# Patient Record
Sex: FEMALE | Race: OTHER | NOT HISPANIC OR LATINO
[De-identification: names, ages, dates, MRNs, and addresses within clinical notes are randomized per-mention and may not be internally consistent; named-entity substitution may affect disease eponyms.]

---

## 2018-05-15 ENCOUNTER — CHART COPY (OUTPATIENT)
Age: 68
End: 2018-05-15

## 2018-05-15 DIAGNOSIS — R29.898 OTHER SYMPTOMS AND SIGNS INVOLVING THE MUSCULOSKELETAL SYSTEM: ICD-10-CM

## 2018-05-20 ENCOUNTER — APPOINTMENT (OUTPATIENT)
Dept: MRI IMAGING | Facility: CLINIC | Age: 68
End: 2018-05-20
Payer: MEDICARE

## 2018-05-20 ENCOUNTER — OUTPATIENT (OUTPATIENT)
Dept: OUTPATIENT SERVICES | Facility: HOSPITAL | Age: 68
LOS: 1 days | End: 2018-05-20
Payer: MEDICARE

## 2018-05-20 DIAGNOSIS — R29.898 OTHER SYMPTOMS AND SIGNS INVOLVING THE MUSCULOSKELETAL SYSTEM: ICD-10-CM

## 2018-05-20 PROCEDURE — 72148 MRI LUMBAR SPINE W/O DYE: CPT

## 2018-05-20 PROCEDURE — 72148 MRI LUMBAR SPINE W/O DYE: CPT | Mod: 26

## 2018-05-21 ENCOUNTER — APPOINTMENT (OUTPATIENT)
Dept: SPINE | Facility: CLINIC | Age: 68
End: 2018-05-21
Payer: MEDICARE

## 2018-05-21 VITALS
HEART RATE: 59 BPM | DIASTOLIC BLOOD PRESSURE: 76 MMHG | BODY MASS INDEX: 33.49 KG/M2 | HEIGHT: 62 IN | WEIGHT: 182 LBS | SYSTOLIC BLOOD PRESSURE: 135 MMHG

## 2018-05-21 DIAGNOSIS — R26.89 OTHER ABNORMALITIES OF GAIT AND MOBILITY: ICD-10-CM

## 2018-05-21 DIAGNOSIS — R29.2 ABNORMAL REFLEX: ICD-10-CM

## 2018-05-21 PROCEDURE — 99213 OFFICE O/P EST LOW 20 MIN: CPT

## 2018-05-21 RX ORDER — FOLIC ACID 1 MG/1
1 TABLET ORAL
Qty: 30 | Refills: 0 | Status: ACTIVE | COMMUNITY
Start: 2017-10-06

## 2018-05-21 RX ORDER — METOPROLOL TARTRATE 75 MG/1
TABLET, FILM COATED ORAL
Refills: 0 | Status: ACTIVE | COMMUNITY

## 2018-05-21 RX ORDER — GABAPENTIN 900 MG/1
TABLET, FILM COATED ORAL
Refills: 0 | Status: ACTIVE | COMMUNITY

## 2018-05-21 RX ORDER — PAROXETINE HYDROCHLORIDE 40 MG/1
40 TABLET, FILM COATED ORAL
Refills: 0 | Status: ACTIVE | COMMUNITY

## 2018-05-21 RX ORDER — SIMVASTATIN 80 MG/1
TABLET, FILM COATED ORAL
Refills: 0 | Status: ACTIVE | COMMUNITY

## 2018-05-27 ENCOUNTER — APPOINTMENT (OUTPATIENT)
Dept: MRI IMAGING | Facility: CLINIC | Age: 68
End: 2018-05-27
Payer: MEDICARE

## 2018-05-27 ENCOUNTER — OUTPATIENT (OUTPATIENT)
Dept: OUTPATIENT SERVICES | Facility: HOSPITAL | Age: 68
LOS: 1 days | End: 2018-05-27
Payer: MEDICARE

## 2018-05-27 DIAGNOSIS — M54.9 DORSALGIA, UNSPECIFIED: ICD-10-CM

## 2018-05-27 PROCEDURE — 72141 MRI NECK SPINE W/O DYE: CPT

## 2018-05-27 PROCEDURE — 70551 MRI BRAIN STEM W/O DYE: CPT

## 2018-05-27 PROCEDURE — 70551 MRI BRAIN STEM W/O DYE: CPT | Mod: 26

## 2018-05-27 PROCEDURE — 72141 MRI NECK SPINE W/O DYE: CPT | Mod: 26

## 2018-06-01 ENCOUNTER — APPOINTMENT (OUTPATIENT)
Dept: SPINE | Facility: CLINIC | Age: 68
End: 2018-06-01
Payer: MEDICARE

## 2018-06-01 VITALS
HEIGHT: 62 IN | SYSTOLIC BLOOD PRESSURE: 125 MMHG | HEART RATE: 57 BPM | DIASTOLIC BLOOD PRESSURE: 84 MMHG | WEIGHT: 182 LBS | BODY MASS INDEX: 33.49 KG/M2

## 2018-06-01 PROCEDURE — 99213 OFFICE O/P EST LOW 20 MIN: CPT

## 2018-06-01 RX ORDER — AMLODIPINE BESYLATE 5 MG/1
TABLET ORAL
Refills: 0 | Status: ACTIVE | COMMUNITY

## 2018-06-01 RX ORDER — BENAZEPRIL HYDROCHLORIDE 5 MG/1
TABLET ORAL
Refills: 0 | Status: ACTIVE | COMMUNITY

## 2018-06-02 ENCOUNTER — OUTPATIENT (OUTPATIENT)
Dept: OUTPATIENT SERVICES | Facility: HOSPITAL | Age: 68
LOS: 1 days | End: 2018-06-02
Payer: MEDICARE

## 2018-06-02 ENCOUNTER — APPOINTMENT (OUTPATIENT)
Dept: CT IMAGING | Facility: CLINIC | Age: 68
End: 2018-06-02
Payer: MEDICARE

## 2018-06-02 DIAGNOSIS — G95.9 DISEASE OF SPINAL CORD, UNSPECIFIED: ICD-10-CM

## 2018-06-02 PROCEDURE — 72125 CT NECK SPINE W/O DYE: CPT | Mod: 26

## 2018-06-02 PROCEDURE — 72125 CT NECK SPINE W/O DYE: CPT

## 2018-07-01 ENCOUNTER — OUTPATIENT (OUTPATIENT)
Dept: OUTPATIENT SERVICES | Facility: HOSPITAL | Age: 68
LOS: 1 days | End: 2018-07-01
Payer: MEDICARE

## 2018-07-01 PROCEDURE — G9001: CPT

## 2018-07-10 ENCOUNTER — OUTPATIENT (OUTPATIENT)
Dept: OUTPATIENT SERVICES | Facility: HOSPITAL | Age: 68
LOS: 1 days | End: 2018-07-10
Payer: MEDICARE

## 2018-07-10 VITALS
HEART RATE: 57 BPM | DIASTOLIC BLOOD PRESSURE: 58 MMHG | TEMPERATURE: 98 F | HEIGHT: 62 IN | OXYGEN SATURATION: 98 % | SYSTOLIC BLOOD PRESSURE: 112 MMHG | WEIGHT: 190.04 LBS | RESPIRATION RATE: 16 BRPM

## 2018-07-10 DIAGNOSIS — N39.0 URINARY TRACT INFECTION, SITE NOT SPECIFIED: Chronic | ICD-10-CM

## 2018-07-10 DIAGNOSIS — Z29.9 ENCOUNTER FOR PROPHYLACTIC MEASURES, UNSPECIFIED: ICD-10-CM

## 2018-07-10 DIAGNOSIS — E78.00 PURE HYPERCHOLESTEROLEMIA, UNSPECIFIED: ICD-10-CM

## 2018-07-10 DIAGNOSIS — Z98.890 OTHER SPECIFIED POSTPROCEDURAL STATES: Chronic | ICD-10-CM

## 2018-07-10 DIAGNOSIS — Z01.818 ENCOUNTER FOR OTHER PREPROCEDURAL EXAMINATION: ICD-10-CM

## 2018-07-10 DIAGNOSIS — M95.9 ACQUIRED DEFORMITY OF MUSCULOSKELETAL SYSTEM, UNSPECIFIED: ICD-10-CM

## 2018-07-10 DIAGNOSIS — M48.02 SPINAL STENOSIS, CERVICAL REGION: ICD-10-CM

## 2018-07-10 DIAGNOSIS — Z86.79 PERSONAL HISTORY OF OTHER DISEASES OF THE CIRCULATORY SYSTEM: ICD-10-CM

## 2018-07-10 LAB
ALBUMIN SERPL ELPH-MCNC: 4.4 G/DL — SIGNIFICANT CHANGE UP (ref 3.3–5)
ALP SERPL-CCNC: 73 U/L — SIGNIFICANT CHANGE UP (ref 40–120)
ALT FLD-CCNC: 16 U/L — SIGNIFICANT CHANGE UP (ref 10–45)
ANION GAP SERPL CALC-SCNC: 17 MMOL/L — SIGNIFICANT CHANGE UP (ref 5–17)
AST SERPL-CCNC: 18 U/L — SIGNIFICANT CHANGE UP (ref 10–40)
BILIRUB SERPL-MCNC: 0.4 MG/DL — SIGNIFICANT CHANGE UP (ref 0.2–1.2)
BLD GP AB SCN SERPL QL: NEGATIVE — SIGNIFICANT CHANGE UP
BUN SERPL-MCNC: 26 MG/DL — HIGH (ref 7–23)
CALCIUM SERPL-MCNC: 9.8 MG/DL — SIGNIFICANT CHANGE UP (ref 8.4–10.5)
CHLORIDE SERPL-SCNC: 99 MMOL/L — SIGNIFICANT CHANGE UP (ref 96–108)
CO2 SERPL-SCNC: 21 MMOL/L — LOW (ref 22–31)
CREAT SERPL-MCNC: 1.65 MG/DL — HIGH (ref 0.5–1.3)
GLUCOSE SERPL-MCNC: 115 MG/DL — HIGH (ref 70–99)
HBA1C BLD-MCNC: 6.2 % — HIGH (ref 4–5.6)
HCT VFR BLD CALC: 45.3 % — HIGH (ref 34.5–45)
HGB BLD-MCNC: 14.7 G/DL — SIGNIFICANT CHANGE UP (ref 11.5–15.5)
MCHC RBC-ENTMCNC: 30.1 PG — SIGNIFICANT CHANGE UP (ref 27–34)
MCHC RBC-ENTMCNC: 32.5 GM/DL — SIGNIFICANT CHANGE UP (ref 32–36)
MCV RBC AUTO: 92.8 FL — SIGNIFICANT CHANGE UP (ref 80–100)
MRSA PCR RESULT.: SIGNIFICANT CHANGE UP
PLATELET # BLD AUTO: 314 K/UL — SIGNIFICANT CHANGE UP (ref 150–400)
POTASSIUM SERPL-MCNC: 4.6 MMOL/L — SIGNIFICANT CHANGE UP (ref 3.5–5.3)
POTASSIUM SERPL-SCNC: 4.6 MMOL/L — SIGNIFICANT CHANGE UP (ref 3.5–5.3)
PROT SERPL-MCNC: 8.5 G/DL — HIGH (ref 6–8.3)
RBC # BLD: 4.88 M/UL — SIGNIFICANT CHANGE UP (ref 3.8–5.2)
RBC # FLD: 13 % — SIGNIFICANT CHANGE UP (ref 10.3–14.5)
RH IG SCN BLD-IMP: POSITIVE — SIGNIFICANT CHANGE UP
S AUREUS DNA NOSE QL NAA+PROBE: DETECTED
SODIUM SERPL-SCNC: 137 MMOL/L — SIGNIFICANT CHANGE UP (ref 135–145)
WBC # BLD: 10.58 K/UL — HIGH (ref 3.8–10.5)
WBC # FLD AUTO: 10.58 K/UL — HIGH (ref 3.8–10.5)

## 2018-07-10 PROCEDURE — 87641 MR-STAPH DNA AMP PROBE: CPT

## 2018-07-10 PROCEDURE — 80053 COMPREHEN METABOLIC PANEL: CPT

## 2018-07-10 PROCEDURE — 86850 RBC ANTIBODY SCREEN: CPT

## 2018-07-10 PROCEDURE — 86900 BLOOD TYPING SEROLOGIC ABO: CPT

## 2018-07-10 PROCEDURE — 85027 COMPLETE CBC AUTOMATED: CPT

## 2018-07-10 PROCEDURE — G0463: CPT

## 2018-07-10 PROCEDURE — 86901 BLOOD TYPING SEROLOGIC RH(D): CPT

## 2018-07-10 PROCEDURE — 83036 HEMOGLOBIN GLYCOSYLATED A1C: CPT

## 2018-07-10 PROCEDURE — 87640 STAPH A DNA AMP PROBE: CPT

## 2018-07-10 RX ORDER — SODIUM CHLORIDE 9 MG/ML
3 INJECTION INTRAMUSCULAR; INTRAVENOUS; SUBCUTANEOUS EVERY 8 HOURS
Qty: 0 | Refills: 0 | Status: DISCONTINUED | OUTPATIENT
Start: 2018-07-20 | End: 2018-07-20

## 2018-07-10 RX ORDER — LIDOCAINE HCL 20 MG/ML
0.2 VIAL (ML) INJECTION ONCE
Qty: 0 | Refills: 0 | Status: DISCONTINUED | OUTPATIENT
Start: 2018-07-20 | End: 2018-07-20

## 2018-07-10 RX ORDER — CEFAZOLIN SODIUM 1 G
2000 VIAL (EA) INJECTION ONCE
Qty: 0 | Refills: 0 | Status: DISCONTINUED | OUTPATIENT
Start: 2018-07-20 | End: 2018-07-21

## 2018-07-10 NOTE — H&P PST ADULT - PSH
Fracture  ORIF Left wrist  1/17/13  Bleeding ulcer  stomach surgery for bleeding ulcer  H/O colonoscopy Bleeding ulcer  stomach surgery for bleeding ulcer  Chronic UTI  Pt reports presently on 2nd dose of antibiotics 7/5/18 10 days, Dr Herman aware, pt going for m/eval 7/13/18.  Fracture  ORIF Left wrist  1/17/13  H/O colonoscopy    History of lumbar surgery  2013 Bleeding ulcer  stomach surgery for bleeding ulcer  Chronic UTI  Pt reports presently on 2nd dose of antibiotics 7/5/18 10 days, Dr Herman aware, pt going for m/eval 7/13/18.  Fracture  ORIF Left wrist  1/17/13  H/O cardiac catheterization  2013, no intervention needed, treated medically  H/O colonoscopy    History of lumbar surgery  2013

## 2018-07-10 NOTE — H&P PST ADULT - PMH
Anxiety    Bleeding ulcer  stomach ulcer 4 years ago  Hypercholesterolemia    H/O: HTN (hypertension)    Lumbar stenosis Acute hepatitis C virus infection without hepatic coma  treated 2015- Resolved  Anxiety    Balance disorder    Bleeding ulcer  stomach ulcer 2011  H/O: HTN (hypertension)    Hypercholesterolemia    Lumbar stenosis    Spinal stenosis in cervical region Acute hepatitis C virus infection without hepatic coma  treated 2015- Resolved  Anxiety    Balance disorder    Bleeding ulcer  stomach ulcer 2011  Coronary artery disease involving native coronary artery of native heart without angina pectoris    H/O: HTN (hypertension)    Hypercholesterolemia    Lumbar stenosis    Spinal stenosis in cervical region

## 2018-07-10 NOTE — H&P PST ADULT - PROBLEM SELECTOR PLAN 1
C3-7 anterior cervical discectomy and fusion   Check labs, MRSA , MSSA  Call for M/eval (Chronic UTI), ekg C3-7 anterior cervical discectomy and fusion   Check labs, MRSA , MSSA  Call for M/eval (Chronic UTI), ekg and Card eval

## 2018-07-10 NOTE — H&P PST ADULT - ASSESSMENT
CAPRINI SCORE [CLOT]    AGE RELATED RISK FACTORS                                                       MOBILITY RELATED FACTORS  [ ] Age 41-60 years                                            (1 Point)                  [ ] Bed rest                                                        (1 Point)  [ ] Age: 61-74 years                                           (2 Points)                 [ ] Plaster cast                                                   (2 Points)  [ ] Age= 75 years                                              (3 Points)                 [ ] Bed bound for more than 72 hours                 (2 Points)    DISEASE RELATED RISK FACTORS                                               GENDER SPECIFIC FACTORS  [ ] Edema in the lower extremities                       (1 Point)                  [ ] Pregnancy                                                     (1 Point)  [ ] Varicose veins                                               (1 Point)                  [ ] Post-partum < 6 weeks                                   (1 Point)             [ ] BMI > 25 Kg/m2                                            (1 Point)                  [ ] Hormonal therapy  or oral contraception          (1 Point)                 [ ] Sepsis (in the previous month)                        (1 Point)                  [ ] History of pregnancy complications                 (1 point)  [ ] Pneumonia or serious lung disease                                               [ ] Unexplained or recurrent                     (1 Point)           (in the previous month)                               (1 Point)  [ ] Abnormal pulmonary function test                     (1 Point)                 SURGERY RELATED RISK FACTORS  [ ] Acute myocardial infarction                              (1 Point)                 [ ]  Section                                             (1 Point)  [ ] Congestive heart failure (in the previous month)  (1 Point)               [ ] Minor surgery                                                  (1 Point)   [ ] Inflammatory bowel disease                             (1 Point)                 [ ] Arthroscopic surgery                                        (2 Points)  [ ] Central venous access                                      (2 Points)                [ ] General surgery lasting more than 45 minutes   (2 Points)       [ ] Stroke (in the previous month)                          (5 Points)               [ ] Elective arthroplasty                                         (5 Points)                                                                                                                                               HEMATOLOGY RELATED FACTORS                                                 TRAUMA RELATED RISK FACTORS  [ ] Prior episodes of VTE                                     (3 Points)                 [ ] Fracture of the hip, pelvis, or leg                       (5 Points)  [ ] Positive family history for VTE                         (3 Points)                 [ ] Acute spinal cord injury (in the previous month)  (5 Points)  [ ] Prothrombin 99499 A                                     (3 Points)                 [ ] Paralysis  (less than 1 month)                             (5 Points)  [ ] Factor V Leiden                                             (3 Points)                  [ ] Multiple Trauma within 1 month                        (5 Points)  [ ] Lupus anticoagulants                                     (3 Points)                                                           [ ] Anticardiolipin antibodies                               (3 Points)                                                       [ ] High homocysteine in the blood                      (3 Points)                                             [ ] Other congenital or acquired thrombophilia      (3 Points)                                                [ ] Heparin induced thrombocytopenia                  (3 Points)                                          Total Score [    5      ]

## 2018-07-10 NOTE — H&P PST ADULT - ACTIVITY
Able to walk block with walker , able to climb stairs, moderate house work Able to walk block with walker slow, able to climb stairs, moderate house work

## 2018-07-10 NOTE — H&P PST ADULT - HISTORY OF PRESENT ILLNESS
68 y old female PMH  HTN, HLD, Chronic back pain, difficulty walking uses walker. S/P lumbar surgery 2013. S/P MRI and Cat scan. Presents for C3-7 anterior cervical discectomy and fusion 7/17/18. Pt reports she has a UTI 2nd round of antibiotics started 7/4/18 for 10 days, Dr Herman is aware. Pt is goine for M/eval 7/13/18. 68 y old female PMH  HTN, HLD, Chronic back pain, difficulty walking uses walker. S/P lumbar surgery 2013. S/P MRI Spine. Presents for C3-7 anterior cervical discectomy and fusion 7/17/18. Pt reports she has a UTI 2nd round of antibiotics started 7/4/18 for 10 days, Dr Herman is aware. Pt is goine for M/eval 7/13/18.

## 2018-07-10 NOTE — H&P PST ADULT - NSANTHOSAYNRD_GEN_A_CORE
No. LEIA screening performed.  STOP BANG Legend: 0-2 = LOW Risk; 3-4 = INTERMEDIATE Risk; 5-8 = HIGH Risk

## 2018-07-13 ENCOUNTER — OTHER (OUTPATIENT)
Age: 68
End: 2018-07-13

## 2018-07-13 DIAGNOSIS — Z22.321 CARRIER OR SUSPECTED CARRIER OF METHICILLIN SUSCEPTIBLE STAPHYLOCOCCUS AUREUS: ICD-10-CM

## 2018-07-19 ENCOUNTER — TRANSCRIPTION ENCOUNTER (OUTPATIENT)
Age: 68
End: 2018-07-19

## 2018-07-20 ENCOUNTER — INPATIENT (INPATIENT)
Facility: HOSPITAL | Age: 68
LOS: 2 days | Discharge: HOME | DRG: 472 | End: 2018-07-23
Attending: NEUROLOGICAL SURGERY | Admitting: NEUROLOGICAL SURGERY
Payer: MEDICARE

## 2018-07-20 ENCOUNTER — APPOINTMENT (OUTPATIENT)
Dept: SPINE | Facility: HOSPITAL | Age: 68
End: 2018-07-20

## 2018-07-20 VITALS
OXYGEN SATURATION: 97 % | RESPIRATION RATE: 18 BRPM | WEIGHT: 190.04 LBS | DIASTOLIC BLOOD PRESSURE: 70 MMHG | TEMPERATURE: 98 F | HEIGHT: 62 IN | SYSTOLIC BLOOD PRESSURE: 104 MMHG | HEART RATE: 60 BPM

## 2018-07-20 DIAGNOSIS — M95.9 ACQUIRED DEFORMITY OF MUSCULOSKELETAL SYSTEM, UNSPECIFIED: ICD-10-CM

## 2018-07-20 DIAGNOSIS — Z98.890 OTHER SPECIFIED POSTPROCEDURAL STATES: Chronic | ICD-10-CM

## 2018-07-20 DIAGNOSIS — N39.0 URINARY TRACT INFECTION, SITE NOT SPECIFIED: Chronic | ICD-10-CM

## 2018-07-20 LAB
ANION GAP SERPL CALC-SCNC: 17 MMOL/L — SIGNIFICANT CHANGE UP (ref 5–17)
APPEARANCE UR: CLEAR — SIGNIFICANT CHANGE UP
BACTERIA # UR AUTO: ABNORMAL /HPF
BASOPHILS # BLD AUTO: 0 K/UL — SIGNIFICANT CHANGE UP (ref 0–0.2)
BASOPHILS NFR BLD AUTO: 0.4 % — SIGNIFICANT CHANGE UP (ref 0–2)
BILIRUB UR-MCNC: NEGATIVE — SIGNIFICANT CHANGE UP
BUN SERPL-MCNC: 26 MG/DL — HIGH (ref 7–23)
CALCIUM SERPL-MCNC: 9.3 MG/DL — SIGNIFICANT CHANGE UP (ref 8.4–10.5)
CHLORIDE SERPL-SCNC: 99 MMOL/L — SIGNIFICANT CHANGE UP (ref 96–108)
CO2 SERPL-SCNC: 21 MMOL/L — LOW (ref 22–31)
COLOR SPEC: SIGNIFICANT CHANGE UP
CREAT SERPL-MCNC: 1.12 MG/DL — SIGNIFICANT CHANGE UP (ref 0.5–1.3)
DIFF PNL FLD: ABNORMAL
EOSINOPHIL # BLD AUTO: 0.1 K/UL — SIGNIFICANT CHANGE UP (ref 0–0.5)
EOSINOPHIL NFR BLD AUTO: 0.8 % — SIGNIFICANT CHANGE UP (ref 0–6)
EPI CELLS # UR: SIGNIFICANT CHANGE UP /HPF
GLUCOSE BLDC GLUCOMTR-MCNC: 127 MG/DL — HIGH (ref 70–99)
GLUCOSE SERPL-MCNC: 192 MG/DL — HIGH (ref 70–99)
GLUCOSE UR QL: NEGATIVE — SIGNIFICANT CHANGE UP
HCT VFR BLD CALC: 44.8 % — SIGNIFICANT CHANGE UP (ref 34.5–45)
HGB BLD-MCNC: 14.6 G/DL — SIGNIFICANT CHANGE UP (ref 11.5–15.5)
KETONES UR-MCNC: NEGATIVE — SIGNIFICANT CHANGE UP
LEUKOCYTE ESTERASE UR-ACNC: ABNORMAL
LYMPHOCYTES # BLD AUTO: 1.4 K/UL — SIGNIFICANT CHANGE UP (ref 1–3.3)
LYMPHOCYTES # BLD AUTO: 13.6 % — SIGNIFICANT CHANGE UP (ref 13–44)
MCHC RBC-ENTMCNC: 30 PG — SIGNIFICANT CHANGE UP (ref 27–34)
MCHC RBC-ENTMCNC: 32.6 GM/DL — SIGNIFICANT CHANGE UP (ref 32–36)
MCV RBC AUTO: 91.8 FL — SIGNIFICANT CHANGE UP (ref 80–100)
MONOCYTES # BLD AUTO: 0.1 K/UL — SIGNIFICANT CHANGE UP (ref 0–0.9)
MONOCYTES NFR BLD AUTO: 1.2 % — LOW (ref 2–14)
NEUTROPHILS # BLD AUTO: 8.8 K/UL — HIGH (ref 1.8–7.4)
NEUTROPHILS NFR BLD AUTO: 84 % — HIGH (ref 43–77)
NITRITE UR-MCNC: NEGATIVE — SIGNIFICANT CHANGE UP
PH UR: 6.5 — SIGNIFICANT CHANGE UP (ref 5–8)
PLATELET # BLD AUTO: 306 K/UL — SIGNIFICANT CHANGE UP (ref 150–400)
POTASSIUM SERPL-MCNC: 4.4 MMOL/L — SIGNIFICANT CHANGE UP (ref 3.5–5.3)
POTASSIUM SERPL-SCNC: 4.4 MMOL/L — SIGNIFICANT CHANGE UP (ref 3.5–5.3)
PROT UR-MCNC: SIGNIFICANT CHANGE UP
RBC # BLD: 4.88 M/UL — SIGNIFICANT CHANGE UP (ref 3.8–5.2)
RBC # FLD: 11.6 % — SIGNIFICANT CHANGE UP (ref 10.3–14.5)
RBC CASTS # UR COMP ASSIST: SIGNIFICANT CHANGE UP /HPF (ref 0–2)
SODIUM SERPL-SCNC: 137 MMOL/L — SIGNIFICANT CHANGE UP (ref 135–145)
SP GR SPEC: 1.01 — SIGNIFICANT CHANGE UP (ref 1.01–1.02)
UROBILINOGEN FLD QL: NEGATIVE — SIGNIFICANT CHANGE UP
WBC # BLD: 10.5 K/UL — SIGNIFICANT CHANGE UP (ref 3.8–10.5)
WBC # FLD AUTO: 10.5 K/UL — SIGNIFICANT CHANGE UP (ref 3.8–10.5)
WBC UR QL: ABNORMAL /HPF (ref 0–5)

## 2018-07-20 PROCEDURE — 22853 INSJ BIOMECHANICAL DEVICE: CPT | Mod: 59,GC

## 2018-07-20 PROCEDURE — 22551 ARTHRD ANT NTRBDY CERVICAL: CPT | Mod: GC

## 2018-07-20 PROCEDURE — 99291 CRITICAL CARE FIRST HOUR: CPT

## 2018-07-20 PROCEDURE — 22552 ARTHRD ANT NTRBD CERVICAL EA: CPT | Mod: GC

## 2018-07-20 RX ORDER — DEXAMETHASONE 0.5 MG/5ML
4 ELIXIR ORAL EVERY 6 HOURS
Qty: 0 | Refills: 0 | Status: COMPLETED | OUTPATIENT
Start: 2018-07-20 | End: 2018-07-21

## 2018-07-20 RX ORDER — DEXTROSE MONOHYDRATE, SODIUM CHLORIDE, AND POTASSIUM CHLORIDE 50; .745; 4.5 G/1000ML; G/1000ML; G/1000ML
1000 INJECTION, SOLUTION INTRAVENOUS
Qty: 0 | Refills: 0 | Status: DISCONTINUED | OUTPATIENT
Start: 2018-07-20 | End: 2018-07-21

## 2018-07-20 RX ORDER — CEFAZOLIN SODIUM 1 G
2000 VIAL (EA) INJECTION EVERY 8 HOURS
Qty: 0 | Refills: 0 | Status: COMPLETED | OUTPATIENT
Start: 2018-07-20 | End: 2018-07-21

## 2018-07-20 RX ORDER — ACETAMINOPHEN 500 MG
650 TABLET ORAL EVERY 6 HOURS
Qty: 0 | Refills: 0 | Status: DISCONTINUED | OUTPATIENT
Start: 2018-07-20 | End: 2018-07-23

## 2018-07-20 RX ORDER — ACETAMINOPHEN 500 MG
1000 TABLET ORAL ONCE
Qty: 0 | Refills: 0 | Status: COMPLETED | OUTPATIENT
Start: 2018-07-20 | End: 2018-07-20

## 2018-07-20 RX ORDER — HYDROMORPHONE HYDROCHLORIDE 2 MG/ML
0.4 INJECTION INTRAMUSCULAR; INTRAVENOUS; SUBCUTANEOUS
Qty: 0 | Refills: 0 | Status: DISCONTINUED | OUTPATIENT
Start: 2018-07-20 | End: 2018-07-21

## 2018-07-20 RX ORDER — ONDANSETRON 8 MG/1
4 TABLET, FILM COATED ORAL ONCE
Qty: 0 | Refills: 0 | Status: DISCONTINUED | OUTPATIENT
Start: 2018-07-20 | End: 2018-07-21

## 2018-07-20 RX ORDER — METOPROLOL TARTRATE 50 MG
100 TABLET ORAL
Qty: 0 | Refills: 0 | Status: DISCONTINUED | OUTPATIENT
Start: 2018-07-20 | End: 2018-07-23

## 2018-07-20 RX ORDER — AMLODIPINE BESYLATE 2.5 MG/1
10 TABLET ORAL DAILY
Qty: 0 | Refills: 0 | Status: DISCONTINUED | OUTPATIENT
Start: 2018-07-20 | End: 2018-07-23

## 2018-07-20 RX ORDER — LISINOPRIL 2.5 MG/1
10 TABLET ORAL DAILY
Qty: 0 | Refills: 0 | Status: DISCONTINUED | OUTPATIENT
Start: 2018-07-20 | End: 2018-07-23

## 2018-07-20 RX ORDER — FOLIC ACID 0.8 MG
1 TABLET ORAL DAILY
Qty: 0 | Refills: 0 | Status: DISCONTINUED | OUTPATIENT
Start: 2018-07-20 | End: 2018-07-23

## 2018-07-20 RX ORDER — OXYCODONE HYDROCHLORIDE 5 MG/1
5 TABLET ORAL EVERY 6 HOURS
Qty: 0 | Refills: 0 | Status: DISCONTINUED | OUTPATIENT
Start: 2018-07-20 | End: 2018-07-23

## 2018-07-20 RX ORDER — SIMVASTATIN 20 MG/1
40 TABLET, FILM COATED ORAL AT BEDTIME
Qty: 0 | Refills: 0 | Status: DISCONTINUED | OUTPATIENT
Start: 2018-07-20 | End: 2018-07-23

## 2018-07-20 RX ADMIN — HYDROMORPHONE HYDROCHLORIDE 0.4 MILLIGRAM(S): 2 INJECTION INTRAMUSCULAR; INTRAVENOUS; SUBCUTANEOUS at 12:50

## 2018-07-20 RX ADMIN — Medication 100 MILLIGRAM(S): at 17:03

## 2018-07-20 RX ADMIN — Medication 1 MILLIGRAM(S): at 15:50

## 2018-07-20 RX ADMIN — HYDROMORPHONE HYDROCHLORIDE 0.4 MILLIGRAM(S): 2 INJECTION INTRAMUSCULAR; INTRAVENOUS; SUBCUTANEOUS at 12:20

## 2018-07-20 RX ADMIN — Medication 4 MILLIGRAM(S): at 18:02

## 2018-07-20 RX ADMIN — Medication 40 MILLIGRAM(S): at 15:19

## 2018-07-20 RX ADMIN — Medication 1000 MILLIGRAM(S): at 17:30

## 2018-07-20 RX ADMIN — OXYCODONE HYDROCHLORIDE 5 MILLIGRAM(S): 5 TABLET ORAL at 15:09

## 2018-07-20 RX ADMIN — DEXTROSE MONOHYDRATE, SODIUM CHLORIDE, AND POTASSIUM CHLORIDE 75 MILLILITER(S): 50; .745; 4.5 INJECTION, SOLUTION INTRAVENOUS at 11:30

## 2018-07-20 RX ADMIN — Medication 100 MILLIGRAM(S): at 18:02

## 2018-07-20 RX ADMIN — SIMVASTATIN 40 MILLIGRAM(S): 20 TABLET, FILM COATED ORAL at 22:25

## 2018-07-20 RX ADMIN — SODIUM CHLORIDE 3 MILLILITER(S): 9 INJECTION INTRAMUSCULAR; INTRAVENOUS; SUBCUTANEOUS at 06:55

## 2018-07-20 RX ADMIN — HYDROMORPHONE HYDROCHLORIDE 0.4 MILLIGRAM(S): 2 INJECTION INTRAMUSCULAR; INTRAVENOUS; SUBCUTANEOUS at 11:40

## 2018-07-20 RX ADMIN — HYDROMORPHONE HYDROCHLORIDE 0.4 MILLIGRAM(S): 2 INJECTION INTRAMUSCULAR; INTRAVENOUS; SUBCUTANEOUS at 12:09

## 2018-07-20 RX ADMIN — HYDROMORPHONE HYDROCHLORIDE 0.4 MILLIGRAM(S): 2 INJECTION INTRAMUSCULAR; INTRAVENOUS; SUBCUTANEOUS at 11:28

## 2018-07-20 RX ADMIN — Medication 4 MILLIGRAM(S): at 12:53

## 2018-07-20 RX ADMIN — HYDROMORPHONE HYDROCHLORIDE 0.4 MILLIGRAM(S): 2 INJECTION INTRAMUSCULAR; INTRAVENOUS; SUBCUTANEOUS at 12:40

## 2018-07-20 RX ADMIN — Medication 400 MILLIGRAM(S): at 17:09

## 2018-07-20 RX ADMIN — OXYCODONE HYDROCHLORIDE 5 MILLIGRAM(S): 5 TABLET ORAL at 14:39

## 2018-07-20 RX ADMIN — HYDROMORPHONE HYDROCHLORIDE 0.4 MILLIGRAM(S): 2 INJECTION INTRAMUSCULAR; INTRAVENOUS; SUBCUTANEOUS at 12:00

## 2018-07-20 RX ADMIN — HYDROMORPHONE HYDROCHLORIDE 0.4 MILLIGRAM(S): 2 INJECTION INTRAMUSCULAR; INTRAVENOUS; SUBCUTANEOUS at 11:48

## 2018-07-20 NOTE — PHYSICAL THERAPY INITIAL EVALUATION ADULT - TRANSFER TRAINING, PT EVAL
GOAL: Pt will transfer sit to/from stand independently with least restrictive device as appropriate in 2 weeks

## 2018-07-20 NOTE — PROGRESS NOTE ADULT - ASSESSMENT
ASSESSMENT: C3-7 ACDF, POD0.  PMH  HTN, HLD, Chronic back pain, difficulty walking uses walker. S/P lumbar surgery 2013.    NEURO:  Surgical drains per NSGY, Pain control  Possible transfer to floor in AM  Activity: [x] OOB as tolerated [] Bedrest [] PT [] OT [] PMNR    PULM:  incentive spirometry    CV:  -150mmHg, d/c a-line in AM if hemodynamically stable    RENAL:  Dickey d/c  IVF until good PO intake    GI:  Diet: Dysphagia screen and then advance diet as tolerated  GI prophylaxis [x] not indicated [] PPI [] other:  Bowel regimen [x] colace []x senna [] other:    ENDO:   Goal euglycemia (-180)    HEME/ONC:  VTE prophylaxis: [x] SCDs [] chemoprophylaxis [x] hold chemoprophylaxis due to: fresh postop [] high risk of DVT/PE on admission due to:    ID:  Marie-op antibiotics    SOCIAL/FAMILY:  [x] awaiting [] updated at bedside [] family meeting    CODE STATUS:  [x] Full Code [] DNR [] DNI [] Palliative/Comfort Care    DISPOSITION:  [x] ICU [] Stroke Unit [] Floor [] EMU [] RCU [] PCU      Time seen: 1700  Time spent: 35 critical care minutes ASSESSMENT: C3-7 ACDF, POD0.  PMH  HTN, HLD, Chronic back pain, difficulty walking uses walker. S/P lumbar surgery 2013.    NEURO:  Surgical drains per NSGY, Pain control  Possible transfer to floor in AM  Activity: [x] OOB as tolerated [] Bedrest [] PT [] OT [] PMNR    PULM:  incentive spirometry    CV:  -150mmHg, d/c a-line in AM if hemodynamically stable    RENAL:  Dickey d/c  IVF until good PO intake    GI:  Diet: Dysphagia screen and then advance diet as tolerated  GI prophylaxis [x] not indicated [] PPI [] other:  Bowel regimen [x] colace []x senna [] other:    ENDO:   Goal euglycemia (-180)    HEME/ONC:  VTE prophylaxis: [x] SCDs [] chemoprophylaxis [x] hold chemoprophylaxis due to: fresh postop [] high risk of DVT/PE on admission due to:    ID:  Marie-op antibiotics    SOCIAL/FAMILY:  [x] awaiting [] updated at bedside [] family meeting    CODE STATUS:  [x] Full Code [] DNR [] DNI [] Palliative/Comfort Care    DISPOSITION:  [x] ICU [] Stroke Unit [] Floor [] EMU [] RCU [] PCU      Time seen: 1700  Time spent: 35 critical care minutes for risk for post operative hemorrhage, cervical myelopathy, cord compression, airway compromise

## 2018-07-20 NOTE — PHYSICAL THERAPY INITIAL EVALUATION ADULT - GAIT TRAINING, PT EVAL
GOAL: Pt will ambulate 200 feet independently with least restrictive device as appropriate in 2 weeks

## 2018-07-20 NOTE — PHYSICAL THERAPY INITIAL EVALUATION ADULT - GENERAL OBSERVATIONS, REHAB EVAL
Pt beni 35 min eval well. Pt agreed to get into chair. Pt rec'd in bed, peripheral IV lines, +chi, premedicated, and NAD.

## 2018-07-20 NOTE — PATIENT PROFILE ADULT. - PSH
Bleeding ulcer  stomach surgery for bleeding ulcer  Chronic UTI  Pt reports presently on 2nd dose of antibiotics 7/5/18 10 days, Dr Herman aware, pt going for m/eval 7/13/18.  Fracture  ORIF Left wrist  1/17/13  H/O cardiac catheterization  2013, no intervention needed, treated medically  H/O colonoscopy    History of lumbar surgery  2013

## 2018-07-20 NOTE — PATIENT PROFILE ADULT. - PMH
Acute hepatitis C virus infection without hepatic coma  treated 2015- Resolved  Anxiety    Balance disorder    Bleeding ulcer  stomach ulcer 2011  Coronary artery disease involving native coronary artery of native heart without angina pectoris    H/O: HTN (hypertension)    Hypercholesterolemia    Lumbar stenosis    Spinal stenosis in cervical region

## 2018-07-20 NOTE — PHYSICAL THERAPY INITIAL EVALUATION ADULT - PLANNED THERAPY INTERVENTIONS, PT EVAL
bed mobility training/transfer training/GOAL: Pt will negotiate 9 steps with unilateral handrail in a step-to pattern independently in 2 weeks/gait training

## 2018-07-20 NOTE — PHYSICAL THERAPY INITIAL EVALUATION ADULT - PERTINENT HX OF CURRENT PROBLEM, REHAB EVAL
69 y/o female with h/o chronic back pain, s/p lumbar sugery 2013. Pt now presents s/p C3-C7 anterior cervical discectomy and fusion.

## 2018-07-20 NOTE — PHYSICAL THERAPY INITIAL EVALUATION ADULT - ADDITIONAL COMMENTS
Pt states she lives in an apartment with her spouse with about 5 steps to enter building (+handrail), and a flight of steps to apartment (+handrail). Pt states prior to admission being independent with all functional mobility and ADLs. Pt states she was ambulatory using RW. Pt states spouse would assist her at times on stairs.

## 2018-07-21 LAB
ANION GAP SERPL CALC-SCNC: 17 MMOL/L — SIGNIFICANT CHANGE UP (ref 5–17)
BASOPHILS # BLD AUTO: 0 K/UL — SIGNIFICANT CHANGE UP (ref 0–0.2)
BASOPHILS NFR BLD AUTO: 0.2 % — SIGNIFICANT CHANGE UP (ref 0–2)
BUN SERPL-MCNC: 18 MG/DL — SIGNIFICANT CHANGE UP (ref 7–23)
CALCIUM SERPL-MCNC: 9.5 MG/DL — SIGNIFICANT CHANGE UP (ref 8.4–10.5)
CHLORIDE SERPL-SCNC: 95 MMOL/L — LOW (ref 96–108)
CO2 SERPL-SCNC: 21 MMOL/L — LOW (ref 22–31)
CREAT SERPL-MCNC: 0.97 MG/DL — SIGNIFICANT CHANGE UP (ref 0.5–1.3)
CULTURE RESULTS: NO GROWTH — SIGNIFICANT CHANGE UP
EOSINOPHIL # BLD AUTO: 0 K/UL — SIGNIFICANT CHANGE UP (ref 0–0.5)
EOSINOPHIL NFR BLD AUTO: 0.2 % — SIGNIFICANT CHANGE UP (ref 0–6)
GLUCOSE BLDC GLUCOMTR-MCNC: 166 MG/DL — HIGH (ref 70–99)
GLUCOSE BLDC GLUCOMTR-MCNC: 180 MG/DL — HIGH (ref 70–99)
GLUCOSE SERPL-MCNC: 189 MG/DL — HIGH (ref 70–99)
HCT VFR BLD CALC: 46.9 % — HIGH (ref 34.5–45)
HGB BLD-MCNC: 15.2 G/DL — SIGNIFICANT CHANGE UP (ref 11.5–15.5)
LYMPHOCYTES # BLD AUTO: 1.4 K/UL — SIGNIFICANT CHANGE UP (ref 1–3.3)
LYMPHOCYTES # BLD AUTO: 11.9 % — LOW (ref 13–44)
MCHC RBC-ENTMCNC: 30.1 PG — SIGNIFICANT CHANGE UP (ref 27–34)
MCHC RBC-ENTMCNC: 32.4 GM/DL — SIGNIFICANT CHANGE UP (ref 32–36)
MCV RBC AUTO: 92.8 FL — SIGNIFICANT CHANGE UP (ref 80–100)
MONOCYTES # BLD AUTO: 0.1 K/UL — SIGNIFICANT CHANGE UP (ref 0–0.9)
MONOCYTES NFR BLD AUTO: 1.2 % — LOW (ref 2–14)
NEUTROPHILS # BLD AUTO: 10.5 K/UL — HIGH (ref 1.8–7.4)
NEUTROPHILS NFR BLD AUTO: 86.5 % — HIGH (ref 43–77)
PLATELET # BLD AUTO: 369 K/UL — SIGNIFICANT CHANGE UP (ref 150–400)
POTASSIUM SERPL-MCNC: 4.2 MMOL/L — SIGNIFICANT CHANGE UP (ref 3.5–5.3)
POTASSIUM SERPL-SCNC: 4.2 MMOL/L — SIGNIFICANT CHANGE UP (ref 3.5–5.3)
RBC # BLD: 5.05 M/UL — SIGNIFICANT CHANGE UP (ref 3.8–5.2)
RBC # FLD: 11.7 % — SIGNIFICANT CHANGE UP (ref 10.3–14.5)
SODIUM SERPL-SCNC: 133 MMOL/L — LOW (ref 135–145)
SPECIMEN SOURCE: SIGNIFICANT CHANGE UP
WBC # BLD: 12.2 K/UL — HIGH (ref 3.8–10.5)
WBC # FLD AUTO: 12.2 K/UL — HIGH (ref 3.8–10.5)

## 2018-07-21 PROCEDURE — 99233 SBSQ HOSP IP/OBS HIGH 50: CPT

## 2018-07-21 RX ORDER — DEXAMETHASONE 0.5 MG/5ML
4 ELIXIR ORAL EVERY 6 HOURS
Qty: 0 | Refills: 0 | Status: DISCONTINUED | OUTPATIENT
Start: 2018-07-21 | End: 2018-07-21

## 2018-07-21 RX ORDER — ACETAMINOPHEN 500 MG
1000 TABLET ORAL ONCE
Qty: 0 | Refills: 0 | Status: COMPLETED | OUTPATIENT
Start: 2018-07-21 | End: 2018-07-21

## 2018-07-21 RX ORDER — DOCUSATE SODIUM 100 MG
100 CAPSULE ORAL THREE TIMES A DAY
Qty: 0 | Refills: 0 | Status: DISCONTINUED | OUTPATIENT
Start: 2018-07-21 | End: 2018-07-23

## 2018-07-21 RX ORDER — INSULIN LISPRO 100/ML
VIAL (ML) SUBCUTANEOUS
Qty: 0 | Refills: 0 | Status: COMPLETED | OUTPATIENT
Start: 2018-07-21 | End: 2018-07-22

## 2018-07-21 RX ORDER — LABETALOL HCL 100 MG
10 TABLET ORAL ONCE
Qty: 0 | Refills: 0 | Status: COMPLETED | OUTPATIENT
Start: 2018-07-21 | End: 2018-07-21

## 2018-07-21 RX ORDER — SODIUM CHLORIDE 9 MG/ML
1 INJECTION INTRAMUSCULAR; INTRAVENOUS; SUBCUTANEOUS THREE TIMES A DAY
Qty: 0 | Refills: 0 | Status: COMPLETED | OUTPATIENT
Start: 2018-07-21 | End: 2018-07-22

## 2018-07-21 RX ORDER — DEXTROSE 50 % IN WATER 50 %
25 SYRINGE (ML) INTRAVENOUS ONCE
Qty: 0 | Refills: 0 | Status: DISCONTINUED | OUTPATIENT
Start: 2018-07-21 | End: 2018-07-23

## 2018-07-21 RX ORDER — BENZOCAINE AND MENTHOL 5; 1 G/100ML; G/100ML
1 LIQUID ORAL
Qty: 0 | Refills: 0 | Status: DISCONTINUED | OUTPATIENT
Start: 2018-07-21 | End: 2018-07-23

## 2018-07-21 RX ORDER — DEXTROSE 50 % IN WATER 50 %
15 SYRINGE (ML) INTRAVENOUS ONCE
Qty: 0 | Refills: 0 | Status: DISCONTINUED | OUTPATIENT
Start: 2018-07-21 | End: 2018-07-23

## 2018-07-21 RX ORDER — ENOXAPARIN SODIUM 100 MG/ML
40 INJECTION SUBCUTANEOUS
Qty: 0 | Refills: 0 | Status: DISCONTINUED | OUTPATIENT
Start: 2018-07-21 | End: 2018-07-23

## 2018-07-21 RX ORDER — GLUCAGON INJECTION, SOLUTION 0.5 MG/.1ML
1 INJECTION, SOLUTION SUBCUTANEOUS ONCE
Qty: 0 | Refills: 0 | Status: DISCONTINUED | OUTPATIENT
Start: 2018-07-21 | End: 2018-07-23

## 2018-07-21 RX ORDER — SENNA PLUS 8.6 MG/1
2 TABLET ORAL AT BEDTIME
Qty: 0 | Refills: 0 | Status: DISCONTINUED | OUTPATIENT
Start: 2018-07-21 | End: 2018-07-23

## 2018-07-21 RX ORDER — SODIUM CHLORIDE 9 MG/ML
1000 INJECTION, SOLUTION INTRAVENOUS
Qty: 0 | Refills: 0 | Status: DISCONTINUED | OUTPATIENT
Start: 2018-07-21 | End: 2018-07-23

## 2018-07-21 RX ORDER — DEXAMETHASONE 0.5 MG/5ML
4 ELIXIR ORAL EVERY 6 HOURS
Qty: 0 | Refills: 0 | Status: DISCONTINUED | OUTPATIENT
Start: 2018-07-21 | End: 2018-07-22

## 2018-07-21 RX ORDER — DEXTROSE 50 % IN WATER 50 %
12.5 SYRINGE (ML) INTRAVENOUS ONCE
Qty: 0 | Refills: 0 | Status: DISCONTINUED | OUTPATIENT
Start: 2018-07-21 | End: 2018-07-23

## 2018-07-21 RX ADMIN — Medication 4 MILLIGRAM(S): at 23:54

## 2018-07-21 RX ADMIN — Medication 100 MILLIGRAM(S): at 17:55

## 2018-07-21 RX ADMIN — SODIUM CHLORIDE 1 GRAM(S): 9 INJECTION INTRAMUSCULAR; INTRAVENOUS; SUBCUTANEOUS at 14:28

## 2018-07-21 RX ADMIN — Medication 650 MILLIGRAM(S): at 12:15

## 2018-07-21 RX ADMIN — Medication 650 MILLIGRAM(S): at 11:45

## 2018-07-21 RX ADMIN — AMLODIPINE BESYLATE 10 MILLIGRAM(S): 2.5 TABLET ORAL at 06:09

## 2018-07-21 RX ADMIN — Medication 100 MILLIGRAM(S): at 09:01

## 2018-07-21 RX ADMIN — Medication 40 MILLIGRAM(S): at 17:54

## 2018-07-21 RX ADMIN — ENOXAPARIN SODIUM 40 MILLIGRAM(S): 100 INJECTION SUBCUTANEOUS at 17:55

## 2018-07-21 RX ADMIN — Medication 100 MILLIGRAM(S): at 06:10

## 2018-07-21 RX ADMIN — Medication 400 MILLIGRAM(S): at 01:31

## 2018-07-21 RX ADMIN — OXYCODONE HYDROCHLORIDE 5 MILLIGRAM(S): 5 TABLET ORAL at 06:08

## 2018-07-21 RX ADMIN — OXYCODONE HYDROCHLORIDE 5 MILLIGRAM(S): 5 TABLET ORAL at 06:20

## 2018-07-21 RX ADMIN — Medication 1 MILLIGRAM(S): at 11:42

## 2018-07-21 RX ADMIN — BENZOCAINE AND MENTHOL 1 LOZENGE: 5; 1 LIQUID ORAL at 01:01

## 2018-07-21 RX ADMIN — SIMVASTATIN 40 MILLIGRAM(S): 20 TABLET, FILM COATED ORAL at 21:32

## 2018-07-21 RX ADMIN — SODIUM CHLORIDE 1 GRAM(S): 9 INJECTION INTRAMUSCULAR; INTRAVENOUS; SUBCUTANEOUS at 21:32

## 2018-07-21 RX ADMIN — Medication 100 MILLIGRAM(S): at 00:46

## 2018-07-21 RX ADMIN — Medication 10 MILLIGRAM(S): at 01:25

## 2018-07-21 RX ADMIN — Medication 1: at 17:55

## 2018-07-21 RX ADMIN — Medication 4 MILLIGRAM(S): at 00:45

## 2018-07-21 RX ADMIN — Medication 4 MILLIGRAM(S): at 06:08

## 2018-07-21 RX ADMIN — LISINOPRIL 10 MILLIGRAM(S): 2.5 TABLET ORAL at 11:42

## 2018-07-21 RX ADMIN — Medication 1000 MILLIGRAM(S): at 01:45

## 2018-07-21 RX ADMIN — Medication 4 MILLIGRAM(S): at 17:55

## 2018-07-21 NOTE — PROGRESS NOTE ADULT - ASSESSMENT
ASSESSMENT: C3-7 ACDF, POD#1.  PMH  HTN, HLD, Chronic back pain, difficulty walking uses walker. S/P lumbar surgery 2013.    NEURO:  Surgical drains per NSGY, Pain control  Transfer to floor thisAM  Activity: [x] OOB as tolerated [] Bedrest [X] PT [X] OT [] PMNR    PULM:  incentive spirometry  D/C sarai    CV:  -< 150mmHg, d/c a-line now    RENAL:  Dickey d/c  IVF until good PO intake    GI:  Diet: Dysphagia screen and then advance diet as tolerated  GI prophylaxis [x] not indicated   Bowel regimen [x] colace []x senna [] other:    ENDO:   Goal euglycemia (-180)    HEME/ONC:  VTE prophylaxis: [x] SCDs [X] chemoprophylaxis - start today     ID:  Marie-op antibiotics    SOCIAL/FAMILY:  [x] awaiting     CODE STATUS:  [x] Full Code     DISPOSITION:  [x] ICU     Time spent: 20 minutes

## 2018-07-21 NOTE — PROGRESS NOTE ADULT - ASSESSMENT
HPI:  68 y old female PMH  HTN, HLD, Chronic back pain, difficulty walking uses walker. S/P lumbar surgery 2013. S/P MRI Spine. Presents for C3-7 anterior cervical discectomy and fusion 7/17/18. Pt reports she has a UTI 2nd round of antibiotics started 7/4/18 for 10 days, Dr Herman is aware. Pt is goine for M/eval 7/13/18. (10 Jul 2018 09:11)    PROCEDURE:  s/p c3-c7 ACDF   POD# 1     PLAN:  1 Out of bed   2 continue PT   3 PRN pain meds   4 dvt ppx sql/scds   5 continue bp meds-metoprolol/ lisinopril/amlodipine   6 continue paxil   7 start stool softeners   8 dispo :p   Assessment:  Please Check When Present   []  GCS  E   V  M     Heart Failure: []Acute, [] acute on chronic , []chronic  Heart Failure:  [] Diastolic (HFpEF), [] Systolic (HFrEF), []Combined (HFpEF and HFrEF), [] RHF, [] Pulm HTN, [] Other    [] BRENDA, [] ATN, [] AIN, [] other  [] CKD1, [] CKD2, [] CKD 3, [] CKD 4, [] CKD 5, []ESRD    Encephalopathy: [] Metabolic, [] Hepatic, [] toxic, [] Neurological, [] Other    Abnormal Nurtitional Status: [] malnurtition (see nutrition note), [ ]underweight: BMI < 19, [] morbid obesity: BMI >40, [] Cachexia    [] Sepsis  [] hypovolemic shock,[] cardiogenic shock, [] hemorrhagic shock, [] neuogenic shock  [] Acute Respiratory Failure  []Cerebral edema, [] Brain compression/ herniation,   [] Functional quadriplegia  [] Acute blood loss anemia

## 2018-07-22 LAB
ANION GAP SERPL CALC-SCNC: 15 MMOL/L — SIGNIFICANT CHANGE UP (ref 5–17)
BASOPHILS # BLD AUTO: 0.01 K/UL — SIGNIFICANT CHANGE UP (ref 0–0.2)
BASOPHILS NFR BLD AUTO: 0.1 % — SIGNIFICANT CHANGE UP (ref 0–2)
BUN SERPL-MCNC: 19 MG/DL — SIGNIFICANT CHANGE UP (ref 7–23)
CALCIUM SERPL-MCNC: 9.9 MG/DL — SIGNIFICANT CHANGE UP (ref 8.4–10.5)
CHLORIDE SERPL-SCNC: 98 MMOL/L — SIGNIFICANT CHANGE UP (ref 96–108)
CO2 SERPL-SCNC: 24 MMOL/L — SIGNIFICANT CHANGE UP (ref 22–31)
CREAT SERPL-MCNC: 0.95 MG/DL — SIGNIFICANT CHANGE UP (ref 0.5–1.3)
EOSINOPHIL # BLD AUTO: 0 K/UL — SIGNIFICANT CHANGE UP (ref 0–0.5)
EOSINOPHIL NFR BLD AUTO: 0 % — SIGNIFICANT CHANGE UP (ref 0–6)
GLUCOSE BLDC GLUCOMTR-MCNC: 152 MG/DL — HIGH (ref 70–99)
GLUCOSE BLDC GLUCOMTR-MCNC: 157 MG/DL — HIGH (ref 70–99)
GLUCOSE BLDC GLUCOMTR-MCNC: 191 MG/DL — HIGH (ref 70–99)
GLUCOSE BLDC GLUCOMTR-MCNC: 195 MG/DL — HIGH (ref 70–99)
GLUCOSE SERPL-MCNC: 207 MG/DL — HIGH (ref 70–99)
HCT VFR BLD CALC: 45.3 % — HIGH (ref 34.5–45)
HGB BLD-MCNC: 15.6 G/DL — HIGH (ref 11.5–15.5)
IMM GRANULOCYTES NFR BLD AUTO: 0.3 % — SIGNIFICANT CHANGE UP (ref 0–1.5)
LYMPHOCYTES # BLD AUTO: 1.68 K/UL — SIGNIFICANT CHANGE UP (ref 1–3.3)
LYMPHOCYTES # BLD AUTO: 11.1 % — LOW (ref 13–44)
MCHC RBC-ENTMCNC: 31.1 PG — SIGNIFICANT CHANGE UP (ref 27–34)
MCHC RBC-ENTMCNC: 34.4 GM/DL — SIGNIFICANT CHANGE UP (ref 32–36)
MCV RBC AUTO: 90.2 FL — SIGNIFICANT CHANGE UP (ref 80–100)
MONOCYTES # BLD AUTO: 0.49 K/UL — SIGNIFICANT CHANGE UP (ref 0–0.9)
MONOCYTES NFR BLD AUTO: 3.3 % — SIGNIFICANT CHANGE UP (ref 2–14)
NEUTROPHILS # BLD AUTO: 12.84 K/UL — HIGH (ref 1.8–7.4)
NEUTROPHILS NFR BLD AUTO: 85.2 % — HIGH (ref 43–77)
PLATELET # BLD AUTO: 442 K/UL — HIGH (ref 150–400)
POTASSIUM SERPL-MCNC: 4 MMOL/L — SIGNIFICANT CHANGE UP (ref 3.5–5.3)
POTASSIUM SERPL-SCNC: 4 MMOL/L — SIGNIFICANT CHANGE UP (ref 3.5–5.3)
RBC # BLD: 5.02 M/UL — SIGNIFICANT CHANGE UP (ref 3.8–5.2)
RBC # FLD: 12.9 % — SIGNIFICANT CHANGE UP (ref 10.3–14.5)
SODIUM SERPL-SCNC: 137 MMOL/L — SIGNIFICANT CHANGE UP (ref 135–145)
WBC # BLD: 15.07 K/UL — HIGH (ref 3.8–10.5)
WBC # FLD AUTO: 15.07 K/UL — HIGH (ref 3.8–10.5)

## 2018-07-22 PROCEDURE — 72040 X-RAY EXAM NECK SPINE 2-3 VW: CPT | Mod: 26

## 2018-07-22 RX ORDER — DEXAMETHASONE 0.5 MG/5ML
4 ELIXIR ORAL EVERY 8 HOURS
Qty: 0 | Refills: 0 | Status: DISCONTINUED | OUTPATIENT
Start: 2018-07-22 | End: 2018-07-23

## 2018-07-22 RX ORDER — HYDRALAZINE HCL 50 MG
10 TABLET ORAL ONCE
Qty: 0 | Refills: 0 | Status: COMPLETED | OUTPATIENT
Start: 2018-07-22 | End: 2018-07-22

## 2018-07-22 RX ADMIN — Medication 1: at 12:34

## 2018-07-22 RX ADMIN — LISINOPRIL 10 MILLIGRAM(S): 2.5 TABLET ORAL at 12:33

## 2018-07-22 RX ADMIN — Medication 100 MILLIGRAM(S): at 17:39

## 2018-07-22 RX ADMIN — SIMVASTATIN 40 MILLIGRAM(S): 20 TABLET, FILM COATED ORAL at 21:31

## 2018-07-22 RX ADMIN — ENOXAPARIN SODIUM 40 MILLIGRAM(S): 100 INJECTION SUBCUTANEOUS at 17:39

## 2018-07-22 RX ADMIN — Medication 10 MILLIGRAM(S): at 00:56

## 2018-07-22 RX ADMIN — Medication 1 MILLIGRAM(S): at 12:33

## 2018-07-22 RX ADMIN — AMLODIPINE BESYLATE 10 MILLIGRAM(S): 2.5 TABLET ORAL at 05:13

## 2018-07-22 RX ADMIN — Medication 4 MILLIGRAM(S): at 21:31

## 2018-07-22 RX ADMIN — Medication 40 MILLIGRAM(S): at 17:39

## 2018-07-22 RX ADMIN — Medication 1: at 08:40

## 2018-07-22 RX ADMIN — Medication 4 MILLIGRAM(S): at 05:13

## 2018-07-22 RX ADMIN — Medication 4 MILLIGRAM(S): at 12:34

## 2018-07-22 RX ADMIN — SODIUM CHLORIDE 1 GRAM(S): 9 INJECTION INTRAMUSCULAR; INTRAVENOUS; SUBCUTANEOUS at 05:13

## 2018-07-22 RX ADMIN — Medication 100 MILLIGRAM(S): at 05:13

## 2018-07-22 NOTE — PROGRESS NOTE ADULT - ASSESSMENT
68 y old female PMH  HTN, HLD, Chronic back pain, difficulty walking uses walker. S/P lumbar surgery 2013. S/P MRI Spine. Presents for C3-7 anterior cervical discectomy and fusion 7/17/18. Pt reports she has a UTI 2nd round of antibiotics started 7/4/18 for 10 days, Dr Herman is aware. Pt is goine for M/eval 7/13/18.    PROCEDURE:  7/20 s/p C3-C7 ACDF  POD#2    PLAN:  Neuro:   - DC HMV  - continue decadron- dc home with medrol dose pack  - pain control- oxycodone prn, bowel regimen  - depression- continue paxil  Respiratory:   - encouraged incentive spirometry  CV:  - HTN- continue amlodipine, metoprolol, lisinopril, statin  Endocrine:   - continue fingersticks with sliding scale coverage while on decadron  DVT ppx:   - venodynes, sq lovenox  PT/OT:   - PT- pending  - discharge home when cleared by PT      Bharat Matrimony # [id number]

## 2018-07-23 ENCOUNTER — TRANSCRIPTION ENCOUNTER (OUTPATIENT)
Age: 68
End: 2018-07-23

## 2018-07-23 VITALS
TEMPERATURE: 98 F | DIASTOLIC BLOOD PRESSURE: 85 MMHG | RESPIRATION RATE: 18 BRPM | OXYGEN SATURATION: 96 % | HEART RATE: 65 BPM | SYSTOLIC BLOOD PRESSURE: 138 MMHG

## 2018-07-23 LAB
ANION GAP SERPL CALC-SCNC: 17 MMOL/L — SIGNIFICANT CHANGE UP (ref 5–17)
BASOPHILS # BLD AUTO: 0 K/UL — SIGNIFICANT CHANGE UP (ref 0–0.2)
BASOPHILS NFR BLD AUTO: 0 % — SIGNIFICANT CHANGE UP (ref 0–2)
BUN SERPL-MCNC: 30 MG/DL — HIGH (ref 7–23)
CALCIUM SERPL-MCNC: 9.5 MG/DL — SIGNIFICANT CHANGE UP (ref 8.4–10.5)
CHLORIDE SERPL-SCNC: 98 MMOL/L — SIGNIFICANT CHANGE UP (ref 96–108)
CO2 SERPL-SCNC: 23 MMOL/L — SIGNIFICANT CHANGE UP (ref 22–31)
CREAT SERPL-MCNC: 1 MG/DL — SIGNIFICANT CHANGE UP (ref 0.5–1.3)
EOSINOPHIL # BLD AUTO: 0 K/UL — SIGNIFICANT CHANGE UP (ref 0–0.5)
EOSINOPHIL NFR BLD AUTO: 0 % — SIGNIFICANT CHANGE UP (ref 0–6)
GLUCOSE BLDC GLUCOMTR-MCNC: 170 MG/DL — HIGH (ref 70–99)
GLUCOSE BLDC GLUCOMTR-MCNC: 242 MG/DL — HIGH (ref 70–99)
GLUCOSE SERPL-MCNC: 199 MG/DL — HIGH (ref 70–99)
HCT VFR BLD CALC: 47.1 % — HIGH (ref 34.5–45)
HGB BLD-MCNC: 15.7 G/DL — HIGH (ref 11.5–15.5)
IMM GRANULOCYTES NFR BLD AUTO: 0.3 % — SIGNIFICANT CHANGE UP (ref 0–1.5)
LYMPHOCYTES # BLD AUTO: 13.7 % — SIGNIFICANT CHANGE UP (ref 13–44)
LYMPHOCYTES # BLD AUTO: 2.12 K/UL — SIGNIFICANT CHANGE UP (ref 1–3.3)
MCHC RBC-ENTMCNC: 30.4 PG — SIGNIFICANT CHANGE UP (ref 27–34)
MCHC RBC-ENTMCNC: 33.3 GM/DL — SIGNIFICANT CHANGE UP (ref 32–36)
MCV RBC AUTO: 91.3 FL — SIGNIFICANT CHANGE UP (ref 80–100)
MONOCYTES # BLD AUTO: 0.47 K/UL — SIGNIFICANT CHANGE UP (ref 0–0.9)
MONOCYTES NFR BLD AUTO: 3 % — SIGNIFICANT CHANGE UP (ref 2–14)
NEUTROPHILS # BLD AUTO: 12.89 K/UL — HIGH (ref 1.8–7.4)
NEUTROPHILS NFR BLD AUTO: 83 % — HIGH (ref 43–77)
PLATELET # BLD AUTO: 414 K/UL — HIGH (ref 150–400)
POTASSIUM SERPL-MCNC: 4 MMOL/L — SIGNIFICANT CHANGE UP (ref 3.5–5.3)
POTASSIUM SERPL-SCNC: 4 MMOL/L — SIGNIFICANT CHANGE UP (ref 3.5–5.3)
RBC # BLD: 5.16 M/UL — SIGNIFICANT CHANGE UP (ref 3.8–5.2)
RBC # FLD: 12.8 % — SIGNIFICANT CHANGE UP (ref 10.3–14.5)
SODIUM SERPL-SCNC: 138 MMOL/L — SIGNIFICANT CHANGE UP (ref 135–145)
WBC # BLD: 15.52 K/UL — HIGH (ref 3.8–10.5)
WBC # FLD AUTO: 15.52 K/UL — HIGH (ref 3.8–10.5)

## 2018-07-23 PROCEDURE — 81001 URINALYSIS AUTO W/SCOPE: CPT

## 2018-07-23 PROCEDURE — 87086 URINE CULTURE/COLONY COUNT: CPT

## 2018-07-23 PROCEDURE — C1713: CPT

## 2018-07-23 PROCEDURE — 85027 COMPLETE CBC AUTOMATED: CPT

## 2018-07-23 PROCEDURE — 82962 GLUCOSE BLOOD TEST: CPT

## 2018-07-23 PROCEDURE — C1769: CPT

## 2018-07-23 PROCEDURE — 97530 THERAPEUTIC ACTIVITIES: CPT

## 2018-07-23 PROCEDURE — 76000 FLUOROSCOPY <1 HR PHYS/QHP: CPT

## 2018-07-23 PROCEDURE — 80048 BASIC METABOLIC PNL TOTAL CA: CPT

## 2018-07-23 PROCEDURE — 97161 PT EVAL LOW COMPLEX 20 MIN: CPT

## 2018-07-23 PROCEDURE — 97116 GAIT TRAINING THERAPY: CPT

## 2018-07-23 PROCEDURE — C1889: CPT

## 2018-07-23 PROCEDURE — 72040 X-RAY EXAM NECK SPINE 2-3 VW: CPT

## 2018-07-23 RX ORDER — ACETAMINOPHEN 500 MG
2 TABLET ORAL
Qty: 0 | Refills: 0 | DISCHARGE
Start: 2018-07-23

## 2018-07-23 RX ORDER — HYDRALAZINE HCL 50 MG
10 TABLET ORAL ONCE
Qty: 0 | Refills: 0 | Status: COMPLETED | OUTPATIENT
Start: 2018-07-23 | End: 2018-07-23

## 2018-07-23 RX ORDER — DOCUSATE SODIUM 100 MG
1 CAPSULE ORAL
Qty: 0 | Refills: 0 | DISCHARGE
Start: 2018-07-23

## 2018-07-23 RX ORDER — OXYCODONE HYDROCHLORIDE 5 MG/1
1 TABLET ORAL
Qty: 28 | Refills: 0
Start: 2018-07-23 | End: 2018-07-29

## 2018-07-23 RX ORDER — SENNA PLUS 8.6 MG/1
2 TABLET ORAL
Qty: 0 | Refills: 0 | DISCHARGE
Start: 2018-07-23

## 2018-07-23 RX ADMIN — Medication 1 MILLIGRAM(S): at 12:12

## 2018-07-23 RX ADMIN — AMLODIPINE BESYLATE 10 MILLIGRAM(S): 2.5 TABLET ORAL at 05:05

## 2018-07-23 RX ADMIN — Medication 10 MILLIGRAM(S): at 00:55

## 2018-07-23 RX ADMIN — Medication 4 MILLIGRAM(S): at 05:05

## 2018-07-23 RX ADMIN — Medication 100 MILLIGRAM(S): at 05:05

## 2018-07-23 RX ADMIN — LISINOPRIL 10 MILLIGRAM(S): 2.5 TABLET ORAL at 12:12

## 2018-07-23 NOTE — DISCHARGE NOTE ADULT - PLAN OF CARE
increase activity Follow up with Dr. Herman in 7-10 days-Neurosurgeon please call office to confirm appointment.  Follow up with PMD in 10 days.

## 2018-07-23 NOTE — DISCHARGE NOTE ADULT - PATIENT PORTAL LINK FT
You can access the [facility] Patient Portal, offered by [facility], by registering with the following website: [email or web address]

## 2018-07-23 NOTE — DISCHARGE NOTE ADULT - ADDITIONAL INSTRUCTIONS
If notices any new weakness, numbness, tingling, severe pain, swallowing difficulty or fever then please call the physician immediately or come back to hospital.

## 2018-07-23 NOTE — DISCHARGE NOTE ADULT - HOSPITAL COURSE
Patient had a c3-c7 anterior cervical discectomy and fusion done on 7/20 and post surgery patient was admitted to pacu and was observed. Patient was given pain meds, ivf and was started on routine home meds. 	Patient was moved to floor once stable. Patient's hemovac was removed on 7/22. Patient was seen by physical therapy and was recommended for home with home PT. Patient had no other complications. Patient was discharged home with pain meds and follow up instructions.

## 2018-07-23 NOTE — PROGRESS NOTE ADULT - SUBJECTIVE AND OBJECTIVE BOX
HPI:  68 y old female PMH  HTN, HLD, Chronic back pain, difficulty walking uses walker. S/P lumbar surgery 2013. S/P MRI Spine. Presents for C3-7 anterior cervical discectomy and fusion 7/17/18. Pt reports she has a UTI 2nd round of antibiotics started 7/4/18 for 10 days, Dr Herman is aware. Pt is goine for M/eval 7/13/18. (10 Jul 2018 09:11)    VITALS:  T(C): , Max: 36.8 (07-20-18 @ 06:48)  HR:  (60 - 83)  BP:  (104/70 - 193/91)  ABP: --  RR:  (14 - 18)  SpO2:  (93% - 97%)  Wt(kg): --      07-20-18 @ 07:01  -  07-20-18 @ 17:14  --------------------------------------------------------  IN: 375 mL / OUT: 645 mL / NET: -270 mL      LABS:  Na: 137 (07-20 @ 11:47)  K: 4.4 (07-20 @ 11:47)  Cl: 99 (07-20 @ 11:47)  CO2: 21 (07-20 @ 11:47)  BUN: 26 (07-20 @ 11:47)  Cr: 1.12 (07-20 @ 11:47)  Glu: 192(07-20 @ 11:47)    Hgb: 14.6 (07-20 @ 11:47)  Hct: 44.8 (07-20 @ 11:47)  WBC: 10.5 (07-20 @ 11:47)  Plt: 306 (07-20 @ 11:47)      IMAGING:   Recent imaging studies were reviewed.    MEDICATIONS:  acetaminophen   Tablet. 650 milliGRAM(s) Oral every 6 hours PRN  amLODIPine   Tablet 10 milliGRAM(s) Oral daily  ceFAZolin   IVPB 2000 milliGRAM(s) IV Intermittent once  ceFAZolin   IVPB 2000 milliGRAM(s) IV Intermittent every 8 hours  dexamethasone     Tablet 4 milliGRAM(s) Oral every 6 hours  folic acid 1 milliGRAM(s) Oral daily  HYDROmorphone  Injectable 0.4 milliGRAM(s) IV Push every 10 minutes PRN  lisinopril 10 milliGRAM(s) Oral daily  metoprolol tartrate 100 milliGRAM(s) Oral two times a day  ondansetron Injectable 4 milliGRAM(s) IV Push once PRN  oxyCODONE    IR 5 milliGRAM(s) Oral every 6 hours PRN  PARoxetine 40 milliGRAM(s) Oral daily  simvastatin 40 milliGRAM(s) Oral at bedtime  sodium chloride 0.9% with potassium chloride 20 mEq/L 1000 milliLiter(s) IV Continuous <Continuous>    EXAMINATION:  General:  calm  HEENT:  MMM  Neuro:  awake, alert, oriented x 3, follows commands, moves all extremities 5/5  Cards:  RRR  Respiratory:  no respiratory distress  Adomen:  soft  Extremities:  no edema  Skin:  warm/dry
SUBJECTIVE: Patient was seen and evaluated at bedside. Patient is resting comfortably in bed and is in no new acute distress.     OVERNIGHT EVENTS: none     Vital Signs Last 24 Hrs  T(C): 36.6 (23 Jul 2018 07:47), Max: 36.7 (22 Jul 2018 19:51)  T(F): 97.8 (23 Jul 2018 07:47), Max: 98.1 (22 Jul 2018 19:51)  HR: 66 (23 Jul 2018 07:47) (60 - 83)  BP: 129/81 (23 Jul 2018 07:47) (129/81 - 168/90)  BP(mean): --  RR: 18 (23 Jul 2018 07:47) (18 - 18)  SpO2: 96% (23 Jul 2018 07:47) (94% - 98%)    PHYSICAL EXAM:    General: No Acute Distress     Neurological: Awake, alert oriented to person, place and time, Following Commands, PERRL, EOMI, Face Symmetrical, Speech Fluent, Moving all extremities, Muscle Strength normal in all four extremities, No Drift, Sensation to Light Touch Intact    Pulmonary: Clear to Auscultation, No Rales, No Rhonchi, No Wheezes     Cardiovascular: S1, S2, Regular Rate and Rhythm     Gastrointestinal: Soft, Nontender, Nondistended     Incision: clean and dry     LABS:                        15.7   15.52 )-----------( 414      ( 23 Jul 2018 07:15 )             47.1    07-23    138  |  98  |  30<H>  ----------------------------<  199<H>  4.0   |  23  |  1.00    Ca    9.5      23 Jul 2018 06:20      Hemoglobin A1C, Whole Blood: 6.2 % (07-10 @ 13:53)      07-22 @ 07:01  -  07-23 @ 07:00  --------------------------------------------------------  IN: 880 mL / OUT: 0 mL / NET: 880 mL      DRAINS:     MEDICATIONS:  Antibiotics:    Neuro:  acetaminophen   Tablet. 650 milliGRAM(s) Oral every 6 hours PRN Mild Pain (1 - 3)  oxyCODONE    IR 5 milliGRAM(s) Oral every 6 hours PRN Moderate Pain (4 - 6)  PARoxetine 40 milliGRAM(s) Oral daily    Cardiac:  amLODIPine   Tablet 10 milliGRAM(s) Oral daily  lisinopril 10 milliGRAM(s) Oral daily  metoprolol tartrate 100 milliGRAM(s) Oral two times a day    Pulm:    GI/:  docusate sodium 100 milliGRAM(s) Oral three times a day  senna 2 Tablet(s) Oral at bedtime    Other:   benzocaine 15 mG/menthol 3.6 mG Lozenge 1 Lozenge Oral every 2 hours PRN Sore Throat  dexamethasone  Injectable 4 milliGRAM(s) IV Push every 8 hours  dextrose 40% Gel 15 Gram(s) Oral once PRN Blood Glucose LESS THAN 70 milliGRAM(s)/deciliter  dextrose 5%. 1000 milliLiter(s) IV Continuous <Continuous>  dextrose 50% Injectable 12.5 Gram(s) IV Push once  dextrose 50% Injectable 25 Gram(s) IV Push once  dextrose 50% Injectable 25 Gram(s) IV Push once  enoxaparin Injectable 40 milliGRAM(s) SubCutaneous <User Schedule>  folic acid 1 milliGRAM(s) Oral daily  glucagon  Injectable 1 milliGRAM(s) IntraMuscular once PRN Glucose LESS THAN 70 milligrams/deciliter  simvastatin 40 milliGRAM(s) Oral at bedtime    DIET: [] Regular [] CCD [] Renal [] Puree [] Dysphagia [] Tube Feeds: regular     IMAGING:
SUMMARY:HPI:  68 y old female PMH  HTN, HLD, Chronic back pain, difficulty walking uses walker. S/P lumbar surgery 2013. S/P MRI Spine. Presents for C3-7 anterior cervical discectomy and fusion 7/17/18. Pt reports she has a UTI 2nd round of antibiotics started 7/4/18 for 10 days, Dr Herman is aware. Pt is goine for M/eval 7/13/18.  OVERNIGHT EVENTS:     ADMISSION SCORES:   GCS: HH: MF: NIHSS: RASS: CAM-ICU: ICP:  CLINICAL TRIALS:  Allergies    No Known Allergies    Intolerances    REVIEW OF SYSTEMS:    [ X] All ROS addressed below are non-contributory, except:  Neuro: [ ] Headache [X ] Min neck pain [ ] Numbness [ ] Weakness [ ] Ataxia [ ] Dizziness [ ] Aphasia [ ] Dysarthria [ ] Visual disturbance  Resp: [ ] Shortness of breath/dyspnea, [ ] Orthopnea [ ] Cough  CV: [ ] Chest pain [ ] Palpitation [ ] Lightheadedness [ ] Syncope  Renal: [ ] Thirst [ ] Edema  GI: [ ] Nausea [ ] Emesis [ ] Abdominal pain [ ] Constipation [ ] Diarrhea  Hem: [ ] Hematemesis [ ] bright red blood per rectum  ID: [ ] Fever [ ] Chills [ ] Dysuria  ENT: [ ] Rhinorrhea      VITALS: [ X] Reviewed  Vital Signs Last 24 Hrs  T(C): 36.5 (21 Jul 2018 02:00), Max: 36.8 (20 Jul 2018 06:48)  T(F): 97.7 (21 Jul 2018 02:00), Max: 98.2 (20 Jul 2018 06:48)  HR: 78 (21 Jul 2018 05:00) (60 - 83)  BP: 148/68 (21 Jul 2018 05:00) (104/70 - 193/91)  BP(mean): 98 (21 Jul 2018 05:00) (89 - 131)  RR: 15 (21 Jul 2018 05:00) (14 - 18)  SpO2: 93% (21 Jul 2018 05:00) (93% - 97%)  CAPILLARY BLOOD GLUCOSE      POCT Blood Glucose.: 127 mg/dL (20 Jul 2018 06:37)        LABS:    07-21    133<L>  |  95<L>  |  18  ----------------------------<  189<H>  4.2   |  21<L>  |  0.97    Ca    9.5      21 Jul 2018 02:58                            15.2   12.2  )-----------( 369      ( 21 Jul 2018 02:58 )             46.9       STROKE CORE MEASURES:   Hemoglobin A1C, Whole Blood: 6.2 % (07-10 @ 13:53)     MEDICATION LEVELS:     IMAGING/DATA: [ ] Reviewed    IVF FLUIDS/MEDICATIONS: [ ] Reviewed  MEDICATIONS  (STANDING):  amLODIPine   Tablet 10 milliGRAM(s) Oral daily  ceFAZolin   IVPB 2000 milliGRAM(s) IV Intermittent every 8 hours  ceFAZolin   IVPB 2000 milliGRAM(s) IV Intermittent once  dexamethasone     Tablet 4 milliGRAM(s) Oral every 6 hours  folic acid 1 milliGRAM(s) Oral daily  lisinopril 10 milliGRAM(s) Oral daily  metoprolol tartrate 100 milliGRAM(s) Oral two times a day  PARoxetine 40 milliGRAM(s) Oral daily  simvastatin 40 milliGRAM(s) Oral at bedtime  sodium chloride 0.9% with potassium chloride 20 mEq/L 1000 milliLiter(s) (75 mL/Hr) IV Continuous <Continuous>    MEDICATIONS  (PRN):  acetaminophen   Tablet. 650 milliGRAM(s) Oral every 6 hours PRN Mild Pain (1 - 3)  benzocaine 15 mG/menthol 3.6 mG Lozenge 1 Lozenge Oral every 2 hours PRN Sore Throat  HYDROmorphone  Injectable 0.4 milliGRAM(s) IV Push every 10 minutes PRN Moderate Pain  ondansetron Injectable 4 milliGRAM(s) IV Push once PRN Nausea and/or Vomiting  oxyCODONE    IR 5 milliGRAM(s) Oral every 6 hours PRN Moderate Pain (4 - 6)    I&O's Summary    20 Jul 2018 07:01  -  21 Jul 2018 05:28  --------------------------------------------------------  IN: 2037 mL / OUT: 2290 mL / NET: -253 mL        EXAMINATION:  PHYSICAL EXAM:    Constitutional: No Acute Distress     Neurological: Awake, alert oriented to person, place and time, Following Commands, PERRL, EOMI, No Gaze Preference, Face Symmetrical, Speech Fluent,   Motor exam:          Upper extremity                         Delt     Bicep     Tricep    HG                                                 R         5/5 5/5 5/5 5/5                                               L          5/5 5/5 5/5 5/5          Lower extremity                        HF         KF        KE       DF         PF                                                  R        5/5 5/5 5/5 5/5 5/5                                               L         5/5 5/5 5/5 5/5 5/5                                                 Sensation: [X ] intact to light touch -        Pulmonary: Clear to Auscultation, No rales, No rhonchi, No wheezes     Cardiovascular: S1, S2, Regular rate and rhythm     Gastrointestinal: Soft, Non-tender, Non-distended     Extremities: No calf tenderness
SUBJECTIVE: Patient was seen and evaluated at bedside. Patient is resting comfortably in bed and is in no new acute distress.     OVERNIGHT EVENTS: none     Vital Signs Last 24 Hrs  T(C): 36.6 (21 Jul 2018 12:38), Max: 36.8 (20 Jul 2018 16:00)  T(F): 97.9 (21 Jul 2018 12:38), Max: 98.2 (20 Jul 2018 16:00)  HR: 73 (21 Jul 2018 12:38) (69 - 80)  BP: 143/90 (21 Jul 2018 12:38) (130/86 - 177/81)  BP(mean): 108 (21 Jul 2018 06:00) (90 - 126)  RR: 18 (21 Jul 2018 12:38) (14 - 18)  SpO2: 96% (21 Jul 2018 12:38) (93% - 97%)    PHYSICAL EXAM:    General: No Acute Distress     Neurological: Awake, alert oriented to person, place and time, Following Commands, PERRL, EOMI, Face Symmetrical, Speech Fluent, Moving all extremities, Muscle Strength normal in all four extremities, No Drift, Sensation to Light Touch Intact    Pulmonary: Clear to Auscultation, No Rales, No Rhonchi, No Wheezes     Cardiovascular: S1, S2, Regular Rate and Rhythm     Gastrointestinal: Soft, Nontender, Nondistended     Incision: clean and dry     LABS:                        15.2   12.2  )-----------( 369      ( 21 Jul 2018 02:58 )             46.9    07-21    133<L>  |  95<L>  |  18  ----------------------------<  189<H>  4.2   |  21<L>  |  0.97    Ca    9.5      21 Jul 2018 02:58      Hemoglobin A1C, Whole Blood: 6.2 % (07-10 @ 13:53)      07-20 @ 07:01  -  07-21 @ 07:00  --------------------------------------------------------  IN: 2212 mL / OUT: 2465 mL / NET: -253 mL    07-21 @ 07:01 - 07-21 @ 13:15  --------------------------------------------------------  IN: 190 mL / OUT: 0 mL / NET: 190 mL      DRAINS: hemovac 115 cc     MEDICATIONS:  Antibiotics:    Neuro:  acetaminophen   Tablet. 650 milliGRAM(s) Oral every 6 hours PRN Mild Pain (1 - 3)  oxyCODONE    IR 5 milliGRAM(s) Oral every 6 hours PRN Moderate Pain (4 - 6)  PARoxetine 40 milliGRAM(s) Oral daily    Cardiac:  amLODIPine   Tablet 10 milliGRAM(s) Oral daily  lisinopril 10 milliGRAM(s) Oral daily  metoprolol tartrate 100 milliGRAM(s) Oral two times a day    Pulm:    GI/:    Other:   benzocaine 15 mG/menthol 3.6 mG Lozenge 1 Lozenge Oral every 2 hours PRN Sore Throat  enoxaparin Injectable 40 milliGRAM(s) SubCutaneous <User Schedule>  folic acid 1 milliGRAM(s) Oral daily  simvastatin 40 milliGRAM(s) Oral at bedtime  sodium chloride 1 Gram(s) Oral three times a day    DIET: [] Regular [] CCD [] Renal [] Puree [] Dysphagia [] Tube Feeds: regular     IMAGING: no new imaging today
SUBJECTIVE: Pt seen and examined, doing well on steroids, no complaints of difficulty swallowing    OVERNIGHT EVENTS: none    Vital Signs Last 24 Hrs  T(C): 36.7 (22 Jul 2018 07:44), Max: 36.8 (21 Jul 2018 16:09)  T(F): 98 (22 Jul 2018 07:44), Max: 98.2 (21 Jul 2018 16:09)  HR: 80 (22 Jul 2018 07:44) (73 - 88)  BP: 151/94 (22 Jul 2018 07:44) (143/90 - 193/84)  BP(mean): --  RR: 18 (22 Jul 2018 07:44) (18 - 18)  SpO2: 95% (22 Jul 2018 07:44) (94% - 96%)    PHYSICAL EXAM:    General: No Acute Distress     Neurological: Awake, alert oriented to person, place and time, Following Commands, PERRL, EOMI, Face Symmetrical, Speech Fluent, Moving all extremities, Muscle Strength normal in all four extremities, No Drift, Sensation to Light Touch Intact    Pulmonary: Clear to Auscultation, No Rales, No Rhonchi, No Wheezes     Cardiovascular: S1, S2, Regular Rate and Rhythm     Gastrointestinal: Soft, Nontender, Nondistended     Incision: anterior neck steri-strips c/d/i    LABS:                        15.6   15.07 )-----------( 442      ( 22 Jul 2018 08:02 )             45.3    07-22    137  |  98  |  19  ----------------------------<  207<H>  4.0   |  24  |  0.95    Ca    9.9      22 Jul 2018 06:30      Hemoglobin A1C, Whole Blood: 6.2 % (07-10 @ 13:53)      07-21 @ 07:01  -  07-22 @ 07:00  --------------------------------------------------------  IN: 910 mL / OUT: 1355 mL / NET: -445 mL      DRAINS: HMV removed    MEDICATIONS:  Antibiotics:    Neuro:  acetaminophen   Tablet. 650 milliGRAM(s) Oral every 6 hours PRN Mild Pain (1 - 3)  oxyCODONE    IR 5 milliGRAM(s) Oral every 6 hours PRN Moderate Pain (4 - 6)  PARoxetine 40 milliGRAM(s) Oral daily    Cardiac:  amLODIPine   Tablet 10 milliGRAM(s) Oral daily  lisinopril 10 milliGRAM(s) Oral daily  metoprolol tartrate 100 milliGRAM(s) Oral two times a day    Pulm:    GI/:  docusate sodium 100 milliGRAM(s) Oral three times a day  senna 2 Tablet(s) Oral at bedtime    Other:   benzocaine 15 mG/menthol 3.6 mG Lozenge 1 Lozenge Oral every 2 hours PRN Sore Throat  dexamethasone  Injectable 4 milliGRAM(s) IV Push every 6 hours  dextrose 40% Gel 15 Gram(s) Oral once PRN Blood Glucose LESS THAN 70 milliGRAM(s)/deciliter  dextrose 5%. 1000 milliLiter(s) IV Continuous <Continuous>  dextrose 50% Injectable 12.5 Gram(s) IV Push once  dextrose 50% Injectable 25 Gram(s) IV Push once  dextrose 50% Injectable 25 Gram(s) IV Push once  enoxaparin Injectable 40 milliGRAM(s) SubCutaneous <User Schedule>  folic acid 1 milliGRAM(s) Oral daily  glucagon  Injectable 1 milliGRAM(s) IntraMuscular once PRN Glucose LESS THAN 70 milligrams/deciliter  insulin lispro (HumaLOG) corrective regimen sliding scale   SubCutaneous three times a day before meals  simvastatin 40 milliGRAM(s) Oral at bedtime    DIET: [x] Regular [] CCD [] Renal [] Puree [] Dysphagia [] Tube Feeds:     IMAGING:

## 2018-07-23 NOTE — DISCHARGE NOTE ADULT - CARE PROVIDER_API CALL
Fermin Herman (MD), Neurological Surgery  [address] [place] [address] [address]  [place] [address]  Phone: [phone]  Fax: [phone]

## 2018-07-23 NOTE — PROVIDER CONTACT NOTE (MEDICATION) - ACTION/TREATMENT ORDERED:
states he is going to consult hospitalist and get back to me. no insulin. states to educate patient on diabetic diet due to her A1C.

## 2018-07-23 NOTE — PROGRESS NOTE ADULT - ASSESSMENT
HPI:  68 y old female PMH  HTN, HLD, Chronic back pain, difficulty walking uses walker. S/P lumbar surgery 2013. S/P MRI Spine. Presents for C3-7 anterior cervical discectomy and fusion 7/17/18. Pt reports she has a UTI 2nd round of antibiotics started 7/4/18 for 10 days, Dr Herman is aware. Pt is goine for M/eval 7/13/18. (10 Jul 2018 09:11)    PROCEDURE:  s/p c3-c7 ACDF 7/20   POD# 3    PLAN:  1 Out of bed   2 continue physical therapy   3 dvt ppx sql scds   4 prn pain meds   5 continue decadron   6 regular diet   7 stool softeners  8 dc home with home pt today   Discharge planning:     Assessment:  Please Check When Present   []  GCS  E   V  M     Heart Failure: []Acute, [] acute on chronic , []chronic  Heart Failure:  [] Diastolic (HFpEF), [] Systolic (HFrEF), []Combined (HFpEF and HFrEF), [] RHF, [] Pulm HTN, [] Other    [] BRENDA, [] ATN, [] AIN, [] other  [] CKD1, [] CKD2, [] CKD 3, [] CKD 4, [] CKD 5, []ESRD    Encephalopathy: [] Metabolic, [] Hepatic, [] toxic, [] Neurological, [] Other    Abnormal Nurtitional Status: [] malnurtition (see nutrition note), [ ]underweight: BMI < 19, [] morbid obesity: BMI >40, [] Cachexia    [] Sepsis  [] hypovolemic shock,[] cardiogenic shock, [] hemorrhagic shock, [] neuogenic shock  [] Acute Respiratory Failure  []Cerebral edema, [] Brain compression/ herniation,   [] Functional quadriplegia  [] Acute blood loss anemia

## 2018-07-23 NOTE — DISCHARGE NOTE ADULT - MEDICATION SUMMARY - MEDICATIONS TO TAKE
I will START or STAY ON the medications listed below when I get home from the hospital:    medrol dosepack  -- dispense #1 pack   use as directed   -- Indication: For Steroid     acetaminophen 325 mg oral tablet  -- 2 tab(s) by mouth every 6 hours, As needed, Mild Pain (1 - 3)  -- Indication: For pain    oxyCODONE 5 mg oral tablet  -- 1 tab(s) by mouth every 6 hours, As needed, Moderate Pain (4 - 6) MDD:4  -- Indication: For pain    benazepril 10 mg oral tablet  -- 1 tab(s) by mouth once a day  -- Indication: For Blood pressure     Gralise 600 mg/24 hours oral tablet, extended release  -- 1 tab(s) by mouth once a day  -- Indication: For mood     PARoxetine 40 mg oral tablet  -- 1 tab(s) by mouth once a day  -- Indication: For mood     simvastatin 40 mg oral tablet  -- 1 tab(s) by mouth once a day (at bedtime)  -- Indication: For Cholesterol    metoprolol tartrate 100 mg oral tablet  -- 1 tab(s) by mouth 2 times a day  -- Indication: For Blood pressure     amLODIPine 10 mg oral tablet  -- 1 tab(s) by mouth once a day  -- Indication: For Blood pressure     docusate sodium 100 mg oral capsule  -- 1 cap(s) by mouth 3 times a day  -- Indication: For Stool softener     senna oral tablet  -- 2 tab(s) by mouth once a day (at bedtime)  -- Indication: For Stool softener     folic acid 1 mg oral tablet  -- 1 tab(s) by mouth once a day  -- Indication: For Supplement     folic acid 1 mg oral tablet  -- 1 tab(s) by mouth once a day  -- Indication: For Supplement I will START or STAY ON the medications listed below when I get home from the hospital:    medrol dosepack  -- dispense #1 pack   use as directed   -- Indication: For Steroid     alcohol swab   -- dispense # 60 swabs   -- Indication: For Swab     glucometer lacents   -- dispense # 50 lancet   use 3 times a day   -- Indication: For Check glucose     glucometer test strips   -- dispense # 50   -- Indication: For glucose check     Standard glucometer   -- check glucose 3 times a day   -- Indication: For glucose check     acetaminophen 325 mg oral tablet  -- 2 tab(s) by mouth every 6 hours, As needed, Mild Pain (1 - 3)  -- Indication: For pain    oxyCODONE 5 mg oral tablet  -- 1 tab(s) by mouth every 6 hours, As needed, Moderate Pain (4 - 6) MDD:4  -- Indication: For pain    benazepril 10 mg oral tablet  -- 1 tab(s) by mouth once a day  -- Indication: For Blood pressure     Gralise 600 mg/24 hours oral tablet, extended release  -- 1 tab(s) by mouth once a day  -- Indication: For mood     PARoxetine 40 mg oral tablet  -- 1 tab(s) by mouth once a day  -- Indication: For mood     simvastatin 40 mg oral tablet  -- 1 tab(s) by mouth once a day (at bedtime)  -- Indication: For Cholesterol    metoprolol tartrate 100 mg oral tablet  -- 1 tab(s) by mouth 2 times a day  -- Indication: For Blood pressure     amLODIPine 10 mg oral tablet  -- 1 tab(s) by mouth once a day  -- Indication: For Blood pressure     docusate sodium 100 mg oral capsule  -- 1 cap(s) by mouth 3 times a day  -- Indication: For Stool softener     senna oral tablet  -- 2 tab(s) by mouth once a day (at bedtime)  -- Indication: For Stool softener     folic acid 1 mg oral tablet  -- 1 tab(s) by mouth once a day  -- Indication: For Supplement     folic acid 1 mg oral tablet  -- 1 tab(s) by mouth once a day  -- Indication: For Supplement I will START or STAY ON the medications listed below when I get home from the hospital:    medrol dosepack  -- dispense #1 pack   use as directed   -- Indication: For Steroid     alcohol swab   -- dispense # 60 swabs   -- Indication: For Sugar check     glucometer lacents   -- dispense # 50 lancet   use 3 times a day   -- Indication: For Sugar check     glucometer test strips   -- dispense # 50   -- Indication: For Sugar check     Standard glucometer   -- check glucose 3 times a day   -- Indication: For Sugar check     acetaminophen 325 mg oral tablet  -- 2 tab(s) by mouth every 6 hours, As needed, Mild Pain (1 - 3)  -- Indication: For pain    oxyCODONE 5 mg oral tablet  -- 1 tab(s) by mouth every 6 hours, As needed, Moderate Pain (4 - 6) MDD:4  -- Indication: For pain    benazepril 10 mg oral tablet  -- 1 tab(s) by mouth once a day  -- Indication: For Blood pressure     Gralise 600 mg/24 hours oral tablet, extended release  -- 1 tab(s) by mouth once a day  -- Indication: For mood     PARoxetine 40 mg oral tablet  -- 1 tab(s) by mouth once a day  -- Indication: For mood     simvastatin 40 mg oral tablet  -- 1 tab(s) by mouth once a day (at bedtime)  -- Indication: For Cholesterol    metoprolol tartrate 100 mg oral tablet  -- 1 tab(s) by mouth 2 times a day  -- Indication: For Blood pressure     amLODIPine 10 mg oral tablet  -- 1 tab(s) by mouth once a day  -- Indication: For Blood pressure     docusate sodium 100 mg oral capsule  -- 1 cap(s) by mouth 3 times a day  -- Indication: For Stool softener     senna oral tablet  -- 2 tab(s) by mouth once a day (at bedtime)  -- Indication: For Stool softener     folic acid 1 mg oral tablet  -- 1 tab(s) by mouth once a day  -- Indication: For Supplement     folic acid 1 mg oral tablet  -- 1 tab(s) by mouth once a day  -- Indication: For Supplement

## 2018-07-23 NOTE — DISCHARGE NOTE ADULT - REASON FOR ADMISSION
Patient was admitted on 7/20 with history of balance issues. Patient had a c3-c7 and Anterior cervical discectomy and fusion-7/20

## 2018-07-23 NOTE — DISCHARGE NOTE ADULT - CARE PLAN
Principal Discharge DX:	Spinal stenosis in cervical region  Goal:	increase activity  Assessment and plan of treatment:	Follow up with Dr. Herman in 7-10 days-Neurosurgeon please call office to confirm appointment.  Follow up with PMD in 10 days.

## 2018-07-23 NOTE — DISCHARGE NOTE ADULT - NS AS ACTIVITY OBS
No Heavy lifting/straining/Do not make important decisions/Walking-Outdoors allowed/Stairs allowed/Do not drive or operate machinery/Walking-Indoors allowed/Showering allowed

## 2018-07-30 ENCOUNTER — OTHER (OUTPATIENT)
Age: 68
End: 2018-07-30

## 2018-07-30 PROBLEM — M48.02 SPINAL STENOSIS, CERVICAL REGION: Chronic | Status: ACTIVE | Noted: 2018-07-10

## 2018-07-30 PROBLEM — B17.10 ACUTE HEPATITIS C WITHOUT HEPATIC COMA: Chronic | Status: ACTIVE | Noted: 2018-07-10

## 2018-07-30 PROBLEM — I25.10 ATHEROSCLEROTIC HEART DISEASE OF NATIVE CORONARY ARTERY WITHOUT ANGINA PECTORIS: Chronic | Status: ACTIVE | Noted: 2018-07-10

## 2018-07-30 PROBLEM — R26.89 OTHER ABNORMALITIES OF GAIT AND MOBILITY: Chronic | Status: ACTIVE | Noted: 2018-07-10

## 2018-07-31 DIAGNOSIS — Z71.89 OTHER SPECIFIED COUNSELING: ICD-10-CM

## 2018-08-02 ENCOUNTER — APPOINTMENT (OUTPATIENT)
Dept: SPINE | Facility: CLINIC | Age: 68
End: 2018-08-02
Payer: MEDICARE

## 2018-08-02 VITALS
BODY MASS INDEX: 33.49 KG/M2 | WEIGHT: 182 LBS | SYSTOLIC BLOOD PRESSURE: 126 MMHG | DIASTOLIC BLOOD PRESSURE: 82 MMHG | HEIGHT: 62 IN | TEMPERATURE: 97.9 F

## 2018-08-02 PROCEDURE — 99024 POSTOP FOLLOW-UP VISIT: CPT

## 2018-08-11 ENCOUNTER — APPOINTMENT (OUTPATIENT)
Dept: RADIOLOGY | Facility: CLINIC | Age: 68
End: 2018-08-11
Payer: MEDICARE

## 2018-08-11 ENCOUNTER — OUTPATIENT (OUTPATIENT)
Dept: OUTPATIENT SERVICES | Facility: HOSPITAL | Age: 68
LOS: 1 days | End: 2018-08-11
Payer: MEDICARE

## 2018-08-11 DIAGNOSIS — Z98.890 OTHER SPECIFIED POSTPROCEDURAL STATES: Chronic | ICD-10-CM

## 2018-08-11 DIAGNOSIS — N39.0 URINARY TRACT INFECTION, SITE NOT SPECIFIED: Chronic | ICD-10-CM

## 2018-08-11 DIAGNOSIS — Z98.1 ARTHRODESIS STATUS: ICD-10-CM

## 2018-08-11 PROCEDURE — 72040 X-RAY EXAM NECK SPINE 2-3 VW: CPT | Mod: 26

## 2018-08-11 PROCEDURE — 72040 X-RAY EXAM NECK SPINE 2-3 VW: CPT

## 2018-08-30 ENCOUNTER — APPOINTMENT (OUTPATIENT)
Dept: SPINE | Facility: CLINIC | Age: 68
End: 2018-08-30
Payer: MEDICARE

## 2018-08-30 VITALS
SYSTOLIC BLOOD PRESSURE: 124 MMHG | HEIGHT: 62 IN | BODY MASS INDEX: 34.04 KG/M2 | DIASTOLIC BLOOD PRESSURE: 80 MMHG | WEIGHT: 185 LBS

## 2018-08-30 PROCEDURE — 99024 POSTOP FOLLOW-UP VISIT: CPT

## 2018-08-30 RX ORDER — MUPIROCIN 20 MG/G
2 OINTMENT TOPICAL
Qty: 1 | Refills: 0 | Status: DISCONTINUED | COMMUNITY
Start: 2018-07-13 | End: 2018-08-30

## 2018-10-04 NOTE — H&P PST ADULT - [facility] pharmacy has sent a 90 day supply medication request for Hydroxyzine 50mg cap on the behalf of Marilyn's behalf.       Patient's last completed apt was on 09/04/18 to return on 11/05/18    I have loaded the medication for Hydroxyzine 50mg; #270 with 0 refills; pending your approval.
no murmur

## 2018-12-01 ENCOUNTER — APPOINTMENT (OUTPATIENT)
Dept: RADIOLOGY | Facility: CLINIC | Age: 68
End: 2018-12-01
Payer: MEDICARE

## 2018-12-01 ENCOUNTER — OUTPATIENT (OUTPATIENT)
Dept: OUTPATIENT SERVICES | Facility: HOSPITAL | Age: 68
LOS: 1 days | End: 2018-12-01
Payer: MEDICARE

## 2018-12-01 DIAGNOSIS — Z98.890 OTHER SPECIFIED POSTPROCEDURAL STATES: Chronic | ICD-10-CM

## 2018-12-01 DIAGNOSIS — N39.0 URINARY TRACT INFECTION, SITE NOT SPECIFIED: Chronic | ICD-10-CM

## 2018-12-01 DIAGNOSIS — Z98.1 ARTHRODESIS STATUS: ICD-10-CM

## 2018-12-01 PROCEDURE — 72040 X-RAY EXAM NECK SPINE 2-3 VW: CPT | Mod: 26

## 2018-12-01 PROCEDURE — 72040 X-RAY EXAM NECK SPINE 2-3 VW: CPT

## 2018-12-10 ENCOUNTER — APPOINTMENT (OUTPATIENT)
Dept: SPINE | Facility: CLINIC | Age: 68
End: 2018-12-10
Payer: MEDICARE

## 2018-12-10 VITALS
RESPIRATION RATE: 16 BRPM | HEART RATE: 60 BPM | DIASTOLIC BLOOD PRESSURE: 78 MMHG | OXYGEN SATURATION: 98 % | BODY MASS INDEX: 33.65 KG/M2 | WEIGHT: 184 LBS | TEMPERATURE: 98 F | SYSTOLIC BLOOD PRESSURE: 131 MMHG

## 2018-12-10 DIAGNOSIS — Z78.9 OTHER SPECIFIED HEALTH STATUS: ICD-10-CM

## 2018-12-10 PROCEDURE — 99213 OFFICE O/P EST LOW 20 MIN: CPT

## 2018-12-14 PROBLEM — Z78.9 NON-SMOKER: Status: ACTIVE | Noted: 2018-12-14

## 2019-01-03 ENCOUNTER — OTHER (OUTPATIENT)
Age: 69
End: 2019-01-03

## 2019-03-16 ENCOUNTER — OUTPATIENT (OUTPATIENT)
Dept: OUTPATIENT SERVICES | Facility: HOSPITAL | Age: 69
LOS: 1 days | End: 2019-03-16
Payer: MEDICARE

## 2019-03-16 ENCOUNTER — APPOINTMENT (OUTPATIENT)
Dept: RADIOLOGY | Facility: CLINIC | Age: 69
End: 2019-03-16
Payer: MEDICARE

## 2019-03-16 DIAGNOSIS — Z98.890 OTHER SPECIFIED POSTPROCEDURAL STATES: Chronic | ICD-10-CM

## 2019-03-16 DIAGNOSIS — N39.0 URINARY TRACT INFECTION, SITE NOT SPECIFIED: Chronic | ICD-10-CM

## 2019-03-16 DIAGNOSIS — Z98.1 ARTHRODESIS STATUS: ICD-10-CM

## 2019-03-16 PROCEDURE — 72040 X-RAY EXAM NECK SPINE 2-3 VW: CPT

## 2019-03-16 PROCEDURE — 72040 X-RAY EXAM NECK SPINE 2-3 VW: CPT | Mod: 26

## 2019-03-25 ENCOUNTER — CHART COPY (OUTPATIENT)
Age: 69
End: 2019-03-25

## 2019-03-25 ENCOUNTER — APPOINTMENT (OUTPATIENT)
Dept: SPINE | Facility: CLINIC | Age: 69
End: 2019-03-25
Payer: MEDICARE

## 2019-03-25 VITALS
WEIGHT: 181 LBS | HEIGHT: 62 IN | SYSTOLIC BLOOD PRESSURE: 125 MMHG | DIASTOLIC BLOOD PRESSURE: 77 MMHG | BODY MASS INDEX: 33.31 KG/M2

## 2019-03-25 DIAGNOSIS — G89.29 DORSALGIA, UNSPECIFIED: ICD-10-CM

## 2019-03-25 DIAGNOSIS — G95.9 DISEASE OF SPINAL CORD, UNSPECIFIED: ICD-10-CM

## 2019-03-25 DIAGNOSIS — M54.9 DORSALGIA, UNSPECIFIED: ICD-10-CM

## 2019-03-25 DIAGNOSIS — M21.372 FOOT DROP, LEFT FOOT: ICD-10-CM

## 2019-03-25 DIAGNOSIS — Z78.9 OTHER SPECIFIED HEALTH STATUS: ICD-10-CM

## 2019-03-25 PROCEDURE — 99213 OFFICE O/P EST LOW 20 MIN: CPT

## 2019-03-25 NOTE — REASON FOR VISIT
[Follow-Up: _____] : a [unfilled] follow-up visit [FreeTextEntry1] : no neck pain. No numbness or tingling. Recent x-rays show hardware in good position with progression of interbody arthrodesis.  no significant movement through index levels on flexion extension.

## 2019-03-25 NOTE — PHYSICAL EXAM
[Motor Strength] : muscle strength was normal in all four extremities [FreeTextEntry8] : presently she has a walker but claims that she often does not use it at home

## 2019-04-12 ENCOUNTER — OTHER (OUTPATIENT)
Age: 69
End: 2019-04-12

## 2019-07-20 ENCOUNTER — OUTPATIENT (OUTPATIENT)
Dept: OUTPATIENT SERVICES | Facility: HOSPITAL | Age: 69
LOS: 1 days | End: 2019-07-20

## 2019-07-20 ENCOUNTER — APPOINTMENT (OUTPATIENT)
Dept: RADIOLOGY | Facility: CLINIC | Age: 69
End: 2019-07-20
Payer: MEDICARE

## 2019-07-20 ENCOUNTER — OUTPATIENT (OUTPATIENT)
Dept: OUTPATIENT SERVICES | Facility: HOSPITAL | Age: 69
LOS: 1 days | End: 2019-07-20
Payer: MEDICARE

## 2019-07-20 DIAGNOSIS — N39.0 URINARY TRACT INFECTION, SITE NOT SPECIFIED: Chronic | ICD-10-CM

## 2019-07-20 DIAGNOSIS — Z98.890 OTHER SPECIFIED POSTPROCEDURAL STATES: Chronic | ICD-10-CM

## 2019-07-20 DIAGNOSIS — Z98.1 ARTHRODESIS STATUS: ICD-10-CM

## 2019-07-20 DIAGNOSIS — Z00.8 ENCOUNTER FOR OTHER GENERAL EXAMINATION: ICD-10-CM

## 2019-07-20 PROCEDURE — 72040 X-RAY EXAM NECK SPINE 2-3 VW: CPT | Mod: 26

## 2019-07-20 PROCEDURE — 72040 X-RAY EXAM NECK SPINE 2-3 VW: CPT

## 2019-07-22 ENCOUNTER — APPOINTMENT (OUTPATIENT)
Dept: SPINE | Facility: CLINIC | Age: 69
End: 2019-07-22
Payer: MEDICARE

## 2019-07-22 VITALS
SYSTOLIC BLOOD PRESSURE: 138 MMHG | DIASTOLIC BLOOD PRESSURE: 82 MMHG | HEIGHT: 62 IN | WEIGHT: 183 LBS | HEART RATE: 55 BPM | BODY MASS INDEX: 33.68 KG/M2

## 2019-07-22 DIAGNOSIS — Z98.1 ARTHRODESIS STATUS: ICD-10-CM

## 2019-07-22 PROCEDURE — 99213 OFFICE O/P EST LOW 20 MIN: CPT

## 2019-07-22 RX ORDER — MELOXICAM 15 MG/1
15 TABLET ORAL DAILY
Qty: 30 | Refills: 2 | Status: DISCONTINUED | COMMUNITY
Start: 2018-12-10 | End: 2019-07-22

## 2019-07-22 RX ORDER — TRAMADOL HYDROCHLORIDE 50 MG/1
50 TABLET, COATED ORAL
Qty: 60 | Refills: 0 | Status: DISCONTINUED | COMMUNITY
Start: 2018-08-30 | End: 2019-07-22

## 2019-07-22 NOTE — REASON FOR VISIT
[Follow-Up: _____] : a [unfilled] follow-up visit [FreeTextEntry1] : Recent cervical spine x-rays show a solid arthrodesis with no movement through the index levels on flexion and extension.

## 2019-07-22 NOTE — PHYSICAL EXAM
[Motor Strength] : muscle strength was normal in all four extremities [FreeTextEntry6] : left EHL and dorsiflexion 4/5 [FreeTextEntry8] : using a rolling walker

## 2019-07-22 NOTE — ASSESSMENT
[FreeTextEntry1] : She may benefit from more physical therapy. Current 6 months with followup x-rays.

## 2019-09-27 NOTE — PATIENT PROFILE ADULT. - NS MD HP INPLANTS MED DEV
Patient:   MARQUEZ NARANJO            MRN: [id number]            FIN: [id number]              Age:   61 years     Sex:  FEMALE     :  62   Associated Diagnoses:   None   Author:   NICKY LOMBARDI     No acute overnight events.  While  npo she  was on 50% temporary basal.  While eating she is on 80% temporary basal.  Giving boluses for meals. Received 2 units of bolus at 1 pm and 4.6 at 5 pm  Transfered to ICU for advanced HF.   Per nurse her numbers look better her index is 4.6. Discussed possiblly going home on tuesday.  Per pt she is not eating food from the ED and is someone is bringing food from home. Pt refuses to change settings in the pump. Resistant ot discuss blood sugar.  Home regimen:   Medtronic Pump Settings:  Basal rates:  12 am: 0.9 units/hr  4am: 1.2 units/hr  6am: 1.6 units/hr  9pm: 1.05 units/hr  Insulin to carb ratio: breakfast 1:8, Lunch: 1:10 and dinner 1:13   Sensitivity: 45  target   REVIEW OF SYSTEMS: A full 12 point ROS was done and was negative except as in HPI  PAST MEDICAL HISTORY:    CAD (coronary artery disease)  Carpal tunnel syndrome  Chronic pain  Congestive heart failure  Diabetes mellitus  ESRD on hemodialysis  Fracture dislocation of ankle  H/O: blood transfusion  Hyperlipidemia  Hypertension  Noncompliance with CPAP treatment  Obstructive sleep apnea  Pleural effusion  Risk factors for obstructive sleep apnea  Sickle-cell trait    PAST SURGICAL HISTORY:   No qualifying data available.    FAMILY HISTORY:   No positive family history reported.    SOCIAL HISTORY:   Alcohol  Details: None  Home/Environment  Details: Alcohol abuse in household: No.  Substance abuse in household: No.  Smoker in household: No.  Substance Abuse  Details: None  Tobacco  Details: No, Former smoker, Cigarettes, 1 Pack Per Day, Stopped age 45 Years.    PATIENT HOME MEDICATION LIST:   Home Medications (21) Active  Aspirin Low Dose 81 mg oral delayed release tablet 81 mg = 1 tab, Oral,  Daily  Crestor 40 mg oral tablet 40 mg = 1 tab, Oral, Daily  docusate sodium oral 100 mg capsule 100 mg = 1 cap, Oral, Q Bedtime  fluticasone nasal 50 mcg/spray 1 spray, IntraNasal, BID  gabapentin oral 300 mg capsule 300 mg = 1 cap, Oral, Daily  insulin aspart (NovoLOG) by PATIENT's OWN insulin pump   metoCLOPramide 10 mg oral tablet, disintegrating 10 mg = 1 tab, Oral, TID  metoprolol succinate 25 mg oral capsule, extended release   midodrine oral 5 mg tablet (ProAmatine) 10 mg = 2 tab, Oral, TID  montelukast oral 10 mg tablet 10 mg = 1 tab, Oral, Q Evening  Nitrostat sublingual 0.4 mg tablet 0.4 mg = 1 tab, PRN, SL, Q5 Minutes  Norco oral 325-5 mg tablet 1 tab, PRN, Oral, Q6H  Plavix oral 75 mg tablet 75 mg = 1 tab, Oral, Daily  pyridoxine (vitamin B6) oral 50 mg tablet 50 mg = 1 tab, Oral, BID  Ranexa (ranolazine) oral 500 mg ER tablet 500 mg = 1 tab, Oral, BID  Renal Caps 1 cap, Oral, Daily  rosuvastatin [auto-sub to atorvastatin] 80 mg, Oral, Q Bedtime  sevelamer carbonate oral 800 mg tablet 800 mg = 1 tab, Oral, TID [with meals]  Velphoro 500 mg, Chewed, TID [after meals]  Ventolin HFA 90 mcg/inh inhalation aerosol 2 puff, PRN, Inhaled, Q6H  Vitamin D3 1000 intl units oral capsule 1,000 IU = 1 cap, Oral, Daily    PHYSICAL EXAMINATION:   Vital Signs:      Vitals between:   27-JUL-2019 11:26:13   TO   28-JUL-2019 11:26:13                   LAST RESULT MINIMUM MAXIMUM  Temperature 36.5 36.0 36.5  Heart Rate 77 70 88  Respiratory Rate 16 12 29  NISBP           150 96 186  NIDBP           64 32 79  NIMBP           93 55 113  CVP                     11 11 15  SpO2                    97 95 100       General: Alert and oriented, No acute distress   Eye: Normal conjunctiva, EOMI  HEENT:  Normocephalic, Moist oral mucosa  Neck: Supple, No thyromegaly  Respiratory: Lungs clear to auscultation, non-labored respirations  Cardiovascular: Normal rate, regular rhythm, no edema  Gastrointestinal: soft, non-tender,  non-distended  Neurologic: Alert, oriented, no focal deficit  Integumentary: Warm, dry, No pallor  Psychiatric: Cooperative  LABORATORY RESULTS:      Labs between:  27-JUL-2019 11:26 to 28-JUL-2019 11:26  CBC:                 WBC  HgB  Hct  Plt  MCV  RDW   28-JUL-2019 6.2  13.5  42.9  (L) 111  80.9  (H) 18.9   DIFF:                 Seg  Neutroph//ABS  Lymph//ABS  Mono//ABS  EOS/ABS  28-JUL-2019 NOT APPLICABLE  63 // 3.9 23 // 1.4 12 // 0.8 1 // 0.1  BMP:                 Na  Cl  BUN  Glu   28-JUL-2019 (L) 134  98  (H) 24  76                              K  CO2  Cr  Ca                              4.1  29  (H) 5.37  8.5   Other Chem:             Mg  Phos  Triglycerides  GGTP  DirectBili                           2.0  4.4         POC GLU:                 Latest Result  Latest Date  Minimum  Min Date  Maximum  Max Date                             (H) 151  28-JUL-2019 (H) 151  28-JUL-2019 (H) 235  27-JUL-2019  COAG:                 INR  PT  PTT  Ddimer  Fibrinogen    28-JUL-2019 1.1  11.8                          ASSESSMENT/PLAN  61 year old female with PMH of HTN, DM, ESRD on MWF HD who was admitted to [facility] for CHFx.  Home regimen:   Medtronic Pump Settings:  Basal rates:  12 am: 0.9 units/hr  4am: 1.2>>>1.0 units/hr  6am: 1.6 units/hr  9pm: 1.05 units/hr  Insulin to carb ratio:   breakfast 1:8,   Lunch: 1:10  Dinner 1:13  Sensitivity: 45  target   (>>> means changed to )  #T2DM since 1984:  #DM nephropathy -> ESRD on HD  -Has been on the medtronic insulin pump w/dexcom  -07/28 BG reviewed in the last 24 hours. Morning sugar is low. BG runs high at dinner and bedtime.   Pt is refusing to change the pump setting.and if she continues to refuse after 1 day recommend to treat DM with basal and bolus insulin  History is inconsitent about food intake   Pt is resistant in discussing BG.   recommend to stop juices and will not change  the settings at this time  Recommendations:  --Continue 80% temporary basal while in  hospital.  Will make adjustments to her pump as needed.  --Bolus for meals.  Change carb ratio 1:8 for all 3 meals  –Diabetic diet; poor appertite  –Accu-Chek 4 times daily  –Permissible for patient to wear home Dexcom continuous glucose monitor however this does not replace hospital Accu-Chek  #ESRD on HD  #Secondary HyperPara  - nephrology following  #Severe CHF  #CAD  -cardiology recs DC BB  -tranfered to ICU  Charting performed by kayden Palacios for Dr. Daly  All medical record entries made by the kayden were at my direction. I have reviewed the chart and agree that the record accurately reflects my personal performance of the history, physical exam, hospital course, and assessment and plan.   hardware left wrist

## 2019-10-25 ENCOUNTER — OTHER (OUTPATIENT)
Age: 69
End: 2019-10-25

## 2019-11-27 ENCOUNTER — OTHER (OUTPATIENT)
Age: 69
End: 2019-11-27

## 2020-09-28 ENCOUNTER — TRANSCRIPTION ENCOUNTER (OUTPATIENT)
Age: 70
End: 2020-09-28

## 2020-11-23 NOTE — PROVIDER CONTACT NOTE (MEDICATION) - ASSESSMENT
pt in no distress Paramedian Forehead Flap Division And Inset Text: Division and inset of the paramedian forehead flap was performed to achieve optimal aesthetic result, restore normal anatomic appearance and avoid distortion of normal anatomy, expedite and facilitate wound healing, achieve optimal functional result and because linear closure either not possible or would produce suboptimal result. The patient was prepped and draped in the usual manner. The pedicle was infiltrated with local anesthesia. The pedicle was sectioned with a #15 blade. The pedicle was de-bulked and trimmed to match the shape of the defect. Hemostasis was achieved. The flap donor site and free margin of the flap were secured with deep buried sutures and the wound edges were re-approximated.

## 2021-01-26 NOTE — H&P PST ADULT - EXTREMITIES
Goals Addressed                 This Visit's Progress     Prevent complications post hospitalization. 12/29/2020 Granddaughter report patient is feeling well,c/o back pain controlled with Tylenol. Denies chest pain, SOB, N/V/D, fever. Patient does not currently have a scale,educated on CHF S&S and when to call cardio . Granddaughter will monitor patient patient for CHF S&S and report at next outreach. [facility]    1/1-7/21 readmit to hospital for CHF    1/8/21 Granddaughter reports patient was seen by [facility] on 12/31/2020, patient is weak but doing ok. PCP appointment today, HH will resume care,and Lasix was increased. Denies chest pain, SOB, fever, N/V/D. Granddaughter will report changes to plan of care and monitor S&S of CHF to report at next outreach. [facility]    1/13/21 Granddaughter reports patient is doing well and participating with [place] [facility]. Denies chest pain, SOB, fever, N/V/D. Granddaughter will continue to monitor CHF S&S and report at next outreach. [facility]    1/26/21 Contact information left on voicemail requesting callback. [facility]
detailed exam

## 2021-02-25 ENCOUNTER — NON-APPOINTMENT (OUTPATIENT)
Age: 71
End: 2021-02-25

## 2021-06-12 NOTE — PHYSICAL THERAPY INITIAL EVALUATION ADULT - ACTIVE RANGE OF MOTION EXAMINATION, REHAB EVAL
"DEVICE CLINIC RN POST-OP NOTE    Reason for visit: Post-operative device check; s/p implant of a leadless single chamber pacemaker.     Device: Medtronic [id number] Micra VR TCP  Procedure date: 07/13/2017  Implant Diagnosis: Bradycardia, Sinus Node Dysfunction, Atrial Fibrillation, Valdez-Duran Syncope  Implanting Physician: Dr. Leti Guerra      Assessment  Subjective: \"I'm good.\"  Mr. Da Silva denies insertion site discomfort, no dizziness, lightheadedness, pre-syncope or syncope.  Vitals:   Vitals:    08/15/17 1421   BP: 140/85   Pulse: 64   Resp: 16   Temp: 98.3  F (36.8  C)     Heart Sounds: normal  Lung Sounds: clear to auscultation bilaterally   Other: Mr. Da Silva is nearly five weeks post-op as he missed his scheduled check 07/26/2017.      Device Findings  Interrogation of device reveals Normal sensing threshold. Pacing threshold has increased since next day check post implant. Routed to Dr. Guerra for review.  See the Paceart Report for a full summary of the device information.    Other: Underlying rhythm is atrial fibrillation with intrinsic ventricular response 40s-100s bpm.      Patient Education  Clayton Da Silva was unaccompanied today.     [facility]'s Partnership Agreement for Device Patients and Post-operative Checklist were presented and reviewed with Mr. Da Silva at today's appointment.    Signs and symptoms of infection, poor insertion site healing, and normal device function were reviewed.  There are no range of motion and weight restrictions. Mr. Da Silva with follow-up with periodic in-clinic device checks.      Plan  - Three month post-operative check 10/19/2017 at our [facility] location    Valerie De La Paz, RN, MA  Device Nurse  [facility]      "
Statement Selected
no Active ROM deficits were identified

## 2021-09-20 ENCOUNTER — INPATIENT (INPATIENT)
Facility: HOSPITAL | Age: 71
LOS: 15 days | Discharge: HOME | DRG: 871 | End: 2021-10-06
Attending: INTERNAL MEDICINE | Admitting: INTERNAL MEDICINE
Payer: MEDICARE

## 2021-09-20 VITALS
DIASTOLIC BLOOD PRESSURE: 38 MMHG | RESPIRATION RATE: 20 BRPM | WEIGHT: 175.93 LBS | OXYGEN SATURATION: 86 % | SYSTOLIC BLOOD PRESSURE: 70 MMHG | TEMPERATURE: 97 F | HEIGHT: 62 IN | HEART RATE: 64 BPM

## 2021-09-20 DIAGNOSIS — E83.52 HYPERCALCEMIA: ICD-10-CM

## 2021-09-20 DIAGNOSIS — Z98.890 OTHER SPECIFIED POSTPROCEDURAL STATES: Chronic | ICD-10-CM

## 2021-09-20 DIAGNOSIS — N39.0 URINARY TRACT INFECTION, SITE NOT SPECIFIED: Chronic | ICD-10-CM

## 2021-09-20 DIAGNOSIS — A41.9 SEPSIS, UNSPECIFIED ORGANISM: ICD-10-CM

## 2021-09-20 DIAGNOSIS — E87.6 HYPOKALEMIA: ICD-10-CM

## 2021-09-20 DIAGNOSIS — J96.01 ACUTE RESPIRATORY FAILURE WITH HYPOXIA: ICD-10-CM

## 2021-09-20 DIAGNOSIS — J18.9 PNEUMONIA, UNSPECIFIED ORGANISM: ICD-10-CM

## 2021-09-20 DIAGNOSIS — E78.5 HYPERLIPIDEMIA, UNSPECIFIED: ICD-10-CM

## 2021-09-20 DIAGNOSIS — Z29.9 ENCOUNTER FOR PROPHYLACTIC MEASURES, UNSPECIFIED: ICD-10-CM

## 2021-09-20 DIAGNOSIS — N17.9 ACUTE KIDNEY FAILURE, UNSPECIFIED: ICD-10-CM

## 2021-09-20 DIAGNOSIS — I10 ESSENTIAL (PRIMARY) HYPERTENSION: ICD-10-CM

## 2021-09-20 LAB
ALBUMIN SERPL ELPH-MCNC: 3.2 G/DL — LOW (ref 3.5–5)
ALP SERPL-CCNC: 95 U/L — SIGNIFICANT CHANGE UP (ref 40–120)
ALT FLD-CCNC: 28 U/L DA — SIGNIFICANT CHANGE UP (ref 10–60)
ANION GAP SERPL CALC-SCNC: 7 MMOL/L — SIGNIFICANT CHANGE UP (ref 5–17)
ANION GAP SERPL CALC-SCNC: 9 MMOL/L — SIGNIFICANT CHANGE UP (ref 5–17)
APTT BLD: 26.5 SEC — LOW (ref 27.5–35.5)
AST SERPL-CCNC: 71 U/L — HIGH (ref 10–40)
BASOPHILS # BLD AUTO: 0.05 K/UL — SIGNIFICANT CHANGE UP (ref 0–0.2)
BASOPHILS NFR BLD AUTO: 0.5 % — SIGNIFICANT CHANGE UP (ref 0–2)
BILIRUB SERPL-MCNC: 0.7 MG/DL — SIGNIFICANT CHANGE UP (ref 0.2–1.2)
BUN SERPL-MCNC: 24 MG/DL — HIGH (ref 7–18)
BUN SERPL-MCNC: 25 MG/DL — HIGH (ref 7–18)
CALCIUM SERPL-MCNC: 11 MG/DL — HIGH (ref 8.4–10.5)
CALCIUM SERPL-MCNC: 11.3 MG/DL — HIGH (ref 8.4–10.5)
CHLORIDE SERPL-SCNC: 96 MMOL/L — SIGNIFICANT CHANGE UP (ref 96–108)
CHLORIDE SERPL-SCNC: 98 MMOL/L — SIGNIFICANT CHANGE UP (ref 96–108)
CO2 SERPL-SCNC: 31 MMOL/L — SIGNIFICANT CHANGE UP (ref 22–31)
CO2 SERPL-SCNC: 31 MMOL/L — SIGNIFICANT CHANGE UP (ref 22–31)
CREAT SERPL-MCNC: 1.22 MG/DL — SIGNIFICANT CHANGE UP (ref 0.5–1.3)
CREAT SERPL-MCNC: 1.46 MG/DL — HIGH (ref 0.5–1.3)
EOSINOPHIL # BLD AUTO: 0.02 K/UL — SIGNIFICANT CHANGE UP (ref 0–0.5)
EOSINOPHIL NFR BLD AUTO: 0.2 % — SIGNIFICANT CHANGE UP (ref 0–6)
GLUCOSE SERPL-MCNC: 102 MG/DL — HIGH (ref 70–99)
GLUCOSE SERPL-MCNC: 134 MG/DL — HIGH (ref 70–99)
HCT VFR BLD CALC: 32.2 % — LOW (ref 34.5–45)
HGB BLD-MCNC: 10.8 G/DL — LOW (ref 11.5–15.5)
IMM GRANULOCYTES NFR BLD AUTO: 1.7 % — HIGH (ref 0–1.5)
INR BLD: 1.21 RATIO — HIGH (ref 0.88–1.16)
LACTATE SERPL-SCNC: 1.3 MMOL/L — SIGNIFICANT CHANGE UP (ref 0.7–2)
LYMPHOCYTES # BLD AUTO: 1.76 K/UL — SIGNIFICANT CHANGE UP (ref 1–3.3)
LYMPHOCYTES # BLD AUTO: 18.9 % — SIGNIFICANT CHANGE UP (ref 13–44)
MAGNESIUM SERPL-MCNC: 1.8 MG/DL — SIGNIFICANT CHANGE UP (ref 1.6–2.6)
MCHC RBC-ENTMCNC: 30 PG — SIGNIFICANT CHANGE UP (ref 27–34)
MCHC RBC-ENTMCNC: 33.5 GM/DL — SIGNIFICANT CHANGE UP (ref 32–36)
MCV RBC AUTO: 89.4 FL — SIGNIFICANT CHANGE UP (ref 80–100)
MONOCYTES # BLD AUTO: 0.75 K/UL — SIGNIFICANT CHANGE UP (ref 0–0.9)
MONOCYTES NFR BLD AUTO: 8.1 % — SIGNIFICANT CHANGE UP (ref 2–14)
NEUTROPHILS # BLD AUTO: 6.55 K/UL — SIGNIFICANT CHANGE UP (ref 1.8–7.4)
NEUTROPHILS NFR BLD AUTO: 70.6 % — SIGNIFICANT CHANGE UP (ref 43–77)
NRBC # BLD: 0 /100 WBCS — SIGNIFICANT CHANGE UP (ref 0–0)
PLATELET # BLD AUTO: 295 K/UL — SIGNIFICANT CHANGE UP (ref 150–400)
POTASSIUM SERPL-MCNC: 2.9 MMOL/L — CRITICAL LOW (ref 3.5–5.3)
POTASSIUM SERPL-MCNC: 4.2 MMOL/L — SIGNIFICANT CHANGE UP (ref 3.5–5.3)
POTASSIUM SERPL-SCNC: 2.9 MMOL/L — CRITICAL LOW (ref 3.5–5.3)
POTASSIUM SERPL-SCNC: 4.2 MMOL/L — SIGNIFICANT CHANGE UP (ref 3.5–5.3)
PROT SERPL-MCNC: 8.2 G/DL — SIGNIFICANT CHANGE UP (ref 6–8.3)
PROTHROM AB SERPL-ACNC: 14.3 SEC — HIGH (ref 10.6–13.6)
RBC # BLD: 3.6 M/UL — LOW (ref 3.8–5.2)
RBC # FLD: 12.9 % — SIGNIFICANT CHANGE UP (ref 10.3–14.5)
SARS-COV-2 RNA SPEC QL NAA+PROBE: SIGNIFICANT CHANGE UP
SODIUM SERPL-SCNC: 136 MMOL/L — SIGNIFICANT CHANGE UP (ref 135–145)
SODIUM SERPL-SCNC: 136 MMOL/L — SIGNIFICANT CHANGE UP (ref 135–145)
WBC # BLD: 9.29 K/UL — SIGNIFICANT CHANGE UP (ref 3.8–10.5)
WBC # FLD AUTO: 9.29 K/UL — SIGNIFICANT CHANGE UP (ref 3.8–10.5)

## 2021-09-20 PROCEDURE — 71250 CT THORAX DX C-: CPT | Mod: 26

## 2021-09-20 PROCEDURE — 93010 ELECTROCARDIOGRAM REPORT: CPT

## 2021-09-20 PROCEDURE — 99285 EMERGENCY DEPT VISIT HI MDM: CPT

## 2021-09-20 PROCEDURE — 71045 X-RAY EXAM CHEST 1 VIEW: CPT | Mod: 26

## 2021-09-20 RX ORDER — AZITHROMYCIN 500 MG/1
500 TABLET, FILM COATED ORAL EVERY 24 HOURS
Refills: 0 | Status: DISCONTINUED | OUTPATIENT
Start: 2021-09-20 | End: 2021-09-24

## 2021-09-20 RX ORDER — HEPARIN SODIUM 5000 [USP'U]/ML
5000 INJECTION INTRAVENOUS; SUBCUTANEOUS EVERY 8 HOURS
Refills: 0 | Status: DISCONTINUED | OUTPATIENT
Start: 2021-09-20 | End: 2021-09-23

## 2021-09-20 RX ORDER — SODIUM CHLORIDE 9 MG/ML
1000 INJECTION INTRAMUSCULAR; INTRAVENOUS; SUBCUTANEOUS
Refills: 0 | Status: DISCONTINUED | OUTPATIENT
Start: 2021-09-20 | End: 2021-09-24

## 2021-09-20 RX ORDER — AZITHROMYCIN 500 MG/1
500 TABLET, FILM COATED ORAL ONCE
Refills: 0 | Status: COMPLETED | OUTPATIENT
Start: 2021-09-20 | End: 2021-09-20

## 2021-09-20 RX ORDER — SODIUM CHLORIDE 9 MG/ML
1000 INJECTION INTRAMUSCULAR; INTRAVENOUS; SUBCUTANEOUS ONCE
Refills: 0 | Status: COMPLETED | OUTPATIENT
Start: 2021-09-20 | End: 2021-09-20

## 2021-09-20 RX ORDER — POTASSIUM CHLORIDE 20 MEQ
20 PACKET (EA) ORAL
Refills: 0 | Status: COMPLETED | OUTPATIENT
Start: 2021-09-20 | End: 2021-09-20

## 2021-09-20 RX ORDER — CEFTRIAXONE 500 MG/1
1000 INJECTION, POWDER, FOR SOLUTION INTRAMUSCULAR; INTRAVENOUS EVERY 24 HOURS
Refills: 0 | Status: COMPLETED | OUTPATIENT
Start: 2021-09-20 | End: 2021-09-25

## 2021-09-20 RX ORDER — CEFTRIAXONE 500 MG/1
1000 INJECTION, POWDER, FOR SOLUTION INTRAMUSCULAR; INTRAVENOUS ONCE
Refills: 0 | Status: COMPLETED | OUTPATIENT
Start: 2021-09-20 | End: 2021-09-20

## 2021-09-20 RX ORDER — MAGNESIUM SULFATE 500 MG/ML
1 VIAL (ML) INJECTION ONCE
Refills: 0 | Status: COMPLETED | OUTPATIENT
Start: 2021-09-20 | End: 2021-09-20

## 2021-09-20 RX ORDER — POTASSIUM CHLORIDE 20 MEQ
40 PACKET (EA) ORAL ONCE
Refills: 0 | Status: COMPLETED | OUTPATIENT
Start: 2021-09-20 | End: 2021-09-20

## 2021-09-20 RX ORDER — ATORVASTATIN CALCIUM 80 MG/1
20 TABLET, FILM COATED ORAL AT BEDTIME
Refills: 0 | Status: DISCONTINUED | OUTPATIENT
Start: 2021-09-20 | End: 2021-09-24

## 2021-09-20 RX ADMIN — CEFTRIAXONE 100 MILLIGRAM(S): 500 INJECTION, POWDER, FOR SOLUTION INTRAMUSCULAR; INTRAVENOUS at 14:55

## 2021-09-20 RX ADMIN — Medication 100 GRAM(S): at 21:28

## 2021-09-20 RX ADMIN — SODIUM CHLORIDE 1000 MILLILITER(S): 9 INJECTION INTRAMUSCULAR; INTRAVENOUS; SUBCUTANEOUS at 19:11

## 2021-09-20 RX ADMIN — ATORVASTATIN CALCIUM 20 MILLIGRAM(S): 80 TABLET, FILM COATED ORAL at 21:32

## 2021-09-20 RX ADMIN — Medication 20 MILLIEQUIVALENT(S): at 12:54

## 2021-09-20 RX ADMIN — Medication 20 MILLIEQUIVALENT(S): at 14:04

## 2021-09-20 RX ADMIN — Medication 40 MILLIEQUIVALENT(S): at 21:28

## 2021-09-20 RX ADMIN — HEPARIN SODIUM 5000 UNIT(S): 5000 INJECTION INTRAVENOUS; SUBCUTANEOUS at 21:32

## 2021-09-20 RX ADMIN — Medication 20 MILLIEQUIVALENT(S): at 18:18

## 2021-09-20 RX ADMIN — AZITHROMYCIN 255 MILLIGRAM(S): 500 TABLET, FILM COATED ORAL at 14:58

## 2021-09-20 NOTE — H&P ADULT - PROBLEM SELECTOR PLAN 2
K 2.9 likely due to poor PO intake   Given 20Meq x3 in ED  Follow BMP at midnight Hypotensive to 80s on RA, increased to 98% on 4L and hypotension   Likely 2/2 CAP  CXR: RLL consolidation and effusion  c/w azithro and rocephin for CAP  follow legionella antigen  Follow blood cultures  Follow echo   Follow CT chest   Dr. Lombardi consulted

## 2021-09-20 NOTE — H&P ADULT - NSHPPHYSICALEXAM_GEN_ALL_CORE
LOS:     VITALS:   T(C): 36.5 (09-20-21 @ 15:51), Max: 36.5 (09-20-21 @ 15:51)  HR: 63 (09-20-21 @ 15:51) (60 - 64)  BP: 116/65 (09-20-21 @ 15:51) (70/38 - 116/65)  RR: 17 (09-20-21 @ 15:51) (17 - 20)  SpO2: 96% (09-20-21 @ 15:51) (86% - 98%)    GENERAL: NAD, lying in bed comfortably with NC on   HEAD:  Atraumatic, Normocephalic  EYES: EOMI, PERRLA, conjunctiva and sclera clear  ENT: Moist mucous membranes  NECK: Supple, No JVD  CHEST/LUNG: RLL crackles   HEART: Regular rate and rhythm; No murmurs, rubs, or gallops  ABDOMEN: BSx4; Soft, nontender, nondistended  EXTREMITIES:  2+ Peripheral Pulses, brisk capillary refill. No clubbing, cyanosis, or edema  NERVOUS SYSTEM:  A&Ox3, no focal deficits   SKIN: No rashes or lesions

## 2021-09-20 NOTE — H&P ADULT - PROBLEM SELECTOR PLAN 4
Confirm home meds  Hold blood pressure medications as BP on the lower side   Monitor BP Cr. 1.46   Likely prerenal  Follow urine lytes  Follow BMP in am

## 2021-09-20 NOTE — ED ADULT NURSE NOTE - OBJECTIVE STATEMENT
PT REPORTS GENERALIZED WEAKNESS X 4 DAYS. SENT BY PMD FOR WEAKNESS. EMS  REPORTS O2SAT IN THE FIELD WAS 84% ON ROOM AIR.

## 2021-09-20 NOTE — ED ADULT TRIAGE NOTE - CHIEF COMPLAINT QUOTE
PT REPORTS GENERALIZED WEAKNESS X 4 DAYS. SENT BY PMD FOR WEAKNESS PT REPORTS GENERALIZED WEAKNESS X 4 DAYS. SENT BY PMD FOR WEAKNESS. EMS  REPORTS O2SAT IN THE FIELD WAS 84% ON RA

## 2021-09-20 NOTE — H&P ADULT - PROBLEM SELECTOR PLAN 5
Follow lipid panel in am Confirm home meds  Hold blood pressure medications as BP on the lower side   Monitor BP

## 2021-09-20 NOTE — H&P ADULT - PROBLEM SELECTOR PLAN 7
IMPROVE VTE Individual Risk Assessment  RISK                                                                Points  [  ] Previous VTE                                                  3  [  ] Thrombophilia                                               2  [  ] Lower limb paralysis                                      2        (unable to hold up >15 seconds)    [  ] Current Cancer                                              2         (within 6 months)  [x  ] Immobilization > 24 hrs                                1  [  ] ICU/CCU stay > 24 hours                              1  [x  ] Age > 60                                                      1  IMPROVE VTE Score _________2, -- for DVT proph Likely 2/2 dehydration   Follow BMP at midnight

## 2021-09-20 NOTE — ED PROVIDER NOTE - OBJECTIVE STATEMENT
71-year-old female hx of HTN BIBEMS for hypoxia/hypotension at outpatient clinic. Has been weak and SOB for a few days, has also had a productive cough. No fevers. No chest pain. No other symptoms.

## 2021-09-20 NOTE — H&P ADULT - ASSESSMENT
72 y/o F hx of HTN and HLD BIBEMS for hypoxia and hypotension at outpatient clinic. Admitted for AHRF 2/2 pna

## 2021-09-20 NOTE — H&P ADULT - PROBLEM SELECTOR PLAN 3
Cr. 1.46   Likely prerenal  Follow urine lytes  Follow BMP in am K 2.9 likely due to poor PO intake   Given 20Meq x3 in ED  Follow BMP at midnight

## 2021-09-20 NOTE — H&P ADULT - HISTORY OF PRESENT ILLNESS
70 y/o F hx of HTN and HLD BIBEMS for hypoxia and hypotension at outpatient clinic. Patient states that for the past weak she has been feeling nauseous, fatigue and decreased appetite. She had 1episode of non bloody vomit and a couple of episodes of non bloody diarrhea  72 y/o F hx of HTN and HLD BIBEMS for hypoxia and hypotension at outpatient clinic. Patient states that for the past weak she has been feeling nauseous, fatigue and decreased appetite. She had 1episode of non bloody vomit and a couple of episodes of non bloody diarrhea early last week which has also  resolved. She states she developed a cough last week as well which is associated with phlegm but denies fevers, chills, shortness of breath or night sweats. Denies chest pain, palpitations, diarrhea, constipation, headache or any other complaints

## 2021-09-20 NOTE — H&P ADULT - ATTENDING COMMENTS
72 y/o F hx of HTN and HLD BIBEMS for hypoxia and hypotension at outpatient clinic.   Patient reports vague symptoms- nausea. vomiting x 1 episdoe today associated with weakness noted to be hypoxic and hypotensive Out patient clinic.     Reviewed imaging and labs     Cxr RLL pneumonia     Acute Hypoxic respiratory Failure   RLL Pneumonia iv abx, follow up blood, urine cultures.   Follow up urine for Legionella   Follow up CT chest   Pulm consult called, follow up recs.     HTN Monitor off meds 70 y/o F hx of HTN and HLD BIBEMS for hypoxia and hypotension at outpatient clinic.   Patient reports vague symptoms- nausea. vomiting x 1 episdoe today associated with weakness noted to be hypoxic and hypotensive Out patient clinic.     Reviewed imaging and labs     Cxr RLL pneumonia     Acute Hypoxic respiratory Failure   RLL Pneumonia   BRENDA   Sepsis     iv abx, follow up blood, urine cultures.   Follow up urine for Legionella   Follow up CT chest   Pulm consult called, follow up recs.   PT eval     HTN Monitor off meds  Gentle hydration  Monitor renal function

## 2021-09-20 NOTE — ED PROVIDER NOTE - CLINICAL SUMMARY MEDICAL DECISION MAKING FREE TEXT BOX
71-year-old female hx of HTN BIBEMS for hypoxia/hypotension at outpatient clinic found to have pneumonia here, COVID negative. WIll admit for IV antibiotics and oxygen supplementation as patient desats to 92% on room air.

## 2021-09-20 NOTE — H&P ADULT - PROBLEM SELECTOR PROBLEM 4
[facility] HLD (hyperlipidemia) HTN (hypertension) BRENDA (acute kidney injury)

## 2021-09-20 NOTE — ED ADULT NURSE NOTE - CHIEF COMPLAINT QUOTE
PT REPORTS GENERALIZED WEAKNESS X 4 DAYS. SENT BY PMD FOR WEAKNESS. EMS  REPORTS O2SAT IN THE FIELD WAS 84% ON RA

## 2021-09-20 NOTE — H&P ADULT - PROBLEM SELECTOR PLAN 1
Hypotensive to 80s on RA, increased to 98% on 4L  CXR: RLL consolidation and effusion  c/w azithro and rocephin for CAP  follow legionella antigen  Follow blood cultures  Follow echo   Dr. Lombardi consulted Hypotensive to 80s on RA, increased to 98% on 4L  CXR: RLL consolidation and effusion  c/w azithro and rocephin for CAP  follow legionella antigen  Follow blood cultures  Follow echo   Follow CT chest   Dr. Lombardi consulted Hypotensive to 80s on RA, increased to 98% on 4L and hypotension   CXR: RLL consolidation and effusion  c/w azithro and rocephin for CAP  follow legionella antigen  Follow blood cultures  Follow echo   Follow CT chest   Dr. Lombardi consulted

## 2021-09-20 NOTE — H&P ADULT - PROBLEM SELECTOR PLAN 6
Likely 2/2 dehydration   Follow BMP at midnight Follow lipid panel in am  Started on atorvastatin 20mg

## 2021-09-20 NOTE — H&P ADULT - PROBLEM SELECTOR PLAN 8
IMPROVE VTE Individual Risk Assessment  RISK                                                                Points  [  ] Previous VTE                                                  3  [  ] Thrombophilia                                               2  [  ] Lower limb paralysis                                      2        (unable to hold up >15 seconds)    [  ] Current Cancer                                              2         (within 6 months)  [x  ] Immobilization > 24 hrs                                1  [  ] ICU/CCU stay > 24 hours                              1  [x  ] Age > 60                                                      1  IMPROVE VTE Score _________2, -- for DVT proph

## 2021-09-21 ENCOUNTER — TRANSCRIPTION ENCOUNTER (OUTPATIENT)
Age: 71
End: 2021-09-21

## 2021-09-21 DIAGNOSIS — C80.1 MALIGNANT (PRIMARY) NEOPLASM, UNSPECIFIED: ICD-10-CM

## 2021-09-21 DIAGNOSIS — J18.9 PNEUMONIA, UNSPECIFIED ORGANISM: ICD-10-CM

## 2021-09-21 DIAGNOSIS — N39.0 URINARY TRACT INFECTION, SITE NOT SPECIFIED: ICD-10-CM

## 2021-09-21 DIAGNOSIS — Z02.9 ENCOUNTER FOR ADMINISTRATIVE EXAMINATIONS, UNSPECIFIED: ICD-10-CM

## 2021-09-21 LAB
A1C WITH ESTIMATED AVERAGE GLUCOSE RESULT: 6.5 % — HIGH (ref 4–5.6)
ALBUMIN SERPL ELPH-MCNC: 2.9 G/DL — LOW (ref 3.5–5)
ALP SERPL-CCNC: 90 U/L — SIGNIFICANT CHANGE UP (ref 40–120)
ALT FLD-CCNC: 22 U/L DA — SIGNIFICANT CHANGE UP (ref 10–60)
ANION GAP SERPL CALC-SCNC: 12 MMOL/L — SIGNIFICANT CHANGE UP (ref 5–17)
APPEARANCE UR: ABNORMAL
AST SERPL-CCNC: 61 U/L — HIGH (ref 10–40)
BACTERIA # UR AUTO: ABNORMAL /HPF
BILIRUB SERPL-MCNC: 0.5 MG/DL — SIGNIFICANT CHANGE UP (ref 0.2–1.2)
BILIRUB UR-MCNC: NEGATIVE — SIGNIFICANT CHANGE UP
BUN SERPL-MCNC: 23 MG/DL — HIGH (ref 7–18)
CALCIUM SERPL-MCNC: 10.6 MG/DL — HIGH (ref 8.4–10.5)
CHLORIDE SERPL-SCNC: 99 MMOL/L — SIGNIFICANT CHANGE UP (ref 96–108)
CHOLEST SERPL-MCNC: 172 MG/DL — SIGNIFICANT CHANGE UP
CO2 SERPL-SCNC: 27 MMOL/L — SIGNIFICANT CHANGE UP (ref 22–31)
COLOR SPEC: YELLOW — SIGNIFICANT CHANGE UP
COVID-19 SPIKE DOMAIN AB INTERP: POSITIVE
COVID-19 SPIKE DOMAIN ANTIBODY RESULT: 82.3 U/ML — HIGH
CREAT ?TM UR-MCNC: 156 MG/DL — SIGNIFICANT CHANGE UP
CREAT SERPL-MCNC: 1.04 MG/DL — SIGNIFICANT CHANGE UP (ref 0.5–1.3)
DIFF PNL FLD: ABNORMAL
EPI CELLS # UR: ABNORMAL /HPF
ESTIMATED AVERAGE GLUCOSE: 140 MG/DL — HIGH (ref 68–114)
GLUCOSE SERPL-MCNC: 102 MG/DL — HIGH (ref 70–99)
GLUCOSE UR QL: NEGATIVE — SIGNIFICANT CHANGE UP
HCT VFR BLD CALC: 30.9 % — LOW (ref 34.5–45)
HCV AB S/CO SERPL IA: 11.82 S/CO — HIGH (ref 0–0.99)
HCV AB SERPL-IMP: REACTIVE
HDLC SERPL-MCNC: 25 MG/DL — LOW
HGB BLD-MCNC: 10.3 G/DL — LOW (ref 11.5–15.5)
KETONES UR-MCNC: ABNORMAL
LEGIONELLA AG UR QL: NEGATIVE — SIGNIFICANT CHANGE UP
LEUKOCYTE ESTERASE UR-ACNC: ABNORMAL
LIPID PNL WITH DIRECT LDL SERPL: 78 MG/DL — SIGNIFICANT CHANGE UP
MAGNESIUM SERPL-MCNC: 2.1 MG/DL — SIGNIFICANT CHANGE UP (ref 1.6–2.6)
MCHC RBC-ENTMCNC: 30.1 PG — SIGNIFICANT CHANGE UP (ref 27–34)
MCHC RBC-ENTMCNC: 33.3 GM/DL — SIGNIFICANT CHANGE UP (ref 32–36)
MCV RBC AUTO: 90.4 FL — SIGNIFICANT CHANGE UP (ref 80–100)
NITRITE UR-MCNC: POSITIVE
NON HDL CHOLESTEROL: 147 MG/DL — HIGH
NRBC # BLD: 0 /100 WBCS — SIGNIFICANT CHANGE UP (ref 0–0)
NT-PROBNP SERPL-SCNC: 779 PG/ML — HIGH (ref 0–125)
PH UR: 6 — SIGNIFICANT CHANGE UP (ref 5–8)
PHOSPHATE SERPL-MCNC: 3.7 MG/DL — SIGNIFICANT CHANGE UP (ref 2.5–4.5)
PLATELET # BLD AUTO: 301 K/UL — SIGNIFICANT CHANGE UP (ref 150–400)
POTASSIUM SERPL-MCNC: 3.8 MMOL/L — SIGNIFICANT CHANGE UP (ref 3.5–5.3)
POTASSIUM SERPL-SCNC: 3.8 MMOL/L — SIGNIFICANT CHANGE UP (ref 3.5–5.3)
PROT SERPL-MCNC: 7.6 G/DL — SIGNIFICANT CHANGE UP (ref 6–8.3)
PROT UR-MCNC: 30 MG/DL
RBC # BLD: 3.42 M/UL — LOW (ref 3.8–5.2)
RBC # FLD: 13.1 % — SIGNIFICANT CHANGE UP (ref 10.3–14.5)
RBC CASTS # UR COMP ASSIST: ABNORMAL /HPF (ref 0–2)
SARS-COV-2 IGG+IGM SERPL QL IA: 82.3 U/ML — HIGH
SARS-COV-2 IGG+IGM SERPL QL IA: POSITIVE
SODIUM SERPL-SCNC: 138 MMOL/L — SIGNIFICANT CHANGE UP (ref 135–145)
SODIUM UR-SCNC: 50 MMOL/L — SIGNIFICANT CHANGE UP
SP GR SPEC: 1.02 — SIGNIFICANT CHANGE UP (ref 1.01–1.02)
TRIGL SERPL-MCNC: 347 MG/DL — HIGH
UROBILINOGEN FLD QL: NEGATIVE — SIGNIFICANT CHANGE UP
WBC # BLD: 7.34 K/UL — SIGNIFICANT CHANGE UP (ref 3.8–10.5)
WBC # FLD AUTO: 7.34 K/UL — SIGNIFICANT CHANGE UP (ref 3.8–10.5)
WBC UR QL: >50 /HPF (ref 0–5)

## 2021-09-21 RX ORDER — METOPROLOL TARTRATE 50 MG
50 TABLET ORAL
Refills: 0 | Status: DISCONTINUED | OUTPATIENT
Start: 2021-09-21 | End: 2021-09-22

## 2021-09-21 RX ORDER — INFLUENZA VIRUS VACCINE 15; 15; 15; 15 UG/.5ML; UG/.5ML; UG/.5ML; UG/.5ML
0.5 SUSPENSION INTRAMUSCULAR ONCE
Refills: 0 | Status: DISCONTINUED | OUTPATIENT
Start: 2021-09-21 | End: 2021-10-06

## 2021-09-21 RX ORDER — HYDRALAZINE HCL 50 MG
5 TABLET ORAL ONCE
Refills: 0 | Status: COMPLETED | OUTPATIENT
Start: 2021-09-21 | End: 2021-09-21

## 2021-09-21 RX ORDER — AMLODIPINE BESYLATE 2.5 MG/1
5 TABLET ORAL DAILY
Refills: 0 | Status: DISCONTINUED | OUTPATIENT
Start: 2021-09-21 | End: 2021-10-04

## 2021-09-21 RX ADMIN — AMLODIPINE BESYLATE 5 MILLIGRAM(S): 2.5 TABLET ORAL at 18:34

## 2021-09-21 RX ADMIN — Medication 50 MILLIGRAM(S): at 18:43

## 2021-09-21 RX ADMIN — HEPARIN SODIUM 5000 UNIT(S): 5000 INJECTION INTRAVENOUS; SUBCUTANEOUS at 05:35

## 2021-09-21 RX ADMIN — ATORVASTATIN CALCIUM 20 MILLIGRAM(S): 80 TABLET, FILM COATED ORAL at 21:09

## 2021-09-21 RX ADMIN — HEPARIN SODIUM 5000 UNIT(S): 5000 INJECTION INTRAVENOUS; SUBCUTANEOUS at 21:09

## 2021-09-21 RX ADMIN — HEPARIN SODIUM 5000 UNIT(S): 5000 INJECTION INTRAVENOUS; SUBCUTANEOUS at 13:08

## 2021-09-21 RX ADMIN — CEFTRIAXONE 100 MILLIGRAM(S): 500 INJECTION, POWDER, FOR SOLUTION INTRAMUSCULAR; INTRAVENOUS at 13:08

## 2021-09-21 RX ADMIN — AZITHROMYCIN 255 MILLIGRAM(S): 500 TABLET, FILM COATED ORAL at 13:08

## 2021-09-21 RX ADMIN — SODIUM CHLORIDE 60 MILLILITER(S): 9 INJECTION INTRAMUSCULAR; INTRAVENOUS; SUBCUTANEOUS at 05:35

## 2021-09-21 NOTE — CONSULT NOTE ADULT - SUBJECTIVE AND OBJECTIVE BOX
MEDICATIONS  (STANDING):  atorvastatin 20 milliGRAM(s) Oral at bedtime  azithromycin  IVPB 500 milliGRAM(s) IV Intermittent every 24 hours  cefTRIAXone   IVPB 1000 milliGRAM(s) IV Intermittent every 24 hours  heparin   Injectable 5000 Unit(s) SubCutaneous every 8 hours  influenza   Vaccine 0.5 milliLiter(s) IntraMuscular once  sodium chloride 0.9%. 1000 milliLiter(s) (60 mL/Hr) IV Continuous <Continuous>    MEDICATIONS  (PRN):      Vital Signs Last 24 Hrs  T(C): 36.7 (21 Sep 2021 05:05), Max: 36.9 (21 Sep 2021 01:14)  T(F): 98.1 (21 Sep 2021 05:05), Max: 98.4 (21 Sep 2021 01:14)  HR: 96 (21 Sep 2021 05:05) (63 - 96)  BP: 164/72 (21 Sep 2021 05:05) (116/65 - 164/72)  BP(mean): --  RR: 19 (21 Sep 2021 05:05) (17 - 19)  SpO2: 95% (21 Sep 2021 05:05) (93% - 97%)    LABS:                        10.3   7.34  )-----------( 301      ( 21 Sep 2021 07:37 )             30.9     09-21    138  |  99  |  23<H>  ----------------------------<  102<H>  3.8   |  27  |  1.04    Ca    10.6<H>      21 Sep 2021 07:37  Phos  3.7       Mg     2.1         TPro  7.6  /  Alb  2.9<L>  /  TBili  0.5  /  DBili  x   /  AST  61<H>  /  ALT  22  /  AlkPhos  90  09-21    PT/INR - ( 20 Sep 2021 11:38 )   PT: 14.3 sec;   INR: 1.21 ratio         PTT - ( 20 Sep 2021 11:38 )  PTT:26.5 sec      Urinalysis Basic - ( 21 Sep 2021 09:15 )    Color: Yellow / Appearance: very cloudy / S.020 / pH: x  Gluc: x / Ketone: Small  / Bili: Negative / Urobili: Negative   Blood: x / Protein: 30 mg/dL / Nitrite: Positive   Leuk Esterase: Moderate / RBC: 2-5 /HPF / WBC >50 /HPF   Sq Epi: x / Non Sq Epi: Many /HPF / Bacteria: Many /HPF        COVID-19 PCR: NotDetec (20 Sep 2021 11:38)      RADIOLOGY & ADDITIONAL TESTS:  < from: CT Chest No Cont (21 @ 22:05) >    EXAM:  CT CHEST                            PROCEDURE DATE:  2021          INTERPRETATION:  CLINICAL INFORMATION: Pleural effusion.    COMPARISON: None.    CONTRAST/COMPLICATIONS:  IV Contrast: NONE  Oral Contrast: NONE  Complications: None reported at time of study completion    PROCEDURE:  CT of the Chest was performed.  Sagittal and coronal reformats were performed.    FINDINGS:    LUNGS AND AIRWAYS: Central mass encasing the right hilum, measuring at least 5.4 x 5.2 cm. Postobstructiveatelectasis in the right middle and lower lobes. Patchy consolidation in the right upper lobe, possibly pneumonia.  PLEURA: Small right pleural effusion.  MEDIASTINUM AND DESIREE: Mediastinal and right hilar adenopathy. For example:  *  Lower right paratracheal node, measuring 1.9 x 1.8 cm  *  Subcarinal node (series 3, image 65), measuring 2.0 x 1.5 cm  VESSELS: Atherosclerotic calcifications.  HEART: Heart size is normal. No pericardial effusion.  CHEST WALL AND LOWER NECK: Within normal limits.  VISUALIZED UPPER ABDOMEN: Peripherally calcified gallstone or gallbladder wall. Multiple indeterminate liver lesions, concerning for metastases. For example:  *  Segment 6 (series 3, image 135), measuring 1.7 x 1.5 cm  *  Segment 4A (series 3, image 104), measuring 2.2 x 2.2 cm  BONES: Mild compression of the T12 superior endplate. Degenerative changes. ACDF hardware.    IMPRESSION:  Central mass encasing the right hilum with postobstructive atelectasis in the right middle and lower lobes.    Mediastinal and right hilar adenopathy.    Probable liver metastases.    Patchy consolidation in the right upper lobe, possibly pneumonia.    --- End of Report ---    < end of copied text >       Reason for Admission: Pneumonia  History of Present Illness:   72 y/o F hx of HTN and HLD BIBEMS for hypoxia and hypotension at outpatient clinic. Patient states that for the past weak she has been feeling nauseous, fatigue and decreased appetite. She had 1episode of non bloody vomit and a couple of episodes of non bloody diarrhea early last week which has also  resolved. She states she developed a cough last week as well which is associated with phlegm but denies fevers, chills, shortness of breath or night sweats. Denies chest pain, palpitations, diarrhea, constipation, headache or any other complaints    REVIEW OF SYSTEMS:    CONSTITUTIONAL: No fever, no loss of appetite. no chills, no weight loss  EYES: no acute visual disturbances  NECK: No pain or stiffness  RESPIRATORY: + cough; + shortness of breath  CARDIOVASCULAR: No chest pain, no palpitations  GASTROINTESTINAL: No pain. No nausea or vomiting; No diarrhea   NEUROLOGICAL: No headache or numbness, no tremors  MUSCULOSKELETAL: No joint pain, no muscle pain  GENITOURINARY: no dysuria, no frequency, no hesitancy  PSYCHIATRY: no depression, no anxiety  ALL OTHER  ROS negative        Allergies and Intolerances:        Allergies:  	No Known Allergies:     Home Medications:   * Outpatient Medication Status not yet specified    Patient History:    Past Medical, Past Surgical, and Family History:  PAST MEDICAL HISTORY:  HLD (hyperlipidemia)     HTN (hypertension).     Social History:  Social History (marital status, living situation, occupation, tobacco use, alcohol and drug use, and sexual history): Smokes 1ppd for many years, stopped 22 years ago     Tobacco Screening:  · Core Measure Site	Yes  · Has the patient used tobacco in the past 30 days?	No    Risk Assessment:    Present on Admission:  Deep Venous Thrombosis	no  Pulmonary Embolus	no     Heart Failure:  Does this patient have a history of or has been diagnosed with heart failure? no.      MEDICATIONS  (STANDING):  atorvastatin 20 milliGRAM(s) Oral at bedtime  azithromycin  IVPB 500 milliGRAM(s) IV Intermittent every 24 hours  cefTRIAXone   IVPB 1000 milliGRAM(s) IV Intermittent every 24 hours  heparin   Injectable 5000 Unit(s) SubCutaneous every 8 hours  influenza   Vaccine 0.5 milliLiter(s) IntraMuscular once  sodium chloride 0.9%. 1000 milliLiter(s) (60 mL/Hr) IV Continuous <Continuous>    MEDICATIONS  (PRN):      Vital Signs Last 24 Hrs  T(C): 36.7 (21 Sep 2021 05:05), Max: 36.9 (21 Sep 2021 01:14)  T(F): 98.1 (21 Sep 2021 05:05), Max: 98.4 (21 Sep 2021 01:14)  HR: 96 (21 Sep 2021 05:05) (63 - 96)  BP: 164/72 (21 Sep 2021 05:05) (116/65 - 164/72)  BP(mean): --  RR: 19 (21 Sep 2021 05:05) (17 - 19)  SpO2: 95% (21 Sep 2021 05:05) (93% - 97%)    GEN: NAD; A and O x 3  LUNGS: decreased BS on right, Left CTA  HEART: S1 S2  ABDOMEN: soft, non-tender, non-distended, + BS  EXTREMITIES: no edema  NERVOUS SYSTEM:  Awake and alert; no focal neuro deficits      LABS:                        10.3   7.34  )-----------( 301      ( 21 Sep 2021 07:37 )             30.9     09-    138  |  99  |  23<H>  ----------------------------<  102<H>  3.8   |  27  |  1.04    Ca    10.6<H>      21 Sep 2021 07:37  Phos  3.7     -  Mg     2.1     -    TPro  7.6  /  Alb  2.9<L>  /  TBili  0.5  /  DBili  x   /  AST  61<H>  /  ALT  22  /  AlkPhos  90  09-21    PT/INR - ( 20 Sep 2021 11:38 )   PT: 14.3 sec;   INR: 1.21 ratio         PTT - ( 20 Sep 2021 11:38 )  PTT:26.5 sec      Urinalysis Basic - ( 21 Sep 2021 09:15 )    Color: Yellow / Appearance: very cloudy / S.020 / pH: x  Gluc: x / Ketone: Small  / Bili: Negative / Urobili: Negative   Blood: x / Protein: 30 mg/dL / Nitrite: Positive   Leuk Esterase: Moderate / RBC: 2-5 /HPF / WBC >50 /HPF   Sq Epi: x / Non Sq Epi: Many /HPF / Bacteria: Many /HPF        COVID-19 PCR: NotDetec (20 Sep 2021 11:38)      RADIOLOGY & ADDITIONAL TESTS:  < from: CT Chest No Cont (21 @ 22:05) >    EXAM:  CT CHEST                            PROCEDURE DATE:  2021          INTERPRETATION:  CLINICAL INFORMATION: Pleural effusion.    COMPARISON: None.    CONTRAST/COMPLICATIONS:  IV Contrast: NONE  Oral Contrast: NONE  Complications: None reported at time of study completion    PROCEDURE:  CT of the Chest was performed.  Sagittal and coronal reformats were performed.    FINDINGS:    LUNGS AND AIRWAYS: Central mass encasing the right hilum, measuring at least 5.4 x 5.2 cm. Postobstructiveatelectasis in the right middle and lower lobes. Patchy consolidation in the right upper lobe, possibly pneumonia.  PLEURA: Small right pleural effusion.  MEDIASTINUM AND DESIREE: Mediastinal and right hilar adenopathy. For example:  *  Lower right paratracheal node, measuring 1.9 x 1.8 cm  *  Subcarinal node (series 3, image 65), measuring 2.0 x 1.5 cm  VESSELS: Atherosclerotic calcifications.  HEART: Heart size is normal. No pericardial effusion.  CHEST WALL AND LOWER NECK: Within normal limits.  VISUALIZED UPPER ABDOMEN: Peripherally calcified gallstone or gallbladder wall. Multiple indeterminate liver lesions, concerning for metastases. For example:  *  Segment 6 (series 3, image 135), measuring 1.7 x 1.5 cm  *  Segment 4A (series 3, image 104), measuring 2.2 x 2.2 cm  BONES: Mild compression of the T12 superior endplate. Degenerative changes. ACDF hardware.    IMPRESSION:  Central mass encasing the right hilum with postobstructive atelectasis in the right middle and lower lobes.    Mediastinal and right hilar adenopathy.    Probable liver metastases.    Patchy consolidation in the right upper lobe, possibly pneumonia.    --- End of Report ---    < end of copied text >

## 2021-09-21 NOTE — CONSULT NOTE ADULT - ASSESSMENT
complete note to follow   complete note to follow    Problem# R/O Malignancy  p/w hypoxia, N/V and weakness  admits new onset cough and SOB, denies wt loss  former smoker, quit 22 years ago, approx 25 pack year hx  no hx malignancy  FamHx includes mother and sister with unknown cancer, both   AST slightly elevated, ALT/ALKPhos wnl  non-contrast CT shows: 5.4x5.2cm central mass encasing Right hilum. mediastinal/hilar adenopathy and mult indeterminate liver lesions, largest 2.2cm  Rec's:  -Check CEA  -CT A/P with contrast for complete w/u  -will need lung biopsy when clinically stable inpt vs outpt  -PET/CT as outpt  -Await results for further recommendations    Problem# Normocytic Anemia  Hgb=on admit 10.8 and today 10.3  pt thinks she has a hx of anemia, but unsure  Cr nl  Problem# Anemia  p/w weakness, fatigue, syncope  s/p  units PRBC   Hgb on admit and now  Rec's:  -CBC daily  -anemia w/u  -Transfuse PRBC if Hgb <7.0 or if symptomatic  -will reach out to pt's PCP for baseline CBC and other hx    Thank you for the referral. Will continue to monitor the patient.  Please call with any questions [phone]  Above reviewed with Attending Dr. Mathews         Problem# R/O Malignancy  p/w hypoxia, N/V and weakness  admits new onset cough and SOB, denies wt loss  former smoker, quit 22 years ago, approx 25 pack year hx  no hx malignancy  FamHx includes mother and sister with unknown cancer, both   AST slightly elevated, ALT/ALKPhos wnl  non-contrast CT shows: 5.4x5.2cm central mass encasing Right hilum. mediastinal/hilar adenopathy and mult indeterminate liver lesions, largest 2.2cm  Rec's:  -Check CEA  -CT A/P with contrast for complete w/u  -will need lung biopsy when clinically stable inpt vs outpt  -PET/CT as outpt  -Await results for further recommendations    Problem# Normocytic Anemia  Hgb=on admit 10.8 and today 10.3  pt thinks she has a hx of anemia, but unsure  Cr nl  Problem# Anemia  p/w weakness, fatigue, syncope  s/p  units PRBC   Hgb on admit and now  Rec's:  -CBC daily  -anemia w/u  -Transfuse PRBC if Hgb <7.0 or if symptomatic  -will reach out to pt's PCP for baseline CBC and other hx    Thank you for the referral. Will continue to monitor the patient.  Please call with any questions [phone]  Above reviewed with Attending Dr. Mathews

## 2021-09-21 NOTE — DISCHARGE NOTE PROVIDER - HOSPITAL COURSE
70 y/o F hx of HTN and HLD BIBEMS for hypoxia and hypotension at outpatient clinic.   Patient was admitted under medicine for sepsis secondary to RUL PNA Vs UTI. CT showed RUL PNA. UA positive. Patient is on azithromycin and Rocephin. BC and UC pending  Incidental finding on CT scan of Mediastinal and right hilar adenopathy, Probable liver metastases. Oncology Dr. Mathews following. Recommending CT abdomen and pelvis with contrast for more details.      72 y/o F hx of HTN and HLD BIBEMS for hypoxia and hypotension at outpatient clinic.   Patient was admitted under medicine for sepsis secondary to RUL PNA Vs UTI. CT showed RUL PNA. UA positive. Patient is on azithromycin and Rocephin. BC no growth and UC pending  Incidental finding on CT scan of Mediastinal and right hilar adenopathy, Probable liver metastases. Oncology Dr. Mathews following. Recommending CT abdomen and pelvis with contrast, resulted Liver lesions suspected on the noncontrast study not confirmed on this portal venous phase study. Consider arterial phase imaging if needed for patient's diagnosis.  Group of peritoneal nodules in the right mid abdomen may represent metastatic peritoneal implants.  Calcified gallstone versus gallbladder wall calcification which may be further evaluated with ultrasound on a nonemergent basis  Evidence of gastric bypass.   Pulmonary following and suggestive bronchoscopy with biopsy/EBUS. ----  Patient is saturating well on room air.       Incomplete  Please note this is a brief summary and refer to medical records /daily progress notes for completed hospital course.    70 y/o F hx of HTN and HLD BIBEMS for hypoxia and hypotension at outpatient clinic.   Patient was admitted under medicine for sepsis secondary to RUL PNA Vs UTI. CT showed RUL PNA. UA positive. Patient is on azithromycin and Rocephin. BC no growth and UC pending  Incidental finding on CT scan of Mediastinal and right hilar adenopathy, Probable liver metastases. Oncology Dr. Mathews following. Recommending CT abdomen and pelvis with contrast, resulted Liver lesions suspected on the noncontrast study not confirmed on this portal venous phase study. Consider arterial phase imaging if needed for patient's diagnosis.  Group of peritoneal nodules in the right mid abdomen may represent metastatic peritoneal implants. IR consulted, unable for biopsy du to size of it. Pt will need outpt PET scan   Calcified gallstone versus gallbladder wall calcification which may be further evaluated with ultrasound on a nonemergent basis  Evidence of gastric bypass.   Pulmonary following and suggestive bronchoscopy with biopsy/EBUS. Decision making process has delayed by pt being indecisive. Pt finally wanted to pursue with ---xxxxx   Patient is saturating well on room air.       Incomplete  Please note this is a brief summary and refer to medical records /daily progress notes for completed hospital course.    70 y/o F hx of HTN and HLD BIBEMS for hypoxia and hypotension at outpatient clinic.   Patient was admitted to medicine for sepsis around with RUL PNA and + UTI. Patient is on azithromycin and Rocephin, urine culture + for ECOLi , BC no growth  Incidental finding on CT scan of Mediastinal and right hilar adenopathy, Probable liver metastases. Oncology Dr. Mathews following. Recommending CT abdomen and pelvis with contrast, resulted Liver lesions suspected on the noncontrast study not confirmed on this portal venous phase study. Consider arterial phase imaging if needed for patient's diagnosis.  Group of peritoneal nodules in the right mid abdomen may represent metastatic peritoneal implants. IR consulted, unable for biopsy due to small size of it. Pt will need outpt PET scan   Calcified gallstone versus gallbladder wall calcification which may be further evaluated with ultrasound on a nonemergent basis  Pulmonary following s/p bronchoscopy with biopsy/EBUS which found   Large pedunculated septated mass obstructing 80 % of right main bronchus due to malignancy .   EBUS done to evaluate for mediastinal nodes. Pending pathology to confirm metastasis, heme onc follows who reccs palliative treatment if tissue diagnosis confirms metastatic cancer .  Patient to follow up outpatient, continue supplemental 02 as needed (delivered to home). PT reccs JANETTE, but family and patient opted for discharge home  Given clinical course, decision made to discharge patient home     Discharge discussed with attending    This is only a brief summary of patient's hospital stay, for full course please see EMR 72 y/o F hx of HTN and HLD BIBEMS for hypoxia and hypotension at outpatient clinic.   Patient was admitted to medicine for sepsis around with RUL PNA and + UTI. Patient is on azithromycin and Rocephin, urine culture + for ECOLi , BC no growth  Incidental finding on CT scan of Mediastinal and right hilar adenopathy, Probable liver metastases. Oncology Dr. Mathews following. Recommending CT abdomen and pelvis with contrast, resulted Liver lesions suspected on the noncontrast study not confirmed on this portal venous phase study. Consider arterial phase imaging if needed for patient's diagnosis.  Group of peritoneal nodules in the right mid abdomen may represent metastatic peritoneal implants. IR consulted, unable for biopsy due to small size of it. Pt will need outpt PET scan   Calcified gallstone versus gallbladder wall calcification which may be further evaluated with ultrasound on a nonemergent basis  Pulmonary following s/p bronchoscopy with biopsy/EBUS which found   Large pedunculated septated mass obstructing 80 % of right main bronchus due to malignancy .   EBUS done to evaluate for mediastinal nodes. Pending pathology to confirm metastasis, heme onc follows who reccs palliative treatment if tissue diagnosis confirms metastatic cancer .  Patient to follow up outpatient, continue supplemental 02 as needed (delivered to home). PT reccs JANETTE, but family and patient opted for discharge home  Given clinical course, decision made to discharge patient home     Discharge discussed with attending    This is only a brief summary of patient's hospital stay, for full course please see EMR

## 2021-09-21 NOTE — DISCHARGE NOTE PROVIDER - NSDCCPCAREPLAN_GEN_ALL_CORE_FT
PRINCIPAL DISCHARGE DIAGNOSIS  Diagnosis: Pneumonia  Assessment and Plan of Treatment: Pneumonia is a lung infection that can cause a fever, cough, and trouble breathing.  Continue all antibiotics as ordered until complete.  Nutrition is important, eat small frequent meals.  Get lots of rest and drink fluids.  Call your health care provider upon arrival home from hospital and make a follow up appointment for one week.  If your cough worsens, you develop fever greater than 101', you have shaking chills, a fast heartbeat, trouble breathing and/or feel your are breathing much faster than usual, call your healthcare provider.  Make sure you wash your hands frequently.        SECONDARY DISCHARGE DIAGNOSES  Diagnosis: Malignancy  Assessment and Plan of Treatment: CT chest resulted abnormal findings with Central mass encasing right hilum, measuring 5.4 x 5.2 cm with post obstructive atelectasis in R middle and lowe lobes. Mediastinal and right hilar adenopathy. Probable liver metastases. patchy consolidation in R upper lob, possibly peumonia. Heme/oncology consulted.    Diagnosis: HTN (hypertension)  Assessment and Plan of Treatment: Low salt diet  Activity as tolerated.  Take all medication as prescribed.  Follow up with your medical doctor for routine blood pressure monitoring at your next visit.  Notify your doctor if you have any of the following symptoms:   Dizziness, Lightheadedness, Blurry vision, Headache, Chest pain, Shortness of breath       PRINCIPAL DISCHARGE DIAGNOSIS  Diagnosis: Pneumonia  Assessment and Plan of Treatment: Pneumonia is a lung infection that can cause a fever, cough, and trouble breathing.  Continue all antibiotics as ordered until complete.  Nutrition is important, eat small frequent meals.  Get lots of rest and drink fluids.  Call your health care provider upon arrival home from hospital and make a follow up appointment for one week.  If your cough worsens, you develop fever greater than 101', you have shaking chills, a fast heartbeat, trouble breathing and/or feel your are breathing much faster than usual, call your healthcare provider.  Make sure you wash your hands frequently.        SECONDARY DISCHARGE DIAGNOSES  Diagnosis: Malignancy  Assessment and Plan of Treatment: CT chest resulted abnormal findings with Central mass encasing right hilum, measuring 5.4 x 5.2 cm with post obstructive atelectasis in R middle and lowe lobes. Mediastinal and right hilar adenopathy. Probable liver metastases. patchy consolidation in R upper lob, possibly peumonia. You were seen and evaluated by pulmonologist Dr Lombardi had a bronchoscopy which found a large mass blocking your right bronchus which explains your shortness of breath. Multiple biopsies were taking, and you should follow up with your oncologist for results. Continue using your oxygen as needed for shortness of breath    Diagnosis: HLD (hyperlipidemia)  Assessment and Plan of Treatment: Follow up with PCP for treatment goals, continue medication, have liver function testing every 3 months as anti lipid medications can cause liver irritation, eat low fat, low cholesterol meals      Diagnosis: HTN (hypertension)  Assessment and Plan of Treatment: Low salt diet  Activity as tolerated.  Take all medication as prescribed.  Follow up with your medical doctor for routine blood pressure monitoring at your next visit.  Notify your doctor if you have any of the following symptoms:   Dizziness, Lightheadedness, Blurry vision, Headache, Chest pain, Shortness of breath       PRINCIPAL DISCHARGE DIAGNOSIS  Diagnosis: Pneumonia  Assessment and Plan of Treatment: Pneumonia is a lung infection that can cause a fever, cough, and trouble breathing.  Continue all antibiotics as ordered until complete.  Nutrition is important, eat small frequent meals.  Get lots of rest and drink fluids.  Call your health care provider upon arrival home from hospital and make a follow up appointment for one week.  If your cough worsens, you develop fever greater than 101', you have shaking chills, a fast heartbeat, trouble breathing and/or feel your are breathing much faster than usual, call your healthcare provider.  Make sure you wash your hands frequently.        SECONDARY DISCHARGE DIAGNOSES  Diagnosis: Malignancy  Assessment and Plan of Treatment: Metastatic disease likely of lung origin:   -Your imaging revealed abnormal findings including central mass encasing right hilum, measuring 5.4 x 5.2 cm with post obstructive atelectasis in R middle and lower lobes. Mediastinal and right hilar adenopathy. Probable liver metastases. and lytic skull lesions;   - You were seen and evaluated by pulmonologist Dr Lombardi had a bronchoscopy which found a large mass blocking your right bronchus which explains your shortness of breath. Multiple biopsies were taking, and you should follow up with your oncologist for results. Continue using your oxygen as needed for shortness of breath; 2 litres via nasal cannula. Continue with as needed bronchodilator therapy as prescribed.   - You are to follow up with Dr Lombardi within 1 week for biopsy result and diagnosis   - You are also to follow up with Oncologist, Dr Miranda within1 week for decussion of treatment options based on biopsy and for outpatient PET scan to be scheduled.    Diagnosis: Acute UTI  Assessment and Plan of Treatment: You were followed by an Infectious Disease Specialist and you were treated for VRE UTI with appropriate antibiotic  Continue to follow up with your PMD for continuity of care    Diagnosis: QT prolongation  Assessment and Plan of Treatment: You were evaluated by an Electrophysiologist for an abnormality seen on your ekg known as QT prolongation  - Your ekgs will need to be monitored if you were to be given certain drugs that can potentially cause further QT prolongation such as chemotherapy drugs  - Follow up with your PMD/ Cardiologist for continued periodic monitoring.    Diagnosis: BRENDA (acute kidney injury)  Assessment and Plan of Treatment: You were found with evidence of kidney injury which was treated with intravenous fluid therapy and has since resolved  Follow up with your PMD for continued monitoring of your blood work    Diagnosis: Hypercalcemia  Assessment and Plan of Treatment: Your calcium levels were elevated and  you were successfully treated with your calcium levels now within normal limits  - Follow up with your PMD and  Oncology as recommended    Diagnosis: HTN (hypertension)  Assessment and Plan of Treatment: Low salt diet  Activity as tolerated.  Take all medication as prescribed.  Follow up with your medical doctor for routine blood pressure monitoring at your next visit.  Notify your doctor if you have any of the following symptoms:   Dizziness, Lightheadedness, Blurry vision, Headache, Chest pain, Shortness of breath       PRINCIPAL DISCHARGE DIAGNOSIS  Diagnosis: Pneumonia  Assessment and Plan of Treatment: Pneumonia is a lung infection that can cause a fever, cough, and trouble breathing.  Continue all antibiotics as ordered until complete.  Nutrition is important, eat small frequent meals.  Get lots of rest and drink fluids.  Call your health care provider upon arrival home from hospital and make a follow up appointment for one week.  If your cough worsens, you develop fever greater than 101', you have shaking chills, a fast heartbeat, trouble breathing and/or feel your are breathing much faster than usual, call your healthcare provider.  Make sure you wash your hands frequently.        SECONDARY DISCHARGE DIAGNOSES  Diagnosis: Malignancy  Assessment and Plan of Treatment: Metastatic disease likely of lung origin:   -Your imaging revealed abnormal findings including central mass encasing right hilum, measuring 5.4 x 5.2 cm with post obstructive atelectasis in R middle and lower lobes. Mediastinal and right hilar adenopathy. Probable liver metastases. and lytic skull lesions;   - You were seen and evaluated by pulmonologist Dr Lombardi had a bronchoscopy which found a large mass blocking your right bronchus which explains your shortness of breath. Multiple biopsies were taking, and you should follow up with your oncologist for results. Continue using your oxygen as needed for shortness of breath; 2 litres via nasal cannula. Continue with as needed bronchodilator therapy as prescribed.   - You are to follow up with Dr Lombardi within 1 week for biopsy result and diagnosis   - You are also to follow up with Oncologist, Dr Miranda within1 week for decussion of treatment options based on biopsy and for outpatient PET scan to be scheduled.    Diagnosis: Acute UTI  Assessment and Plan of Treatment: You were followed by an Infectious Disease Specialist and you were treated for VRE UTI with appropriate antibiotic  Continue to follow up with your PMD for continuity of care    Diagnosis: QT prolongation  Assessment and Plan of Treatment: You were evaluated by an Electrophysiologist for an abnormality seen on your ekg known as QT prolongation  - Your ekgs will need to be monitored if you were to be given certain drugs that can potentially cause further QT prolongation including certain chemotherapy drugs  - Follow up with your PMD/ Cardiologist for continued periodic monitoring.    Diagnosis: BRENDA (acute kidney injury)  Assessment and Plan of Treatment: You were found with evidence of kidney injury which was treated with intravenous fluid therapy and has since resolved  Follow up with your PMD for continued monitoring of your blood work    Diagnosis: Hypercalcemia  Assessment and Plan of Treatment: Your calcium levels were elevated and  you were successfully treated with your calcium levels now within normal limits  - Follow up with your PMD and  Oncology as recommended    Diagnosis: HTN (hypertension)  Assessment and Plan of Treatment: Low salt diet  Activity as tolerated.  Take all medication as prescribed.  Follow up with your medical doctor for routine blood pressure monitoring at your next visit.  Notify your doctor if you have any of the following symptoms:   Dizziness, Lightheadedness, Blurry vision, Headache, Chest pain, Shortness of breath       PRINCIPAL DISCHARGE DIAGNOSIS  Diagnosis: Pneumonia  Assessment and Plan of Treatment: Pneumonia is a lung infection that can cause a fever, cough, and trouble breathing.  Continue all antibiotics as ordered until complete.  Nutrition is important, eat small frequent meals.  Get lots of rest and drink fluids.  Call your health care provider upon arrival home from hospital and make a follow up appointment for one week.  If your cough worsens, you develop fever greater than 101', you have shaking chills, a fast heartbeat, trouble breathing and/or feel your are breathing much faster than usual, call your healthcare provider.  Make sure you wash your hands frequently.        SECONDARY DISCHARGE DIAGNOSES  Diagnosis: Malignancy  Assessment and Plan of Treatment: Metastatic disease likely of lung origin:   -Your imaging revealed abnormal findings including central mass encasing right hilum, measuring 5.4 x 5.2 cm with post obstructive atelectasis in R middle and lower lobes. Mediastinal and right hilar adenopathy. Probable liver metastases. and lytic skull lesions;   - You were seen and evaluated by pulmonologist Dr Lombardi had a bronchoscopy which found a large mass blocking your right bronchus which explains your shortness of breath. Multiple biopsies were taking, and you should follow up with your oncologist for results. Continue using your oxygen as needed for shortness of breath; 2 litres via nasal cannula. Continue with as needed bronchodilator therapy as prescribed.   - You are to follow up with Dr Lombardi within 1 week for biopsy result and diagnosis   - You are also to follow up with Oncologist, Dr Miranda within1 week for discussion of treatment options based on biopsy and for outpatient PET scan to be scheduled.    Diagnosis: Acute UTI  Assessment and Plan of Treatment: You were followed by an Infectious Disease Specialist and you were treated for VRE UTI with appropriate antibiotic  Continue to follow up with your PMD for continuity of care    Diagnosis: QT prolongation  Assessment and Plan of Treatment: You were evaluated by an Electrophysiologist for an abnormality seen on your ekg known as QT prolongation  - Your ekgs will need to be monitored if you were to be given certain drugs that can potentially cause further QT prolongation including certain chemotherapy drugs  - Follow up with your PMD/ Cardiologist for continued periodic monitoring.    Diagnosis: BRENDA (acute kidney injury)  Assessment and Plan of Treatment: You were found with evidence of kidney injury which was treated with intravenous fluid therapy and has since resolved  Follow up with your PMD for continued monitoring of your blood work    Diagnosis: Hypercalcemia  Assessment and Plan of Treatment: Your calcium levels were elevated and  you were successfully treated with your calcium levels now within normal limits  - Follow up with your PMD and  Oncology as recommended    Diagnosis: HTN (hypertension)  Assessment and Plan of Treatment: Low salt diet  Activity as tolerated.  Take all medication as prescribed.  Follow up with your medical doctor for routine blood pressure monitoring at your next visit.  Notify your doctor if you have any of the following symptoms:   Dizziness, Lightheadedness, Blurry vision, Headache, Chest pain, Shortness of breath

## 2021-09-21 NOTE — PROGRESS NOTE ADULT - PROBLEM SELECTOR PLAN 4
Incidental finding on CT scan showed liver lesions and parahilar mass  - Oncology Dr. Mathews following to r/o malignancy   -CT of pelvis and abdomin with contrast pending

## 2021-09-21 NOTE — PROGRESS NOTE ADULT - ASSESSMENT
72 y/o F hx of HTN and HLD BIBEMS for hypoxia and hypotension at outpatient clinic.   Patient was admitted under medicine for sepsis secondary to RUL PNA Vs UTI. CT showed RUL PNA. UA positive. Patient is on azithromycin and Rocephin. BC and UC pending  Incidental finding on CT scan of Mediastinal and right hilar adenopathy, Probable liver metastases. Oncology Dr. Mathews following. Recommending CT abdomen and pelvis with contrast for more details.

## 2021-09-21 NOTE — PATIENT PROFILE ADULT - TOBACCO USE
GYN Fever>100.4/Numbness, tingling/Inability to Tolerate Liquids or Foods/Excessive Diarrhea/Increased Irritability or Sluggishness/Swelling that continues/Numbness, color, or temperature change to extremity/Bleeding that does not stop/Persistent Nausea and Vomiting/Unable to Urinate/Pain not relieved by Medications Former smoker

## 2021-09-21 NOTE — PROGRESS NOTE ADULT - TREATMENT GUIDELINE COMMENT
Spoke with patient and  at bedside in length. Informed patient and spouse about the comorbidities of the patient and requested what are her medical wishes are when she is unable to make her own medical decisions. Educated the patient and spouse about CPR, endotracheal intubation, feeding tube, ABX treatment. Spouse and patient understood and answered all questions. At this time both patient and spouse " want everything done", including and not limited to CPR, MV. Joan calloway

## 2021-09-21 NOTE — DISCHARGE NOTE PROVIDER - CARE PROVIDER_API CALL
Serafin Lombardi)  Internal Medicine  [address], [address]  [place] [address]  Phone: [phone]  Fax: [phone]  Follow Up Time: 1 week   Serafin Lombardi)  Internal Medicine  [address], [address]  [place] [address]  Phone: [phone]  Fax: [phone]  Follow Up Time: 1 week    Aftab Plunkett  [facility]  [address]  [place] [address]  Phone: [phone]  Fax: [phone]  Follow Up Time: 1 week    Nathanael Taylor  FAMILY MEDICINE  [address] [place] [address]  Phone: [phone]  Fax: [phone]  Follow Up Time: 1 week   Serafin Lombardi)  Internal Medicine  [address], [address]  [place] [address]  Phone: [phone]  Fax: [phone]  Follow Up Time: 1 week    Nathanael Taylor  FAMILY MEDICINE  [address] [place] [address]  Phone: [phone]  Fax: [phone]  Follow Up Time: 1 week    Lennie Mathews)  Hematology; Medical Oncology  [address] [facility] [address] [facility]  [place] [address]  Phone: [phone]  Fax: [phone]  Follow Up Time: 1 week

## 2021-09-21 NOTE — DISCHARGE NOTE PROVIDER - PROVIDER TOKENS
PROVIDER:[TOKEN:[[id number]],FOLLOWUP:[1 week]] PROVIDER:[TOKEN:[[id number]],FOLLOWUP:[1 week]],PROVIDER:[TOKEN:[[id number]],FOLLOWUP:[1 week]],PROVIDER:[TOKEN:[[id number]],FOLLOWUP:[1 week]] PROVIDER:[TOKEN:[[id number]],FOLLOWUP:[1 week]],PROVIDER:[TOKEN:[[id number]],FOLLOWUP:[1 week]],PROVIDER:[TOKEN:[[id number]],FOLLOWUP:[1 week]]

## 2021-09-21 NOTE — CONSULT NOTE ADULT - SUBJECTIVE AND OBJECTIVE BOX
Time of visit:    CHIEF COMPLAINT: Patient is a 71y old  Female who presents with a chief complaint of Pneumonia (21 Sep 2021 16:24)      HPI:  72 y/o F hx of HTN and HLD BIBEMS for hypoxia and hypotension at outpatient clinic. Patient states that for the past weak she has been feeling nauseous, fatigue and decreased appetite. She had 1episode of non bloody vomit and a couple of episodes of non bloody diarrhea early last week which has also  resolved. She states she developed a cough last week as well which is associated with phlegm but denies fevers, chills, shortness of breath or night sweats. Denies chest pain, palpitations, diarrhea, constipation, headache or any other complaints (20 Sep 2021 17:45)   Patient seen and examined.     PAST MEDICAL & SURGICAL HISTORY:  HTN (hypertension)    HLD (hyperlipidemia)        Allergies    No Known Allergies    Intolerances        MEDICATIONS  (STANDING):  amLODIPine   Tablet 5 milliGRAM(s) Oral daily  atorvastatin 20 milliGRAM(s) Oral at bedtime  azithromycin  IVPB 500 milliGRAM(s) IV Intermittent every 24 hours  cefTRIAXone   IVPB 1000 milliGRAM(s) IV Intermittent every 24 hours  heparin   Injectable 5000 Unit(s) SubCutaneous every 8 hours  influenza   Vaccine 0.5 milliLiter(s) IntraMuscular once  metoprolol tartrate 50 milliGRAM(s) Oral two times a day  sodium chloride 0.9%. 1000 milliLiter(s) (60 mL/Hr) IV Continuous <Continuous>      MEDICATIONS  (PRN):   Medications up to date at time of exam.    Medications up to date at time of exam.    FAMILY HISTORY:      SOCIAL HISTORY  Smoking History: [   ] smoking/smoke exposure, [ x  ] former smoker quit 20 yrs ago   smoked 50 pack years   Living Condition: [   ] apartment, [   ] private house  Work History:   Travel History: denies recent travel  Illicit Substance Use: denies  Alcohol Use: denies    REVIEW OF SYSTEMS:    CONSTITUTIONAL:  denies fevers, chills, sweats, weight loss    HEENT:  denies diplopia or blurred vision, sore throat or runny nose.    CARDIOVASCULAR:  denies pressure, squeezing, tightness, or heaviness about the chest; no palpitations.    RESPIRATORY:  denies SOB, cough, CINTRON, wheezing.    GASTROINTESTINAL:  denies abdominal pain, nausea, vomiting or diarrhea.    GENITOURINARY: denies dysuria, frequency or urgency.    NEUROLOGIC:  denies numbness, tingling, seizures or weakness.    PSYCHIATRIC:  denies disorder of thought or mood.    MSK: denies swelling, redness      PHYSICAL EXAMINATION:    GENERAL: The patient is a well-developed, well-nourished, in no apparent distress.     Vital Signs Last 24 Hrs  T(C): 36.4 (21 Sep 2021 19:37), Max: 36.9 (21 Sep 2021 01:14)  T(F): 97.5 (21 Sep 2021 19:37), Max: 98.4 (21 Sep 2021 01:14)  HR: 91 (21 Sep 2021 19:37) (85 - 98)  BP: 179/84 (21 Sep 2021 19:37) (153/84 - 186/88)  BP(mean): --  RR: 19 (21 Sep 2021 19:37) (18 - 19)  SpO2: 95% (21 Sep 2021 19:37) (93% - 97%)   (if applicable)    Chest Tube (if applicable)    HEENT: Head is normocephalic and atraumatic. Extraocular muscles are intact. Mucous membranes are moist.     NECK: Supple, no palpable adenopathy.    LUNGS: Clear to auscultation, no wheezing, rales, or rhonchi.    HEART: Regular rate and rhythm without murmur.    ABDOMEN: Soft, nontender, and nondistended.  No hepatosplenomegaly is noted.    RENAL: No difficulty voiding, no pelvic pain    EXTREMITIES: Without any cyanosis, clubbing, rash, lesions or edema.    NEUROLOGIC: Awake, alert, oriented, grossly intact    SKIN: Warm, dry, good turgor.      LABS:                        10.3   7.34  )-----------( 301      ( 21 Sep 2021 07:37 )             30.9     09-21    138  |  99  |  23<H>  ----------------------------<  102<H>  3.8   |  27  |  1.04    Ca    10.6<H>      21 Sep 2021 07:37  Phos  3.7     09-  Mg     2.1     09-21    TPro  7.6  /  Alb  2.9<L>  /  TBili  0.5  /  DBili  x   /  AST  61<H>  /  ALT  22  /  AlkPhos  90      PT/INR - ( 20 Sep 2021 11:38 )   PT: 14.3 sec;   INR: 1.21 ratio         PTT - ( 20 Sep 2021 11:38 )  PTT:26.5 sec  Urinalysis Basic - ( 21 Sep 2021 09:15 )    Color: Yellow / Appearance: very cloudy / S.020 / pH: x  Gluc: x / Ketone: Small  / Bili: Negative / Urobili: Negative   Blood: x / Protein: 30 mg/dL / Nitrite: Positive   Leuk Esterase: Moderate / RBC: 2-5 /HPF / WBC >50 /HPF   Sq Epi: x / Non Sq Epi: Many /HPF / Bacteria: Many /HPF              Serum Pro-Brain Natriuretic Peptide: 779 pg/mL (21 @ 07:37)          MICROBIOLOGY: (if applicable)    RADIOLOGY & ADDITIONAL STUDIES:  EKG:   CT chest:< from: CT Chest No Cont (21 @ 22:05) >    EXAM:  CT CHEST                            PROCEDURE DATE:  2021          INTERPRETATION:  CLINICAL INFORMATION: Pleural effusion.    COMPARISON: None.    CONTRAST/COMPLICATIONS:  IV Contrast: NONE  Oral Contrast: NONE  Complications: None reported at time of study completion    PROCEDURE:  CT of the Chest was performed.  Sagittal and coronal reformats were performed.    FINDINGS:    LUNGS AND AIRWAYS: Central mass encasing the right hilum, measuring at least 5.4 x 5.2 cm. Postobstructiveatelectasis in the right middle and lower lobes. Patchy consolidation in the right upper lobe, possibly pneumonia.  PLEURA: Small right pleural effusion.  MEDIASTINUM AND DESIREE: Mediastinal and right hilar adenopathy. For example:  *  Lower right paratracheal node, measuring 1.9 x 1.8 cm  *  Subcarinal node (series 3, image 65), measuring 2.0 x 1.5 cm  VESSELS: Atherosclerotic calcifications.  HEART: Heart size is normal. No pericardial effusion.  CHEST WALL AND LOWER NECK: Within normal limits.  VISUALIZED UPPER ABDOMEN: Peripherally calcified gallstone or gallbladder wall. Multiple indeterminate liver lesions, concerning for metastases. For example:  *  Segment 6 (series 3, image 135), measuring 1.7 x 1.5 cm  *  Segment 4A (series 3, image 104), measuring 2.2 x 2.2 cm  BONES: Mild compression of the T12 superior endplate. Degenerative changes. ACDF hardware.    IMPRESSION:  Central mass encasing the right hilum with postobstructive atelectasis in the right middle and lower lobes.    Mediastinal and right hilar adenopathy.    Probable liver metastases.    Patchy consolidation in the right upper lobe, possibly pneumonia.    --- End of Report ---            GERARDO CLAYTON MD; Attending Radiologist  This document has been electronically signed. Sep 21 2021  9:16AM    < end of copied text >    ECHO:    IMPRESSION: 71y Female PAST MEDICAL & SURGICAL HISTORY:  HTN (hypertension)    HLD (hyperlipidemia)     p/w           IMP:  This is a 71 yr old white woman , former smoker with  HTN  HLD BIBEMS for hypoxia and hypotension at outpatient clinic.  Hypoxia due to post obstructive pna with right lung mass with meds.      ASSESSMENT     - Acute Hypoxic Resp Failure   - Right hilar lung mass  - Mediastinal adenopathy   - Post obstructive PNA   - BRENDA  - Former Smoker       Sugg;  -pat will need tissue bx   -amicable for bronch with bx / EBUS   -pat wish for me to speak with her    -continue antibx   -f/u cultures   -O2 supp to maintain sat >90%  -albuterol inhaler   -dvt/gi prophy   -i informed pat that she probable has malignancy with mets

## 2021-09-21 NOTE — PROGRESS NOTE ADULT - PROBLEM SELECTOR PLAN 3
UA positive, Currently patient denies painful or burning sensation on urination.   -Rocephin   -UC pending  -urine Legionella pending   -supportive care

## 2021-09-21 NOTE — PROGRESS NOTE ADULT - SUBJECTIVE AND OBJECTIVE BOX
Patient is a 71y old  Female who presents with a chief complaint of Pneumonia (21 Sep 2021 13:12)      INTERVAL HPI/OVERNIGHT EVENTS: no events over night. Resting in bed comfortably eating breakfast.         REVIEW OF SYSTEMS:  CONSTITUTIONAL: No fever, chills  ENMT:  No difficulty hearing, no change in vision  NECK: No pain or stiffness  RESPIRATORY: No cough, SOB  CARDIOVASCULAR: No chest pain, palpitations  GASTROINTESTINAL: No abdominal pain. No nausea, vomiting, or diarrhea  GENITOURINARY: No dysuria  NEUROLOGICAL: No HA  SKIN: No itching, burning, rashes, or lesions   MUSCULOSKELETAL: No joint pain or swelling; No muscle, back, or extremity pain      T(C): 36.9 (21 @ 13:37), Max: 36.9 (21 @ 01:14)  HR: 97 (21 @ 13:37) (63 - 97)  BP: 165/80 (21 @ 13:37) (116/65 - 165/80)  RR: 18 (21 @ 13:37) (17 - 19)  SpO2: 93% (21 @ 13:37) (93% - 97%)  Wt(kg): --Vital Signs Last 24 Hrs  T(C): 36.9 (21 Sep 2021 13:37), Max: 36.9 (21 Sep 2021 01:14)  T(F): 98.4 (21 Sep 2021 13:37), Max: 98.4 (21 Sep 2021 01:14)  HR: 97 (21 Sep 2021 13:37) (63 - 97)  BP: 165/80 (21 Sep 2021 13:37) (116/65 - 165/80)  BP(mean): --  RR: 18 (21 Sep 2021 13:37) (17 - 19)  SpO2: 93% (21 Sep 2021 13:37) (93% - 97%)    PHYSICAL EXAM:  GENERAL: NAD  EYES: clear conjunctiva; EOMI  ENMT: Moist mucous membranes  NECK: Supple, No JVD,   CHEST/LUNG: Clear to auscultation bilaterally; No rales, rhonchi, wheezing, or rubs  HEART: S1, S2, Regular rate and rhythm  ABDOMEN: Soft, Nontender, Nondistended; Bowel sounds present  NEURO: Alert & Oriented X3  EXTREMITIES: No LE edema, no calf tenderness  SKIN: No rashes or lesions    MEDICATIONS  (STANDING):  atorvastatin 20 milliGRAM(s) Oral at bedtime  azithromycin  IVPB 500 milliGRAM(s) IV Intermittent every 24 hours  cefTRIAXone   IVPB 1000 milliGRAM(s) IV Intermittent every 24 hours  heparin   Injectable 5000 Unit(s) SubCutaneous every 8 hours  influenza   Vaccine 0.5 milliLiter(s) IntraMuscular once  sodium chloride 0.9%. 1000 milliLiter(s) (60 mL/Hr) IV Continuous <Continuous>    MEDICATIONS  (PRN):    Consultant(s) Notes Reviewed:  [x ] YES  [ ] NO  Care Discussed with Consultants/Other Providers [ x] YES  [ ] NO    LABS:                        10.3   7.34  )-----------( 301      ( 21 Sep 2021 07:37 )             30.9     -    138  |  99  |  23<H>  ----------------------------<  102<H>  3.8   |  27  |  1.04    Ca    10.6<H>      21 Sep 2021 07:37  Phos  3.7       Mg     2.1         TPro  7.6  /  Alb  2.9<L>  /  TBili  0.5  /  DBili  x   /  AST  61<H>  /  ALT  22  /  AlkPhos  90      PT/INR - ( 20 Sep 2021 11:38 )   PT: 14.3 sec;   INR: 1.21 ratio         PTT - ( 20 Sep 2021 11:38 )  PTT:26.5 sec  CAPILLARY BLOOD GLUCOSE    Urinalysis Basic - ( 21 Sep 2021 09:15 )    Color: Yellow / Appearance: very cloudy / S.020 / pH: x  Gluc: x / Ketone: Small  / Bili: Negative / Urobili: Negative   Blood: x / Protein: 30 mg/dL / Nitrite: Positive   Leuk Esterase: Moderate / RBC: 2-5 /HPF / WBC >50 /HPF   Sq Epi: x / Non Sq Epi: Many /HPF / Bacteria: Many /HPF      RADIOLOGY & ADDITIONAL TESTS:  < from: CT Chest No Cont (21 @ 22:05) >    EXAM:  CT CHEST                            PROCEDURE DATE:  2021          INTERPRETATION:  CLINICAL INFORMATION: Pleural effusion.    COMPARISON: None.    CONTRAST/COMPLICATIONS:  IV Contrast: NONE  Oral Contrast: NONE  Complications: None reported at time of study completion    PROCEDURE:  CT of the Chest was performed.  Sagittal and coronal reformats were performed.    FINDINGS:    LUNGS AND AIRWAYS: Central mass encasing the right hilum, measuring at least 5.4 x 5.2 cm. Postobstructiveatelectasis in the right middle and lower lobes. Patchy consolidation in the right upper lobe, possibly pneumonia.  PLEURA: Small right pleural effusion.  MEDIASTINUM AND DESIREE: Mediastinal and right hilar adenopathy. For example:  *  Lower right paratracheal node, measuring 1.9 x 1.8 cm  *  Subcarinal node (series 3, image 65), measuring 2.0 x 1.5 cm  VESSELS: Atherosclerotic calcifications.  HEART: Heart size is normal. No pericardial effusion.  CHEST WALL AND LOWER NECK: Within normal limits.  VISUALIZED UPPER ABDOMEN: Peripherally calcified gallstone or gallbladder wall. Multiple indeterminate liver lesions, concerning for metastases. For example:  *  Segment 6 (series 3, image 135), measuring 1.7 x 1.5 cm  *  Segment 4A (series 3, image 104), measuring 2.2 x 2.2 cm  BONES: Mild compression of the T12 superior endplate. Degenerative changes. ACDF hardware.    IMPRESSION:  Central mass encasing the right hilum with postobstructive atelectasis in the right middle and lower lobes.    Mediastinal and right hilar adenopathy.    Probable liver metastases.    Patchy consolidation in the right upper lobe, possibly pneumonia.    --- End of Report ---    < end of copied text >  < from: Xray Chest 1 View- PORTABLE-Urgent (21 @ 11:45) >    EXAM:  XR CHEST PORTABLE URGENT 1V                            PROCEDURE DATE:  2021          INTERPRETATION:  Chest one view    HISTORY: Sepsis    COMPARISON STUDY: None available    Frontal expiratory view of the chest shows the heart to be borderline in size. The lungs show clear left lung with right pleural effusion and possible adjacent infiltrate. There is no evidence of pneumothorax nor left pleural effusion.    IMPRESSION:  Right infiltrate/effusion.    Further information may be obtained from the patient's subsequent CT of the chest.      Thank you for the courtesy of this referral.    --- End of Report ---    < end of copied text >      Imaging Personally Reviewed:  [ ] YES  [ ] NO

## 2021-09-21 NOTE — CHART NOTE - NSCHARTNOTEFT_GEN_A_CORE
I called spouse Marcos as per pat request [phone] to discuss CT chest finding and possible bronchoscopy with bx. We had a brief discussion and will coming to the hosp 9/2 for further discussion

## 2021-09-21 NOTE — DISCHARGE NOTE PROVIDER - NSDCMRMEDTOKEN_GEN_ALL_CORE_FT
amLODIPine 5 mg oral tablet: 1 tab(s) orally once a day  benazepril 20 mg oral tablet: 1 tab(s) orally once a day  folic acid 1 mg oral tablet: 1 tab(s) orally once a day  LORazepam 1 mg oral tablet: 1 milligram(s) orally once a day (at bedtime), As Needed  Anxiety  metoprolol tartrate 100 mg oral tablet: 1 tab(s) orally 2 times a day  Paxil 40 mg oral tablet: 1 tab(s) orally once a day AM  simvastatin 40 mg oral tablet: 1 tab(s) orally once a day (at bedtime)   acetaminophen 325 mg oral tablet: 2 tab(s) orally every 6 hours, As needed, Mild Pain (1 - 3)  alcohol swab : dispense # 60 swabs   amLODIPine 10 mg oral tablet: 1 tab(s) orally once a day  amLODIPine 5 mg oral tablet: 1 tab(s) orally once a day  benazepril 10 mg oral tablet: 1 tab(s) orally once a day  benazepril 20 mg oral tablet: 1 tab(s) orally once a day  docusate sodium 100 mg oral capsule: 1 cap(s) orally 3 times a day  folic acid 1 mg oral tablet: 1 tab(s) orally once a day  folic acid 1 mg oral tablet: 1 tab(s) orally once a day  folic acid 1 mg oral tablet: 1 tab(s) orally once a day  glucometer lacents : dispense # 50 lancet   use 3 times a day   glucometer test strips : dispense # 50   Gralise 600 mg/24 hours oral tablet, extended release: 1 tab(s) orally once a day  LORazepam 1 mg oral tablet: 1 milligram(s) orally once a day (at bedtime), As Needed  Anxiety  medrol dosepack: dispense #1 pack   use as directed   metoprolol tartrate 100 mg oral tablet: 1 tab(s) orally 2 times a day  metoprolol tartrate 100 mg oral tablet: 1 tab(s) orally 2 times a day  oxyCODONE 5 mg oral tablet: 1 tab(s) orally every 6 hours, As needed, Moderate Pain (4 - 6) MDD:4  PARoxetine 40 mg oral tablet: 1 tab(s) orally once a day  Paxil 40 mg oral tablet: 1 tab(s) orally once a day AM  senna oral tablet: 2 tab(s) orally once a day (at bedtime)  simvastatin 40 mg oral tablet: 1 tab(s) orally once a day (at bedtime)  simvastatin 40 mg oral tablet: 1 tab(s) orally once a day (at bedtime)  Standard glucometer : check glucose 3 times a day    acetaminophen 325 mg oral tablet: 2 tab(s) orally every 6 hours, As needed, Mild Pain (1 - 3)  amLODIPine 5 mg oral tablet: 1 tab(s) orally once a day  benazepril 10 mg oral tablet: 1 tab(s) orally once a day  folic acid 1 mg oral tablet: 1 tab(s) orally once a day  metoprolol tartrate 100 mg oral tablet: 1 tab(s) orally 2 times a day  PARoxetine 40 mg oral tablet: 1 tab(s) orally once a day  simvastatin 40 mg oral tablet: 1 tab(s) orally once a day (at bedtime)   acetaminophen 325 mg oral tablet: 2 tab(s) orally every 6 hours, As needed, Mild Pain (1 - 3)  albuterol 90 mcg/inh inhalation aerosol: 2 puff(s) inhaled every 6 hours, As needed, Shortness of Breath and/or Wheezing  amLODIPine 5 mg oral tablet: 1 tab(s) orally once a day  benazepril 10 mg oral tablet: 1 tab(s) orally once a day  folic acid 1 mg oral tablet: 1 tab(s) orally once a day  metoprolol tartrate 100 mg oral tablet: 1 tab(s) orally 2 times a day  PARoxetine 40 mg oral tablet: 1 tab(s) orally once a day  simvastatin 40 mg oral tablet: 1 tab(s) orally once a day (at bedtime)   acetaminophen 325 mg oral tablet: 2 tab(s) orally every 6 hours, As needed, Pain  albuterol 90 mcg/inh inhalation aerosol: 2 puff(s) inhaled every 6 hours, As needed, Shortness of Breath and/or Wheezing  amLODIPine 5 mg oral tablet: 1 tab(s) orally once a day  benazepril 10 mg oral tablet: 1 tab(s) orally once a day  folic acid 1 mg oral tablet: 1 tab(s) orally once a day  metoprolol tartrate 100 mg oral tablet: 1 tab(s) orally 2 times a day  PARoxetine 40 mg oral tablet: 1 tab(s) orally once a day  simvastatin 40 mg oral tablet: 1 tab(s) orally once a day (at bedtime)

## 2021-09-21 NOTE — PROGRESS NOTE ADULT - PROBLEM SELECTOR PLAN 1
Patient presented with hypoxia likely due to PNA. Currently patient ton 2L NC. On room air SaO2 88%  -CT of chest showed RUL PNA  -Azithromycin IV  -BC pending  -supportive care  -trend cbc

## 2021-09-22 LAB
ANION GAP SERPL CALC-SCNC: 13 MMOL/L — SIGNIFICANT CHANGE UP (ref 5–17)
BUN SERPL-MCNC: 15 MG/DL — SIGNIFICANT CHANGE UP (ref 7–18)
CALCIUM SERPL-MCNC: 10.9 MG/DL — HIGH (ref 8.4–10.5)
CEA SERPL-MCNC: 1.9 NG/ML — SIGNIFICANT CHANGE UP (ref 0–3.8)
CEA SERPL-MCNC: 1.9 NG/ML — SIGNIFICANT CHANGE UP (ref 0–3.8)
CHLORIDE SERPL-SCNC: 96 MMOL/L — SIGNIFICANT CHANGE UP (ref 96–108)
CO2 SERPL-SCNC: 29 MMOL/L — SIGNIFICANT CHANGE UP (ref 22–31)
CREAT SERPL-MCNC: 0.93 MG/DL — SIGNIFICANT CHANGE UP (ref 0.5–1.3)
GLUCOSE BLDC GLUCOMTR-MCNC: 147 MG/DL — HIGH (ref 70–99)
GLUCOSE SERPL-MCNC: 112 MG/DL — HIGH (ref 70–99)
HCT VFR BLD CALC: 30.5 % — LOW (ref 34.5–45)
HGB BLD-MCNC: 10.2 G/DL — LOW (ref 11.5–15.5)
IRON SATN MFR SERPL: 21 % — SIGNIFICANT CHANGE UP (ref 15–50)
IRON SATN MFR SERPL: 56 UG/DL — SIGNIFICANT CHANGE UP (ref 40–150)
LDH SERPL L TO P-CCNC: 606 U/L — HIGH (ref 120–225)
MCHC RBC-ENTMCNC: 29.9 PG — SIGNIFICANT CHANGE UP (ref 27–34)
MCHC RBC-ENTMCNC: 33.4 GM/DL — SIGNIFICANT CHANGE UP (ref 32–36)
MCV RBC AUTO: 89.4 FL — SIGNIFICANT CHANGE UP (ref 80–100)
MRSA PCR RESULT.: SIGNIFICANT CHANGE UP
NRBC # BLD: 0 /100 WBCS — SIGNIFICANT CHANGE UP (ref 0–0)
PLATELET # BLD AUTO: 296 K/UL — SIGNIFICANT CHANGE UP (ref 150–400)
POTASSIUM SERPL-MCNC: 3.5 MMOL/L — SIGNIFICANT CHANGE UP (ref 3.5–5.3)
POTASSIUM SERPL-SCNC: 3.5 MMOL/L — SIGNIFICANT CHANGE UP (ref 3.5–5.3)
RBC # BLD: 3.41 M/UL — LOW (ref 3.8–5.2)
RBC # FLD: 13.1 % — SIGNIFICANT CHANGE UP (ref 10.3–14.5)
RETICS #: 72.6 K/UL — SIGNIFICANT CHANGE UP (ref 25–125)
RETICS/RBC NFR: 2.1 % — SIGNIFICANT CHANGE UP (ref 0.5–2.5)
S AUREUS DNA NOSE QL NAA+PROBE: DETECTED
SODIUM SERPL-SCNC: 138 MMOL/L — SIGNIFICANT CHANGE UP (ref 135–145)
TIBC SERPL-MCNC: 262 UG/DL — SIGNIFICANT CHANGE UP (ref 250–450)
UIBC SERPL-MCNC: 206 UG/DL — SIGNIFICANT CHANGE UP (ref 110–370)
WBC # BLD: 6.61 K/UL — SIGNIFICANT CHANGE UP (ref 3.8–10.5)
WBC # FLD AUTO: 6.61 K/UL — SIGNIFICANT CHANGE UP (ref 3.8–10.5)

## 2021-09-22 PROCEDURE — 74177 CT ABD & PELVIS W/CONTRAST: CPT | Mod: 26

## 2021-09-22 RX ORDER — SODIUM CHLORIDE 9 MG/ML
1000 INJECTION INTRAMUSCULAR; INTRAVENOUS; SUBCUTANEOUS
Refills: 0 | Status: DISCONTINUED | OUTPATIENT
Start: 2021-09-22 | End: 2021-09-24

## 2021-09-22 RX ORDER — CHLORHEXIDINE GLUCONATE 213 G/1000ML
1 SOLUTION TOPICAL DAILY
Refills: 0 | Status: DISCONTINUED | OUTPATIENT
Start: 2021-09-22 | End: 2021-10-04

## 2021-09-22 RX ORDER — ALBUTEROL 90 UG/1
2 AEROSOL, METERED ORAL EVERY 6 HOURS
Refills: 0 | Status: DISCONTINUED | OUTPATIENT
Start: 2021-09-22 | End: 2021-10-04

## 2021-09-22 RX ORDER — MUPIROCIN 20 MG/G
1 OINTMENT TOPICAL
Refills: 0 | Status: COMPLETED | OUTPATIENT
Start: 2021-09-22 | End: 2021-09-27

## 2021-09-22 RX ORDER — IOHEXOL 300 MG/ML
30 INJECTION, SOLUTION INTRAVENOUS ONCE
Refills: 0 | Status: COMPLETED | OUTPATIENT
Start: 2021-09-22 | End: 2021-09-22

## 2021-09-22 RX ORDER — METOPROLOL TARTRATE 50 MG
100 TABLET ORAL
Refills: 0 | Status: DISCONTINUED | OUTPATIENT
Start: 2021-09-22 | End: 2021-10-04

## 2021-09-22 RX ORDER — METOPROLOL TARTRATE 50 MG
50 TABLET ORAL ONCE
Refills: 0 | Status: COMPLETED | OUTPATIENT
Start: 2021-09-22 | End: 2021-09-22

## 2021-09-22 RX ADMIN — SODIUM CHLORIDE 125 MILLILITER(S): 9 INJECTION INTRAMUSCULAR; INTRAVENOUS; SUBCUTANEOUS at 20:31

## 2021-09-22 RX ADMIN — AZITHROMYCIN 255 MILLIGRAM(S): 500 TABLET, FILM COATED ORAL at 13:29

## 2021-09-22 RX ADMIN — SODIUM CHLORIDE 125 MILLILITER(S): 9 INJECTION INTRAMUSCULAR; INTRAVENOUS; SUBCUTANEOUS at 18:00

## 2021-09-22 RX ADMIN — Medication 50 MILLIGRAM(S): at 09:33

## 2021-09-22 RX ADMIN — ATORVASTATIN CALCIUM 20 MILLIGRAM(S): 80 TABLET, FILM COATED ORAL at 21:35

## 2021-09-22 RX ADMIN — HEPARIN SODIUM 5000 UNIT(S): 5000 INJECTION INTRAVENOUS; SUBCUTANEOUS at 21:35

## 2021-09-22 RX ADMIN — IOHEXOL 30 MILLILITER(S): 300 INJECTION, SOLUTION INTRAVENOUS at 08:07

## 2021-09-22 RX ADMIN — Medication 50 MILLIGRAM(S): at 05:25

## 2021-09-22 RX ADMIN — CEFTRIAXONE 100 MILLIGRAM(S): 500 INJECTION, POWDER, FOR SOLUTION INTRAMUSCULAR; INTRAVENOUS at 13:30

## 2021-09-22 RX ADMIN — AMLODIPINE BESYLATE 5 MILLIGRAM(S): 2.5 TABLET ORAL at 05:25

## 2021-09-22 RX ADMIN — HEPARIN SODIUM 5000 UNIT(S): 5000 INJECTION INTRAVENOUS; SUBCUTANEOUS at 13:31

## 2021-09-22 RX ADMIN — HEPARIN SODIUM 5000 UNIT(S): 5000 INJECTION INTRAVENOUS; SUBCUTANEOUS at 05:25

## 2021-09-22 RX ADMIN — Medication 100 MILLIGRAM(S): at 17:10

## 2021-09-22 NOTE — PROGRESS NOTE ADULT - PROBLEM SELECTOR PLAN 3
UA positive, Currently patient denies painful or burning sensation on urination.   -Rocephin   -UC pending  -urine Legionella pending   -supportive care UA positive, Currently patient denies painful or burning sensation on urination.   -Rocephin   -UC pending  -urine Legionella negative  -supportive care

## 2021-09-22 NOTE — PROGRESS NOTE ADULT - PROBLEM SELECTOR PLAN 4
Incidental finding on CT scan showed liver lesions and parahilar mass  - Oncology Dr. Mathews following to r/o malignancy   -CT of pelvis and abdomin with contrast pending Incidental finding on CT scan showed liver lesions and parahilar mass  -CEA 1.9  -F/U   - Oncology Dr. Mathews following to r/o malignancy   -CT of pelvis and abdomin- Group of peritoneal nodules in the right mid abdomen may represent metastatic peritoneal implants.  -Dr. Arnett consulted Corrected calcium level 11.4. Likely due to metastasis  -Iv hydration 125cc/hr for 24 hours   -f/u CA level in the AM

## 2021-09-22 NOTE — PROGRESS NOTE ADULT - PROBLEM SELECTOR PLAN 7
-Heparin subq   -PPI Admission pt was hypotensive and now SBP in 150-160.  -will resume home medication

## 2021-09-22 NOTE — PROGRESS NOTE ADULT - PROBLEM SELECTOR PLAN 1
Patient presented with hypoxia likely due to PNA. Currently patient ton 2L NC. On room air SaO2 88%  -CT of chest showed RUL PNA  -Azithromycin IV  -BC pending  -supportive care  -trend cbc Patient presented with hypoxia likely due to PNA. hypoxia improving, room air SaO2 92%  -CT of chest showed RUL PNA  -Azithromycin IV  -BC prelim negative   -supportive care  -trend cbc

## 2021-09-22 NOTE — PROGRESS NOTE ADULT - PROBLEM SELECTOR PLAN 5
likely due to infection process  -Improved Incidental finding on CT scan showed liver lesions and parahilar mass  -CEA 1.9  -F/U   - Oncology Dr. Mathews following to r/o malignancy   -CT of pelvis and abdomin- Group of peritoneal nodules in the right mid abdomen may represent metastatic peritoneal implants.  -Dr. Arnett consulted

## 2021-09-22 NOTE — PROGRESS NOTE ADULT - SUBJECTIVE AND OBJECTIVE BOX
Patient is a 71y old  Female who presents with a chief complaint of Pneumonia (21 Sep 2021 16:24)      INTERVAL HPI/OVERNIGHT EVENTS: No events over night. This morning, noted to have SBP 180s, resumed home medication. Weaned off oxygen. room air Sao2 92%.         REVIEW OF SYSTEMS:  CONSTITUTIONAL: No fever, chills  ENMT:  No difficulty hearing, no change in vision  NECK: No pain or stiffness  RESPIRATORY: No cough, SOB  CARDIOVASCULAR: No chest pain, palpitations  GASTROINTESTINAL: No abdominal pain. No nausea, vomiting, or diarrhea  GENITOURINARY: No dysuria  NEUROLOGICAL: No HA  SKIN: No itching, burning, rashes, or lesions   MUSCULOSKELETAL: No joint pain or swelling; No muscle, back, or extremity pain      T(C): 36.3 (21 @ 05:10), Max: 36.9 (21 @ 13:37)  HR: 76 (21 @ 08:31) (76 - 98)  BP: 183/81 (21 @ 08:31) (154/69 - 188/82)  RR: 19 (21 @ 08:31) (18 - 19)  SpO2: 92% (21 @ 08:31) (92% - 97%)  Wt(kg): --Vital Signs Last 24 Hrs  T(C): 36.3 (22 Sep 2021 05:10), Max: 36.9 (21 Sep 2021 13:37)  T(F): 97.4 (22 Sep 2021 05:10), Max: 98.4 (21 Sep 2021 13:37)  HR: 76 (22 Sep 2021 08:31) (76 - 98)  BP: 183/81 (22 Sep 2021 08:31) (154/69 - 188/82)  BP(mean): --  RR: 19 (22 Sep 2021 08:31) (18 - 19)  SpO2: 92% (22 Sep 2021 08:31) (92% - 97%)    PHYSICAL EXAM:  GENERAL: NAD  EYES: clear conjunctiva; EOMI  ENMT: Moist mucous membranes  NECK: Supple, No JVD,   CHEST/LUNG: Clear to auscultation bilaterally; No rales, rhonchi, wheezing, or rubs  HEART: S1, S2, Regular rate and rhythm  ABDOMEN: Soft, Nontender, Nondistended; Bowel sounds present  NEURO: Alert & Oriented X3  EXTREMITIES: No LE edema, no calf tenderness  SKIN: No rashes or lesions    MEDICATIONS  (STANDING):  amLODIPine   Tablet 5 milliGRAM(s) Oral daily  atorvastatin 20 milliGRAM(s) Oral at bedtime  azithromycin  IVPB 500 milliGRAM(s) IV Intermittent every 24 hours  cefTRIAXone   IVPB 1000 milliGRAM(s) IV Intermittent every 24 hours  heparin   Injectable 5000 Unit(s) SubCutaneous every 8 hours  influenza   Vaccine 0.5 milliLiter(s) IntraMuscular once  metoprolol tartrate 100 milliGRAM(s) Oral two times a day  sodium chloride 0.9%. 1000 milliLiter(s) (60 mL/Hr) IV Continuous <Continuous>    MEDICATIONS  (PRN):  ALBUTerol    90 MICROgram(s) HFA Inhaler 2 Puff(s) Inhalation every 6 hours PRN Shortness of Breath and/or Wheezing    Consultant(s) Notes Reviewed:  [x ] YES  [ ] NO  Care Discussed with Consultants/Other Providers [ x] YES  [ ] NO    LABS:                        10.2   6.61  )-----------( 296      ( 22 Sep 2021 07:54 )             30.5     09-    138  |  96  |  15  ----------------------------<  112<H>  3.5   |  29  |  0.93    Ca    10.9<H>      22 Sep 2021 07:54  Phos  3.7       Mg     2.1         TPro  7.6  /  Alb  2.9<L>  /  TBili  0.5  /  DBili  x   /  AST  61<H>  /  ALT  22  /  AlkPhos  90  -    PT/INR - ( 20 Sep 2021 11:38 )   PT: 14.3 sec;   INR: 1.21 ratio         PTT - ( 20 Sep 2021 11:38 )  PTT:26.5 sec  CAPILLARY BLOOD GLUCOSE      Urinalysis Basic - ( 21 Sep 2021 09:15 )    Color: Yellow / Appearance: very cloudy / S.020 / pH: x  Gluc: x / Ketone: Small  / Bili: Negative / Urobili: Negative   Blood: x / Protein: 30 mg/dL / Nitrite: Positive   Leuk Esterase: Moderate / RBC: 2-5 /HPF / WBC >50 /HPF   Sq Epi: x / Non Sq Epi: Many /HPF / Bacteria: Many /HPF      RADIOLOGY & ADDITIONAL TESTS:  < from: CT Chest No Cont (21 @ 22:05) >    EXAM:  CT CHEST                            PROCEDURE DATE:  2021          INTERPRETATION:  CLINICAL INFORMATION: Pleural effusion.    COMPARISON: None.    CONTRAST/COMPLICATIONS:  IV Contrast: NONE  Oral Contrast: NONE  Complications: None reported at time of study completion    PROCEDURE:  CT of the Chest was performed.  Sagittal and coronal reformats were performed.    FINDINGS:    LUNGS AND AIRWAYS: Central mass encasing the right hilum, measuring at least 5.4 x 5.2 cm. Postobstructiveatelectasis in the right middle and lower lobes. Patchy consolidation in the right upper lobe, possibly pneumonia.  PLEURA: Small right pleural effusion.  MEDIASTINUM AND DESIREE: Mediastinal and right hilar adenopathy. For example:  *  Lower right paratracheal node, measuring 1.9 x 1.8 cm  *  Subcarinal node (series 3, image 65), measuring 2.0 x 1.5 cm  VESSELS: Atherosclerotic calcifications.  HEART: Heart size is normal. No pericardial effusion.  CHEST WALL AND LOWER NECK: Within normal limits.  VISUALIZED UPPER ABDOMEN: Peripherally calcified gallstone or gallbladder wall. Multiple indeterminate liver lesions, concerning for metastases. For example:  *  Segment 6 (series 3, image 135), measuring 1.7 x 1.5 cm  *  Segment 4A (series 3, image 104), measuring 2.2 x 2.2 cm  BONES: Mild compression of the T12 superior endplate. Degenerative changes. ACDF hardware.    IMPRESSION:  Central mass encasing the right hilum with postobstructive atelectasis in the right middle and lower lobes.    Mediastinal and right hilar adenopathy.    Probable liver metastases.    Patchy consolidation in the right upper lobe, possibly pneumonia.    --- End of Report ---      < end of copied text >  < from: Xray Chest 1 View- PORTABLE-Urgent (21 @ 11:45) >  EXAM:  XR CHEST PORTABLE URGENT 1V                            PROCEDURE DATE:  2021          INTERPRETATION:  Chest one view    HISTORY: Sepsis    COMPARISON STUDY: None available    Frontal expiratory view of the chest shows the heart to be borderline in size. The lungs show clear left lung with right pleural effusion and possible adjacent infiltrate. There is no evidence of pneumothorax nor left pleural effusion.    IMPRESSION:  Right infiltrate/effusion.    Further information may be obtained from the patient's subsequent CT of the chest.      Thank you for the courtesy of this referral.    --- End of Report ---      < end of copied text >      Imaging Personally Reviewed:  [ ] YES  [ ] NO     Patient is a 71y old  Female who presents with a chief complaint of Pneumonia (21 Sep 2021 16:24)      INTERVAL HPI/OVERNIGHT EVENTS: No events over night. This morning, noted to have SBP 180s, resumed home medication. Weaned off oxygen. room air Sao2 92%.         REVIEW OF SYSTEMS:  CONSTITUTIONAL: No fever, chills  ENMT:  No difficulty hearing, no change in vision  NECK: No pain or stiffness  RESPIRATORY: No cough, SOB  CARDIOVASCULAR: No chest pain, palpitations  GASTROINTESTINAL: No abdominal pain. No nausea, vomiting, or diarrhea  GENITOURINARY: No dysuria  NEUROLOGICAL: No HA  SKIN: No itching, burning, rashes, or lesions   MUSCULOSKELETAL: No joint pain or swelling; No muscle, back, or extremity pain      T(C): 36.3 (21 @ 05:10), Max: 36.9 (21 @ 13:37)  HR: 76 (21 @ 08:31) (76 - 98)  BP: 183/81 (21 @ 08:31) (154/69 - 188/82)  RR: 19 (21 @ 08:31) (18 - 19)  SpO2: 92% (21 @ 08:31) (92% - 97%)  Wt(kg): --Vital Signs Last 24 Hrs  T(C): 36.3 (22 Sep 2021 05:10), Max: 36.9 (21 Sep 2021 13:37)  T(F): 97.4 (22 Sep 2021 05:10), Max: 98.4 (21 Sep 2021 13:37)  HR: 76 (22 Sep 2021 08:31) (76 - 98)  BP: 183/81 (22 Sep 2021 08:31) (154/69 - 188/82)  BP(mean): --  RR: 19 (22 Sep 2021 08:31) (18 - 19)  SpO2: 92% (22 Sep 2021 08:31) (92% - 97%)    PHYSICAL EXAM:  GENERAL: NAD  EYES: clear conjunctiva; EOMI  ENMT: Moist mucous membranes  NECK: Supple, No JVD,   CHEST/LUNG: Clear to auscultation bilaterally; No rales, rhonchi, wheezing, or rubs  HEART: S1, S2, Regular rate and rhythm  ABDOMEN: Soft, Nontender, Nondistended; Bowel sounds present  NEURO: Alert & Oriented X3  EXTREMITIES: No LE edema, no calf tenderness  SKIN: No rashes or lesions    MEDICATIONS  (STANDING):  amLODIPine   Tablet 5 milliGRAM(s) Oral daily  atorvastatin 20 milliGRAM(s) Oral at bedtime  azithromycin  IVPB 500 milliGRAM(s) IV Intermittent every 24 hours  cefTRIAXone   IVPB 1000 milliGRAM(s) IV Intermittent every 24 hours  heparin   Injectable 5000 Unit(s) SubCutaneous every 8 hours  influenza   Vaccine 0.5 milliLiter(s) IntraMuscular once  metoprolol tartrate 100 milliGRAM(s) Oral two times a day  sodium chloride 0.9%. 1000 milliLiter(s) (60 mL/Hr) IV Continuous <Continuous>    MEDICATIONS  (PRN):  ALBUTerol    90 MICROgram(s) HFA Inhaler 2 Puff(s) Inhalation every 6 hours PRN Shortness of Breath and/or Wheezing    Consultant(s) Notes Reviewed:  [x ] YES  [ ] NO  Care Discussed with Consultants/Other Providers [ x] YES  [ ] NO    LABS:                        10.2   6.61  )-----------( 296      ( 22 Sep 2021 07:54 )             30.5     09-    138  |  96  |  15  ----------------------------<  112<H>  3.5   |  29  |  0.93    Ca    10.9<H>      22 Sep 2021 07:54  Phos  3.7       Mg     2.1         TPro  7.6  /  Alb  2.9<L>  /  TBili  0.5  /  DBili  x   /  AST  61<H>  /  ALT  22  /  AlkPhos  90  -    PT/INR - ( 20 Sep 2021 11:38 )   PT: 14.3 sec;   INR: 1.21 ratio         PTT - ( 20 Sep 2021 11:38 )  PTT:26.5 sec  CAPILLARY BLOOD GLUCOSE      Urinalysis Basic - ( 21 Sep 2021 09:15 )    Color: Yellow / Appearance: very cloudy / S.020 / pH: x  Gluc: x / Ketone: Small  / Bili: Negative / Urobili: Negative   Blood: x / Protein: 30 mg/dL / Nitrite: Positive   Leuk Esterase: Moderate / RBC: 2-5 /HPF / WBC >50 /HPF   Sq Epi: x / Non Sq Epi: Many /HPF / Bacteria: Many /HPF      RADIOLOGY & ADDITIONAL TESTS:  < from: CT Chest No Cont (21 @ 22:05) >    EXAM:  CT CHEST                            PROCEDURE DATE:  2021          INTERPRETATION:  CLINICAL INFORMATION: Pleural effusion.    COMPARISON: None.    CONTRAST/COMPLICATIONS:  IV Contrast: NONE  Oral Contrast: NONE  Complications: None reported at time of study completion    PROCEDURE:  CT of the Chest was performed.  Sagittal and coronal reformats were performed.    FINDINGS:    LUNGS AND AIRWAYS: Central mass encasing the right hilum, measuring at least 5.4 x 5.2 cm. Postobstructiveatelectasis in the right middle and lower lobes. Patchy consolidation in the right upper lobe, possibly pneumonia.  PLEURA: Small right pleural effusion.  MEDIASTINUM AND DESIREE: Mediastinal and right hilar adenopathy. For example:  *  Lower right paratracheal node, measuring 1.9 x 1.8 cm  *  Subcarinal node (series 3, image 65), measuring 2.0 x 1.5 cm  VESSELS: Atherosclerotic calcifications.  HEART: Heart size is normal. No pericardial effusion.  CHEST WALL AND LOWER NECK: Within normal limits.  VISUALIZED UPPER ABDOMEN: Peripherally calcified gallstone or gallbladder wall. Multiple indeterminate liver lesions, concerning for metastases. For example:  *  Segment 6 (series 3, image 135), measuring 1.7 x 1.5 cm  *  Segment 4A (series 3, image 104), measuring 2.2 x 2.2 cm  BONES: Mild compression of the T12 superior endplate. Degenerative changes. ACDF hardware.    IMPRESSION:  Central mass encasing the right hilum with postobstructive atelectasis in the right middle and lower lobes.    Mediastinal and right hilar adenopathy.    Probable liver metastases.    Patchy consolidation in the right upper lobe, possibly pneumonia.    --- End of Report ---      < end of copied text >  < from: Xray Chest 1 View- PORTABLE-Urgent (21 @ 11:45) >  EXAM:  XR CHEST PORTABLE URGENT 1V                            PROCEDURE DATE:  2021          INTERPRETATION:  Chest one view    HISTORY: Sepsis    COMPARISON STUDY: None available    Frontal expiratory view of the chest shows the heart to be borderline in size. The lungs show clear left lung with right pleural effusion and possible adjacent infiltrate. There is no evidence of pneumothorax nor left pleural effusion.    IMPRESSION:  Right infiltrate/effusion.    Further information may be obtained from the patient's subsequent CT of the chest.      Thank you for the courtesy of this referral.    --- End of Report ---      < end of copied text >    < from: CT Abdomen and Pelvis w/ Oral Cont and w/ IV Cont (21 @ 10:19) >    EXAM:  CT ABDOMEN AND PELVIS OC IC                            PROCEDURE DATE:  2021          INTERPRETATION:  CLINICAL INFORMATION: CAT chest demonstrated right hilar mass and probable liver metastases. Evaluate for metastatic disease    COMPARISON: CT chest of 2021    CONTRAST/COMPLICATIONS:  IV Contrast: Omnipaque 350  90 cc administered   30 cc discarded  Oral Contrast: Omnipaque 300  Complications: None reported at time of study completion    PROCEDURE:  CT of the Abdomen and Pelvis was performed.  Sagittal and coronal reformats were performed.    FINDINGS:  LOWER CHEST: Partially visualized known right hilar/infrahilar mass with atelectatic lung at the right lung base and small right pleural effusion more fully described on chest CT of 2021. Coronary artery calcification is also noted.    LIVER: Within normal limits. Suspected lesion in the right hepatic lobe on the noncontrast study is not confirmed on this portal venous phase study.  BILE DUCTS: Normal caliber.  GALLBLADDER: Personally calcified gallstone versus gallbladder wall calcification. This may be correlated with ultrasound on a nonemergent basis.  SPLEEN: Within normal limits.  PANCREAS: Within normal limits.  ADRENALS: Within normal limits.  KIDNEYS/URETERS: Within normal limits.    BLADDER: Within normal limits.  REPRODUCTIVE ORGANS: Uterus and adnexa within normal limits. Incidental note is made of left adnexal calcification    BOWEL: No bowel obstruction. Appendix is normal. Changes of gastricbypass  PERITONEUM: No ascites. Right mid abdominal peritoneal nodules are noted measuring up to 1.0 cm suspicious for metastatic peritoneal implants.  VESSELS: Atherosclerotic changes.  RETROPERITONEUM/LYMPH NODES: Upper abdominal lymph nodes at thelevel of the celiac axis and superior mesenteric artery are seen measuring up to 8 mm in short axis.  ABDOMINAL WALL: Within normal limits.  BONES: Degenerative changes. Pedicle screws are seen on the left at the L4 and L5 levels with artificial discat L4-L5 and mild grade 1 anterolisthesis of L4 on L5. Mild superior T12 compression fracture    IMPRESSION:  Liver lesions suspected on the noncontrast study not confirmed on this portal venous phase study. Consider arterial phase imaging if needed for patient's diagnosis.  Group of peritoneal nodules in the right mid abdomen may represent metastatic peritoneal implants.  Calcified gallstone versus gallbladder wall calcification which may be further evaluated with ultrasound on a nonemergent basis  Evidence of gastric bypass    --- End of Report ---      < end of copied text >    Imaging Personally Reviewed:  [ ] YES  [ ] NO

## 2021-09-22 NOTE — CONSULT NOTE ADULT - ASSESSMENT
71 years old patient with Right Lung Mass with mediastinal and hilar lymphadenopathy, and abdominal lesions concerning for neoplasia. Hepatitis C positive, former smoker.     Plan Suggestion:  Pulmonary team Consult for possible EBUS with Biopsy   RUQ US due to finding of calcified GB  Palliative care consult   Tumor markers   Screening colonoscopy and mammogram when appropriate  71 years old patient with Right Lung Mass with mediastinal and hilar lymphadenopathy, and abdominal lesions concerning for neoplasia. Hepatitis C positive, former smoker.     Plan Suggestion:  Pulmonary team Consult for possible EBUS with Biopsy   RUQ US due to finding of calcified GB  Palliative care consult   Tumor markers   No surgical oncology intervention at this time.   Screening colonoscopy and mammogram when appropriate   At this time, surgery will sign off. Please re-consult if any changes or new findings pertinent for surgical oncology input.  A/P: 71 F with Right Lung Mass with mediastinal and hilar lymphadenopathy, multiple liver lesion and peritoneal nodules   - f/u Pulmonology for EBUS  - Please consult Cardiothoracic Surgery for lung mass   - No Surgical Oncology intervention at the time being , please reconsult PRN

## 2021-09-22 NOTE — PROGRESS NOTE ADULT - SUBJECTIVE AND OBJECTIVE BOX
Time of Visit:  Patient seen and examined.     MEDICATIONS  (STANDING):  amLODIPine   Tablet 5 milliGRAM(s) Oral daily  atorvastatin 20 milliGRAM(s) Oral at bedtime  azithromycin  IVPB 500 milliGRAM(s) IV Intermittent every 24 hours  cefTRIAXone   IVPB 1000 milliGRAM(s) IV Intermittent every 24 hours  chlorhexidine 2% Cloths 1 Application(s) Topical daily  heparin   Injectable 5000 Unit(s) SubCutaneous every 8 hours  influenza   Vaccine 0.5 milliLiter(s) IntraMuscular once  metoprolol tartrate 100 milliGRAM(s) Oral two times a day  mupirocin 2% Ointment 1 Application(s) Topical two times a day  sodium chloride 0.9%. 1000 milliLiter(s) (60 mL/Hr) IV Continuous <Continuous>  sodium chloride 0.9%. 1000 milliLiter(s) (125 mL/Hr) IV Continuous <Continuous>      MEDICATIONS  (PRN):  ALBUTerol    90 MICROgram(s) HFA Inhaler 2 Puff(s) Inhalation every 6 hours PRN Shortness of Breath and/or Wheezing       Medications up to date at time of exam.      PHYSICAL EXAMINATION:  Patient has no new complaints.  GENERAL: The patient is a well-developed, well-nourished, in no apparent distress.     Vital Signs Last 24 Hrs  T(C): 36.9 (22 Sep 2021 14:36), Max: 37.1 (22 Sep 2021 11:14)  T(F): 98.5 (22 Sep 2021 14:36), Max: 98.7 (22 Sep 2021 11:14)  HR: 76 (22 Sep 2021 14:36) (74 - 92)  BP: 107/82 (22 Sep 2021 14:36) (107/82 - 188/82)  BP(mean): --  RR: 18 (22 Sep 2021 14:36) (18 - 19)  SpO2: 92% (22 Sep 2021 14:36) (92% - 97%)   (if applicable)    Chest Tube (if applicable)    HEENT: Head is normocephalic and atraumatic. Extraocular muscles are intact. Mucous membranes are moist.     NECK: Supple, no palpable adenopathy.    LUNGS: Clear to auscultation, no wheezing, rales, or rhonchi.    HEART: Regular rate and rhythm without murmur.    ABDOMEN: Soft, nontender, and nondistended.  No hepatosplenomegaly is noted.    : No painful voiding, no pelvic pain    EXTREMITIES: Without any cyanosis, clubbing, rash, lesions or edema.    NEUROLOGIC: Awake, alert, oriented, grossly intact    SKIN: Warm, dry, good turgor.      LABS:                        10.2   6.61  )-----------( 296      ( 22 Sep 2021 07:54 )             30.5         138  |  96  |  15  ----------------------------<  112<H>  3.5   |  29  |  0.93    Ca    10.9<H>      22 Sep 2021 07:54  Phos  3.7       Mg     2.1         TPro  7.6  /  Alb  2.9<L>  /  TBili  0.5  /  DBili  x   /  AST  61<H>  /  ALT  22  /  AlkPhos  90        Urinalysis Basic - ( 21 Sep 2021 09:15 )    Color: Yellow / Appearance: very cloudy / S.020 / pH: x  Gluc: x / Ketone: Small  / Bili: Negative / Urobili: Negative   Blood: x / Protein: 30 mg/dL / Nitrite: Positive   Leuk Esterase: Moderate / RBC: 2-5 /HPF / WBC >50 /HPF   Sq Epi: x / Non Sq Epi: Many /HPF / Bacteria: Many /HPF              Serum Pro-Brain Natriuretic Peptide: 779 pg/mL (21 @ 07:37)          MICROBIOLOGY: (if applicable)    RADIOLOGY & ADDITIONAL STUDIES:  EKG:   CXR:  ECHO:    IMPRESSION: 71y Female PAST MEDICAL & SURGICAL HISTORY:  Lumbar stenosis    H/O: HTN (hypertension)    Hypercholesterolemia    Bleeding ulcer  stomach ulcer     Anxiety    Spinal stenosis in cervical region    Acute hepatitis C virus infection without hepatic coma  treated - Resolved    Balance disorder    Coronary artery disease involving native coronary artery of native heart without angina pectoris    HTN (hypertension)    HLD (hyperlipidemia)    H/O colonoscopy    Bleeding ulcer  stomach surgery for bleeding ulcer    Fracture  ORIF Left wrist  13    History of lumbar surgery      Chronic UTI  Pt reports presently on 2nd dose of antibiotics 18 10 days, Dr Herman aware, pt going for m/eval 18.    H/O cardiac catheterization  , no intervention needed, treated medically     p/w       IMP:  This is a 71 yr old white woman , former smoker with  HTN  HLD BIBEMS for hypoxia and hypotension at outpatient clinic.  Hypoxia due to post obstructive pna with right lung mass with meds.      ASSESSMENT     - Acute Hypoxic Resp Failure   - Right hilar lung mass  - Mediastinal adenopathy   - Post obstructive PNA   - BRENDA  - Former Smoker       Sugg;  -i spoke with pat today.. she stated that she is tired and do not wish to talk about procedures . She also stated that she misses her  for 3 days and is unhappy about this development .  -pat will need tissue bx   -amicable for bronch with bx / EBUS .. now pat is deferring any decision regarding medical care for another day.   -continue antibx   -f/u cultures   -O2 supp to maintain sat >90%  -albuterol inhaler   -dvt/gi prophy   -i informed pat that she probable has malignancy with mets

## 2021-09-22 NOTE — PROGRESS NOTE ADULT - PROBLEM SELECTOR PLAN 6
Admission pt was hypotensive and now SBP in 150-160.  -will resume home medication likely due to infection process  -Improved

## 2021-09-22 NOTE — PROGRESS NOTE ADULT - ASSESSMENT
complete note to follow   Assessment and Recommendation:   · Assessment	    Problem# R/O Malignancy  p/w hypoxia, N/V and weakness  admits new onset cough and SOB, denies wt loss  former smoker, quit 22 years ago, approx 25 pack year hx  no hx malignancy  FamHx includes mother and sister with unknown cancer, both   AST slightly elevated, ALT/ALKPhos wnl  non-contrast CT shows: 5.4x5.2cm central mass encasing Right hilum. mediastinal/hilar adenopathy and mult indeterminate liver lesions, largest 2.2cm  reviewed findings with pt and discussed likely lung cancer with met disease to liver; however need a biopsy to dx and then formulate prognosis and tx plan  Rec's:  -CEA is normal, likely non-expressor  -CT A/P with contrast for complete w/u  -will need lung biopsy when clinically stable inpt vs outpt  -PET/CT as outpt  -Await results for further recommendations    Problem# Normocytic Anemia  Hgb=on admit 10.8 and today 10.2  pt thinks she has a hx of anemia, but unsure  Cr nl  Rec's:  -CBC daily  -anemia w/u, retic 2.1%, Iron Sat 21%, others pending  -Hepatitis C (+), Hepatology consult  -Transfuse PRBC if Hgb <7.0 or if symptomatic  -will reach out to pt's PCP for baseline CBC and other hx    Thank you for the referral. Will continue to monitor the patient.  Please call with any questions [phone]  Above reviewed with Attending Dr. Mathews       Assessment and Recommendation:   · Assessment	    Problem# R/O Malignancy  p/w hypoxia, N/V and weakness  admits new onset cough and SOB, denies wt loss  former smoker, quit 22 years ago, approx 25 pack year hx  no hx malignancy  FamHx includes mother and sister with unknown cancer, both   AST slightly elevated, ALT/ALKPhos wnl  non-contrast CT shows: 5.4x5.2cm central mass encasing Right hilum. mediastinal/hilar adenopathy and mult indeterminate liver lesions, largest 2.2cm  reviewed findings with pt and discussed likely lung cancer with met disease to liver; however need a biopsy to dx and then formulate prognosis and tx plan  Rec's:  -CEA is normal, likely non-expressor if lung CA or other pathology  -CT A/P shows Right mid abdominal peritoneal nodules are noted measuring up to 1.0 cm suspicious for metastatic peritoneal implants. Upper abdominal lymph nodes at thelevel of the celiac axis and superior mesenteric artery are seen measuring up to 8 mm in short axis. Liver lesions suspected on the noncontrast study not confirmed on this portal venous phase study.   -IR consult for possible peritoneal Bx  -will need lung biopsy when clinically stable inpt vs outpt  -AMS, rec brain MRI, check electrolytes, ? delirium  -PET/CT as outpt  -PT eval when clinically stable  -Await results for further recommendations    Problem# Normocytic Anemia  Hgb=on admit 10.8 and today 10.2  pt thinks she has a hx of anemia, but unsure  Cr nl  Rec's:  -CBC daily  -anemia w/u, retic 2.1%, Iron Sat 21%, others pending  -Hepatitis C reactive, RNA pending, Hepatology consult  -Transfuse PRBC if Hgb <7.0 or if symptomatic  -will reach out to pt's PCP for baseline CBC and other hx    Thank you for the referral. Will continue to monitor the patient.  Please call with any questions [phone]  Above reviewed with Attending Dr. Mathews       Assessment and Recommendation:   · Assessment	    Problem# R/O Malignancy  p/w hypoxia, N/V and weakness  admits new onset cough and SOB, denies wt loss  former smoker, quit 22 years ago, approx 25 pack year hx  no hx malignancy  FamHx includes mother and sister with unknown cancer, both   AST slightly elevated, ALT/ALKPhos wnl  non-contrast CT shows: 5.4x5.2cm central mass encasing Right hilum. mediastinal/hilar adenopathy and mult indeterminate liver lesions, largest 2.2cm  reviewed findings with pt and discussed likely lung cancer with met disease to liver; however need a biopsy to dx and then formulate prognosis and tx plan  Rec's:  -CEA is normal, likely non-expressor if lung CA or other pathology  -CT A/P shows Right mid abdominal peritoneal nodules are noted measuring up to 1.0 cm suspicious for metastatic peritoneal implants. Upper abdominal lymph nodes at thelevel of the celiac axis and superior mesenteric artery are seen measuring up to 8 mm in short axis. Liver lesions suspected on the noncontrast study not confirmed on this portal venous phase study.   -IR consult for possible peritoneal Bx  -will need lung biopsy when clinically stable inpt vs outpt  -AMS, rec brain MRI, check electrolytes, ? delirium, CA -12  -PET/CT as outpt  -PT eval when clinically stable  -Await results for further recommendations    Problem# Normocytic Anemia  Hgb=on admit 10.8 and today 10.2  pt thinks she has a hx of anemia, but unsure  Cr nl  Rec's:  -CBC daily  -anemia w/u, retic 2.1%, Iron Sat 21%, others pending  -Hepatitis C reactive, RNA pending, Hepatology consult  -Transfuse PRBC if Hgb <7.0 or if symptomatic  -will reach out to pt's PCP for baseline CBC and other hx    Thank you for the referral. Will continue to monitor the patient.  Please call with any questions [phone]  Above reviewed with Attending Dr. Mathews

## 2021-09-22 NOTE — PROGRESS NOTE ADULT - SUBJECTIVE AND OBJECTIVE BOX
Patient is a 71y old  Female who presents with a chief complaint of Pneumonia, UTI (22 Sep 2021 10:22)      SUBJECTIVE / OVERNIGHT EVENTS:    ADDITIONAL REVIEW OF SYSTEMS:    MEDICATIONS  (STANDING):  amLODIPine   Tablet 5 milliGRAM(s) Oral daily  atorvastatin 20 milliGRAM(s) Oral at bedtime  azithromycin  IVPB 500 milliGRAM(s) IV Intermittent every 24 hours  cefTRIAXone   IVPB 1000 milliGRAM(s) IV Intermittent every 24 hours  heparin   Injectable 5000 Unit(s) SubCutaneous every 8 hours  influenza   Vaccine 0.5 milliLiter(s) IntraMuscular once  metoprolol tartrate 100 milliGRAM(s) Oral two times a day  sodium chloride 0.9%. 1000 milliLiter(s) (60 mL/Hr) IV Continuous <Continuous>    MEDICATIONS  (PRN):  ALBUTerol    90 MICROgram(s) HFA Inhaler 2 Puff(s) Inhalation every 6 hours PRN Shortness of Breath and/or Wheezing    CAPILLARY BLOOD GLUCOSE        I&O's Summary      PHYSICAL EXAM:  Vital Signs Last 24 Hrs  T(C): 37.1 (22 Sep 2021 11:14), Max: 37.1 (22 Sep 2021 11:14)  T(F): 98.7 (22 Sep 2021 11:14), Max: 98.7 (22 Sep 2021 11:14)  HR: 74 (22 Sep 2021 11:14) (74 - 98)  BP: 147/63 (22 Sep 2021 11:14) (147/63 - 188/82)  BP(mean): --  RR: 18 (22 Sep 2021 11:14) (18 - 19)  SpO2: 92% (22 Sep 2021 11:14) (92% - 97%)    LABS:                        10.2   6.61  )-----------( 296      ( 22 Sep 2021 07:54 )             30.5     09-22    138  |  96  |  15  ----------------------------<  112<H>  3.5   |  29  |  0.93    Ca    10.9<H>      22 Sep 2021 07:54  Phos  3.7     09-21  Mg     2.1     09-21    TPro  7.6  /  Alb  2.9<L>  /  TBili  0.5  /  DBili  x   /  AST  61<H>  /  ALT  22  /  AlkPhos  90  -          Urinalysis Basic - ( 21 Sep 2021 09:15 )    Color: Yellow / Appearance: very cloudy / S.020 / pH: x  Gluc: x / Ketone: Small  / Bili: Negative / Urobili: Negative   Blood: x / Protein: 30 mg/dL / Nitrite: Positive   Leuk Esterase: Moderate / RBC: 2-5 /HPF / WBC >50 /HPF   Sq Epi: x / Non Sq Epi: Many /HPF / Bacteria: Many /HPF        Culture - Blood (collected 20 Sep 2021 18:38)  Source: .Blood Blood-Peripheral  Preliminary Report (21 Sep 2021 19:02):    No growth to date.    Culture - Blood (collected 20 Sep 2021 18:38)  Source: .Blood Blood-Peripheral  Preliminary Report (21 Sep 2021 19:02):    No growth to date.      COVID-19 PCR: NotDetec (20 Sep 2021 11:38)      RADIOLOGY & ADDITIONAL TESTS:  Imaging from Last 24 Hours:    Electrocardiogram/QTc Interval:    COORDINATION OF CARE:  Care Discussed with Consultants/Other Providers:     Patient is a 71y old  Female who presents with a chief complaint of Pneumonia, UTI (22 Sep 2021 10:22)      SUBJECTIVE / OVERNIGHT EVENTS: events noted. Now weaned off O2 and on RA    MEDICATIONS  (STANDING):  amLODIPine   Tablet 5 milliGRAM(s) Oral daily  atorvastatin 20 milliGRAM(s) Oral at bedtime  azithromycin  IVPB 500 milliGRAM(s) IV Intermittent every 24 hours  cefTRIAXone   IVPB 1000 milliGRAM(s) IV Intermittent every 24 hours  heparin   Injectable 5000 Unit(s) SubCutaneous every 8 hours  influenza   Vaccine 0.5 milliLiter(s) IntraMuscular once  metoprolol tartrate 100 milliGRAM(s) Oral two times a day  sodium chloride 0.9%. 1000 milliLiter(s) (60 mL/Hr) IV Continuous <Continuous>    MEDICATIONS  (PRN):  ALBUTerol    90 MICROgram(s) HFA Inhaler 2 Puff(s) Inhalation every 6 hours PRN Shortness of Breath and/or Wheezing      Vital Signs Last 24 Hrs  T(C): 37.1 (22 Sep 2021 11:14), Max: 37.1 (22 Sep 2021 11:14)  T(F): 98.7 (22 Sep 2021 11:14), Max: 98.7 (22 Sep 2021 11:14)  HR: 74 (22 Sep 2021 11:14) (74 - 98)  BP: 147/63 (22 Sep 2021 11:14) (147/63 - 188/82)  BP(mean): --  RR: 18 (22 Sep 2021 11:14) (18 - 19)  SpO2: 92% (22 Sep 2021 11:14) (92% - 97%)      GEN: NAD; A and O x 3  LUNGS: decreased BS on right, Left CTA  HEART: S1 S2  ABDOMEN: soft, non-tender, non-distended, + BS  EXTREMITIES: no edema  NERVOUS SYSTEM:  Awake and alert; no focal neuro deficits          LABS:                        10.2   6.61  )-----------( 296      ( 22 Sep 2021 07:54 )             30.5     09-    138  |  96  |  15  ----------------------------<  112<H>  3.5   |  29  |  0.93    Ca    10.9<H>      22 Sep 2021 07:54  Phos  3.7       Mg     2.1         TPro  7.6  /  Alb  2.9<L>  /  TBili  0.5  /  DBili  x   /  AST  61<H>  /  ALT  22  /  AlkPhos  90            Urinalysis Basic - ( 21 Sep 2021 09:15 )    Color: Yellow / Appearance: very cloudy / S.020 / pH: x  Gluc: x / Ketone: Small  / Bili: Negative / Urobili: Negative   Blood: x / Protein: 30 mg/dL / Nitrite: Positive   Leuk Esterase: Moderate / RBC: 2-5 /HPF / WBC >50 /HPF   Sq Epi: x / Non Sq Epi: Many /HPF / Bacteria: Many /HPF        Culture - Blood (collected 20 Sep 2021 18:38)  Source: .Blood Blood-Peripheral  Preliminary Report (21 Sep 2021 19:02):    No growth to date.    Culture - Blood (collected 20 Sep 2021 18:38)  Source: .Blood Blood-Peripheral  Preliminary Report (21 Sep 2021 19:02):    No growth to date.      COVID-19 PCR: NotDetec (20 Sep 2021 11:38)         Patient is a 71y old  Female who presents with a chief complaint of Pneumonia, UTI (22 Sep 2021 10:22)    SUBJECTIVE / OVERNIGHT EVENTS: events noted. Now weaned off O2 and on RA. Pt confused and irritable    MEDICATIONS  (STANDING):  amLODIPine   Tablet 5 milliGRAM(s) Oral daily  atorvastatin 20 milliGRAM(s) Oral at bedtime  azithromycin  IVPB 500 milliGRAM(s) IV Intermittent every 24 hours  cefTRIAXone   IVPB 1000 milliGRAM(s) IV Intermittent every 24 hours  heparin   Injectable 5000 Unit(s) SubCutaneous every 8 hours  influenza   Vaccine 0.5 milliLiter(s) IntraMuscular once  metoprolol tartrate 100 milliGRAM(s) Oral two times a day  sodium chloride 0.9%. 1000 milliLiter(s) (60 mL/Hr) IV Continuous <Continuous>    MEDICATIONS  (PRN):  ALBUTerol    90 MICROgram(s) HFA Inhaler 2 Puff(s) Inhalation every 6 hours PRN Shortness of Breath and/or Wheezing      Vital Signs Last 24 Hrs  T(C): 37.1 (22 Sep 2021 11:14), Max: 37.1 (22 Sep 2021 11:14)  T(F): 98.7 (22 Sep 2021 11:14), Max: 98.7 (22 Sep 2021 11:14)  HR: 74 (22 Sep 2021 11:14) (74 - 98)  BP: 147/63 (22 Sep 2021 11:14) (147/63 - 188/82)  BP(mean): --  RR: 18 (22 Sep 2021 11:14) (18 - 19)  SpO2: 92% (22 Sep 2021 11:14) (92% - 97%)      GEN: NAD; unable to assess orientation, pt agitated and makes several comments about family issues and that she feels "unsafe", but states that is because she cannot walk  LUNGS: decreased BS on right, Left CTA  HEART: S1 S2  ABDOMEN: soft, non-tender, non-distended, + BS  EXTREMITIES: no edema  NERVOUS SYSTEM:  Awake and alert      LABS:                        10.2   6.61  )-----------( 296      ( 22 Sep 2021 07:54 )             30.5     09-22    138  |  96  |  15  ----------------------------<  112<H>  3.5   |  29  |  0.93    Ca    10.9<H>      22 Sep 2021 07:54  Phos  3.7       Mg     2.1         TPro  7.6  /  Alb  2.9<L>  /  TBili  0.5  /  DBili  x   /  AST  61<H>  /  ALT  22  /  AlkPhos  90            Urinalysis Basic - ( 21 Sep 2021 09:15 )    Color: Yellow / Appearance: very cloudy / S.020 / pH: x  Gluc: x / Ketone: Small  / Bili: Negative / Urobili: Negative   Blood: x / Protein: 30 mg/dL / Nitrite: Positive   Leuk Esterase: Moderate / RBC: 2-5 /HPF / WBC >50 /HPF   Sq Epi: x / Non Sq Epi: Many /HPF / Bacteria: Many /HPF        Culture - Blood (collected 20 Sep 2021 18:38)  Source: .Blood Blood-Peripheral  Preliminary Report (21 Sep 2021 19:02):    No growth to date.    Culture - Blood (collected 20 Sep 2021 18:38)  Source: .Blood Blood-Peripheral  Preliminary Report (21 Sep 2021 19:02):    No growth to date.      COVID-19 PCR: NotDetec (20 Sep 2021 11:38)      Radiology:  < from: CT Abdomen and Pelvis w/ Oral Cont and w/ IV Cont (21 @ 10:19) >    EXAM:  CT ABDOMEN AND PELVIS OC IC                            PROCEDURE DATE:  2021          INTERPRETATION:  CLINICAL INFORMATION: CAT chest demonstrated right hilar mass and probable liver metastases. Evaluate for metastatic disease    COMPARISON: CT chest of 2021    CONTRAST/COMPLICATIONS:  IV Contrast: Omnipaque 350  90 cc administered   30 cc discarded  Oral Contrast: Omnipaque 300  Complications: None reported at time of study completion    PROCEDURE:  CT of the Abdomen and Pelvis was performed.  Sagittal and coronal reformats were performed.    FINDINGS:  LOWER CHEST: Partially visualized known right hilar/infrahilar mass with atelectatic lung at the right lung base and small right pleural effusion more fully described on chest CT of 2021. Coronary artery calcification is also noted.    LIVER: Within normal limits. Suspected lesion in the right hepatic lobe on the noncontrast study is not confirmed on this portal venous phase study.  BILE DUCTS: Normal caliber.  GALLBLADDER: Personally calcified gallstone versus gallbladder wall calcification. This may be correlated with ultrasound on a nonemergent basis.  SPLEEN: Within normal limits.  PANCREAS: Within normal limits.  ADRENALS: Within normal limits.  KIDNEYS/URETERS: Within normal limits.    BLADDER: Within normal limits.  REPRODUCTIVE ORGANS: Uterus and adnexa within normal limits. Incidental note is made of left adnexal calcification    BOWEL: No bowel obstruction. Appendix is normal. Changes of gastricbypass  PERITONEUM: No ascites. Right mid abdominal peritoneal nodules are noted measuring up to 1.0 cm suspicious for metastatic peritoneal implants.  VESSELS: Atherosclerotic changes.  RETROPERITONEUM/LYMPH NODES: Upper abdominal lymph nodes at thelevel of the celiac axis and superior mesenteric artery are seen measuring up to 8 mm in short axis.  ABDOMINAL WALL: Within normal limits.  BONES: Degenerative changes. Pedicle screws are seen on the left at the L4 and L5 levels with artificial discat L4-L5 and mild grade 1 anterolisthesis of L4 on L5. Mild superior T12 compression fracture    IMPRESSION:  Liver lesions suspected on the noncontrast study not confirmed on this portal venous phase study. Consider arterial phase imaging if needed for patient's diagnosis.  Group of peritoneal nodules in the right mid abdomen may represent metastatic peritoneal implants.  Calcified gallstone versus gallbladder wall calcification which may be further evaluated with ultrasound on a nonemergent basis  Evidence of gastric bypass    < end of copied text >

## 2021-09-23 DIAGNOSIS — C80.1 MALIGNANT (PRIMARY) NEOPLASM, UNSPECIFIED: ICD-10-CM

## 2021-09-23 DIAGNOSIS — R53.81 OTHER MALAISE: ICD-10-CM

## 2021-09-23 DIAGNOSIS — R91.8 OTHER NONSPECIFIC ABNORMAL FINDING OF LUNG FIELD: ICD-10-CM

## 2021-09-23 DIAGNOSIS — Z51.5 ENCOUNTER FOR PALLIATIVE CARE: ICD-10-CM

## 2021-09-23 LAB
-  AMIKACIN: SIGNIFICANT CHANGE UP
-  AMOXICILLIN/CLAVULANIC ACID: SIGNIFICANT CHANGE UP
-  AMPICILLIN/SULBACTAM: SIGNIFICANT CHANGE UP
-  AMPICILLIN: SIGNIFICANT CHANGE UP
-  AZTREONAM: SIGNIFICANT CHANGE UP
-  CEFAZOLIN: SIGNIFICANT CHANGE UP
-  CEFEPIME: SIGNIFICANT CHANGE UP
-  CEFOXITIN: SIGNIFICANT CHANGE UP
-  CEFTRIAXONE: SIGNIFICANT CHANGE UP
-  CIPROFLOXACIN: SIGNIFICANT CHANGE UP
-  ERTAPENEM: SIGNIFICANT CHANGE UP
-  GENTAMICIN: SIGNIFICANT CHANGE UP
-  IMIPENEM: SIGNIFICANT CHANGE UP
-  LEVOFLOXACIN: SIGNIFICANT CHANGE UP
-  MEROPENEM: SIGNIFICANT CHANGE UP
-  NITROFURANTOIN: SIGNIFICANT CHANGE UP
-  PIPERACILLIN/TAZOBACTAM: SIGNIFICANT CHANGE UP
-  TIGECYCLINE: SIGNIFICANT CHANGE UP
-  TOBRAMYCIN: SIGNIFICANT CHANGE UP
-  TRIMETHOPRIM/SULFAMETHOXAZOLE: SIGNIFICANT CHANGE UP
ALBUMIN SERPL ELPH-MCNC: 3 G/DL — LOW (ref 3.5–5)
ALP SERPL-CCNC: 88 U/L — SIGNIFICANT CHANGE UP (ref 40–120)
ALT FLD-CCNC: 21 U/L DA — SIGNIFICANT CHANGE UP (ref 10–60)
ANION GAP SERPL CALC-SCNC: 10 MMOL/L — SIGNIFICANT CHANGE UP (ref 5–17)
ANION GAP SERPL CALC-SCNC: 13 MMOL/L — SIGNIFICANT CHANGE UP (ref 5–17)
AST SERPL-CCNC: 68 U/L — HIGH (ref 10–40)
BILIRUB SERPL-MCNC: 0.5 MG/DL — SIGNIFICANT CHANGE UP (ref 0.2–1.2)
BUN SERPL-MCNC: 11 MG/DL — SIGNIFICANT CHANGE UP (ref 7–18)
BUN SERPL-MCNC: 11 MG/DL — SIGNIFICANT CHANGE UP (ref 7–18)
CALCIUM SERPL-MCNC: 10.6 MG/DL — HIGH (ref 8.4–10.5)
CALCIUM SERPL-MCNC: 10.6 MG/DL — HIGH (ref 8.4–10.5)
CANCER AG19-9 SERPL-ACNC: 7 U/ML — SIGNIFICANT CHANGE UP
CHLORIDE SERPL-SCNC: 97 MMOL/L — SIGNIFICANT CHANGE UP (ref 96–108)
CHLORIDE SERPL-SCNC: 98 MMOL/L — SIGNIFICANT CHANGE UP (ref 96–108)
CO2 SERPL-SCNC: 29 MMOL/L — SIGNIFICANT CHANGE UP (ref 22–31)
CO2 SERPL-SCNC: 29 MMOL/L — SIGNIFICANT CHANGE UP (ref 22–31)
CREAT SERPL-MCNC: 0.88 MG/DL — SIGNIFICANT CHANGE UP (ref 0.5–1.3)
CREAT SERPL-MCNC: 0.93 MG/DL — SIGNIFICANT CHANGE UP (ref 0.5–1.3)
CULTURE RESULTS: SIGNIFICANT CHANGE UP
GLUCOSE BLDC GLUCOMTR-MCNC: 134 MG/DL — HIGH (ref 70–99)
GLUCOSE SERPL-MCNC: 116 MG/DL — HIGH (ref 70–99)
GLUCOSE SERPL-MCNC: 97 MG/DL — SIGNIFICANT CHANGE UP (ref 70–99)
HCT VFR BLD CALC: 30.8 % — LOW (ref 34.5–45)
HGB BLD-MCNC: 10.4 G/DL — LOW (ref 11.5–15.5)
MCHC RBC-ENTMCNC: 29.8 PG — SIGNIFICANT CHANGE UP (ref 27–34)
MCHC RBC-ENTMCNC: 33.8 GM/DL — SIGNIFICANT CHANGE UP (ref 32–36)
MCV RBC AUTO: 88.3 FL — SIGNIFICANT CHANGE UP (ref 80–100)
METHOD TYPE: SIGNIFICANT CHANGE UP
NRBC # BLD: 0 /100 WBCS — SIGNIFICANT CHANGE UP (ref 0–0)
ORGANISM # SPEC MICROSCOPIC CNT: SIGNIFICANT CHANGE UP
ORGANISM # SPEC MICROSCOPIC CNT: SIGNIFICANT CHANGE UP
PLATELET # BLD AUTO: 301 K/UL — SIGNIFICANT CHANGE UP (ref 150–400)
POTASSIUM SERPL-MCNC: 3.1 MMOL/L — LOW (ref 3.5–5.3)
POTASSIUM SERPL-MCNC: 3.7 MMOL/L — SIGNIFICANT CHANGE UP (ref 3.5–5.3)
POTASSIUM SERPL-SCNC: 3.1 MMOL/L — LOW (ref 3.5–5.3)
POTASSIUM SERPL-SCNC: 3.7 MMOL/L — SIGNIFICANT CHANGE UP (ref 3.5–5.3)
PROT SERPL-MCNC: 8 G/DL — SIGNIFICANT CHANGE UP (ref 6–8.3)
RBC # BLD: 3.49 M/UL — LOW (ref 3.8–5.2)
RBC # FLD: 13.1 % — SIGNIFICANT CHANGE UP (ref 10.3–14.5)
SARS-COV-2 RNA SPEC QL NAA+PROBE: SIGNIFICANT CHANGE UP
SODIUM SERPL-SCNC: 137 MMOL/L — SIGNIFICANT CHANGE UP (ref 135–145)
SODIUM SERPL-SCNC: 139 MMOL/L — SIGNIFICANT CHANGE UP (ref 135–145)
SPECIMEN SOURCE: SIGNIFICANT CHANGE UP
WBC # BLD: 7.37 K/UL — SIGNIFICANT CHANGE UP (ref 3.8–10.5)
WBC # FLD AUTO: 7.37 K/UL — SIGNIFICANT CHANGE UP (ref 3.8–10.5)

## 2021-09-23 PROCEDURE — 70553 MRI BRAIN STEM W/O & W/DYE: CPT | Mod: 26

## 2021-09-23 PROCEDURE — 99223 1ST HOSP IP/OBS HIGH 75: CPT

## 2021-09-23 PROCEDURE — 99497 ADVNCD CARE PLAN 30 MIN: CPT | Mod: 25

## 2021-09-23 RX ORDER — POTASSIUM CHLORIDE 20 MEQ
40 PACKET (EA) ORAL EVERY 4 HOURS
Refills: 0 | Status: COMPLETED | OUTPATIENT
Start: 2021-09-23 | End: 2021-09-23

## 2021-09-23 RX ADMIN — CHLORHEXIDINE GLUCONATE 1 APPLICATION(S): 213 SOLUTION TOPICAL at 09:57

## 2021-09-23 RX ADMIN — HEPARIN SODIUM 5000 UNIT(S): 5000 INJECTION INTRAVENOUS; SUBCUTANEOUS at 06:04

## 2021-09-23 RX ADMIN — MUPIROCIN 1 APPLICATION(S): 20 OINTMENT TOPICAL at 06:05

## 2021-09-23 RX ADMIN — SODIUM CHLORIDE 125 MILLILITER(S): 9 INJECTION INTRAMUSCULAR; INTRAVENOUS; SUBCUTANEOUS at 06:12

## 2021-09-23 RX ADMIN — AZITHROMYCIN 255 MILLIGRAM(S): 500 TABLET, FILM COATED ORAL at 09:59

## 2021-09-23 RX ADMIN — Medication 100 MILLIGRAM(S): at 17:33

## 2021-09-23 RX ADMIN — ATORVASTATIN CALCIUM 20 MILLIGRAM(S): 80 TABLET, FILM COATED ORAL at 21:10

## 2021-09-23 RX ADMIN — Medication 40 MILLIEQUIVALENT(S): at 16:58

## 2021-09-23 RX ADMIN — Medication 100 MILLIGRAM(S): at 06:04

## 2021-09-23 RX ADMIN — AMLODIPINE BESYLATE 5 MILLIGRAM(S): 2.5 TABLET ORAL at 06:05

## 2021-09-23 RX ADMIN — CEFTRIAXONE 100 MILLIGRAM(S): 500 INJECTION, POWDER, FOR SOLUTION INTRAMUSCULAR; INTRAVENOUS at 09:58

## 2021-09-23 RX ADMIN — MUPIROCIN 1 APPLICATION(S): 20 OINTMENT TOPICAL at 17:32

## 2021-09-23 RX ADMIN — Medication 40 MILLIEQUIVALENT(S): at 09:57

## 2021-09-23 NOTE — PROGRESS NOTE ADULT - SUBJECTIVE AND OBJECTIVE BOX
NP Note discussed with  Primary Attending    INTERVAL HPI/OVERNIGHT EVENTS: no new complaints    MEDICATIONS  (STANDING):  amLODIPine   Tablet 5 milliGRAM(s) Oral daily  atorvastatin 20 milliGRAM(s) Oral at bedtime  azithromycin  IVPB 500 milliGRAM(s) IV Intermittent every 24 hours  cefTRIAXone   IVPB 1000 milliGRAM(s) IV Intermittent every 24 hours  chlorhexidine 2% Cloths 1 Application(s) Topical daily  influenza   Vaccine 0.5 milliLiter(s) IntraMuscular once  metoprolol tartrate 100 milliGRAM(s) Oral two times a day  mupirocin 2% Ointment 1 Application(s) Topical two times a day  sodium chloride 0.9%. 1000 milliLiter(s) (60 mL/Hr) IV Continuous <Continuous>  sodium chloride 0.9%. 1000 milliLiter(s) (125 mL/Hr) IV Continuous <Continuous>    MEDICATIONS  (PRN):  ALBUTerol    90 MICROgram(s) HFA Inhaler 2 Puff(s) Inhalation every 6 hours PRN Shortness of Breath and/or Wheezing      __________________________________________________  REVIEW OF SYSTEMS:    CONSTITUTIONAL: No fever,   EYES: no acute visual disturbances  NECK: No pain or stiffness  RESPIRATORY: No cough; No shortness of breath  CARDIOVASCULAR: No chest pain, no palpitations  GASTROINTESTINAL: No pain. No nausea or vomiting; No diarrhea   NEUROLOGICAL: No headache or numbness, no tremors  MUSCULOSKELETAL: No joint pain, no muscle pain  GENITOURINARY: no dysuria, no frequency, no hesitancy  PSYCHIATRY: no depression , no anxiety  ALL OTHER  ROS negative        Vital Signs Last 24 Hrs  T(C): 36.5 (23 Sep 2021 13:36), Max: 36.8 (22 Sep 2021 21:19)  T(F): 97.7 (23 Sep 2021 13:36), Max: 98.2 (22 Sep 2021 21:19)  HR: 80 (23 Sep 2021 13:36) (73 - 92)  BP: 159/70 (23 Sep 2021 13:36) (136/61 - 177/88)  BP(mean): 83 (23 Sep 2021 10:53) (80 - 83)  RR: 18 (23 Sep 2021 13:36) (18 - 18)  SpO2: 93% (23 Sep 2021 13:36) (91% - 95%)    ________________________________________________  PHYSICAL EXAM:  GENERAL: NAD  HEENT: Normocephalic;  conjunctivae and sclerae clear; moist mucous membranes;   NECK : supple  CHEST/LUNG: Clear to auscultation bilaterally with good air entry   HEART: S1 S2  regular; no murmurs, gallops or rubs  ABDOMEN: Soft, Nontender, Nondistended; Bowel sounds present  EXTREMITIES: no cyanosis; no edema; no calf tenderness  SKIN: warm and dry; no rash  NERVOUS SYSTEM:  Awake and alert; Oriented  to self and place, periodically confused.   Psych : combative behavior on exam.   _________________________________________________  LABS:                        10.4   7.37  )-----------( 301      ( 23 Sep 2021 07:50 )             30.8     09-23    137  |  98  |  11  ----------------------------<  97  3.7   |  29  |  0.93    Ca    10.6<H>      23 Sep 2021 14:54    TPro  8.0  /  Alb  3.0<L>  /  TBili  0.5  /  DBili  x   /  AST  68<H>  /  ALT  21  /  AlkPhos  88  09-23        CAPILLARY BLOOD GLUCOSE      POCT Blood Glucose.: 134 mg/dL (23 Sep 2021 06:11)  POCT Blood Glucose.: 147 mg/dL (22 Sep 2021 23:34)        RADIOLOGY & ADDITIONAL TESTS:    Imaging  Reviewed:  YES  < from: CT Abdomen and Pelvis w/ Oral Cont and w/ IV Cont (09.22.21 @ 10:19) >    EXAM:  CT ABDOMEN AND PELVIS OC IC                            PROCEDURE DATE:  09/22/2021          INTERPRETATION:  CLINICAL INFORMATION: CAT chest demonstrated right hilar mass and probable liver metastases. Evaluate for metastatic disease    COMPARISON: CT chest of 9/20/2021    CONTRAST/COMPLICATIONS:  IV Contrast: Omnipaque 350  90 cc administered   30 cc discarded  Oral Contrast: Omnipaque 300  Complications: None reported at time of study completion    PROCEDURE:  CT of the Abdomen and Pelvis was performed.  Sagittal and coronal reformats were performed.    FINDINGS:  LOWER CHEST: Partially visualized known right hilar/infrahilar mass with atelectatic lung at the right lung base and small right pleural effusion more fully described on chest CT of 9/20/2021. Coronary artery calcification is also noted.    LIVER: Within normal limits. Suspected lesion in the right hepatic lobe on the noncontrast study is not confirmed on this portal venous phase study.  BILE DUCTS: Normal caliber.  GALLBLADDER: Personally calcified gallstone versus gallbladder wall calcification. This may be correlated with ultrasound on a nonemergent basis.  SPLEEN: Within normal limits.  PANCREAS: Within normal limits.  ADRENALS: Within normal limits.  KIDNEYS/URETERS: Within normal limits.    BLADDER: Within normal limits.  REPRODUCTIVE ORGANS: Uterus and adnexa within normal limits. Incidental note is made of left adnexal calcification    BOWEL: No bowel obstruction. Appendix is normal. Changes of gastricbypass  PERITONEUM: No ascites. Right mid abdominal peritoneal nodules are noted measuring up to 1.0 cm suspicious for metastatic peritoneal implants.  VESSELS: Atherosclerotic changes.  RETROPERITONEUM/LYMPH NODES: Upper abdominal lymph nodes at thelevel of the celiac axis and superior mesenteric artery are seen measuring up to 8 mm in short axis.  ABDOMINAL WALL: Within normal limits.  BONES: Degenerative changes. Pedicle screws are seen on the left at the L4 and L5 levels with artificial discat L4-L5 and mild grade 1 anterolisthesis of L4 on L5. Mild superior T12 compression fracture    IMPRESSION:  Liver lesions suspected on the noncontrast study not confirmed on this portal venous phase study. Consider arterial phase imaging if needed for patient's diagnosis.  Group of peritoneal nodules in the right mid abdomen may represent metastatic peritoneal implants.  Calcified gallstone versus gallbladder wall calcification which may be further evaluated with ultrasound on a nonemergent basis  Evidence of gastric bypass    --- End of Report ---            LAVONNE CHE MD; Attending Radiologist  This document has been electronically signed. Sep 22 2021  2:03PM    < end of copied text >  < from: CT Chest No Cont (09.20.21 @ 22:05) >    EXAM:  CT CHEST                            PROCEDURE DATE:  09/20/2021          INTERPRETATION:  CLINICAL INFORMATION: Pleural effusion.    COMPARISON: None.    CONTRAST/COMPLICATIONS:  IV Contrast: NONE  Oral Contrast: NONE  Complications: None reported at time of study completion    PROCEDURE:  CT of the Chest was performed.  Sagittal and coronal reformats were performed.    FINDINGS:    LUNGS AND AIRWAYS: Central mass encasing the right hilum, measuring at least 5.4 x 5.2 cm. Postobstructiveatelectasis in the right middle and lower lobes. Patchy consolidation in the right upper lobe, possibly pneumonia.  PLEURA: Small right pleural effusion.  MEDIASTINUM AND DESIREE: Mediastinal and right hilar adenopathy. For example:  *  Lower right paratracheal node, measuring 1.9 x 1.8 cm  *  Subcarinal node (series 3, image 65), measuring 2.0 x 1.5 cm  VESSELS: Atherosclerotic calcifications.  HEART: Heart size is normal. No pericardial effusion.  CHEST WALL AND LOWER NECK: Within normal limits.  VISUALIZED UPPER ABDOMEN: Peripherally calcified gallstone or gallbladder wall. Multiple indeterminate liver lesions, concerning for metastases. For example:  *  Segment 6 (series 3, image 135), measuring 1.7 x 1.5 cm  *  Segment 4A (series 3, image 104), measuring 2.2 x 2.2 cm  BONES: Mild compression of the T12 superior endplate. Degenerative changes. ACDF hardware.    IMPRESSION:  Central mass encasing the right hilum with postobstructive atelectasis in the right middle and lower lobes.    Mediastinal and right hilar adenopathy.    Probable liver metastases.    Patchy consolidation in the right upper lobe, possibly pneumonia.    --- End of Report ---            GERARDO CLAYTON MD; Attending Radiologist  This document has been electronically signed. Sep 21 2021  9:16AM    < end of copied text >    Consultant(s) Notes Reviewed:   YES      Plan of care was discussed with patient and /or primary care giver; all questions and concerns were addressed

## 2021-09-23 NOTE — PROGRESS NOTE ADULT - PROBLEM SELECTOR PLAN 3
UA positive, Currently patient denies painful or burning sensation on urination.   -Rocephin IV  -UCx shows Ecoli (10,000-49,000CFU/ml).   -urine Legionella negative  - pt increasing confusion, paranoia, f/u MRI brain  -supportive care

## 2021-09-23 NOTE — PHYSICAL THERAPY INITIAL EVALUATION ADULT - GENERAL OBSERVATIONS, REHAB EVAL
Pt. received supine in bed, NAD, cooperative and motivated during eval.  Pt. A&O x 2 and has periods of confusion. Pt. however able to follow verbal commands.

## 2021-09-23 NOTE — PROGRESS NOTE ADULT - SUBJECTIVE AND OBJECTIVE BOX
Patient is a 71y old  Female who presents with a chief complaint of Pneumonia (22 Sep 2021 18:58)      SUBJECTIVE / OVERNIGHT EVENTS: events noted. Pt states she thought she was "dancing on the roof last night". Today pt oriented to person/place/time. She does admit to hx of anxiety for which she takes lorazapam prn. Noted hypercalcemia, IVF given, may need bisphosphonate    MEDICATIONS  (STANDING):  amLODIPine   Tablet 5 milliGRAM(s) Oral daily  atorvastatin 20 milliGRAM(s) Oral at bedtime  azithromycin  IVPB 500 milliGRAM(s) IV Intermittent every 24 hours  cefTRIAXone   IVPB 1000 milliGRAM(s) IV Intermittent every 24 hours  chlorhexidine 2% Cloths 1 Application(s) Topical daily  influenza   Vaccine 0.5 milliLiter(s) IntraMuscular once  metoprolol tartrate 100 milliGRAM(s) Oral two times a day  mupirocin 2% Ointment 1 Application(s) Topical two times a day  potassium chloride    Tablet ER 40 milliEquivalent(s) Oral every 4 hours  sodium chloride 0.9%. 1000 milliLiter(s) (60 mL/Hr) IV Continuous <Continuous>  sodium chloride 0.9%. 1000 milliLiter(s) (125 mL/Hr) IV Continuous <Continuous>    MEDICATIONS  (PRN):  ALBUTerol    90 MICROgram(s) HFA Inhaler 2 Puff(s) Inhalation every 6 hours PRN Shortness of Breath and/or Wheezing      Vital Signs Last 24 Hrs  T(C): 36.4 (23 Sep 2021 04:46), Max: 36.9 (22 Sep 2021 14:36)  T(F): 97.6 (23 Sep 2021 04:46), Max: 98.5 (22 Sep 2021 14:36)  HR: 80 (23 Sep 2021 10:53) (73 - 92)  BP: 139/64 (23 Sep 2021 10:53) (107/82 - 177/88)  BP(mean): 83 (23 Sep 2021 10:53) (80 - 83)  RR: 18 (23 Sep 2021 04:46) (18 - 18)  SpO2: 93% (23 Sep 2021 10:53) (91% - 95%)      LABS:                        10.4   7.37  )-----------( 301      ( 23 Sep 2021 07:50 )             30.8     09-23    139  |  97  |  11  ----------------------------<  116<H>  3.1<L>   |  29  |  0.88    Ca    10.6<H>      23 Sep 2021 07:50                Culture - Urine (collected 21 Sep 2021 14:05)  Source: Clean Catch Clean Catch (Midstream)  Preliminary Report (23 Sep 2021 02:31):    10,000 - 49,000 CFU/mL Escherichia coli    Culture - Blood (collected 20 Sep 2021 18:38)  Source: .Blood Blood-Peripheral  Preliminary Report (21 Sep 2021 19:02):    No growth to date.    Culture - Blood (collected 20 Sep 2021 18:38)  Source: .Blood Blood-Peripheral  Preliminary Report (21 Sep 2021 19:02):    No growth to date.      COVID-19 PCR: NotDetec (20 Sep 2021 11:38)           Patient is a 71y old  Female who presents with a chief complaint of Pneumonia (22 Sep 2021 18:58)      SUBJECTIVE / OVERNIGHT EVENTS: events noted. Pt states she thought she was "dancing on the roof last night". Today pt oriented to person/place/time. She does admit to hx of anxiety for which she takes lorazapam prn. Noted hypercalcemia, IVF given, may need bisphosphonate. Upon rounding in the afternoon the pt was confused and stating she needs help and they are trying to check her criminal background.    MEDICATIONS  (STANDING):  amLODIPine   Tablet 5 milliGRAM(s) Oral daily  atorvastatin 20 milliGRAM(s) Oral at bedtime  azithromycin  IVPB 500 milliGRAM(s) IV Intermittent every 24 hours  cefTRIAXone   IVPB 1000 milliGRAM(s) IV Intermittent every 24 hours  chlorhexidine 2% Cloths 1 Application(s) Topical daily  influenza   Vaccine 0.5 milliLiter(s) IntraMuscular once  metoprolol tartrate 100 milliGRAM(s) Oral two times a day  mupirocin 2% Ointment 1 Application(s) Topical two times a day  potassium chloride    Tablet ER 40 milliEquivalent(s) Oral every 4 hours  sodium chloride 0.9%. 1000 milliLiter(s) (60 mL/Hr) IV Continuous <Continuous>  sodium chloride 0.9%. 1000 milliLiter(s) (125 mL/Hr) IV Continuous <Continuous>    MEDICATIONS  (PRN):  ALBUTerol    90 MICROgram(s) HFA Inhaler 2 Puff(s) Inhalation every 6 hours PRN Shortness of Breath and/or Wheezing      Vital Signs Last 24 Hrs  T(C): 36.4 (23 Sep 2021 04:46), Max: 36.9 (22 Sep 2021 14:36)  T(F): 97.6 (23 Sep 2021 04:46), Max: 98.5 (22 Sep 2021 14:36)  HR: 80 (23 Sep 2021 10:53) (73 - 92)  BP: 139/64 (23 Sep 2021 10:53) (107/82 - 177/88)  BP(mean): 83 (23 Sep 2021 10:53) (80 - 83)  RR: 18 (23 Sep 2021 04:46) (18 - 18)  SpO2: 93% (23 Sep 2021 10:53) (91% - 95%)    GEN: NAD; unable to assess orientation, but was oriented earlier in the morning and now paranoid and stating "they are looking into my criminal background"  LUNGS: decreased BS on right, Left CTA  HEART: S1 S2  ABDOMEN: soft, non-tender, non-distended, + BS  EXTREMITIES: no edema  NERVOUS SYSTEM:  Awake and alert    LABS:                        10.4   7.37  )-----------( 301      ( 23 Sep 2021 07:50 )             30.8     09-23    139  |  97  |  11  ----------------------------<  116<H>  3.1<L>   |  29  |  0.88    Ca    10.6<H>      23 Sep 2021 07:50        Culture - Urine (collected 21 Sep 2021 14:05)  Source: Clean Catch Clean Catch (Midstream)  Preliminary Report (23 Sep 2021 02:31):    10,000 - 49,000 CFU/mL Escherichia coli    Culture - Blood (collected 20 Sep 2021 18:38)  Source: .Blood Blood-Peripheral  Preliminary Report (21 Sep 2021 19:02):    No growth to date.    Culture - Blood (collected 20 Sep 2021 18:38)  Source: .Blood Blood-Peripheral  Preliminary Report (21 Sep 2021 19:02):    No growth to date.      COVID-19 PCR: NotDetec (20 Sep 2021 11:38)

## 2021-09-23 NOTE — CONSULT NOTE ADULT - CONVERSATION DETAILS
Palliative care team met with the pt at the bedside, AOx1, confused minimal insight as to clinical condition at the time of exam.  Later met with pt and her spouse Marcos, discussed her clinical condition and findings on CT. Marcos shared he has noticed his wife has been intermittently confused since being admitted.   Marcos stated do biopsy ASAP, they want pt to be treated.    Reviewed MOLST; Marcos stated he wants all medical interventions to keep his wife alive, including CPR and intubation with long term mechanical ventilation.    All questions answered.  Support provided. Palliative care team met with the pt at the bedside, AOx1, confused minimal insight as to clinical condition at the time of exam.  Later met with pt and her spouse Marcos, discussed her clinical condition and findings on CT. Marcos shared he has noticed his wife has been intermittently confused since being admitted.   Marcos stated do biopsy ASAP, they want pt to be treated.    Reviewed MOLST; Marcos stated he wants all medical interventions to keep his wife alive, including CPR and intubation with long term mechanical ventilation.    All questions answered.  Support provided.    Plan discussed with Primary team and RN

## 2021-09-23 NOTE — PROGRESS NOTE ADULT - SUBJECTIVE AND OBJECTIVE BOX
Time of Visit:  Patient seen and examined.     MEDICATIONS  (STANDING):  amLODIPine   Tablet 5 milliGRAM(s) Oral daily  atorvastatin 20 milliGRAM(s) Oral at bedtime  azithromycin  IVPB 500 milliGRAM(s) IV Intermittent every 24 hours  cefTRIAXone   IVPB 1000 milliGRAM(s) IV Intermittent every 24 hours  chlorhexidine 2% Cloths 1 Application(s) Topical daily  influenza   Vaccine 0.5 milliLiter(s) IntraMuscular once  metoprolol tartrate 100 milliGRAM(s) Oral two times a day  mupirocin 2% Ointment 1 Application(s) Topical two times a day  sodium chloride 0.9%. 1000 milliLiter(s) (60 mL/Hr) IV Continuous <Continuous>  sodium chloride 0.9%. 1000 milliLiter(s) (125 mL/Hr) IV Continuous <Continuous>      MEDICATIONS  (PRN):  ALBUTerol    90 MICROgram(s) HFA Inhaler 2 Puff(s) Inhalation every 6 hours PRN Shortness of Breath and/or Wheezing       Medications up to date at time of exam.      PHYSICAL EXAMINATION:  Patient has no new complaints.  GENERAL: The patient is a well-developed, well-nourished, in no apparent distress.     Vital Signs Last 24 Hrs  T(C): 36.5 (23 Sep 2021 13:36), Max: 36.8 (22 Sep 2021 21:19)  T(F): 97.7 (23 Sep 2021 13:36), Max: 98.2 (22 Sep 2021 21:19)  HR: 97 (23 Sep 2021 17:37) (73 - 97)  BP: 142/80 (23 Sep 2021 17:37) (136/61 - 177/88)  BP(mean): 83 (23 Sep 2021 10:53) (80 - 83)  RR: 14 (23 Sep 2021 17:37) (14 - 18)  SpO2: 94% (23 Sep 2021 17:37) (91% - 95%)   (if applicable)    Chest Tube (if applicable)    HEENT: Head is normocephalic and atraumatic. Extraocular muscles are intact. Mucous membranes are moist.     NECK: Supple, no palpable adenopathy.    LUNGS: Clear to auscultation, no wheezing, rales, or rhonchi.    HEART: Regular rate and rhythm without murmur.    ABDOMEN: Soft, nontender, and nondistended.  No hepatosplenomegaly is noted.    : No painful voiding, no pelvic pain    EXTREMITIES: Without any cyanosis, clubbing, rash, lesions or edema.    NEUROLOGIC: Awake, alert, but episodes of confusion     SKIN: Warm, dry, good turgor.      LABS:                        10.4   7.37  )-----------( 301      ( 23 Sep 2021 07:50 )             30.8     09-23    137  |  98  |  11  ----------------------------<  97  3.7   |  29  |  0.93    Ca    10.6<H>      23 Sep 2021 14:54    TPro  8.0  /  Alb  3.0<L>  /  TBili  0.5  /  DBili  x   /  AST  68<H>  /  ALT  21  /  AlkPhos  88  09-23                Serum Pro-Brain Natriuretic Peptide: 779 pg/mL (09-21-21 @ 07:37)          MICROBIOLOGY: (if applicable)    RADIOLOGY & ADDITIONAL STUDIES:  EKG:   CXR:  ECHO:    IMPRESSION: 71y Female PAST MEDICAL & SURGICAL HISTORY:  Lumbar stenosis    H/O: HTN (hypertension)    Hypercholesterolemia    Bleeding ulcer  stomach ulcer 2011    Anxiety    Spinal stenosis in cervical region    Acute hepatitis C virus infection without hepatic coma  treated 2015- Resolved    Balance disorder    Coronary artery disease involving native coronary artery of native heart without angina pectoris    HTN (hypertension)    HLD (hyperlipidemia)    H/O colonoscopy    Bleeding ulcer  stomach surgery for bleeding ulcer    Fracture  ORIF Left wrist  1/17/13    History of lumbar surgery  2013    Chronic UTI  Pt reports presently on 2nd dose of antibiotics 7/5/18 10 days, Dr Herman aware, pt going for m/eval 7/13/18.    H/O cardiac catheterization  2013, no intervention needed, treated medically     p/w           IMP:  This is a 71 yr old white woman , former smoker with  HTN  HLD BIBEMS for hypoxia and hypotension at outpatient clinic.  Hypoxia due to post obstructive pna with right lung mass with meds.. Pat is having episodes of confusion during conversation       ASSESSMENT     - Acute Hypoxic Resp Failure   - Right hilar lung mass  - Mediastinal adenopathy   - Post obstructive PNA   - BRENDA  - Former Smoker       Sugg;  -MRI of brain   -correct electrolyte   -pat will need tissue bx   -amicable for bronch with bx / EBUS .. now pat is deferring any decision regarding medical care for another day.   -continue antibx   -f/u cultures   -O2 supp to maintain sat >90%  -albuterol inhaler   -dvt/gi prophy   -i informed pat that she probable has malignancy with mets    -pat stated that she is tired and wish to talk more tomorr. She seems confused and unable to make decisions

## 2021-09-23 NOTE — PROGRESS NOTE ADULT - PROBLEM SELECTOR PLAN 1
Patient presented with hypoxia likely due to PNA. hypoxia improving, room air SaO2 95%.   -CT of chest showed RUL PNA  -Azithromycin   IV  -BC prelim negative   -supportive care  -trend cbc  -OOB daily

## 2021-09-23 NOTE — PROGRESS NOTE ADULT - PROBLEM SELECTOR PLAN 6
Corrected calcium level 11.4. Likely due to metastasis  -Ca 10.6 today  -c/w IVF   -monitor SCa level

## 2021-09-23 NOTE — CONSULT NOTE ADULT - SUBJECTIVE AND OBJECTIVE BOX
[facility] Palliative Consult Service:  Dr. Suni Lindquist: cell ([phone])  Dr. Berenice Garcia: cell ([phone])   Liudmila Polk NP: cell ([phone])  Domitila Enciso NP: cell ([phone])   Rich Adams LSW: cell ([phone])     HPI:  72 y/o F hx of HTN and HLD BIBEMS for hypoxia and hypotension at outpatient clinic. Patient states that for the past weak she has been feeling nauseous, fatigue and decreased appetite. She had 1episode of non bloody vomit and a couple of episodes of non bloody diarrhea early last week which has also  resolved. She states she developed a cough last week as well which is associated with phlegm but denies fevers, chills, shortness of breath or night sweats. Denies chest pain, palpitations, diarrhea, constipation, headache or any other complaints     Interval hx: Pt AOx1, mentation waxes and wanes.  Incidental finding on CT CT shows: 5.4x5.2cm central mass encasing Right hilum. mediastinal/hilar adenopathy and mult indeterminate liver lesions, largest 2.2cm        PAST MEDICAL & SURGICAL HISTORY:  Lumbar stenosis    H/O: HTN (hypertension)    Hypercholesterolemia    Bleeding ulcer  stomach ulcer     Anxiety    Spinal stenosis in cervical region    Acute hepatitis C virus infection without hepatic coma  treated - Resolved    Balance disorder    Coronary artery disease involving native coronary artery of native heart without angina pectoris    HTN (hypertension)    HLD (hyperlipidemia)    H/O colonoscopy    Bleeding ulcer  stomach surgery for bleeding ulcer    Fracture  ORIF Left wrist  13    History of lumbar surgery      Chronic UTI  Pt reports presently on 2nd dose of antibiotics 18 10 days, Dr Herman aware, pt going for m/eval 18.    H/O cardiac catheterization  , no intervention needed, treated medically        SOCIAL HISTORY:    Admitted from:  home    Pt is  lives with her  Marcos  Substance abuse history: none             Tobacco hx:  25 PPD history                Alcohol hx: stopped 22 year ago             Home Opioid hx: none  [employer]:    [employer]                                Preferred Language: English    Marcos Gray  Phone# [phone]      FAMILY HISTORY:  Pt mother  had cancer     Baseline ADLs (prior to admission): independent    Allergies    No Known Allergies    Intolerances      Present Symptoms:   Pain: denies  Fatigue: yes  Nausea: denies  Lack of Appetite: denies  SOB: denies  Depression: denies  Anxiety: denies  Review of Systems: [All others negative     MEDICATIONS  (STANDING):  amLODIPine   Tablet 5 milliGRAM(s) Oral daily  atorvastatin 20 milliGRAM(s) Oral at bedtime  azithromycin  IVPB 500 milliGRAM(s) IV Intermittent every 24 hours  cefTRIAXone   IVPB 1000 milliGRAM(s) IV Intermittent every 24 hours  chlorhexidine 2% Cloths 1 Application(s) Topical daily  influenza   Vaccine 0.5 milliLiter(s) IntraMuscular once  metoprolol tartrate 100 milliGRAM(s) Oral two times a day  mupirocin 2% Ointment 1 Application(s) Topical two times a day  potassium chloride    Tablet ER 40 milliEquivalent(s) Oral every 4 hours  sodium chloride 0.9%. 1000 milliLiter(s) (60 mL/Hr) IV Continuous <Continuous>  sodium chloride 0.9%. 1000 milliLiter(s) (125 mL/Hr) IV Continuous <Continuous>    MEDICATIONS  (PRN):  ALBUTerol    90 MICROgram(s) HFA Inhaler 2 Puff(s) Inhalation every 6 hours PRN Shortness of Breath and/or Wheezing      PHYSICAL EXAM:  Vital Signs Last 24 Hrs  T(C): 36.4 (23 Sep 2021 04:46), Max: 36.9 (22 Sep 2021 14:36)  T(F): 97.6 (23 Sep 2021 04:46), Max: 98.5 (22 Sep 2021 14:36)  HR: 80 (23 Sep 2021 10:53) (73 - 92)  BP: 139/64 (23 Sep 2021 10:53) (107/82 - 177/88)  BP(mean): 83 (23 Sep 2021 10:53) (80 - 83)  RR: 18 (23 Sep 2021 04:46) (18 - 18)  SpO2: 93% (23 Sep 2021 10:53) (91% - 95%)    General: Obese elderly woman, AOx1. confused.  NAD    Palliative Performance Scale/Karnofsky Score: 40  ECOG Performance: 3    HEENT: atraumatic, moist mucous membrane  Lungs: decreased b/l unlabored on RA  CV: RRR, S1S2  GI: soft non distended, non tender on palpation   : urinating  Musculoskeletal: weakness, no edema  Skin: no abnormal skin lesions, poor skin turgor  Neuro: Aox1, requires reorientation  Oral intake ability: moderate po intake    LABS:                        10.4   7.37  )-----------( 301      ( 23 Sep 2021 07:50 )             30.8         139  |  97  |  11  ----------------------------<  116<H>  3.1<L>   |  29  |  0.88    Ca    10.6<H>      23 Sep 2021 07:50        < from: CT Abdomen and Pelvis w/ Oral Cont and w/ IV Cont (21 @ 10:19) >    EXAM:  CT ABDOMEN AND PELVIS OC IC                            PROCEDURE DATE:  2021          INTERPRETATION:  CLINICAL INFORMATION: CAT chest demonstrated right hilar mass and probable liver metastases. Evaluate for metastatic disease    COMPARISON: CT chest of 2021    CONTRAST/COMPLICATIONS:  IV Contrast: Omnipaque 350  90 cc administered   30 cc discarded  Oral Contrast: Omnipaque 300  Complications: None reported at time of study completion    PROCEDURE:  CT of the Abdomen and Pelvis was performed.  Sagittal and coronal reformats were performed.    FINDINGS:  LOWER CHEST: Partially visualized known right hilar/infrahilar mass with atelectatic lung at the right lung base and small right pleural effusion more fully described on chest CT of 2021. Coronary artery calcification is also noted.    LIVER: Within normal limits. Suspected lesion in the right hepatic lobe on the noncontrast study is not confirmed on this portal venous phase study.  BILE DUCTS: Normal caliber.  GALLBLADDER: Personally calcified gallstone versus gallbladder wall calcification. This may be correlated with ultrasound on a nonemergent basis.  SPLEEN: Within normal limits.  PANCREAS: Within normal limits.  ADRENALS: Within normal limits.  KIDNEYS/URETERS: Within normal limits.    BLADDER: Within normal limits.  REPRODUCTIVE ORGANS: Uterus and adnexa within normal limits. Incidental note is made of left adnexal calcification    BOWEL: No bowel obstruction. Appendix is normal. Changes of gastricbypass  PERITONEUM: No ascites. Right mid abdominal peritoneal nodules are noted measuring up to 1.0 cm suspicious for metastatic peritoneal implants.  VESSELS: Atherosclerotic changes.  RETROPERITONEUM/LYMPH NODES: Upper abdominal lymph nodes at thelevel of the celiac axis and superior mesenteric artery are seen measuring up to 8 mm in short axis.  ABDOMINAL WALL: Within normal limits.  BONES: Degenerative changes. Pedicle screws are seen on the left at the L4 and L5 levels with artificial discat L4-L5 and mild grade 1 anterolisthesis of L4 on L5. Mild superior T12 compression fracture    IMPRESSION:  Liver lesions suspected on the noncontrast study not confirmed on this portal venous phase study. Consider arterial phase imaging if needed for patient's diagnosis.  Group of peritoneal nodules in the right mid abdomen may represent metastatic peritoneal implants.  Calcified gallstone versus gallbladder wall calcification which may be further evaluated with ultrasound on a nonemergent basis  Evidence of gastric bypass    < end of copied text >    RADIOLOGY & ADDITIONAL STUDIES: reviewed  ADVANCED DIRECTIVES: CPR with intubation and long term mechanical ventilation

## 2021-09-23 NOTE — PROGRESS NOTE ADULT - PROBLEM SELECTOR PLAN 5
rule out  Incidental finding on CT scan showed liver lesions and R hilar lung `mass  see plan above-lung mass rule out  Incidental finding on CT scan showed liver lesions and R hilar lung `mass  see plan above-lung mass  f/u IR for bx of peritoneal nodules  pulm rec-Bronch EBUS with biopsy

## 2021-09-23 NOTE — CONSULT NOTE ADULT - PROBLEM SELECTOR RECOMMENDATION 2
Pt was ambulatory with walker prior to admission.  Currently chair bound.   High risk for skin failure.  Skin care per protocol  Frequent positioning.    Pt eval

## 2021-09-23 NOTE — PROGRESS NOTE ADULT - ASSESSMENT
72 y/o F hx of HTN and HLD BIBEMS for hypoxia and hypotension at outpatient clinic.   Patient was admitted under medicine for sepsis secondary to RUL PNA. CT showed RUL PNA. UA positive, UCx E coli (10-49K).  Patient is on azithromycin and Rocephin. Bcx no growth.   Incidental finding on CT scan of R hilar lung mass and Mediastinal  adenopathy, Probable liver metastases. Pulmonary dr. Lombardi and Oncology [facility]- Dr. Mathews following.   CT abdomen and pelvis with contrast and resulted Liver lesions suspected on the noncontrast study not confirmed on this portal venous phase study. Consider arterial phase imaging if needed for patient's diagnosis.  Group of peritoneal nodules in the right mid abdomen may represent metastatic peritoneal implants. Calcified gallstone versus gallbladder wall calcification which may be further evaluated with ultrasound on a nonemergent basis  Evidence of gastric bypass.   Pulmonary /[facility] onc recommended EBUS with biopsy, and IR bx for peritoneal nodules. IR attending dr. Rossi consulted.   Surgery Dr Tomas Arnett recommended RUQ US for calcified GB. pt refused.   Patient appears confused on exam, ordered MRI brain.   PT evaluated and recommended JANETTE.     Updated condition and discussed GOC, diagnostic test/treatment plan discussed with spouse Marcos at bedside. wants full code, aggressive treatment.

## 2021-09-23 NOTE — PROGRESS NOTE ADULT - ASSESSMENT
complete note to follow      Assessment and Plan:   · Assessment		    Problem# R/O Malignancy  p/w hypoxia, N/V and weakness  admits new onset cough and SOB, denies wt loss  former smoker, quit 22 years ago, approx 25 pack year hx  no hx malignancy  FamHx includes mother and sister with unknown cancer, both   AST slightly elevated, ALT/ALKPhos wnl  non-contrast CT shows: 5.4x5.2cm central mass encasing Right hilum. mediastinal/hilar adenopathy and mult indeterminate liver lesions, largest 2.2cm  reviewed findings with pt and discussed likely lung cancer with met disease to liver; however need a biopsy to dx and then formulate prognosis and tx plan  CEA is normal, likely non-expressor if lung CA or other pathology  CT A/P shows Right mid abdominal peritoneal nodules are noted measuring up to 1.0 cm suspicious for metastatic peritoneal implants. Upper abdominal lymph nodes at thelevel of the celiac axis and superior mesenteric artery are seen measuring up to 8 mm in short axis. Liver lesions suspected on the noncontrast study not confirmed on this portal venous phase study.   Rec's:  -IR consult for possible peritoneal Bx  -will need Bronch/EBUS when clinically stable inpt vs outpt  -AMS, rec brain MRI, check electrolytes, ? delirium, CA -12 on , repeat pending albumin level  -IVF given, may need bisphosphonate  -Pall Care consult  -PET/CT as outpt  -PT eval when clinically stable  -Await results for further recommendations    Problem# Normocytic Anemia  Hgb=on admit 10.8 and today 10.4 stable  pt thinks she has a hx of anemia, but unsure  Cr nl  Rec's:  -CBC daily  -retic 2.1%, Iron Sat 21%, check B12/folate/TSH/SPEP  -Hepatitis C reactive, RNA pending, Hepatology consult  -Transfuse PRBC if Hgb <7.0 or if symptomatic  -will reach out to pt's PCP for baseline CBC and other hx    Thank you for the referral. Will continue to monitor the patient.  Please call with any questions [phone]  Above reviewed with Attending Dr. Mathews         Assessment and Plan:   · Assessment		    Problem# R/O Malignancy  p/w hypoxia, N/V and weakness  admits new onset cough and SOB, denies wt loss  former smoker, quit 22 years ago, approx 25 pack year hx  no hx malignancy  FamHx includes mother and sister with unknown cancer, both   AST slightly elevated, ALT/ALKPhos wnl  non-contrast CT shows: 5.4x5.2cm central mass encasing Right hilum. mediastinal/hilar adenopathy and mult indeterminate liver lesions, largest 2.2cm  reviewed findings with pt and discussed likely lung cancer with met disease to liver; however need a biopsy to dx and then formulate prognosis and tx plan  CEA is normal, likely non-expressor if lung CA or other pathology  CT A/P shows Right mid abdominal peritoneal nodules are noted measuring up to 1.0 cm suspicious for metastatic peritoneal implants. Upper abdominal lymph nodes at thelevel of the celiac axis and superior mesenteric artery are seen measuring up to 8 mm in short axis. Liver lesions suspected on the noncontrast study not confirmed on this portal venous phase study.   Rec's:  -IR consult for possible peritoneal Bx  -will need Bronch/EBUS when clinically stable inpt vs outpt  -AMS, rec brain MRI, check electrolytes, ? delirium, CA -12 on  and today 11.4, repeat pending albumin level  -IVF given, may need bisphosphonate  -Pall Care consult  -PET/CT as outpt  -PT eval when clinically stable  -Await results for further recommendations    Problem# Normocytic Anemia  Hgb=on admit 10.8 and today 10.4 stable  pt thinks she has a hx of anemia, but unsure  Cr nl  Rec's:  -CBC daily  -retic 2.1%, Iron Sat 21%, check B12/folate/TSH/SPEP  -Hepatitis C reactive, RNA pending, Hepatology consult  -Transfuse PRBC if Hgb <7.0 or if symptomatic  -will reach out to pt's PCP for baseline CBC and other hx    Thank you for the referral. Will continue to monitor the patient.  Please call with any questions [phone]  Above reviewed with Attending Dr. Mathews

## 2021-09-23 NOTE — GOALS OF CARE CONVERSATION - ADVANCED CARE PLANNING - CONVERSATION DETAILS
Palliative care team met with pt to provide palliative assessment. Patient was not oriented.     Palliative care team met with pt and her  of 27 years Marcos at bedside to discuss pt's current condition and goals of care. Pt's  reflected on the pt's recent decline, citing her recent confusion and difficulty walking. Pt's  expressed interest in the pt doing everything she can to keep her alive and well. Pt's  stated that he was interested in having the pt do biopsy and was hopeful around the results. Palliative care team reviewed the pt's MOLST, and pt's  stated that he wanted the pt to remain a full code at this time. Pt's  agreed to reach out as needed.    Patrick Adams  [phone]

## 2021-09-23 NOTE — CONSULT NOTE ADULT - PROBLEM SELECTOR RECOMMENDATION 9
p/w with cough and SOB.  Hypoxia due to post obstructive pna with right lung mass.  Pulmonary and hem/onc following.    Pt and spouse wants to have biopsy and treatment.  Pt is a FULL CODE       CT shows: 5.4x5.2cm central mass encasing Right hilum. mediastinal/hilar adenopathy and mult indeterminate liver lesions, largest 2.2cm  CT A/P shows Right mid abdominal peritoneal nodules are noted measuring up to 1.0 cm suspicious for metastatic peritoneal implants. Upper abdominal lymph nodes at thelevel of the celiac axis and superior mesenteric artery are seen measuring up to 8 mm in short axis. Liver lesions suspected on the noncontrast study not confirmed on this portal venous phase study.

## 2021-09-23 NOTE — PROGRESS NOTE ADULT - PROBLEM SELECTOR PLAN 4
Incidental finding on CT scan showed liver lesions and R hilar lung `mass  -CEA 1.9  - : 7  -Oncology Dr. Mathews following to r/o malignancy   -CT of pelvis and abdomin- Group of peritoneal nodules in the right mid abdomen may represent metastatic peritoneal implants.  -Dr. Arnett consulted-rec appreciated  -f/u RUQ US -pt refused today  -Palliative consulted  -IR bx for peritoneal nodules (spoke with dr. Fam from IR). Held heparin in case bx tomorrow. ordered Coag study, CBC, CMP in AM, will need stat COVID when it it planned. Incidental finding on CT scan showed liver lesions and R hilar lung `mass  -CEA 1.9  - : 7  -Oncology Dr. Mathews following to r/o malignancy   -CT of pelvis and abdomin- Group of peritoneal nodules in the right mid abdomen may represent metastatic peritoneal implants.  -pulm dr. Lombardi-rec. EBUS with biopsy  -surgery Dr. Arnett consulted-rec appreciated  -f/u RUQ US -pt refused today  -Palliative consulted  -IR bx for peritoneal nodules (spoke with dr. Fam from IR). Held heparin in case bx tomorrow. ordered Coag study, CBC, CMP in AM, will need stat COVID when it it planned.

## 2021-09-24 DIAGNOSIS — R19.00 INTRA-ABDOMINAL AND PELVIC SWELLING, MASS AND LUMP, UNSPECIFIED SITE: ICD-10-CM

## 2021-09-24 DIAGNOSIS — R41.0 DISORIENTATION, UNSPECIFIED: ICD-10-CM

## 2021-09-24 LAB
ALBUMIN SERPL ELPH-MCNC: 2.9 G/DL — LOW (ref 3.5–5)
ALP SERPL-CCNC: 98 U/L — SIGNIFICANT CHANGE UP (ref 40–120)
ALT FLD-CCNC: 28 U/L DA — SIGNIFICANT CHANGE UP (ref 10–60)
ANION GAP SERPL CALC-SCNC: 10 MMOL/L — SIGNIFICANT CHANGE UP (ref 5–17)
APTT BLD: 26.6 SEC — LOW (ref 27.5–35.5)
AST SERPL-CCNC: 86 U/L — HIGH (ref 10–40)
BILIRUB SERPL-MCNC: 0.5 MG/DL — SIGNIFICANT CHANGE UP (ref 0.2–1.2)
BUN SERPL-MCNC: 10 MG/DL — SIGNIFICANT CHANGE UP (ref 7–18)
CALCIUM SERPL-MCNC: 11.3 MG/DL — HIGH (ref 8.4–10.5)
CHLORIDE SERPL-SCNC: 100 MMOL/L — SIGNIFICANT CHANGE UP (ref 96–108)
CO2 SERPL-SCNC: 28 MMOL/L — SIGNIFICANT CHANGE UP (ref 22–31)
CREAT SERPL-MCNC: 1.05 MG/DL — SIGNIFICANT CHANGE UP (ref 0.5–1.3)
GLUCOSE BLDC GLUCOMTR-MCNC: 121 MG/DL — HIGH (ref 70–99)
GLUCOSE BLDC GLUCOMTR-MCNC: 123 MG/DL — HIGH (ref 70–99)
GLUCOSE SERPL-MCNC: 120 MG/DL — HIGH (ref 70–99)
HCT VFR BLD CALC: 31.9 % — LOW (ref 34.5–45)
HCV RNA FLD QL NAA+PROBE: SIGNIFICANT CHANGE UP
HGB BLD-MCNC: 10.8 G/DL — LOW (ref 11.5–15.5)
INR BLD: 1.14 RATIO — SIGNIFICANT CHANGE UP (ref 0.88–1.16)
MCHC RBC-ENTMCNC: 30.3 PG — SIGNIFICANT CHANGE UP (ref 27–34)
MCHC RBC-ENTMCNC: 33.9 GM/DL — SIGNIFICANT CHANGE UP (ref 32–36)
MCV RBC AUTO: 89.4 FL — SIGNIFICANT CHANGE UP (ref 80–100)
NRBC # BLD: 0 /100 WBCS — SIGNIFICANT CHANGE UP (ref 0–0)
PLATELET # BLD AUTO: 280 K/UL — SIGNIFICANT CHANGE UP (ref 150–400)
POTASSIUM SERPL-MCNC: 4 MMOL/L — SIGNIFICANT CHANGE UP (ref 3.5–5.3)
POTASSIUM SERPL-SCNC: 4 MMOL/L — SIGNIFICANT CHANGE UP (ref 3.5–5.3)
PROT SERPL-MCNC: 8.1 G/DL — SIGNIFICANT CHANGE UP (ref 6–8.3)
PROTHROM AB SERPL-ACNC: 13.5 SEC — SIGNIFICANT CHANGE UP (ref 10.6–13.6)
RBC # BLD: 3.57 M/UL — LOW (ref 3.8–5.2)
RBC # FLD: 13.2 % — SIGNIFICANT CHANGE UP (ref 10.3–14.5)
SODIUM SERPL-SCNC: 138 MMOL/L — SIGNIFICANT CHANGE UP (ref 135–145)
WBC # BLD: 8.35 K/UL — SIGNIFICANT CHANGE UP (ref 3.8–10.5)
WBC # FLD AUTO: 8.35 K/UL — SIGNIFICANT CHANGE UP (ref 3.8–10.5)

## 2021-09-24 PROCEDURE — 76705 ECHO EXAM OF ABDOMEN: CPT | Mod: 26,RT

## 2021-09-24 RX ORDER — BENAZEPRIL HYDROCHLORIDE 40 MG/1
1 TABLET ORAL
Qty: 0 | Refills: 0 | DISCHARGE

## 2021-09-24 RX ORDER — FOLIC ACID 0.8 MG
1 TABLET ORAL
Qty: 0 | Refills: 0 | DISCHARGE

## 2021-09-24 RX ORDER — SENNA PLUS 8.6 MG/1
2 TABLET ORAL AT BEDTIME
Refills: 0 | Status: DISCONTINUED | OUTPATIENT
Start: 2021-09-24 | End: 2021-09-29

## 2021-09-24 RX ORDER — METOPROLOL TARTRATE 50 MG
1 TABLET ORAL
Qty: 0 | Refills: 0 | DISCHARGE

## 2021-09-24 RX ORDER — SIMVASTATIN 20 MG/1
40 TABLET, FILM COATED ORAL AT BEDTIME
Refills: 0 | Status: DISCONTINUED | OUTPATIENT
Start: 2021-09-24 | End: 2021-10-04

## 2021-09-24 RX ORDER — SIMVASTATIN 20 MG/1
1 TABLET, FILM COATED ORAL
Qty: 0 | Refills: 0 | DISCHARGE

## 2021-09-24 RX ORDER — FOLIC ACID 0.8 MG
1 TABLET ORAL DAILY
Refills: 0 | Status: DISCONTINUED | OUTPATIENT
Start: 2021-09-24 | End: 2021-10-04

## 2021-09-24 RX ORDER — ACETAMINOPHEN 500 MG
650 TABLET ORAL EVERY 6 HOURS
Refills: 0 | Status: DISCONTINUED | OUTPATIENT
Start: 2021-09-24 | End: 2021-10-04

## 2021-09-24 RX ORDER — LISINOPRIL 2.5 MG/1
10 TABLET ORAL EVERY 12 HOURS
Refills: 0 | Status: DISCONTINUED | OUTPATIENT
Start: 2021-09-24 | End: 2021-10-04

## 2021-09-24 RX ORDER — AMLODIPINE BESYLATE 2.5 MG/1
1 TABLET ORAL
Qty: 0 | Refills: 0 | DISCHARGE

## 2021-09-24 RX ORDER — SODIUM CHLORIDE 9 MG/ML
1000 INJECTION INTRAMUSCULAR; INTRAVENOUS; SUBCUTANEOUS
Refills: 0 | Status: DISCONTINUED | OUTPATIENT
Start: 2021-09-24 | End: 2021-09-26

## 2021-09-24 RX ORDER — LISINOPRIL 2.5 MG/1
10 TABLET ORAL EVERY 12 HOURS
Refills: 0 | Status: DISCONTINUED | OUTPATIENT
Start: 2021-09-24 | End: 2021-09-24

## 2021-09-24 RX ORDER — GABAPENTIN 400 MG/1
1 CAPSULE ORAL
Qty: 0 | Refills: 0 | DISCHARGE

## 2021-09-24 RX ADMIN — MUPIROCIN 1 APPLICATION(S): 20 OINTMENT TOPICAL at 17:33

## 2021-09-24 RX ADMIN — SENNA PLUS 2 TABLET(S): 8.6 TABLET ORAL at 21:39

## 2021-09-24 RX ADMIN — Medication 650 MILLIGRAM(S): at 17:33

## 2021-09-24 RX ADMIN — Medication 1 MILLIGRAM(S): at 17:33

## 2021-09-24 RX ADMIN — Medication 650 MILLIGRAM(S): at 18:33

## 2021-09-24 RX ADMIN — SODIUM CHLORIDE 125 MILLILITER(S): 9 INJECTION INTRAMUSCULAR; INTRAVENOUS; SUBCUTANEOUS at 17:33

## 2021-09-24 RX ADMIN — MUPIROCIN 1 APPLICATION(S): 20 OINTMENT TOPICAL at 06:00

## 2021-09-24 RX ADMIN — Medication 100 MILLIGRAM(S): at 17:33

## 2021-09-24 RX ADMIN — SIMVASTATIN 40 MILLIGRAM(S): 20 TABLET, FILM COATED ORAL at 21:41

## 2021-09-24 RX ADMIN — CHLORHEXIDINE GLUCONATE 1 APPLICATION(S): 213 SOLUTION TOPICAL at 12:02

## 2021-09-24 RX ADMIN — Medication 100 MILLIGRAM(S): at 06:01

## 2021-09-24 RX ADMIN — Medication 40 MILLIGRAM(S): at 21:40

## 2021-09-24 RX ADMIN — AMLODIPINE BESYLATE 5 MILLIGRAM(S): 2.5 TABLET ORAL at 06:01

## 2021-09-24 RX ADMIN — LISINOPRIL 10 MILLIGRAM(S): 2.5 TABLET ORAL at 21:40

## 2021-09-24 RX ADMIN — CEFTRIAXONE 100 MILLIGRAM(S): 500 INJECTION, POWDER, FOR SOLUTION INTRAMUSCULAR; INTRAVENOUS at 12:48

## 2021-09-24 NOTE — PROGRESS NOTE ADULT - PROBLEM SELECTOR PLAN 8
Likely secondary to metastatic disease  CA 11.3  Corrected Ca 12.2  Continue IV fluids   Follow up BMP in AM

## 2021-09-24 NOTE — PROGRESS NOTE ADULT - PROBLEM SELECTOR PLAN 3
CTs noted above  US noted above  IR consulted for peritoneal biopsy  Dr. Mathews, Heme/Onc, following  Dr. Arnett, Surg-Onc, following  Palliative care following

## 2021-09-24 NOTE — PROGRESS NOTE ADULT - PROBLEM SELECTOR PLAN 6
SPO2 92% on 2L, titrate as tolerated  CT chest noted above  S/p course of Azithromycin  Blood cultures NGTD

## 2021-09-24 NOTE — CONSULT NOTE ADULT - SUBJECTIVE AND OBJECTIVE BOX
patient is 71 year old female with  hx of HTN and HLD BIBEMS for hypoxia and hypotension at outpatient clinic. Patient states that for the past weak she has been feeling nauseous, fatigue and decreased appetite. She had 1 episode of non bloody vomit and a couple of episodes of non bloody diarrhea early last week which has also  resolved. She states she developed a cough last week as well which is associated with phlegm but denies fevers, chills, shortness of breath or night sweats. Denies chest pain, palpitations, diarrhea, constipation, headache or any other complaints    Vital Signs Last 24 Hrs  T(C): 36.4 (24 Sep 2021 05:00), Max: 36.9 (23 Sep 2021 22:04)  T(F): 97.5 (24 Sep 2021 05:00), Max: 98.4 (23 Sep 2021 22:04)  HR: 107 (24 Sep 2021 05:00) (78 - 107)  BP: 161/85 (24 Sep 2021 05:00) (142/80 - 167/78)  BP(mean): --  RR: 20 (24 Sep 2021 05:00) (14 - 20)  SpO2: 92% (24 Sep 2021 05:00) (91% - 94%)    LABS:                        10.8   8.35  )-----------( 280      ( 24 Sep 2021 07:56 )             31.9     24 Sep 2021 07:56    138    |  100    |  10     ----------------------------<  120    4.0     |  28     |  1.05     Ca    11.3       24 Sep 2021 07:56    TPro  8.1    /  Alb  2.9    /  TBili  0.5    /  DBili  x      /  AST  86     /  ALT  28     /  AlkPhos  98     24 Sep 2021 07:56    PT/INR - ( 24 Sep 2021 07:56 )   PT: 13.5 sec;   INR: 1.14 ratio         PTT - ( 24 Sep 2021 07:56 )  PTT:26.6 sec    MEDICATIONS  (STANDING):  amLODIPine   Tablet 5 milliGRAM(s) Oral daily  atorvastatin 20 milliGRAM(s) Oral at bedtime  azithromycin  IVPB 500 milliGRAM(s) IV Intermittent every 24 hours  cefTRIAXone   IVPB 1000 milliGRAM(s) IV Intermittent every 24 hours  chlorhexidine 2% Cloths 1 Application(s) Topical daily  influenza   Vaccine 0.5 milliLiter(s) IntraMuscular once  metoprolol tartrate 100 milliGRAM(s) Oral two times a day  mupirocin 2% Ointment 1 Application(s) Topical two times a day  sodium chloride 0.9%. 1000 milliLiter(s) (60 mL/Hr) IV Continuous <Continuous>  sodium chloride 0.9%. 1000 milliLiter(s) (125 mL/Hr) IV Continuous <Continuous>    MEDICATIONS  (PRN):  ALBUTerol    90 MICROgram(s) HFA Inhaler 2 Puff(s) Inhalation every 6 hours PRN Shortness of Breath and/or Wheezing    < from: CT Abdomen and Pelvis w/ Oral Cont and w/ IV Cont (09.22.21 @ 10:19) >  IMPRESSION:  Liver lesions suspected on the noncontrast study not confirmed on this portal venous phase study. Consider arterial phase imaging if needed for patient's diagnosis.  Group of peritoneal nodules in the right mid abdomen may represent metastatic peritoneal implants.  Calcified gallstone versus gallbladder wall calcification which may be further evaluated with ultrasound on a nonemergent basis  Evidence of gastric bypass    --- End of Report ---        < end of copied text >      < from: MR Lumbar Spine No Cont (05.20.18 @ 11:23) >  FINDINGS: Conus terminates at the L1 level and is normal in signal.   Vertebral body heights are maintained. There is mild anterolisthesis of   L4 on L5. Alignment is otherwise normal.    The patient is status post instrumented spinal fusion at L4-L5 with   left-sided pedicle screws and posterior interconnecting rods and with an   interbody spacer. Correlate with CT to further evaluate the orthopedic   hardware if indicated.    There is multilevel disc degeneration.    L1-L2: Minimal disc bulge. No central canal stenosis or foraminal   narrowing.    L2-L3: Minimal disc bulge. Mild bilateral facet arthrosis. Mild bilateral   foraminal narrowing. No central canal stenosis.    L3-L4: Minimal disc bulge. Mild bilateral facet arthrosis.Mild bilateral   foraminal narrowing. No central canal stenosis.    L4-L5: Status post instrumented spinal fusion, as above. Mild central   canal stenosis and mild bilateral foraminal narrowing.    L5-S1: Advanced right facet arthrosis. Minimal disc bulge. Mild right   foraminal narrowing. No central canal stenosis.    IMPRESSION: Status post instrumented spinal fusion at L4-L5.    Multilevel spondylosis, as above.    Advanced right facet arthrosis at L5-S1.        < end of copied text >         Patient is 71 year old female with  hx of HTN and HLD BIBEMS for hypoxia and hypotension at outpatient clinic. Patient states that for the past weak she has been feeling nauseous, fatigue and decreased appetite. She had 1 episode of non bloody vomit and a couple of episodes of non bloody diarrhea early last week which has also  resolved. She states she developed a cough last week as well which is associated with phlegm but denies fevers, chills, shortness of breath or night sweats. Denies chest pain, palpitations, diarrhea, constipation, headache or any other complaints    Patient was admitted under medicine for sepsis secondary to RUL PNA. Also group of peritoneal nodules in the right mid abdomen may represent metastatic peritoneal implants.    IR was consulted for peritoneal nodule biopsy       Vital Signs Last 24 Hrs  T(C): 36.4 (24 Sep 2021 05:00), Max: 36.9 (23 Sep 2021 22:04)  T(F): 97.5 (24 Sep 2021 05:00), Max: 98.4 (23 Sep 2021 22:04)  HR: 107 (24 Sep 2021 05:00) (78 - 107)  BP: 161/85 (24 Sep 2021 05:00) (142/80 - 167/78)  BP(mean): --  RR: 20 (24 Sep 2021 05:00) (14 - 20)  SpO2: 92% (24 Sep 2021 05:00) (91% - 94%)    LABS:                        10.8   8.35  )-----------( 280      ( 24 Sep 2021 07:56 )             31.9     24 Sep 2021 07:56    138    |  100    |  10     ----------------------------<  120    4.0     |  28     |  1.05     Ca    11.3       24 Sep 2021 07:56    TPro  8.1    /  Alb  2.9    /  TBili  0.5    /  DBili  x      /  AST  86     /  ALT  28     /  AlkPhos  98     24 Sep 2021 07:56    PT/INR - ( 24 Sep 2021 07:56 )   PT: 13.5 sec;   INR: 1.14 ratio         PTT - ( 24 Sep 2021 07:56 )  PTT:26.6 sec    MEDICATIONS  (STANDING):  amLODIPine   Tablet 5 milliGRAM(s) Oral daily  atorvastatin 20 milliGRAM(s) Oral at bedtime  azithromycin  IVPB 500 milliGRAM(s) IV Intermittent every 24 hours  cefTRIAXone   IVPB 1000 milliGRAM(s) IV Intermittent every 24 hours  chlorhexidine 2% Cloths 1 Application(s) Topical daily  influenza   Vaccine 0.5 milliLiter(s) IntraMuscular once  metoprolol tartrate 100 milliGRAM(s) Oral two times a day  mupirocin 2% Ointment 1 Application(s) Topical two times a day  sodium chloride 0.9%. 1000 milliLiter(s) (60 mL/Hr) IV Continuous <Continuous>  sodium chloride 0.9%. 1000 milliLiter(s) (125 mL/Hr) IV Continuous <Continuous>    MEDICATIONS  (PRN):  ALBUTerol    90 MICROgram(s) HFA Inhaler 2 Puff(s) Inhalation every 6 hours PRN Shortness of Breath and/or Wheezing    < from: CT Abdomen and Pelvis w/ Oral Cont and w/ IV Cont (09.22.21 @ 10:19) >  IMPRESSION:  Liver lesions suspected on the noncontrast study not confirmed on this portal venous phase study. Consider arterial phase imaging if needed for patient's diagnosis.  Group of peritoneal nodules in the right mid abdomen may represent metastatic peritoneal implants.  Calcified gallstone versus gallbladder wall calcification which may be further evaluated with ultrasound on a nonemergent basis  Evidence of gastric bypass    --- End of Report ---        < end of copied text >      < from: MR Lumbar Spine No Cont (05.20.18 @ 11:23) >  FINDINGS: Conus terminates at the L1 level and is normal in signal.   Vertebral body heights are maintained. There is mild anterolisthesis of   L4 on L5. Alignment is otherwise normal.    The patient is status post instrumented spinal fusion at L4-L5 with   left-sided pedicle screws and posterior interconnecting rods and with an   interbody spacer. Correlate with CT to further evaluate the orthopedic   hardware if indicated.    There is multilevel disc degeneration.    L1-L2: Minimal disc bulge. No central canal stenosis or foraminal   narrowing.    L2-L3: Minimal disc bulge. Mild bilateral facet arthrosis. Mild bilateral   foraminal narrowing. No central canal stenosis.    L3-L4: Minimal disc bulge. Mild bilateral facet arthrosis.Mild bilateral   foraminal narrowing. No central canal stenosis.    L4-L5: Status post instrumented spinal fusion, as above. Mild central   canal stenosis and mild bilateral foraminal narrowing.    L5-S1: Advanced right facet arthrosis. Minimal disc bulge. Mild right   foraminal narrowing. No central canal stenosis.    IMPRESSION: Status post instrumented spinal fusion at L4-L5.    Multilevel spondylosis, as above.    Advanced right facet arthrosis at L5-S1.        < end of copied text >         Patient is 71 year old female with  hx of HTN and HLD BIBEMS for hypoxia and hypotension at outpatient clinic. Patient states that for the past weak she has been feeling nauseous, fatigue and decreased appetite. She had 1 episode of non bloody vomit and a couple of episodes of non bloody diarrhea early last week which has also  resolved. She states she developed a cough last week as well which is associated with phlegm but denies fevers, chills, shortness of breath or night sweats. Denies chest pain, palpitations, diarrhea, constipation, headache or any other complaints    Patient was admitted under medicine for sepsis secondary to RUL PNA. Also group of peritoneal nodules in the right mid abdomen may represent metastatic peritoneal implants.    IR was consulted for peritoneal nodule biopsy       Vital Signs Last 24 Hrs  T(C): 36.4 (24 Sep 2021 05:00), Max: 36.9 (23 Sep 2021 22:04)  T(F): 97.5 (24 Sep 2021 05:00), Max: 98.4 (23 Sep 2021 22:04)  HR: 107 (24 Sep 2021 05:00) (78 - 107)  BP: 161/85 (24 Sep 2021 05:00) (142/80 - 167/78)  BP(mean): --  RR: 20 (24 Sep 2021 05:00) (14 - 20)  SpO2: 92% (24 Sep 2021 05:00) (91% - 94%)    LABS:                        10.8   8.35  )-----------( 280      ( 24 Sep 2021 07:56 )             31.9     24 Sep 2021 07:56    138    |  100    |  10     ----------------------------<  120    4.0     |  28     |  1.05     Ca    11.3       24 Sep 2021 07:56    TPro  8.1    /  Alb  2.9    /  TBili  0.5    /  DBili  x      /  AST  86     /  ALT  28     /  AlkPhos  98     24 Sep 2021 07:56    PT/INR - ( 24 Sep 2021 07:56 )   PT: 13.5 sec;   INR: 1.14 ratio         PTT - ( 24 Sep 2021 07:56 )  PTT:26.6 sec    MEDICATIONS  (STANDING):  amLODIPine   Tablet 5 milliGRAM(s) Oral daily  atorvastatin 20 milliGRAM(s) Oral at bedtime  azithromycin  IVPB 500 milliGRAM(s) IV Intermittent every 24 hours  cefTRIAXone   IVPB 1000 milliGRAM(s) IV Intermittent every 24 hours  chlorhexidine 2% Cloths 1 Application(s) Topical daily  influenza   Vaccine 0.5 milliLiter(s) IntraMuscular once  metoprolol tartrate 100 milliGRAM(s) Oral two times a day  mupirocin 2% Ointment 1 Application(s) Topical two times a day  sodium chloride 0.9%. 1000 milliLiter(s) (60 mL/Hr) IV Continuous <Continuous>  sodium chloride 0.9%. 1000 milliLiter(s) (125 mL/Hr) IV Continuous <Continuous>    MEDICATIONS  (PRN):  ALBUTerol    90 MICROgram(s) HFA Inhaler 2 Puff(s) Inhalation every 6 hours PRN Shortness of Breath and/or Wheezing    < from: CT Abdomen and Pelvis w/ Oral Cont and w/ IV Cont (09.22.21 @ 10:19) >  IMPRESSION:  Liver lesions suspected on the noncontrast study not confirmed on this portal venous phase study. Consider arterial phase imaging if needed for patient's diagnosis.  Group of peritoneal nodules in the right mid abdomen may represent metastatic peritoneal implants.  Calcified gallstone versus gallbladder wall calcification which may be further evaluated with ultrasound on a nonemergent basis  Evidence of gastric bypass    --- End of Report ---        < end of copied text >    < from: CT Chest No Cont (09.20.21 @ 22:05) >  IMPRESSION:  Central mass encasing the right hilum with postobstructive atelectasis in the right middle and lower lobes.    Mediastinal and right hilar adenopathy.    Probable liver metastases.    Patchy consolidation in the right upper lobe, possibly pneumonia.    --- End of Report ---    < end of copied text >

## 2021-09-24 NOTE — PROGRESS NOTE ADULT - SUBJECTIVE AND OBJECTIVE BOX
Time of Visit:  Patient seen and examined.     MEDICATIONS  (STANDING):  amLODIPine   Tablet 5 milliGRAM(s) Oral daily  cefTRIAXone   IVPB 1000 milliGRAM(s) IV Intermittent every 24 hours  chlorhexidine 2% Cloths 1 Application(s) Topical daily  folic acid 1 milliGRAM(s) Oral daily  influenza   Vaccine 0.5 milliLiter(s) IntraMuscular once  lisinopril 10 milliGRAM(s) Oral every 12 hours  metoprolol tartrate 100 milliGRAM(s) Oral two times a day  mupirocin 2% Ointment 1 Application(s) Topical two times a day  PARoxetine 40 milliGRAM(s) Oral daily  senna 2 Tablet(s) Oral at bedtime  simvastatin 40 milliGRAM(s) Oral at bedtime  sodium chloride 0.9%. 1000 milliLiter(s) (125 mL/Hr) IV Continuous <Continuous>      MEDICATIONS  (PRN):  acetaminophen   Tablet .. 650 milliGRAM(s) Oral every 6 hours PRN Temp greater or equal to 38C (100.4F), Mild Pain (1 - 3)  ALBUTerol    90 MICROgram(s) HFA Inhaler 2 Puff(s) Inhalation every 6 hours PRN Shortness of Breath and/or Wheezing       Medications up to date at time of exam.      PHYSICAL EXAMINATION:  Patient has no new complaints.  GENERAL: The patient is a well-developed, well-nourished, in no apparent distress.     Vital Signs Last 24 Hrs  T(C): 37 (24 Sep 2021 19:26), Max: 37 (24 Sep 2021 14:10)  T(F): 98.6 (24 Sep 2021 19:26), Max: 98.6 (24 Sep 2021 14:10)  HR: 93 (24 Sep 2021 19:26) (82 - 107)  BP: 143/83 (24 Sep 2021 19:26) (143/83 - 166/72)  BP(mean): --  RR: 18 (24 Sep 2021 19:26) (18 - 20)  SpO2: 94% (24 Sep 2021 19:26) (91% - 94%)   (if applicable)    Chest Tube (if applicable)    HEENT: Head is normocephalic and atraumatic. Extraocular muscles are intact. Mucous membranes are moist.     NECK: Supple, no palpable adenopathy.    LUNGS: Clear to auscultation, no wheezing, rales, or rhonchi.    HEART: Regular rate and rhythm without murmur.    ABDOMEN: Soft, nontender, and nondistended.  No hepatosplenomegaly is noted.    : No painful voiding, no pelvic pain    EXTREMITIES: Without any cyanosis, clubbing, rash, lesions or edema.    NEUROLOGIC: Awake, alert, oriented, grossly intact    SKIN: Warm, dry, good turgor.      LABS:                        10.8   8.35  )-----------( 280      ( 24 Sep 2021 07:56 )             31.9     09-24    138  |  100  |  10  ----------------------------<  120<H>  4.0   |  28  |  1.05    Ca    11.3<H>      24 Sep 2021 07:56    TPro  8.1  /  Alb  2.9<L>  /  TBili  0.5  /  DBili  x   /  AST  86<H>  /  ALT  28  /  AlkPhos  98  09-24    PT/INR - ( 24 Sep 2021 07:56 )   PT: 13.5 sec;   INR: 1.14 ratio         PTT - ( 24 Sep 2021 07:56 )  PTT:26.6 sec                    MICROBIOLOGY: (if applicable)    RADIOLOGY & ADDITIONAL STUDIES:  EKG:   MRI:< from: MR Head w/wo IV Cont (09.23.21 @ 18:47) >    EXAM:  MR BRAIN WAW IC                            PROCEDURE DATE:  09/23/2021          INTERPRETATION:  CLINICAL INDICATIONS: AMS    COMPARISON: MRI brain dated 5/28/2018    TECHNIQUE: MRI brain: Multiplanar, multisequence MR imaging of the brain are obtained with and without the administration of 7.5 cc intravenous Gadavist contrast. 0 cc of contrast was discarded.    FINDINGS:  There is no abnormal restricted diffusion to suggest acute infarction. Scattered periventricular and subcortical white matter T2 /FLAIR hyperintensities are seen without mass effect, nonspecific, likely representing moderate chronic microvascular changes. No abnormal leptomeningeal or parenchymal enhancement.  Normal T2 flow-voids are seen within  the intracranial vasculature. Moderate size cava septum pellucidum and cavum vergae. The lateral ventricles and cortical sulci are age-appropriate in size and configuration. There is no mass, mass effect, or extra-axial fluid collection. There is no susceptibility artifact to suggest hemorrhage. Midline structures are normal. The patient is status post bilateral ocular lens replacement surgery. Small right mastoid air cell tip effusion. The visualized paranasal sinuses, mastoid air cells and orbits are otherwise unremarkable.      IMPRESSION: No acute infarction. No abnormal intracranial enhancement. Abnormal diffuse heterogeneous T1 marrow signal suspicious for osseous metastasis. Consider noncontrast head CT and/or nuclear medicine bone scan for further evaluation.    --- End of Report ---            NARA VANN MD; Attending Radiologist  This document has been electronically signed. Sep 23 2021  7:11PM    < end of copied text >    ECHO:    IMPRESSION: 71y Female PAST MEDICAL & SURGICAL HISTORY:  Lumbar stenosis    H/O: HTN (hypertension)    Hypercholesterolemia    Bleeding ulcer  stomach ulcer 2011    Anxiety    Spinal stenosis in cervical region    Acute hepatitis C virus infection without hepatic coma  treated 2015- Resolved    Balance disorder    Coronary artery disease involving native coronary artery of native heart without angina pectoris    HTN (hypertension)    HLD (hyperlipidemia)    H/O colonoscopy    Bleeding ulcer  stomach surgery for bleeding ulcer    Fracture  ORIF Left wrist  1/17/13    History of lumbar surgery  2013    Chronic UTI  Pt reports presently on 2nd dose of antibiotics 7/5/18 10 days, Dr Herman aware, pt going for m/eval 7/13/18.    H/O cardiac catheterization  2013, no intervention needed, treated medically     p/w         IMP:  This is a 71 yr old white woman , former smoker with  HTN  HLD BIBEMS for hypoxia and hypotension at outpatient clinic.  Hypoxia due to post obstructive pna with right lung mass with meds.. Pat is having episodes of confusion during conversation       ASSESSMENT     - Acute Hypoxic Resp Failure   - Right hilar lung mass  - Mediastinal adenopathy   - Post obstructive PNA   - BRENDA  - Former Smoker       Sugg;  -MRI of brain  as noted above   -pat will need tissue bx   -amicable for bronch with bx / EBUS .. now pat is deferring any decision regarding medical care for another day.   -continue antibx   -cultures  neg for now   -O2 supp to maintain sat >90%  -albuterol inhaler   -dvt/gi prophy   -palliative care eval noted

## 2021-09-24 NOTE — CONSULT NOTE ADULT - ASSESSMENT
Patient is 71 year old female with hx of HTN and HLD BIBEMS for hypoxia and hypotension at outpatient clinic. Admitted for AHRF 2/2 pna. IR was consulted for peritoneal biopsy      Patient is 71 year old female with hx of HTN and HLD BIBEMS for hypoxia and hypotension at outpatient clinic. Admitted for AHRF 2/2 pna. IR was consulted for peritoneal biopsy.     Patient is 71 year old female with hx of HTN and HLD BIBEMS for hypoxia and hypotension at outpatient clinic. Admitted for AHRF 2/2 pna. On imaging was found to have a central right pulmonary mass, possible liver lesions and subcentimeter peritoneal nodules. IR was consulted for peritoneal nodule biopsy.

## 2021-09-24 NOTE — PROGRESS NOTE ADULT - ASSESSMENT
70 y/o F hx of HTN and HLD BIBEMS for hypoxia and hypotension at outpatient clinic.   Patient was admitted under medicine for sepsis secondary to RUL PNA. CT showed RUL PNA. UA positive, UCx E coli (10-49K).  Patient is on azithromycin and Rocephin. Bcx no growth.   Incidental finding on CT scan of R hilar lung mass and Mediastinal  adenopathy, Probable liver metastases. Pulmonary dr. Lombardi and [facility]- Dr. Mathews following.

## 2021-09-24 NOTE — PROGRESS NOTE ADULT - SUBJECTIVE AND OBJECTIVE BOX
HPI:  71 YOF admitted with AHRF.  Cultures drawn and antibiotics started.  Lung mass incidental finding work up by heme/onc    OVERNIGHT EVENTS:  No new overnight events.  Seen and examined at bedside.     REVIEW OF SYSTEMS:      CONSTITUTIONAL: No fever  EYES: no acute visual disturbances  NECK: No pain or stiffness  RESPIRATORY: No cough; No shortness of breath  CARDIOVASCULAR: No chest pain, no palpitations  GASTROINTESTINAL: No pain. No nausea, vomiting or diarrhea   NEUROLOGICAL: No headache or numbness, no tremors  MUSCULOSKELETAL: No joint pain, no muscle pain  GENITOURINARY: no dysuria, no frequency, no hesitancy  PSYCHIATRY: no depression, no anxiety  ALL OTHER  ROS negative        Vital Signs Last 24 Hrs  T(C): 36.4 (24 Sep 2021 05:00), Max: 36.9 (23 Sep 2021 22:04)  T(F): 97.5 (24 Sep 2021 05:00), Max: 98.4 (23 Sep 2021 22:04)  HR: 107 (24 Sep 2021 05:00) (78 - 107)  BP: 161/85 (24 Sep 2021 05:00) (142/80 - 167/78)  BP(mean): --  RR: 20 (24 Sep 2021 05:00) (14 - 20)  SpO2: 92% (24 Sep 2021 05:00) (91% - 94%)    ________________________________________________  PHYSICAL EXAM:    GENERAL: NAD  HEENT: Normocephalic; conjunctivae and sclerae clear;  NECK : supple, no JVD  CHEST/LUNG: Clear to auscultation; Nonlabored  HEART: S1 S2  regular  ABDOMEN: Soft, Nontender, Nondistended; Bowel sounds present  EXTREMITIES: no cyanosis; no LE edema; no calf tenderness  SKIN: warm and dry; No rashes or lesions  NERVOUS SYSTEM:  Alert; periods of confusion, no new deficits    _________________________________________________  CURRENT MEDICATIONS:    MEDICATIONS  (STANDING):  amLODIPine   Tablet 5 milliGRAM(s) Oral daily  atorvastatin 20 milliGRAM(s) Oral at bedtime  azithromycin  IVPB 500 milliGRAM(s) IV Intermittent every 24 hours  cefTRIAXone   IVPB 1000 milliGRAM(s) IV Intermittent every 24 hours  chlorhexidine 2% Cloths 1 Application(s) Topical daily  influenza   Vaccine 0.5 milliLiter(s) IntraMuscular once  metoprolol tartrate 100 milliGRAM(s) Oral two times a day  mupirocin 2% Ointment 1 Application(s) Topical two times a day  sodium chloride 0.9%. 1000 milliLiter(s) (60 mL/Hr) IV Continuous <Continuous>  sodium chloride 0.9%. 1000 milliLiter(s) (125 mL/Hr) IV Continuous <Continuous>    MEDICATIONS  (PRN):  ALBUTerol    90 MICROgram(s) HFA Inhaler 2 Puff(s) Inhalation every 6 hours PRN Shortness of Breath and/or Wheezing      __________________________________________________  LABS:                          10.8   8.35  )-----------( 280      ( 24 Sep 2021 07:56 )             31.9     09-24    138  |  100  |  10  ----------------------------<  120<H>  4.0   |  28  |  1.05    Ca    11.3<H>      24 Sep 2021 07:56    TPro  8.1  /  Alb  2.9<L>  /  TBili  0.5  /  DBili  x   /  AST  86<H>  /  ALT  28  /  AlkPhos  98  09-24    PT/INR - ( 24 Sep 2021 07:56 )   PT: 13.5 sec;   INR: 1.14 ratio         PTT - ( 24 Sep 2021 07:56 )  PTT:26.6 sec    CAPILLARY BLOOD GLUCOSE      POCT Blood Glucose.: 123 mg/dL (24 Sep 2021 11:26)  POCT Blood Glucose.: 121 mg/dL (24 Sep 2021 07:48)      __________________________________________________  RADIOLOGY & ADDITIONAL TESTS:    Imaging Personally Reviewed:  YES    < from: CT Chest No Cont (09.20.21 @ 22:05) >  IMPRESSION:  Central mass encasing the right hilum with postobstructive atelectasis in the right middle and lower lobes.    Mediastinal and right hilar adenopathy.    Probable liver metastases.    Patchy consolidation in the right upper lobe, possibly pneumonia.    --- End of Report ---    < end of copied text >    < from: CT Chest No Cont (09.20.21 @ 22:05) >  IMPRESSION:  Central mass encasing the right hilum with postobstructive atelectasis in the right middle and lower lobes.    Mediastinal and right hilar adenopathy.    Probable liver metastases.    Patchy consolidation in the right upper lobe, possibly pneumonia.    --- End of Report ---    < end of copied text >    < from: MR Head w/wo IV Cont (09.23.21 @ 18:47) >  IMPRESSION: No acute infarction. No abnormal intracranial enhancement. Abnormal diffuse heterogeneous T1 marrow signal suspicious for osseous metastasis. Consider noncontrast head CT and/or nuclear medicine bone scan for further evaluation.    --- End of Report ---    < end of copied text >    < from: US Abdomen Upper Quadrant Right (09.24.21 @ 12:48) >  IMPRESSION: Multiple circumscribed rounded lesions are identified within the hepatic parenchyma measuring up to 1.9 x 1.7 x 1.1 cm, indeterminate etiology and clinical significance. Further evaluation with contrast-enhanced abdominal MRI is recommended.    < end of copied text >    Consultant(s) Notes Reviewed:   YES     Plan of care was discussed with patient and /or primary care giver; all questions and concerns were addressed and care was aligned with patient's wishes.    Plan discussed with attending and consulting physicians.

## 2021-09-25 LAB
ANION GAP SERPL CALC-SCNC: 9 MMOL/L — SIGNIFICANT CHANGE UP (ref 5–17)
BUN SERPL-MCNC: 18 MG/DL — SIGNIFICANT CHANGE UP (ref 7–18)
CALCIUM SERPL-MCNC: 11.1 MG/DL — HIGH (ref 8.4–10.5)
CHLORIDE SERPL-SCNC: 104 MMOL/L — SIGNIFICANT CHANGE UP (ref 96–108)
CO2 SERPL-SCNC: 28 MMOL/L — SIGNIFICANT CHANGE UP (ref 22–31)
CREAT SERPL-MCNC: 1.33 MG/DL — HIGH (ref 0.5–1.3)
CULTURE RESULTS: SIGNIFICANT CHANGE UP
CULTURE RESULTS: SIGNIFICANT CHANGE UP
GLUCOSE BLDC GLUCOMTR-MCNC: 117 MG/DL — HIGH (ref 70–99)
GLUCOSE SERPL-MCNC: 100 MG/DL — HIGH (ref 70–99)
HCT VFR BLD CALC: 29.5 % — LOW (ref 34.5–45)
HGB BLD-MCNC: 9.9 G/DL — LOW (ref 11.5–15.5)
MAGNESIUM SERPL-MCNC: 1.9 MG/DL — SIGNIFICANT CHANGE UP (ref 1.6–2.6)
MCHC RBC-ENTMCNC: 30 PG — SIGNIFICANT CHANGE UP (ref 27–34)
MCHC RBC-ENTMCNC: 33.6 GM/DL — SIGNIFICANT CHANGE UP (ref 32–36)
MCV RBC AUTO: 89.4 FL — SIGNIFICANT CHANGE UP (ref 80–100)
NRBC # BLD: 0 /100 WBCS — SIGNIFICANT CHANGE UP (ref 0–0)
PHOSPHATE SERPL-MCNC: 5.3 MG/DL — HIGH (ref 2.5–4.5)
PLATELET # BLD AUTO: 244 K/UL — SIGNIFICANT CHANGE UP (ref 150–400)
POTASSIUM SERPL-MCNC: 4.2 MMOL/L — SIGNIFICANT CHANGE UP (ref 3.5–5.3)
POTASSIUM SERPL-SCNC: 4.2 MMOL/L — SIGNIFICANT CHANGE UP (ref 3.5–5.3)
RBC # BLD: 3.3 M/UL — LOW (ref 3.8–5.2)
RBC # FLD: 13.4 % — SIGNIFICANT CHANGE UP (ref 10.3–14.5)
SODIUM SERPL-SCNC: 141 MMOL/L — SIGNIFICANT CHANGE UP (ref 135–145)
SPECIMEN SOURCE: SIGNIFICANT CHANGE UP
SPECIMEN SOURCE: SIGNIFICANT CHANGE UP
WBC # BLD: 9.22 K/UL — SIGNIFICANT CHANGE UP (ref 3.8–10.5)
WBC # FLD AUTO: 9.22 K/UL — SIGNIFICANT CHANGE UP (ref 3.8–10.5)

## 2021-09-25 PROCEDURE — 70450 CT HEAD/BRAIN W/O DYE: CPT | Mod: 26

## 2021-09-25 RX ORDER — HEPARIN SODIUM 5000 [USP'U]/ML
5000 INJECTION INTRAVENOUS; SUBCUTANEOUS EVERY 8 HOURS
Refills: 0 | Status: DISCONTINUED | OUTPATIENT
Start: 2021-09-25 | End: 2021-10-03

## 2021-09-25 RX ORDER — ONDANSETRON 8 MG/1
4 TABLET, FILM COATED ORAL EVERY 8 HOURS
Refills: 0 | Status: DISCONTINUED | OUTPATIENT
Start: 2021-09-25 | End: 2021-10-04

## 2021-09-25 RX ADMIN — MUPIROCIN 1 APPLICATION(S): 20 OINTMENT TOPICAL at 17:11

## 2021-09-25 RX ADMIN — Medication 100 MILLIGRAM(S): at 05:56

## 2021-09-25 RX ADMIN — Medication 100 MILLIGRAM(S): at 17:12

## 2021-09-25 RX ADMIN — SIMVASTATIN 40 MILLIGRAM(S): 20 TABLET, FILM COATED ORAL at 21:46

## 2021-09-25 RX ADMIN — Medication 40 MILLIGRAM(S): at 11:17

## 2021-09-25 RX ADMIN — MUPIROCIN 1 APPLICATION(S): 20 OINTMENT TOPICAL at 05:58

## 2021-09-25 RX ADMIN — LISINOPRIL 10 MILLIGRAM(S): 2.5 TABLET ORAL at 05:58

## 2021-09-25 RX ADMIN — CHLORHEXIDINE GLUCONATE 1 APPLICATION(S): 213 SOLUTION TOPICAL at 11:17

## 2021-09-25 RX ADMIN — AMLODIPINE BESYLATE 5 MILLIGRAM(S): 2.5 TABLET ORAL at 05:56

## 2021-09-25 RX ADMIN — Medication 1 MILLIGRAM(S): at 11:16

## 2021-09-25 RX ADMIN — CEFTRIAXONE 100 MILLIGRAM(S): 500 INJECTION, POWDER, FOR SOLUTION INTRAMUSCULAR; INTRAVENOUS at 13:01

## 2021-09-25 RX ADMIN — HEPARIN SODIUM 5000 UNIT(S): 5000 INJECTION INTRAVENOUS; SUBCUTANEOUS at 21:45

## 2021-09-25 RX ADMIN — LISINOPRIL 10 MILLIGRAM(S): 2.5 TABLET ORAL at 17:12

## 2021-09-25 RX ADMIN — SENNA PLUS 2 TABLET(S): 8.6 TABLET ORAL at 21:45

## 2021-09-25 NOTE — PROGRESS NOTE ADULT - SUBJECTIVE AND OBJECTIVE BOX
HPI:  72 y/o F hx of HTN and HLD BIBEMS for hypoxia and hypotension at outpatient clinic. Patient states that for the past weak she has been feeling nauseous, fatigue and decreased appetite. She had 1episode of non bloody vomit and a couple of episodes of non bloody diarrhea early last week which has also  resolved. She states she developed a cough last week as well which is associated with phlegm but denies fevers, chills, shortness of breath or night sweats. Denies chest pain, palpitations, diarrhea, constipation, headache or any other complaints (20 Sep 2021 17:45)      Patient is a 71y old  Female who presents with a chief complaint of Pneumonia (25 Sep 2021 11:44)      INTERVAL HPI/OVERNIGHT EVENTS:  T(C): 36.9 (09-25-21 @ 13:35), Max: 37 (09-24-21 @ 19:26)  HR: 82 (09-25-21 @ 16:18) (82 - 98)  BP: 115/76 (09-25-21 @ 16:18) (115/76 - 152/78)  RR: 20 (09-25-21 @ 16:18) (18 - 20)  SpO2: 94% (09-25-21 @ 16:18) (88% - 94%)  Wt(kg): --  I&O's Summary      REVIEW OF SYSTEMS: denies fever, chills, SOB, palpitations, chest pain, abdominal pain, nausea, vomitting, diarrhea, constipation, dizziness    MEDICATIONS  (STANDING):  amLODIPine   Tablet 5 milliGRAM(s) Oral daily  chlorhexidine 2% Cloths 1 Application(s) Topical daily  folic acid 1 milliGRAM(s) Oral daily  heparin   Injectable 5000 Unit(s) SubCutaneous every 8 hours  influenza   Vaccine 0.5 milliLiter(s) IntraMuscular once  lisinopril 10 milliGRAM(s) Oral every 12 hours  metoprolol tartrate 100 milliGRAM(s) Oral two times a day  mupirocin 2% Ointment 1 Application(s) Topical two times a day  PARoxetine 40 milliGRAM(s) Oral daily  senna 2 Tablet(s) Oral at bedtime  simvastatin 40 milliGRAM(s) Oral at bedtime  sodium chloride 0.9%. 1000 milliLiter(s) (125 mL/Hr) IV Continuous <Continuous>    MEDICATIONS  (PRN):  acetaminophen   Tablet .. 650 milliGRAM(s) Oral every 6 hours PRN Temp greater or equal to 38C (100.4F), Mild Pain (1 - 3)  ALBUTerol    90 MICROgram(s) HFA Inhaler 2 Puff(s) Inhalation every 6 hours PRN Shortness of Breath and/or Wheezing  ondansetron    Tablet 4 milliGRAM(s) Oral every 8 hours PRN Nausea and/or Vomiting      PHYSICAL EXAM:  GENERAL: NAD, well-groomed, well-developed  HEAD:  Atraumatic, Normocephalic  EYES: EOMI, PERRLA, conjunctiva and sclera clear  ENMT: No tonsillar erythema, exudates, or enlargement; Moist mucous membranes, Good dentition, No lesions  NECK: Supple, No JVD, Normal thyroid  NERVOUS SYSTEM:  Alert & Oriented X3, Good concentration; Motor Strength 5/5 B/L upper and lower extremities; DTRs 2+ intact and symmetric  CHEST/LUNG: Clear to percussion bilaterally; No rales, rhonchi, wheezing, or rubs  HEART: Regular rate and rhythm; No murmurs, rubs, or gallops  ABDOMEN: Soft, Nontender, Nondistended; Bowel sounds present  EXTREMITIES:  2+ Peripheral Pulses, No clubbing, cyanosis, or edema  LYMPH: No lymphadenopathy noted  SKIN: No rashes or lesions  LABS:                        9.9    9.22  )-----------( 244      ( 25 Sep 2021 05:53 )             29.5     09-25    141  |  104  |  18  ----------------------------<  100<H>  4.2   |  28  |  1.33<H>    Ca    11.1<H>      25 Sep 2021 05:53  Phos  5.3     09-25  Mg     1.9     09-25    TPro  8.1  /  Alb  2.9<L>  /  TBili  0.5  /  DBili  x   /  AST  86<H>  /  ALT  28  /  AlkPhos  98  09-24    PT/INR - ( 24 Sep 2021 07:56 )   PT: 13.5 sec;   INR: 1.14 ratio         PTT - ( 24 Sep 2021 07:56 )  PTT:26.6 sec    CAPILLARY BLOOD GLUCOSE      POCT Blood Glucose.: 117 mg/dL (25 Sep 2021 11:48)

## 2021-09-25 NOTE — CHART NOTE - NSCHARTNOTEFT_GEN_A_CORE
INTERVAL HPI/OVERNIGHT EVENTS:  Called to evaluate patient, refused PT eval.  O2sats 88%RA at rest.  She appears comfortable.  Says she wants to rest, doesn't want to get up with PT.      MEDICATIONS  (STANDING):  amLODIPine   Tablet 5 milliGRAM(s) Oral daily  chlorhexidine 2% Cloths 1 Application(s) Topical daily  folic acid 1 milliGRAM(s) Oral daily  heparin   Injectable 5000 Unit(s) SubCutaneous every 8 hours  influenza   Vaccine 0.5 milliLiter(s) IntraMuscular once  lisinopril 10 milliGRAM(s) Oral every 12 hours  metoprolol tartrate 100 milliGRAM(s) Oral two times a day  mupirocin 2% Ointment 1 Application(s) Topical two times a day  PARoxetine 40 milliGRAM(s) Oral daily  senna 2 Tablet(s) Oral at bedtime  simvastatin 40 milliGRAM(s) Oral at bedtime  sodium chloride 0.9%. 1000 milliLiter(s) (125 mL/Hr) IV Continuous <Continuous>    MEDICATIONS  (PRN):  acetaminophen   Tablet .. 650 milliGRAM(s) Oral every 6 hours PRN Temp greater or equal to 38C (100.4F), Mild Pain (1 - 3)  ALBUTerol    90 MICROgram(s) HFA Inhaler 2 Puff(s) Inhalation every 6 hours PRN Shortness of Breath and/or Wheezing  ondansetron    Tablet 4 milliGRAM(s) Oral every 8 hours PRN Nausea and/or Vomiting      Allergies    No Known Allergies    Vital Signs Last 24 Hrs  T(C): 36.9 (25 Sep 2021 13:35), Max: 37 (24 Sep 2021 19:26)  T(F): 98.4 (25 Sep 2021 13:35), Max: 98.6 (24 Sep 2021 19:26)  HR: 82 (25 Sep 2021 16:18) (82 - 98)  BP: 115/76 (25 Sep 2021 16:18) (115/76 - 152/78)  BP(mean): --  RR: 20 (25 Sep 2021 16:18) (18 - 20)  SpO2: 94% (25 Sep 2021 16:18) (88% - 94%)    General: NAD  Cardiovascular: S1, S2  Respiratory: CTA    LABS:                        9.9    9.22  )-----------( 244      ( 25 Sep 2021 05:53 )             29.5     09-25    141  |  104  |  18  ----------------------------<  100<H>  4.2   |  28  |  1.33<H>    Ca    11.1<H>      25 Sep 2021 05:53  Phos  5.3     09-25  Mg     1.9     09-25    TPro  8.1  /  Alb  2.9<L>  /  TBili  0.5  /  DBili  x   /  AST  86<H>  /  ALT  28  /  AlkPhos  98  09-24    PT/INR - ( 24 Sep 2021 07:56 )   PT: 13.5 sec;   INR: 1.14 ratio         PTT - ( 24 Sep 2021 07:56 )  PTT:26.6 sec       Assessment	  70 y/o F hx of HTN and HLD BIBEMS for hypoxia and hypotension at outpatient clinic.   Patient was admitted under medicine for sepsis secondary to RUL PNA. CT showed RUL PNA. UA positive, UCx E coli (10-49K).   Incidental finding on CT scan of R hilar lung mass and Mediastinal  adenopathy, Probable liver metastases. Pulmonary dr. Lombardi and [facility]- Dr. Mathews following.   Patient has completed course of ABX.  Dr. Lombardi to evaluate need for longer course considering her post-obstructive PNA/hilaar mass.     Problem/Plan - 1:  ·  Problem: Acute UTI.   ·  Plan: UA positive  Urine culture grew Ecoli, sensitive to rocephin  Completed course of Rocephin     Problem/Plan - 2:  ·  Problem: Lung mass.   ·  Plan: CTs noted above  CEA 1.9   : 7  Heme/Onc recommends EBUS with biopsy  Dr. Lombardi, Pulmonary, following  Dr. Mathews, Heme/Onc, following  Palliative care following.     Problem/Plan - 3:  ·  Problem: Abdominal mass.   ·  Plan: CTs noted above  US noted above  IR consulted for peritoneal biopsy  Dr. Mathews, Heme/Onc, following  Dr. Arnett, Surg-Onc, following  Palliative care following.     Problem/Plan - 4:  ·  Problem: Malignancy.   ·  Plan: Incidental finding on CT noted above  Plan above.     Problem/Plan - 5:  ·  Problem: Confused.   ·  Plan: MRI noted above  CT shows multiple lytic lesions of the cranium suggestive of metastatic disease.   Follow up s/p ca correction.     Problem/Plan - 6:  ·  Problem: Acute respiratory failure with hypoxia.   ·  Plan: SPO2 88% RA up to 94% on 3LNC continue 3L   CT chest noted above  S/p course of Azithromycin  Blood cultures NGTD.     Problem/Plan - 7:  ·  Problem: Community acquired pneumonia.   ·  Plan: Plan as above.     Problem/Plan - 8:  ·  Problem: Hypercalcemia.   ·  Plan: Likely secondary to metastatic disease  CA 11.3  Corrected Ca 12.2  Continue IV fluids   Follow up BMP in AM.     Problem/Plan - 9:  ·  Problem: BRENDA (acute kidney injury).   ·  Plan: SCr 1.05  Continue IV fluids  Follow up BMP in AM.     Problem/Plan - 10:  ·  Problem: HTN (hypertension).   ·  Plan; Elevated  Pt takes Metoprolol, Amlodipine and Benazepril at home  Continue home regimen bb and norvasc with parameters  Start therapeutic interchange Lisinopril with parameters  Monitor BP.     Problem/Plan - 11:  ·  Problem: DVT prophylaxis.   ·  Plan: DVT PPX: Heparin  GI PPX: PPI.     Problem/Plan - 12:  ·  Problem: Discharge planning issues.   · not yet medically stable for discharge.    Above discussed with Dr. Arias

## 2021-09-25 NOTE — PROGRESS NOTE ADULT - ASSESSMENT
seen and examined vsstable afebrile physical done    covering for Dr holcomb  on bed  not in any distress  labs noted  lizzy 11. with low hgb  crat 1.33   a/p abnormal ct chest,  ct scan r/o mets  sono liver r/o mets   some metastatis going on with high lizzy   heme onc  iv hydration ( creat 1.3 )

## 2021-09-25 NOTE — PROGRESS NOTE ADULT - SUBJECTIVE AND OBJECTIVE BOX
Time of Visit:  Patient seen and examined.     MEDICATIONS  (STANDING):  amLODIPine   Tablet 5 milliGRAM(s) Oral daily  cefTRIAXone   IVPB 1000 milliGRAM(s) IV Intermittent every 24 hours  chlorhexidine 2% Cloths 1 Application(s) Topical daily  folic acid 1 milliGRAM(s) Oral daily  influenza   Vaccine 0.5 milliLiter(s) IntraMuscular once  lisinopril 10 milliGRAM(s) Oral every 12 hours  metoprolol tartrate 100 milliGRAM(s) Oral two times a day  mupirocin 2% Ointment 1 Application(s) Topical two times a day  PARoxetine 40 milliGRAM(s) Oral daily  senna 2 Tablet(s) Oral at bedtime  simvastatin 40 milliGRAM(s) Oral at bedtime  sodium chloride 0.9%. 1000 milliLiter(s) (125 mL/Hr) IV Continuous <Continuous>      MEDICATIONS  (PRN):  acetaminophen   Tablet .. 650 milliGRAM(s) Oral every 6 hours PRN Temp greater or equal to 38C (100.4F), Mild Pain (1 - 3)  ALBUTerol    90 MICROgram(s) HFA Inhaler 2 Puff(s) Inhalation every 6 hours PRN Shortness of Breath and/or Wheezing       Medications up to date at time of exam.      PHYSICAL EXAMINATION:  Patient has no new complaints.  GENERAL: The patient is a well-developed, well-nourished, in no apparent distress.     Vital Signs Last 24 Hrs  T(C): 36.8 (25 Sep 2021 05:01), Max: 37 (24 Sep 2021 14:10)  T(F): 98.2 (25 Sep 2021 05:01), Max: 98.6 (24 Sep 2021 14:10)  HR: 98 (25 Sep 2021 05:01) (90 - 107)  BP: 152/78 (25 Sep 2021 05:01) (143/83 - 159/76)  BP(mean): --  RR: 18 (25 Sep 2021 05:01) (18 - 20)  SpO2: 94% (25 Sep 2021 05:01) (93% - 94%)   (if applicable)    Chest Tube (if applicable)    HEENT: Head is normocephalic and atraumatic. Extraocular muscles are intact. Mucous membranes are moist.     NECK: Supple, no palpable adenopathy.    LUNGS: Clear to auscultation, no wheezing, rales, or rhonchi.    HEART: Regular rate and rhythm without murmur.    ABDOMEN: Soft, nontender, and nondistended.  No hepatosplenomegaly is noted.    : No painful voiding, no pelvic pain    EXTREMITIES: Without any cyanosis, clubbing, rash, lesions or edema.    NEUROLOGIC: Awake, alert, oriented, grossly intact    SKIN: Warm, dry, good turgor.      LABS:                        9.9    9.22  )-----------( 244      ( 25 Sep 2021 05:53 )             29.5     09-25    141  |  104  |  18  ----------------------------<  100<H>  4.2   |  28  |  1.33<H>    Ca    11.1<H>      25 Sep 2021 05:53  Phos  5.3     09-25  Mg     1.9     09-25    TPro  8.1  /  Alb  2.9<L>  /  TBili  0.5  /  DBili  x   /  AST  86<H>  /  ALT  28  /  AlkPhos  98  09-24    PT/INR - ( 24 Sep 2021 07:56 )   PT: 13.5 sec;   INR: 1.14 ratio         PTT - ( 24 Sep 2021 07:56 )  PTT:26.6 sec                    MICROBIOLOGY: (if applicable)    RADIOLOGY & ADDITIONAL STUDIES:  EKG:   CXR:  ECHO:    IMPRESSION: 71y Female PAST MEDICAL & SURGICAL HISTORY:  Lumbar stenosis    H/O: HTN (hypertension)    Hypercholesterolemia    Bleeding ulcer  stomach ulcer 2011    Anxiety    Spinal stenosis in cervical region    Acute hepatitis C virus infection without hepatic coma  treated 2015- Resolved    Balance disorder    Coronary artery disease involving native coronary artery of native heart without angina pectoris    HTN (hypertension)    HLD (hyperlipidemia)    H/O colonoscopy    Bleeding ulcer  stomach surgery for bleeding ulcer    Fracture  ORIF Left wrist  1/17/13    History of lumbar surgery  2013    Chronic UTI  Pt reports presently on 2nd dose of antibiotics 7/5/18 10 days, Dr Virgil smith, pt going for m/eval 7/13/18.    H/O cardiac catheterization  2013, no intervention needed, treated medically     p/w           RECOMMENDATIONS:

## 2021-09-26 LAB
ALBUMIN SERPL ELPH-MCNC: 2.9 G/DL — LOW (ref 3.5–5)
ALP SERPL-CCNC: 98 U/L — SIGNIFICANT CHANGE UP (ref 40–120)
ALT FLD-CCNC: 28 U/L DA — SIGNIFICANT CHANGE UP (ref 10–60)
ANION GAP SERPL CALC-SCNC: 10 MMOL/L — SIGNIFICANT CHANGE UP (ref 5–17)
AST SERPL-CCNC: 72 U/L — HIGH (ref 10–40)
BILIRUB SERPL-MCNC: 0.4 MG/DL — SIGNIFICANT CHANGE UP (ref 0.2–1.2)
BUN SERPL-MCNC: 20 MG/DL — HIGH (ref 7–18)
CALCIUM SERPL-MCNC: 11.3 MG/DL — HIGH (ref 8.4–10.5)
CHLORIDE SERPL-SCNC: 101 MMOL/L — SIGNIFICANT CHANGE UP (ref 96–108)
CO2 SERPL-SCNC: 26 MMOL/L — SIGNIFICANT CHANGE UP (ref 22–31)
CREAT SERPL-MCNC: 1.11 MG/DL — SIGNIFICANT CHANGE UP (ref 0.5–1.3)
GLUCOSE SERPL-MCNC: 118 MG/DL — HIGH (ref 70–99)
HCT VFR BLD CALC: 28.3 % — LOW (ref 34.5–45)
HGB BLD-MCNC: 9.6 G/DL — LOW (ref 11.5–15.5)
MAGNESIUM SERPL-MCNC: 1.8 MG/DL — SIGNIFICANT CHANGE UP (ref 1.6–2.6)
MCHC RBC-ENTMCNC: 29.8 PG — SIGNIFICANT CHANGE UP (ref 27–34)
MCHC RBC-ENTMCNC: 33.9 GM/DL — SIGNIFICANT CHANGE UP (ref 32–36)
MCV RBC AUTO: 87.9 FL — SIGNIFICANT CHANGE UP (ref 80–100)
NRBC # BLD: 0 /100 WBCS — SIGNIFICANT CHANGE UP (ref 0–0)
PHOSPHATE SERPL-MCNC: 4.5 MG/DL — SIGNIFICANT CHANGE UP (ref 2.5–4.5)
PLATELET # BLD AUTO: 253 K/UL — SIGNIFICANT CHANGE UP (ref 150–400)
POTASSIUM SERPL-MCNC: 3.8 MMOL/L — SIGNIFICANT CHANGE UP (ref 3.5–5.3)
POTASSIUM SERPL-SCNC: 3.8 MMOL/L — SIGNIFICANT CHANGE UP (ref 3.5–5.3)
PROT SERPL-MCNC: 7.8 G/DL — SIGNIFICANT CHANGE UP (ref 6–8.3)
RBC # BLD: 3.22 M/UL — LOW (ref 3.8–5.2)
RBC # FLD: 12.9 % — SIGNIFICANT CHANGE UP (ref 10.3–14.5)
SODIUM SERPL-SCNC: 137 MMOL/L — SIGNIFICANT CHANGE UP (ref 135–145)
WBC # BLD: 11.27 K/UL — HIGH (ref 3.8–10.5)
WBC # FLD AUTO: 11.27 K/UL — HIGH (ref 3.8–10.5)

## 2021-09-26 RX ORDER — SODIUM CHLORIDE 9 MG/ML
1000 INJECTION INTRAMUSCULAR; INTRAVENOUS; SUBCUTANEOUS
Refills: 0 | Status: DISCONTINUED | OUTPATIENT
Start: 2021-09-26 | End: 2021-09-26

## 2021-09-26 RX ORDER — SODIUM CHLORIDE 9 MG/ML
1000 INJECTION INTRAMUSCULAR; INTRAVENOUS; SUBCUTANEOUS
Refills: 0 | Status: COMPLETED | OUTPATIENT
Start: 2021-09-26 | End: 2021-09-27

## 2021-09-26 RX ADMIN — LISINOPRIL 10 MILLIGRAM(S): 2.5 TABLET ORAL at 06:07

## 2021-09-26 RX ADMIN — HEPARIN SODIUM 5000 UNIT(S): 5000 INJECTION INTRAVENOUS; SUBCUTANEOUS at 21:33

## 2021-09-26 RX ADMIN — SODIUM CHLORIDE 150 MILLILITER(S): 9 INJECTION INTRAMUSCULAR; INTRAVENOUS; SUBCUTANEOUS at 13:15

## 2021-09-26 RX ADMIN — LISINOPRIL 10 MILLIGRAM(S): 2.5 TABLET ORAL at 17:29

## 2021-09-26 RX ADMIN — SODIUM CHLORIDE 150 MILLILITER(S): 9 INJECTION INTRAMUSCULAR; INTRAVENOUS; SUBCUTANEOUS at 15:24

## 2021-09-26 RX ADMIN — SODIUM CHLORIDE 150 MILLILITER(S): 9 INJECTION INTRAMUSCULAR; INTRAVENOUS; SUBCUTANEOUS at 20:16

## 2021-09-26 RX ADMIN — HEPARIN SODIUM 5000 UNIT(S): 5000 INJECTION INTRAVENOUS; SUBCUTANEOUS at 06:07

## 2021-09-26 RX ADMIN — AMLODIPINE BESYLATE 5 MILLIGRAM(S): 2.5 TABLET ORAL at 06:07

## 2021-09-26 RX ADMIN — HEPARIN SODIUM 5000 UNIT(S): 5000 INJECTION INTRAVENOUS; SUBCUTANEOUS at 13:02

## 2021-09-26 RX ADMIN — SIMVASTATIN 40 MILLIGRAM(S): 20 TABLET, FILM COATED ORAL at 21:33

## 2021-09-26 RX ADMIN — MUPIROCIN 1 APPLICATION(S): 20 OINTMENT TOPICAL at 17:29

## 2021-09-26 RX ADMIN — Medication 100 MILLIGRAM(S): at 17:29

## 2021-09-26 RX ADMIN — Medication 1 MILLIGRAM(S): at 11:36

## 2021-09-26 RX ADMIN — CHLORHEXIDINE GLUCONATE 1 APPLICATION(S): 213 SOLUTION TOPICAL at 11:36

## 2021-09-26 RX ADMIN — MUPIROCIN 1 APPLICATION(S): 20 OINTMENT TOPICAL at 06:08

## 2021-09-26 RX ADMIN — Medication 40 MILLIGRAM(S): at 11:36

## 2021-09-26 RX ADMIN — SODIUM CHLORIDE 150 MILLILITER(S): 9 INJECTION INTRAMUSCULAR; INTRAVENOUS; SUBCUTANEOUS at 21:34

## 2021-09-26 RX ADMIN — Medication 100 MILLIGRAM(S): at 06:08

## 2021-09-26 NOTE — PROGRESS NOTE ADULT - ASSESSMENT
_________________________________________________________________________________________  ========>>  M E D I C A L   A T T E N D I N G    F O L L O W  U P  N O T E  <<=========  -----------------------------------------------------------------------------------------------------    - Patient seen and examined by me earlier today.  (covering today)   - Patient today overall doing ok, comfortable, eating fairly     ==================>> REVIEW OF SYSTEM <<=================    overall poor historian   GEN: no fever, no chills, no pain  RESP: no SOB, no cough, no sputum  CVS: no chest pain, no palpitations, no edema  GI: no abdominal pain, no nausea,  states stomach feels weird ! > can not define further !   : no dysuria, no frequency  Neuro: no headache, no dizziness  Derm : no itching, no rash    ==================>> PHYSICAL EXAM <<=================    GEN: A&O X 1-2 , NAD , comfortable, pleasant, calm   HEENT: NCAT, PERRL, MMM, hearing intact  Neck: supple , no JVD appreciated  CVS: S1S2 , regular , No M/R/G appreciated  PULM: CTA B/L,  no W/R/R appreciated  ABD.: soft. non tender, non distended  Extrem: intact pulses , no edema   PSYCH : normal mood,  not anxious                            ( Note Written / Date of service :  09-26-21 )    ==================>> MEDICATIONS <<====================    MEDICATIONS  (STANDING):  amLODIPine   Tablet 5 milliGRAM(s) Oral daily  chlorhexidine 2% Cloths 1 Application(s) Topical daily  folic acid 1 milliGRAM(s) Oral daily  heparin   Injectable 5000 Unit(s) SubCutaneous every 8 hours  influenza   Vaccine 0.5 milliLiter(s) IntraMuscular once  lisinopril 10 milliGRAM(s) Oral every 12 hours  metoprolol tartrate 100 milliGRAM(s) Oral two times a day  mupirocin 2% Ointment 1 Application(s) Topical two times a day  PARoxetine 40 milliGRAM(s) Oral daily  senna 2 Tablet(s) Oral at bedtime  simvastatin 40 milliGRAM(s) Oral at bedtime  sodium chloride 0.9%. 1000 milliLiter(s) (150 mL/Hr) IV Continuous <Continuous>    MEDICATIONS  (PRN):  acetaminophen   Tablet .. 650 milliGRAM(s) Oral every 6 hours PRN Temp greater or equal to 38C (100.4F), Mild Pain (1 - 3)  ALBUTerol    90 MICROgram(s) HFA Inhaler 2 Puff(s) Inhalation every 6 hours PRN Shortness of Breath and/or Wheezing  ondansetron    Tablet 4 milliGRAM(s) Oral every 8 hours PRN Nausea and/or Vomiting    ___________  Active diet:  Diet, Regular:   DASH/TLC Sodium & Cholesterol Restricted  ___________________    ==================>> VITAL SIGNS <<==================    T(C): 36.9 (09-26-21 @ 13:16), Max: 36.9 (09-25-21 @ 20:35)  HR: 71 (09-26-21 @ 13:16) (67 - 82)  BP: 133/73 (09-26-21 @ 13:16) (115/76 - 133/73)  RR: 16 (09-26-21 @ 13:16) (16 - 20)  SpO2: 91% (09-26-21 @ 13:16) (91% - 95%)      ==================>> LAB AND IMAGING <<==================                        9.6    11.27 )-----------( 253      ( 26 Sep 2021 10:57 )             28.3        09-26    137  |  101  |  20<H>  ----------------------------<  118<H>  3.8   |  26  |  1.11    Ca    11.3<H>      26 Sep 2021 10:57  Phos  4.5     09-26  Mg     1.8     09-26    TPro  7.8  /  Alb  2.9<L>  /  TBili  0.4  /  DBili  x   /  AST  72<H>  /  ALT  28  /  AlkPhos  98  09-26    Lipid profile:  (09-21-21)     Total: 172     LDL  : (p)     HDL  :25     TG   :347     HgA1C:   (09-21-21)          (09-21-21)      6.5    < from: CT Head No Cont (09.25.21 @ 07:24) >  IMPRESSION:  Multiple lytic lesions suspicious for metastases or multiple myeloma. Correlate clinically    If intraparenchymal metastasis is a clinical concern, MRI exam with contrast recommended    No acute intracranial hemorrhage.    < end of copied text >    ___________________________________________________________________________________  ===============>>  A S S E S S M E N T   A N D   P L A N <<===============  ------------------------------------------------------------------------------------------    72 y/o F hx of HTN and HLD BIBEMS for hypoxia and hypotension at outpatient clinic.   Patient was admitted under medicine for sepsis secondary to RUL PNA. CT showed RUL PNA. UA positive, UCx E coli (10-49K).  Patient is on azithromycin and Rocephin. Bcx no growth.   Incidental finding on CT scan of R hilar lung mass and Mediastinal  adenopathy, Probable liver metastases. Pulmonary dr. Lombardi and [facility]- Dr. Mathews following.     Problem/Plan - 1:  ·  Problem: UTI.   Urine culture grew Ecoli, sensitive to rocephin  Continue Ceftriaxone and finish course as planned     Problem/Plan - 2:  ·  Problem: Lung mass.   ·  Plan: CTs noted above  CEA 1.9   : 7  Heme/Onc recommends EBUS with biopsy  Dr. Lombardi, Pulmonary, following  Dr. Mathews, Heme/Onc, following  Palliative care following.    Problem/Plan - 3:  ·  Problem: Abdominal mass.   US noted above  IR consulted for peritoneal biopsy  Dr. Mathews, Heme/Onc, following  Dr. Arnett, Surg-Onc, following  Palliative care following.    Problem/Plan - 4:  ·  Problem: Malignancy.   ·  Plan: Incidental finding on CT noted above  Plan above.    Problem/Plan - 5:  ·  Problem: Confused.   CT showing metastatic disease to bone   MRI did not show intracranial lesions    Problem/Plan - 6:  ·  Problem: Acute respiratory failure with hypoxia.   ·  Plan: SPO2 92% on 2L, titrate as tolerated  S/p course of Azithromycin  Blood cultures NGTD.    Problem/Plan - 7:  ·  Problem: Community acquired pneumonia.   ·  Plan: Plan as above.    Problem/Plan - 8:  ·  Problem: Hypercalcemia.   ·  Plan: Likely secondary to metastatic disease  CA 11.3  Continue IV fluids   monitor labs    Problem/Plan - 9:  ·  Problem: HTN (hypertension).   Pt takes Metoprolol, Amlodipine and Benazepril at home  Continue home regimen bb and norvasc with parameters  Start therapeutic interchange Lisinopril with parameters  Monitor BP.    -GI/DVT Prophylaxis per protocol.    --------------------------------------------  Case discussed with pt, staff  Education given on findings and plan of care  ___________________________  H. JAMARI Hi.  (covering today)   Pager: [phone]

## 2021-09-27 LAB
ANION GAP SERPL CALC-SCNC: 7 MMOL/L — SIGNIFICANT CHANGE UP (ref 5–17)
BUN SERPL-MCNC: 14 MG/DL — SIGNIFICANT CHANGE UP (ref 7–18)
CALCIUM SERPL-MCNC: 11.3 MG/DL — HIGH (ref 8.4–10.5)
CHLORIDE SERPL-SCNC: 101 MMOL/L — SIGNIFICANT CHANGE UP (ref 96–108)
CO2 SERPL-SCNC: 28 MMOL/L — SIGNIFICANT CHANGE UP (ref 22–31)
CREAT SERPL-MCNC: 0.89 MG/DL — SIGNIFICANT CHANGE UP (ref 0.5–1.3)
GLUCOSE SERPL-MCNC: 107 MG/DL — HIGH (ref 70–99)
HCT VFR BLD CALC: 28.6 % — LOW (ref 34.5–45)
HGB BLD-MCNC: 9.7 G/DL — LOW (ref 11.5–15.5)
MAGNESIUM SERPL-MCNC: 1.8 MG/DL — SIGNIFICANT CHANGE UP (ref 1.6–2.6)
MCHC RBC-ENTMCNC: 29.8 PG — SIGNIFICANT CHANGE UP (ref 27–34)
MCHC RBC-ENTMCNC: 33.9 GM/DL — SIGNIFICANT CHANGE UP (ref 32–36)
MCV RBC AUTO: 88 FL — SIGNIFICANT CHANGE UP (ref 80–100)
NRBC # BLD: 0 /100 WBCS — SIGNIFICANT CHANGE UP (ref 0–0)
PHOSPHATE SERPL-MCNC: 3.7 MG/DL — SIGNIFICANT CHANGE UP (ref 2.5–4.5)
PLATELET # BLD AUTO: 265 K/UL — SIGNIFICANT CHANGE UP (ref 150–400)
POTASSIUM SERPL-MCNC: 3.7 MMOL/L — SIGNIFICANT CHANGE UP (ref 3.5–5.3)
POTASSIUM SERPL-SCNC: 3.7 MMOL/L — SIGNIFICANT CHANGE UP (ref 3.5–5.3)
RBC # BLD: 3.25 M/UL — LOW (ref 3.8–5.2)
RBC # FLD: 13 % — SIGNIFICANT CHANGE UP (ref 10.3–14.5)
SODIUM SERPL-SCNC: 136 MMOL/L — SIGNIFICANT CHANGE UP (ref 135–145)
WBC # BLD: 10.87 K/UL — HIGH (ref 3.8–10.5)
WBC # FLD AUTO: 10.87 K/UL — HIGH (ref 3.8–10.5)

## 2021-09-27 RX ORDER — PAMIDRONATE DISODIUM 9 MG/ML
60 INJECTION, SOLUTION INTRAVENOUS ONCE
Refills: 0 | Status: COMPLETED | OUTPATIENT
Start: 2021-09-27 | End: 2021-09-27

## 2021-09-27 RX ORDER — NYSTATIN CREAM 100000 [USP'U]/G
1 CREAM TOPICAL EVERY 8 HOURS
Refills: 0 | Status: DISCONTINUED | OUTPATIENT
Start: 2021-09-27 | End: 2021-10-04

## 2021-09-27 RX ADMIN — LISINOPRIL 10 MILLIGRAM(S): 2.5 TABLET ORAL at 06:38

## 2021-09-27 RX ADMIN — SENNA PLUS 2 TABLET(S): 8.6 TABLET ORAL at 21:58

## 2021-09-27 RX ADMIN — LISINOPRIL 10 MILLIGRAM(S): 2.5 TABLET ORAL at 17:45

## 2021-09-27 RX ADMIN — SODIUM CHLORIDE 150 MILLILITER(S): 9 INJECTION INTRAMUSCULAR; INTRAVENOUS; SUBCUTANEOUS at 01:45

## 2021-09-27 RX ADMIN — SIMVASTATIN 40 MILLIGRAM(S): 20 TABLET, FILM COATED ORAL at 21:58

## 2021-09-27 RX ADMIN — AMLODIPINE BESYLATE 5 MILLIGRAM(S): 2.5 TABLET ORAL at 06:38

## 2021-09-27 RX ADMIN — Medication 100 MILLIGRAM(S): at 17:45

## 2021-09-27 RX ADMIN — Medication 40 MILLIGRAM(S): at 12:27

## 2021-09-27 RX ADMIN — NYSTATIN CREAM 1 APPLICATION(S): 100000 CREAM TOPICAL at 14:57

## 2021-09-27 RX ADMIN — CHLORHEXIDINE GLUCONATE 1 APPLICATION(S): 213 SOLUTION TOPICAL at 12:38

## 2021-09-27 RX ADMIN — Medication 1 MILLIGRAM(S): at 12:28

## 2021-09-27 RX ADMIN — ONDANSETRON 4 MILLIGRAM(S): 8 TABLET, FILM COATED ORAL at 12:27

## 2021-09-27 RX ADMIN — Medication 100 MILLIGRAM(S): at 06:39

## 2021-09-27 RX ADMIN — HEPARIN SODIUM 5000 UNIT(S): 5000 INJECTION INTRAVENOUS; SUBCUTANEOUS at 12:28

## 2021-09-27 RX ADMIN — MUPIROCIN 1 APPLICATION(S): 20 OINTMENT TOPICAL at 06:39

## 2021-09-27 RX ADMIN — HEPARIN SODIUM 5000 UNIT(S): 5000 INJECTION INTRAVENOUS; SUBCUTANEOUS at 06:38

## 2021-09-27 RX ADMIN — HEPARIN SODIUM 5000 UNIT(S): 5000 INJECTION INTRAVENOUS; SUBCUTANEOUS at 21:58

## 2021-09-27 RX ADMIN — PAMIDRONATE DISODIUM 65 MILLIGRAM(S): 9 INJECTION, SOLUTION INTRAVENOUS at 14:03

## 2021-09-27 NOTE — PROGRESS NOTE ADULT - SUBJECTIVE AND OBJECTIVE BOX
Patient is a 71y old  Female who presents with a chief complaint of Pneumonia (27 Sep 2021 12:13)      SUBJECTIVE / OVERNIGHT EVENTS:    ADDITIONAL REVIEW OF SYSTEMS:    MEDICATIONS  (STANDING):  amLODIPine   Tablet 5 milliGRAM(s) Oral daily  chlorhexidine 2% Cloths 1 Application(s) Topical daily  folic acid 1 milliGRAM(s) Oral daily  heparin   Injectable 5000 Unit(s) SubCutaneous every 8 hours  influenza   Vaccine 0.5 milliLiter(s) IntraMuscular once  lisinopril 10 milliGRAM(s) Oral every 12 hours  metoprolol tartrate 100 milliGRAM(s) Oral two times a day  nystatin Powder 1 Application(s) Topical every 8 hours  pamidronate IVPB 60 milliGRAM(s) IV Intermittent once  PARoxetine 40 milliGRAM(s) Oral daily  senna 2 Tablet(s) Oral at bedtime  simvastatin 40 milliGRAM(s) Oral at bedtime    MEDICATIONS  (PRN):  acetaminophen   Tablet .. 650 milliGRAM(s) Oral every 6 hours PRN Temp greater or equal to 38C (100.4F), Mild Pain (1 - 3)  ALBUTerol    90 MICROgram(s) HFA Inhaler 2 Puff(s) Inhalation every 6 hours PRN Shortness of Breath and/or Wheezing  ondansetron    Tablet 4 milliGRAM(s) Oral every 8 hours PRN Nausea and/or Vomiting    CAPILLARY BLOOD GLUCOSE        I&O's Summary      PHYSICAL EXAM:  Vital Signs Last 24 Hrs  T(C): 36.6 (27 Sep 2021 12:26), Max: 37 (27 Sep 2021 05:12)  T(F): 97.8 (27 Sep 2021 12:26), Max: 98.6 (27 Sep 2021 05:12)  HR: 65 (27 Sep 2021 12:26) (65 - 83)  BP: 107/66 (27 Sep 2021 12:26) (107/66 - 167/73)  BP(mean): --  RR: 17 (27 Sep 2021 12:26) (16 - 18)  SpO2: 96% (27 Sep 2021 12:26) (88% - 100%)    LABS:                        9.7    10.87 )-----------( 265      ( 27 Sep 2021 07:48 )             28.6     09-27    136  |  101  |  14  ----------------------------<  107<H>  3.7   |  28  |  0.89    Ca    11.3<H>      27 Sep 2021 07:48  Phos  3.7     09-27  Mg     1.8     09-27    TPro  7.8  /  Alb  2.9<L>  /  TBili  0.4  /  DBili  x   /  AST  72<H>  /  ALT  28  /  AlkPhos  98  09-26              COVID-19 PCR: NotDetec (23 Sep 2021 20:35)  COVID-19 PCR: NotDetec (20 Sep 2021 11:38)       Patient is a 71y old  Female who presents with a chief complaint of Pneumonia (27 Sep 2021 12:13)      SUBJECTIVE / OVERNIGHT EVENTS: events noted. Still confused. Head CT shows lytic lesions and continued hypercalcemia. Await EBUS with Bx      MEDICATIONS  (STANDING):  amLODIPine   Tablet 5 milliGRAM(s) Oral daily  chlorhexidine 2% Cloths 1 Application(s) Topical daily  folic acid 1 milliGRAM(s) Oral daily  heparin   Injectable 5000 Unit(s) SubCutaneous every 8 hours  influenza   Vaccine 0.5 milliLiter(s) IntraMuscular once  lisinopril 10 milliGRAM(s) Oral every 12 hours  metoprolol tartrate 100 milliGRAM(s) Oral two times a day  nystatin Powder 1 Application(s) Topical every 8 hours  pamidronate IVPB 60 milliGRAM(s) IV Intermittent once  PARoxetine 40 milliGRAM(s) Oral daily  senna 2 Tablet(s) Oral at bedtime  simvastatin 40 milliGRAM(s) Oral at bedtime    MEDICATIONS  (PRN):  acetaminophen   Tablet .. 650 milliGRAM(s) Oral every 6 hours PRN Temp greater or equal to 38C (100.4F), Mild Pain (1 - 3)  ALBUTerol    90 MICROgram(s) HFA Inhaler 2 Puff(s) Inhalation every 6 hours PRN Shortness of Breath and/or Wheezing  ondansetron    Tablet 4 milliGRAM(s) Oral every 8 hours PRN Nausea and/or Vomiting    CAPILLARY BLOOD GLUCOSE        I&O's Summary      Vital Signs Last 24 Hrs  T(C): 36.6 (27 Sep 2021 12:26), Max: 37 (27 Sep 2021 05:12)  T(F): 97.8 (27 Sep 2021 12:26), Max: 98.6 (27 Sep 2021 05:12)  HR: 65 (27 Sep 2021 12:26) (65 - 83)  BP: 107/66 (27 Sep 2021 12:26) (107/66 - 167/73)  BP(mean): --  RR: 17 (27 Sep 2021 12:26) (16 - 18)  SpO2: 96% (27 Sep 2021 12:26) (88% - 100%)    GEN: NAD, confused  LUNGS: decreased BS on right, Left CTA  HEART: S1 S2  ABDOMEN: soft, non-tender, non-distended, + BS  EXTREMITIES: no edema  NERVOUS SYSTEM:  Awake and alert    LABS:                        9.7    10.87 )-----------( 265      ( 27 Sep 2021 07:48 )             28.6     09-27    136  |  101  |  14  ----------------------------<  107<H>  3.7   |  28  |  0.89    Ca    11.3<H>      27 Sep 2021 07:48  Phos  3.7     09-27  Mg     1.8     09-27    TPro  7.8  /  Alb  2.9<L>  /  TBili  0.4  /  DBili  x   /  AST  72<H>  /  ALT  28  /  AlkPhos  98  09-26              COVID-19 PCR: NotDetec (23 Sep 2021 20:35)  COVID-19 PCR: NotDetec (20 Sep 2021 11:38)

## 2021-09-27 NOTE — DIETITIAN INITIAL EVALUATION ADULT. - PROBLEM SELECTOR PLAN 1
Hypotensive to 80s on RA, increased to 98% on 4L and hypotension   CXR: RLL consolidation and effusion  c/w azithro and rocephin for CAP  follow legionella antigen  Follow blood cultures  Follow echo   Follow CT chest   Dr. Lombardi consulted

## 2021-09-27 NOTE — PROGRESS NOTE ADULT - ASSESSMENT
72 y/o F hx of HTN and HLD BIBEMS for hypoxia and hypotension at outpatient clinic.   Patient was admitted under medicine for sepsis secondary to RUL PNA. CT showed RUL PNA. UA positive, UCx E coli (10-49K).  Patient is on azithromycin and Rocephin. Bcx no growth.   Incidental finding on CT scan of R hilar lung mass and Mediastinal  adenopathy, Probable liver metastases. Pulmonary dr. Lombardi and [facility]- Dr. Mathews following.

## 2021-09-27 NOTE — DIETITIAN INITIAL EVALUATION ADULT. - PERTINENT LABORATORY DATA
09-27 Na136 mmol/L Glu 107 mg/dL<H> K+ 3.7 mmol/L Cr  0.89 mg/dL BUN 14 mg/dL   09-27 Phos 3.7 mg/dL   09-26 Alb 2.9 g/dL<L>     09-21 Chol 172 mg/dL LDL --    HDL 25 mg/dL<L> Trig 347 mg/dL<H>    09-21-21 @ 09:58 HgbA1C 6.5

## 2021-09-27 NOTE — DIETITIAN INITIAL EVALUATION ADULT. - OTHER INFO
Patient from home lives with . Visited pt. alert but "mildly agitated" with RN at bedside, pt. reports of "feeling nausea" in the am, receiving meds, stated usual po intake good at home, able to chew/swallow w/out difficulty prior to admit, unsure of current wt. or wt. gain/loss histories? dosing wt. 175.9 Lbs on 9/20/21. Per pt. possible d/c to home today, per flow sheet intake 75% noted, obtained food preferences, encourage po intake w/meal set up, skin intact, on edema.

## 2021-09-27 NOTE — PROGRESS NOTE ADULT - PROBLEM SELECTOR PLAN 5
Likely secondary to metastatic disease  CA 11.3  Corrected Ca 12.2  Give Aredia per heme/onc recommendation  Follow up BMP in AM

## 2021-09-27 NOTE — PROGRESS NOTE ADULT - PROBLEM SELECTOR PLAN 1
SPO2 93% on 3L  CT chest noted above  S/p course of Azithromycin  Blood cultures neg  SPO@ 88% on room air  Home oxygen ordered

## 2021-09-27 NOTE — DIETITIAN INITIAL EVALUATION ADULT. - PROBLEM SELECTOR PLAN 2
Hypotensive to 80s on RA, increased to 98% on 4L and hypotension   Likely 2/2 CAP  CXR: RLL consolidation and effusion  c/w azithro and rocephin for CAP  follow legionella antigen  Follow blood cultures  Follow echo   Follow CT chest   Dr. Lombardi consulted

## 2021-09-27 NOTE — PROGRESS NOTE ADULT - SUBJECTIVE AND OBJECTIVE BOX
HPI:  71 YOF admitted with AHRF.  Cultures drawn and antibiotics started.  Lung mass incidental finding work up by heme/onc    OVERNIGHT EVENTS:  No new overnight events.  Seen and examined at bedside.     REVIEW OF SYSTEMS:     CONSTITUTIONAL: No fever  EYES: no acute visual disturbances  NECK: No pain or stiffness  RESPIRATORY: No cough; No shortness of breath  CARDIOVASCULAR: No chest pain, no palpitations  GASTROINTESTINAL: No pain. No nausea, vomiting or diarrhea   NEUROLOGICAL: No headache or numbness, no tremors  MUSCULOSKELETAL: No joint pain, no muscle pain  GENITOURINARY: no dysuria, no frequency, no hesitancy  PSYCHIATRY: no depression, no anxiety  ALL OTHER  ROS negative        Vital Signs Last 24 Hrs  T(C): 37 (27 Sep 2021 05:12), Max: 37 (27 Sep 2021 05:12)  T(F): 98.6 (27 Sep 2021 05:12), Max: 98.6 (27 Sep 2021 05:12)  HR: 83 (27 Sep 2021 05:12) (65 - 83)  BP: 167/73 (27 Sep 2021 05:12) (133/73 - 167/73)  BP(mean): --  RR: 18 (27 Sep 2021 05:12) (16 - 18)  SpO2: 93% (27 Sep 2021 05:12) (88% - 100%)    ________________________________________________  PHYSICAL EXAM:    GENERAL: NAD  HEENT: Normocephalic; conjunctivae and sclerae clear;  NECK : supple, no JVD  CHEST/LUNG: Clear to auscultation; Nonlabored  HEART: S1 S2  regular  ABDOMEN: Soft, Nontender, Nondistended; Bowel sounds present  EXTREMITIES: no cyanosis; no LE edema; no calf tenderness  SKIN: warm and dry; No rashes or lesions  NERVOUS SYSTEM:  Awake; confused    _________________________________________________  CURRENT MEDICATIONS:    MEDICATIONS  (STANDING):  amLODIPine   Tablet 5 milliGRAM(s) Oral daily  chlorhexidine 2% Cloths 1 Application(s) Topical daily  folic acid 1 milliGRAM(s) Oral daily  heparin   Injectable 5000 Unit(s) SubCutaneous every 8 hours  influenza   Vaccine 0.5 milliLiter(s) IntraMuscular once  lisinopril 10 milliGRAM(s) Oral every 12 hours  metoprolol tartrate 100 milliGRAM(s) Oral two times a day  pamidronate IVPB 60 milliGRAM(s) IV Intermittent once  PARoxetine 40 milliGRAM(s) Oral daily  senna 2 Tablet(s) Oral at bedtime  simvastatin 40 milliGRAM(s) Oral at bedtime    MEDICATIONS  (PRN):  acetaminophen   Tablet .. 650 milliGRAM(s) Oral every 6 hours PRN Temp greater or equal to 38C (100.4F), Mild Pain (1 - 3)  ALBUTerol    90 MICROgram(s) HFA Inhaler 2 Puff(s) Inhalation every 6 hours PRN Shortness of Breath and/or Wheezing  ondansetron    Tablet 4 milliGRAM(s) Oral every 8 hours PRN Nausea and/or Vomiting      __________________________________________________  LABS:                          9.7    10.87 )-----------( 265      ( 27 Sep 2021 07:48 )             28.6     09-27    136  |  101  |  14  ----------------------------<  107<H>  3.7   |  28  |  0.89    Ca    11.3<H>      27 Sep 2021 07:48  Phos  3.7     09-27  Mg     1.8     09-27    TPro  7.8  /  Alb  2.9<L>  /  TBili  0.4  /  DBili  x   /  AST  72<H>  /  ALT  28  /  AlkPhos  98  09-26        CAPILLARY BLOOD GLUCOSE          __________________________________________________  RADIOLOGY & ADDITIONAL TESTS:    Imaging Personally Reviewed:  YES    < from: CT Head No Cont (09.25.21 @ 07:24) >  IMPRESSION:  Multiple lytic lesions suspicious for metastases or multiple myeloma. Correlate clinically    If intraparenchymal metastasis is a clinical concern, MRI exam with contrast recommended    No acute intracranial hemorrhage.    < end of copied text >    < from: US Abdomen Upper Quadrant Right (09.24.21 @ 12:48) >  IMPRESSION: Multiple circumscribed rounded lesions are identified within the hepatic parenchyma measuring up to 1.9 x 1.7 x 1.1 cm, indeterminate etiology and clinical significance. Further evaluation with contrast-enhanced abdominal MRI is recommended.    < end of copied text >    < from: CT Chest No Cont (09.20.21 @ 22:05) >  IMPRESSION:  Central mass encasing the right hilum with postobstructive atelectasis in the right middle and lower lobes.    Mediastinal and right hilar adenopathy.    Probable liver metastases.    Patchy consolidation in the right upper lobe, possibly pneumonia.    < end of copied text >    Consultant(s) Notes Reviewed:   YES     Plan of care was discussed with patient and /or primary care giver; all questions and concerns were addressed and care was aligned with patient's wishes.    Plan discussed with attending and consulting physicians.

## 2021-09-27 NOTE — DIETITIAN INITIAL EVALUATION ADULT. - FACTORS AFF FOOD INTAKE
weakness, PNA, acute respiratory failure w/hypoxia, HTN, CAD, anxiety, BRENDA, h/o cardiac catheterization/difficulty with food procurement/preparation/pain/persistent nausea/other (specify)

## 2021-09-27 NOTE — PROGRESS NOTE ADULT - ASSESSMENT
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 mediastinal/hilar adenopathy and mult indeterminate liver lesions, largest 2.2cm  reviewed findings with pt and discussed likely lung cancer with met disease to liver; however need a biopsy to dx and then formulate prognosis and tx plan  CEA is normal, likely non-expressor if lung CA or other pathology  CT A/P shows Right mid abdominal peritoneal nodules are noted measuring up to 1.0 cm suspicious for metastatic peritoneal implants. Upper abdominal lymph nodes at thelevel of the celiac axis and superior mesenteric artery are seen measuring up to 8 mm in short axis. Liver lesions suspected on the noncontrast study not confirmed on this portal venous phase study.   Rec's:  -appreciate IR consult- peritoneal Bx not feasible d/t size would not yield sufficient tissue  -will need Bronch/EBUS with Bx when clinically stable inpt vs outpt  -AMS, Head CT shows multiple lytic lesions, Corrected CA -12.1 despite IVF, start Aredia  -Pall Care consult appreciated, pt is full-code,  is HCP and agree with Bx  -PET/CT as outpt  -PT eval when clinically stable  -Await results for further recommendations    Problem# Normocytic Anemia  Hgb=on admit 10.8 and today 10.4 stable  pt thinks she has a hx of anemia, but unsure  Cr nl  Rec's:  -CBC daily  -retic 2.1%, Iron Sat 21%, check B12/folate/TSH/SPEP  -Hepatitis C reactive, RNA pending, Hepatology consult  -Transfuse PRBC if Hgb <7.0 or if symptomatic  -will reach out to pt's PCP for baseline CBC and other hx    Thank you for the referral. Will continue to monitor the patient.  Please call with any questions [phone]  Above reviewed with Attending Dr. Trinh

## 2021-09-27 NOTE — PROGRESS NOTE ADULT - SUBJECTIVE AND OBJECTIVE BOX
Time of Visit:  Patient seen and examined.     MEDICATIONS  (STANDING):  amLODIPine   Tablet 5 milliGRAM(s) Oral daily  chlorhexidine 2% Cloths 1 Application(s) Topical daily  folic acid 1 milliGRAM(s) Oral daily  heparin   Injectable 5000 Unit(s) SubCutaneous every 8 hours  influenza   Vaccine 0.5 milliLiter(s) IntraMuscular once  lisinopril 10 milliGRAM(s) Oral every 12 hours  metoprolol tartrate 100 milliGRAM(s) Oral two times a day  nystatin Powder 1 Application(s) Topical every 8 hours  PARoxetine 40 milliGRAM(s) Oral daily  senna 2 Tablet(s) Oral at bedtime  simvastatin 40 milliGRAM(s) Oral at bedtime      MEDICATIONS  (PRN):  acetaminophen   Tablet .. 650 milliGRAM(s) Oral every 6 hours PRN Temp greater or equal to 38C (100.4F), Mild Pain (1 - 3)  ALBUTerol    90 MICROgram(s) HFA Inhaler 2 Puff(s) Inhalation every 6 hours PRN Shortness of Breath and/or Wheezing  ondansetron    Tablet 4 milliGRAM(s) Oral every 8 hours PRN Nausea and/or Vomiting       Medications up to date at time of exam.      PHYSICAL EXAMINATION:    Vital Signs Last 24 Hrs  T(C): 36.6 (27 Sep 2021 12:26), Max: 37 (27 Sep 2021 05:12)  T(F): 97.8 (27 Sep 2021 12:26), Max: 98.6 (27 Sep 2021 05:12)  HR: 65 (27 Sep 2021 12:26) (65 - 83)  BP: 107/66 (27 Sep 2021 12:26) (107/66 - 167/73)  BP(mean): --  RR: 17 (27 Sep 2021 12:26) (17 - 18)  SpO2: 96% (27 Sep 2021 12:26) (88% - 100%)   (if applicable)    General: Alert and oriented . Poor historian . No acute distress.     HEENT: Head is normocephalic and atraumatic. Extraocular muscles are intact. Mucous membranes are moist.     NECK: Supple, no palpable adenopathy.    LUNGS: Clear to auscultation bilaterally with no wheezing, rales, or rhonchi. No use of accessory muscle.     HEART: S1 S2 Regular rate and no click/ rub.     ABDOMEN: Soft, nontender, and nondistended.  No hepatosplenomegaly is noted. Active bowel sounds.     EXTREMITIES: Without any cyanosis, clubbing, rash, lesions or edema.    NEUROLOGIC: Awake, alert, oriented.     SKIN: Warm, dry, good turgor.      LABS:                        9.7    10.87 )-----------( 265      ( 27 Sep 2021 07:48 )             28.6     09-27    136  |  101  |  14  ----------------------------<  107<H>  3.7   |  28  |  0.89    Ca    11.3<H>      27 Sep 2021 07:48  Phos  3.7     09-27  Mg     1.8     09-27    TPro  7.8  /  Alb  2.9<L>  /  TBili  0.4  /  DBili  x   /  AST  72<H>  /  ALT  28  /  AlkPhos  98  09-26                        MICROBIOLOGY: (if applicable)    RADIOLOGY & ADDITIONAL STUDIES:  EKG:   CXR:  ECHO:    IMPRESSION: 71y Female PAST MEDICAL & SURGICAL HISTORY:  Lumbar stenosis    H/O: HTN (hypertension)    Hypercholesterolemia    Bleeding ulcer  stomach ulcer 2011    Anxiety    Spinal stenosis in cervical region    Acute hepatitis C virus infection without hepatic coma  treated 2015- Resolved    Balance disorder    Coronary artery disease involving native coronary artery of native heart without angina pectoris    HTN (hypertension)    HLD (hyperlipidemia)    H/O colonoscopy    Bleeding ulcer  stomach surgery for bleeding ulcer    Fracture  ORIF Left wrist  1/17/13    History of lumbar surgery  2013    Chronic UTI  Pt reports presently on 2nd dose of antibiotics 7/5/18 10 days, Dr Herman aware, pt going for m/eval 7/13/18.    H/O cardiac catheterization  2013, no intervention needed, treated medically    Impression; 72 Y/O Female presented to ED with  Hypoxia due to post obstructive pna with right lung mass with meds. Former smoker. Had hypoxia and hypotension at outpatient clinic. 09-23-21 Negative for Covid 19 PCR. Has an Acute hypoxic respiratory failure seconadry to Post obstructive Pneumonia and Has Right Hilar Lung Mass.      Suggestion ;  O2 saturation 96% . Continue Oxygen supplementation 2L NC.   Pat will need tissue bx   Amicable for bronch with bx / EBUS .. now pat is deferring any decision regarding medical care for another day.   Continue PRN Albuterol 2 puffs Q 6 hours.    DVT/ GI prophylactic .

## 2021-09-27 NOTE — DIETITIAN INITIAL EVALUATION ADULT. - PERTINENT MEDS FT
MEDICATIONS  (STANDING):  amLODIPine   Tablet 5 milliGRAM(s) Oral daily  chlorhexidine 2% Cloths 1 Application(s) Topical daily  folic acid 1 milliGRAM(s) Oral daily  heparin   Injectable 5000 Unit(s) SubCutaneous every 8 hours  influenza   Vaccine 0.5 milliLiter(s) IntraMuscular once  lisinopril 10 milliGRAM(s) Oral every 12 hours  metoprolol tartrate 100 milliGRAM(s) Oral two times a day  nystatin Powder 1 Application(s) Topical every 8 hours  PARoxetine 40 milliGRAM(s) Oral daily  senna 2 Tablet(s) Oral at bedtime  simvastatin 40 milliGRAM(s) Oral at bedtime    MEDICATIONS  (PRN):  acetaminophen   Tablet .. 650 milliGRAM(s) Oral every 6 hours PRN Temp greater or equal to 38C (100.4F), Mild Pain (1 - 3)  ALBUTerol    90 MICROgram(s) HFA Inhaler 2 Puff(s) Inhalation every 6 hours PRN Shortness of Breath and/or Wheezing  ondansetron    Tablet 4 milliGRAM(s) Oral every 8 hours PRN Nausea and/or Vomiting

## 2021-09-28 DIAGNOSIS — Z29.9 ENCOUNTER FOR PROPHYLACTIC MEASURES, UNSPECIFIED: ICD-10-CM

## 2021-09-28 LAB
ANION GAP SERPL CALC-SCNC: 10 MMOL/L — SIGNIFICANT CHANGE UP (ref 5–17)
APPEARANCE UR: ABNORMAL
BACTERIA # UR AUTO: ABNORMAL /HPF
BILIRUB UR-MCNC: NEGATIVE — SIGNIFICANT CHANGE UP
BUN SERPL-MCNC: 16 MG/DL — SIGNIFICANT CHANGE UP (ref 7–18)
CALCIUM SERPL-MCNC: 11.5 MG/DL — HIGH (ref 8.4–10.5)
CHLORIDE SERPL-SCNC: 100 MMOL/L — SIGNIFICANT CHANGE UP (ref 96–108)
CO2 SERPL-SCNC: 28 MMOL/L — SIGNIFICANT CHANGE UP (ref 22–31)
COLOR SPEC: YELLOW — SIGNIFICANT CHANGE UP
CREAT SERPL-MCNC: 0.99 MG/DL — SIGNIFICANT CHANGE UP (ref 0.5–1.3)
DIFF PNL FLD: ABNORMAL
EPI CELLS # UR: SIGNIFICANT CHANGE UP /HPF
GLUCOSE SERPL-MCNC: 112 MG/DL — HIGH (ref 70–99)
GLUCOSE UR QL: NEGATIVE — SIGNIFICANT CHANGE UP
HCT VFR BLD CALC: 29.8 % — LOW (ref 34.5–45)
HGB BLD-MCNC: 10 G/DL — LOW (ref 11.5–15.5)
HYALINE CASTS # UR AUTO: ABNORMAL /LPF
KETONES UR-MCNC: NEGATIVE — SIGNIFICANT CHANGE UP
LEUKOCYTE ESTERASE UR-ACNC: NEGATIVE — SIGNIFICANT CHANGE UP
MCHC RBC-ENTMCNC: 29.7 PG — SIGNIFICANT CHANGE UP (ref 27–34)
MCHC RBC-ENTMCNC: 33.6 GM/DL — SIGNIFICANT CHANGE UP (ref 32–36)
MCV RBC AUTO: 88.4 FL — SIGNIFICANT CHANGE UP (ref 80–100)
NITRITE UR-MCNC: NEGATIVE — SIGNIFICANT CHANGE UP
NRBC # BLD: 0 /100 WBCS — SIGNIFICANT CHANGE UP (ref 0–0)
PH UR: 5 — SIGNIFICANT CHANGE UP (ref 5–8)
PLATELET # BLD AUTO: 232 K/UL — SIGNIFICANT CHANGE UP (ref 150–400)
POTASSIUM SERPL-MCNC: 3.5 MMOL/L — SIGNIFICANT CHANGE UP (ref 3.5–5.3)
POTASSIUM SERPL-SCNC: 3.5 MMOL/L — SIGNIFICANT CHANGE UP (ref 3.5–5.3)
PROT UR-MCNC: 30 MG/DL
RBC # BLD: 3.37 M/UL — LOW (ref 3.8–5.2)
RBC # FLD: 13.2 % — SIGNIFICANT CHANGE UP (ref 10.3–14.5)
RBC CASTS # UR COMP ASSIST: ABNORMAL /HPF (ref 0–2)
SODIUM SERPL-SCNC: 138 MMOL/L — SIGNIFICANT CHANGE UP (ref 135–145)
SP GR SPEC: 1.02 — SIGNIFICANT CHANGE UP (ref 1.01–1.02)
TSH SERPL-MCNC: 1.17 UU/ML — SIGNIFICANT CHANGE UP (ref 0.34–4.82)
UROBILINOGEN FLD QL: NEGATIVE — SIGNIFICANT CHANGE UP
WBC # BLD: 8.15 K/UL — SIGNIFICANT CHANGE UP (ref 3.8–10.5)
WBC # FLD AUTO: 8.15 K/UL — SIGNIFICANT CHANGE UP (ref 3.8–10.5)
WBC UR QL: SIGNIFICANT CHANGE UP /HPF (ref 0–5)

## 2021-09-28 PROCEDURE — 71045 X-RAY EXAM CHEST 1 VIEW: CPT | Mod: 26

## 2021-09-28 RX ORDER — ACETAMINOPHEN 500 MG
650 TABLET ORAL ONCE
Refills: 0 | Status: COMPLETED | OUTPATIENT
Start: 2021-09-28 | End: 2021-09-28

## 2021-09-28 RX ORDER — PANTOPRAZOLE SODIUM 20 MG/1
40 TABLET, DELAYED RELEASE ORAL
Refills: 0 | Status: DISCONTINUED | OUTPATIENT
Start: 2021-09-28 | End: 2021-10-04

## 2021-09-28 RX ORDER — CALCITONIN SALMON 200 [IU]/ML
320 INJECTION, SOLUTION INTRAMUSCULAR EVERY 12 HOURS
Refills: 0 | Status: COMPLETED | OUTPATIENT
Start: 2021-09-28 | End: 2021-09-29

## 2021-09-28 RX ADMIN — NYSTATIN CREAM 1 APPLICATION(S): 100000 CREAM TOPICAL at 05:40

## 2021-09-28 RX ADMIN — SENNA PLUS 2 TABLET(S): 8.6 TABLET ORAL at 21:32

## 2021-09-28 RX ADMIN — PANTOPRAZOLE SODIUM 40 MILLIGRAM(S): 20 TABLET, DELAYED RELEASE ORAL at 18:18

## 2021-09-28 RX ADMIN — LISINOPRIL 10 MILLIGRAM(S): 2.5 TABLET ORAL at 18:18

## 2021-09-28 RX ADMIN — CHLORHEXIDINE GLUCONATE 1 APPLICATION(S): 213 SOLUTION TOPICAL at 11:03

## 2021-09-28 RX ADMIN — NYSTATIN CREAM 1 APPLICATION(S): 100000 CREAM TOPICAL at 21:32

## 2021-09-28 RX ADMIN — Medication 650 MILLIGRAM(S): at 13:35

## 2021-09-28 RX ADMIN — HEPARIN SODIUM 5000 UNIT(S): 5000 INJECTION INTRAVENOUS; SUBCUTANEOUS at 21:32

## 2021-09-28 RX ADMIN — HEPARIN SODIUM 5000 UNIT(S): 5000 INJECTION INTRAVENOUS; SUBCUTANEOUS at 13:08

## 2021-09-28 RX ADMIN — Medication 100 MILLIGRAM(S): at 18:19

## 2021-09-28 RX ADMIN — Medication 40 MILLIGRAM(S): at 11:03

## 2021-09-28 RX ADMIN — SIMVASTATIN 40 MILLIGRAM(S): 20 TABLET, FILM COATED ORAL at 21:32

## 2021-09-28 RX ADMIN — Medication 100 MILLIGRAM(S): at 05:40

## 2021-09-28 RX ADMIN — NYSTATIN CREAM 1 APPLICATION(S): 100000 CREAM TOPICAL at 13:09

## 2021-09-28 RX ADMIN — Medication 1 MILLIGRAM(S): at 11:03

## 2021-09-28 RX ADMIN — LISINOPRIL 10 MILLIGRAM(S): 2.5 TABLET ORAL at 05:40

## 2021-09-28 RX ADMIN — AMLODIPINE BESYLATE 5 MILLIGRAM(S): 2.5 TABLET ORAL at 05:40

## 2021-09-28 RX ADMIN — HEPARIN SODIUM 5000 UNIT(S): 5000 INJECTION INTRAVENOUS; SUBCUTANEOUS at 05:40

## 2021-09-28 RX ADMIN — Medication 650 MILLIGRAM(S): at 13:05

## 2021-09-28 RX ADMIN — CALCITONIN SALMON 320 INTERNATIONAL UNIT(S): 200 INJECTION, SOLUTION INTRAMUSCULAR at 18:25

## 2021-09-28 NOTE — PROGRESS NOTE ADULT - PROBLEM SELECTOR PLAN 1
In the setting of lung mass and PNA  - s/p antimicrobial therapy x 5 days   - SPO2 92-96 % on 3L ( recent spo2on RA was 88%; will require home oxygen therapy)   - last blood cx neg  - Repeat Cxray:  ordered as patient w/ Tmax of 102 today  - Pulmonary, Dr Lombardi following. Patient agreeing to lung mass biopsy; also discussed with  also agrees

## 2021-09-28 NOTE — PROGRESS NOTE ADULT - PROBLEM SELECTOR PLAN 8
- S/p Antimicrobial therapy  - Now with Tmax 102 likely in the setting of metastatic disease however cannot r/o infectious etiology; blood cx, Cxray ordered  - ID Dr Lopez consulted

## 2021-09-28 NOTE — PROGRESS NOTE ADULT - SUBJECTIVE AND OBJECTIVE BOX
Time of Visit:  Patient seen and examined.     MEDICATIONS  (STANDING):  amLODIPine   Tablet 5 milliGRAM(s) Oral daily  calcitonin Injectable 320 International Unit(s) SubCutaneous every 12 hours  chlorhexidine 2% Cloths 1 Application(s) Topical daily  folic acid 1 milliGRAM(s) Oral daily  heparin   Injectable 5000 Unit(s) SubCutaneous every 8 hours  influenza   Vaccine 0.5 milliLiter(s) IntraMuscular once  lisinopril 10 milliGRAM(s) Oral every 12 hours  metoprolol tartrate 100 milliGRAM(s) Oral two times a day  nystatin Powder 1 Application(s) Topical every 8 hours  pantoprazole    Tablet 40 milliGRAM(s) Oral before breakfast  PARoxetine 40 milliGRAM(s) Oral daily  senna 2 Tablet(s) Oral at bedtime  simvastatin 40 milliGRAM(s) Oral at bedtime      MEDICATIONS  (PRN):  acetaminophen   Tablet .. 650 milliGRAM(s) Oral every 6 hours PRN Temp greater or equal to 38C (100.4F), Mild Pain (1 - 3)  ALBUTerol    90 MICROgram(s) HFA Inhaler 2 Puff(s) Inhalation every 6 hours PRN Shortness of Breath and/or Wheezing  ondansetron    Tablet 4 milliGRAM(s) Oral every 8 hours PRN Nausea and/or Vomiting       Medications up to date at time of exam.      PHYSICAL EXAMINATION:  Patient has no new complaints.  GENERAL: The patient is a well-developed, well-nourished, in no apparent distress.     Vital Signs Last 24 Hrs  T(C): 37 (28 Sep 2021 20:05), Max: 38.9 (28 Sep 2021 12:24)  T(F): 98.6 (28 Sep 2021 20:05), Max: 102 (28 Sep 2021 12:24)  HR: 93 (28 Sep 2021 20:05) (74 - 93)  BP: 157/73 (28 Sep 2021 20:05) (150/79 - 192/75)  BP(mean): --  RR: 17 (28 Sep 2021 20:05) (17 - 17)  SpO2: 97% (28 Sep 2021 20:05) (95% - 97%)   (if applicable)    Chest Tube (if applicable)    HEENT: Head is normocephalic and atraumatic. Extraocular muscles are intact. Mucous membranes are moist.     NECK: Supple, no palpable adenopathy.    LUNGS: Clear to auscultation, no wheezing, rales, or rhonchi.    HEART: Regular rate and rhythm without murmur.    ABDOMEN: Soft, nontender, and nondistended.  No hepatosplenomegaly is noted.    : No painful voiding, no pelvic pain    EXTREMITIES: Without any cyanosis, clubbing, rash, lesions or edema.    NEUROLOGIC: Awake, + confused     SKIN: Warm, dry, good turgor.      LABS:                        10.0   8.15  )-----------( 232      ( 28 Sep 2021 05:58 )             29.8     09-28    138  |  100  |  16  ----------------------------<  112<H>  3.5   |  28  |  0.99    Ca    11.5<H>      28 Sep 2021 05:58  Phos  3.7     09-  Mg     1.8     -        Urinalysis Basic - ( 28 Sep 2021 16:32 )    Color: Yellow / Appearance: Slightly Turbid / S.020 / pH: x  Gluc: x / Ketone: Negative  / Bili: Negative / Urobili: Negative   Blood: x / Protein: 30 mg/dL / Nitrite: Negative   Leuk Esterase: Negative / RBC: 2-5 /HPF / WBC 3-5 /HPF   Sq Epi: x / Non Sq Epi: Few /HPF / Bacteria: Few /HPF                      MICROBIOLOGY: (if applicable)    RADIOLOGY & ADDITIONAL STUDIES:  EKG:   CXR:  ECHO:    IMPRESSION: 71y Female PAST MEDICAL & SURGICAL HISTORY:  Lumbar stenosis    H/O: HTN (hypertension)    Hypercholesterolemia    Bleeding ulcer  stomach ulcer     Anxiety    Spinal stenosis in cervical region    Acute hepatitis C virus infection without hepatic coma  treated - Resolved    Balance disorder    Coronary artery disease involving native coronary artery of native heart without angina pectoris    HTN (hypertension)    HLD (hyperlipidemia)    H/O colonoscopy    Bleeding ulcer  stomach surgery for bleeding ulcer    Fracture  ORIF Left wrist  13    History of lumbar surgery      Chronic UTI  Pt reports presently on 2nd dose of antibiotics 18 10 days, Dr Herman aware, pt going for m/eval 18.    H/O cardiac catheterization  , no intervention needed, treated medically     p/w       Impression; 70 Y/O Female presented to ED with  Hypoxia due to post obstructive pna with right lung mass with meds. Former smoker. Had hypoxia and hypotension at outpatient clinic. 21 Negative for Covid 19 PCR. Has an Acute hypoxic respiratory failure seconadry to Post obstructive Pneumonia and Has Right Hilar Lung Mass.   Pat is spiking fever , normal wbc with neg cultures and completed 8 days of antibx . Fever may be related to mass or malignancy.  AMS probable due to combination of  hypercalcemia , sepsis          Sugg  -O2 supp as needed   -ivf and lasix  -pat is schedule for bronch 10/4 @ 1300 hr   -O2 supp as needed   -correct calcitonin   -no sedation   -O2 supp as needed   -spouse need emotional support   -i spoke with him today.. upset at care  and blaming pulmonary team for his perceive in delay.     time spent 36 min

## 2021-09-28 NOTE — CHART NOTE - NSCHARTNOTEFT_GEN_A_CORE
I called spouse ama @ [phone]  He agreed for bronchoscopy and bx .   is upset and care. I explained to him that bronchoscopy has to be done in OR and this Monday 10/4 @1300 is the time that I am available and OR has time slot .  Dr Arias informed

## 2021-09-28 NOTE — PROGRESS NOTE ADULT - PROBLEM SELECTOR PLAN 7
/ Rene UTI  - s/p Rocephin therapy   - now with Tmax 102 likely in the setting of metastatic disease however cannot r/o infectious etiology; UA, UCx ordered  - ID Dr Lopez consulted

## 2021-09-28 NOTE — PROGRESS NOTE ADULT - SUBJECTIVE AND OBJECTIVE BOX
Patient is a 71y old  Female who presents with a chief complaint of Pneumonia (27 Sep 2021 16:37)      SUBJECTIVE / OVERNIGHT EVENTS:    ADDITIONAL REVIEW OF SYSTEMS:    MEDICATIONS  (STANDING):  amLODIPine   Tablet 5 milliGRAM(s) Oral daily  chlorhexidine 2% Cloths 1 Application(s) Topical daily  folic acid 1 milliGRAM(s) Oral daily  heparin   Injectable 5000 Unit(s) SubCutaneous every 8 hours  influenza   Vaccine 0.5 milliLiter(s) IntraMuscular once  lisinopril 10 milliGRAM(s) Oral every 12 hours  metoprolol tartrate 100 milliGRAM(s) Oral two times a day  nystatin Powder 1 Application(s) Topical every 8 hours  PARoxetine 40 milliGRAM(s) Oral daily  senna 2 Tablet(s) Oral at bedtime  simvastatin 40 milliGRAM(s) Oral at bedtime    MEDICATIONS  (PRN):  acetaminophen   Tablet .. 650 milliGRAM(s) Oral every 6 hours PRN Temp greater or equal to 38C (100.4F), Mild Pain (1 - 3)  ALBUTerol    90 MICROgram(s) HFA Inhaler 2 Puff(s) Inhalation every 6 hours PRN Shortness of Breath and/or Wheezing  ondansetron    Tablet 4 milliGRAM(s) Oral every 8 hours PRN Nausea and/or Vomiting      Vital Signs Last 24 Hrs  T(C): 37.6 (28 Sep 2021 05:24), Max: 37.6 (28 Sep 2021 05:24)  T(F): 99.7 (28 Sep 2021 05:24), Max: 99.7 (28 Sep 2021 05:24)  HR: 79 (28 Sep 2021 06:47) (65 - 93)  BP: 150/79 (28 Sep 2021 06:47) (107/66 - 192/75)  BP(mean): --  RR: 17 (28 Sep 2021 05:24) (17 - 18)  SpO2: 95% (28 Sep 2021 05:24) (92% - 96%)      LABS:                        10.0   8.15  )-----------( 232      ( 28 Sep 2021 05:58 )             29.8     09-28    138  |  100  |  16  ----------------------------<  112<H>  3.5   |  28  |  0.99    Ca    11.5<H>      28 Sep 2021 05:58  Phos  3.7     09-27  Mg     1.8     09-27                COVID-19 PCR: NotDetec (23 Sep 2021 20:35)  COVID-19 PCR: NotDetec (20 Sep 2021 11:38)      RADIOLOGY & ADDITIONAL TESTS:  Imaging from Last 24 Hours:    Electrocardiogram/QTc Interval:    COORDINATION OF CARE:  Care Discussed with Consultants/Other Providers:   Patient is a 71y old  Female who presents with a chief complaint of Pneumonia (27 Sep 2021 16:37)    SUBJECTIVE / OVERNIGHT EVENTS: events noted. Still confused Oriented to person only, she is unsure of month and thinks she is in a dentist office. She c/o generalized headache, but states it resolved with tylenol      MEDICATIONS  (STANDING):  amLODIPine   Tablet 5 milliGRAM(s) Oral daily  chlorhexidine 2% Cloths 1 Application(s) Topical daily  folic acid 1 milliGRAM(s) Oral daily  heparin   Injectable 5000 Unit(s) SubCutaneous every 8 hours  influenza   Vaccine 0.5 milliLiter(s) IntraMuscular once  lisinopril 10 milliGRAM(s) Oral every 12 hours  metoprolol tartrate 100 milliGRAM(s) Oral two times a day  nystatin Powder 1 Application(s) Topical every 8 hours  PARoxetine 40 milliGRAM(s) Oral daily  senna 2 Tablet(s) Oral at bedtime  simvastatin 40 milliGRAM(s) Oral at bedtime    MEDICATIONS  (PRN):  acetaminophen   Tablet .. 650 milliGRAM(s) Oral every 6 hours PRN Temp greater or equal to 38C (100.4F), Mild Pain (1 - 3)  ALBUTerol    90 MICROgram(s) HFA Inhaler 2 Puff(s) Inhalation every 6 hours PRN Shortness of Breath and/or Wheezing  ondansetron    Tablet 4 milliGRAM(s) Oral every 8 hours PRN Nausea and/or Vomiting      Vital Signs Last 24 Hrs  T(C): 37.6 (28 Sep 2021 05:24), Max: 37.6 (28 Sep 2021 05:24)  T(F): 99.7 (28 Sep 2021 05:24), Max: 99.7 (28 Sep 2021 05:24)  HR: 79 (28 Sep 2021 06:47) (65 - 93)  BP: 150/79 (28 Sep 2021 06:47) (107/66 - 192/75)  BP(mean): --  RR: 17 (28 Sep 2021 05:24) (17 - 18)  SpO2: 95% (28 Sep 2021 05:24) (92% - 96%)      GEN: NAD, confused, lethargic  LUNGS: decreased BS on right, Left CTA  HEART: S1 S2  ABDOMEN: soft, non-tender, non-distended, + BS  EXTREMITIES: no edema      LABS:                        10.0   8.15  )-----------( 232      ( 28 Sep 2021 05:58 )             29.8     09-28    138  |  100  |  16  ----------------------------<  112<H>  3.5   |  28  |  0.99    Ca    11.5<H>      28 Sep 2021 05:58  Phos  3.7     09-27  Mg     1.8     09-27          COVID-19 PCR: NotDetec (23 Sep 2021 20:35)  COVID-19 PCR: NotDetec (20 Sep 2021 11:38)

## 2021-09-28 NOTE — PROGRESS NOTE ADULT - PROBLEM SELECTOR PLAN 6
Patient with periods of confusion in the setting of metastatic disease and hypercalcemia;   - s/p Aredia 9/27. Serum Ca 11.5 today, corrected 12  - Heme/ Onc follow up pending Patient with periods of confusion in the setting of metastatic disease and hypercalcemia;   - s/p Aredia 9/27. Serum Ca 11.5 today, corrected 12  - Heme/ Onc follow up Patient with periods of confusion in the setting of metastatic disease and hypercalcemia;   - s/p Aredia 9/27. Serum Ca 11.5 today, corrected 12  - Neurology, Dr Lorenzo, consulted  - Heme/ Onc follow up

## 2021-09-28 NOTE — PROGRESS NOTE ADULT - PROBLEM SELECTOR PLAN 4
Heme/Onc following  -likely metastatic  Lung cancer  to bone/liver/peritoneum;  -EBUS bx recommended  -outpatient PET scan  s/p palliative eval; patient is full code.

## 2021-09-28 NOTE — PROGRESS NOTE ADULT - ASSESSMENT
seen and examined vsstable afebrile  physical unchaged  on bed comfortable  not in any distress  denies abd p[ain, constipation. no cp or palp  ( covering for Dr cavazos)  labs noted   a/p possible lung ca with mets or other primary site  ONCOLOGY on board    hypercalcemia  bisphosphonate  poor prognosis

## 2021-09-28 NOTE — PROGRESS NOTE ADULT - SUBJECTIVE AND OBJECTIVE BOX
NP Note discussed with  Primary Attending; Dr Yepez     Patient is a 71y old  Female who presents with a chief complaint of Pneumonia (28 Sep 2021 13:47)      INTERVAL HPI/OVERNIGHT EVENTS: Patient seen and examined at bedside; no new complaints. Later with tmax of 102; continues w/o symptoms.                                                           MEDICATIONS  (STANDING):  acetaminophen   Tablet .. 650 milliGRAM(s) Oral once  amLODIPine   Tablet 5 milliGRAM(s) Oral daily  chlorhexidine 2% Cloths 1 Application(s) Topical daily  folic acid 1 milliGRAM(s) Oral daily  heparin   Injectable 5000 Unit(s) SubCutaneous every 8 hours  influenza   Vaccine 0.5 milliLiter(s) IntraMuscular once  lisinopril 10 milliGRAM(s) Oral every 12 hours  metoprolol tartrate 100 milliGRAM(s) Oral two times a day  nystatin Powder 1 Application(s) Topical every 8 hours  PARoxetine 40 milliGRAM(s) Oral daily  senna 2 Tablet(s) Oral at bedtime  simvastatin 40 milliGRAM(s) Oral at bedtime    MEDICATIONS  (PRN):  acetaminophen   Tablet .. 650 milliGRAM(s) Oral every 6 hours PRN Temp greater or equal to 38C (100.4F), Mild Pain (1 - 3)  ALBUTerol    90 MICROgram(s) HFA Inhaler 2 Puff(s) Inhalation every 6 hours PRN Shortness of Breath and/or Wheezing  ondansetron    Tablet 4 milliGRAM(s) Oral every 8 hours PRN Nausea and/or Vomiting      __________________________________________________  REVIEW OF SYSTEMS:    CONSTITUTIONAL: No subjective fever/ discomfort; however w/ increased thirst  RESPIRATORY: No cough; No shortness of breath  CARDIOVASCULAR: No chest pain, no palpitations  GASTROINTESTINAL: No pain. No nausea or vomiting; No diarrhea   NEUROLOGICAL: No headache or numbness, no tremors  MUSCULOSKELETAL: No joint pain, no muscle pain  GENITOURINARY: no dysuria, no frequency, no hematuria   ALL OTHER  ROS negative        Vital Signs Last 24 Hrs  T(C): 38.9 (28 Sep 2021 12:24), Max: 38.9 (28 Sep 2021 12:24)  T(F): 102 (28 Sep 2021 12:24), Max: 102 (28 Sep 2021 12:24)  HR: 74 (28 Sep 2021 12:24) (66 - 93)  BP: 163/73 (28 Sep 2021 12:24) (127/72 - 192/75)  BP(mean): --  RR: 17 (28 Sep 2021 12:24) (17 - 18)  SpO2: 96% (28 Sep 2021 12:24) (92% - 96%)    ________________________________________________  PHYSICAL EXAM:  GENERAL: NAD  HEENT: Normocephalic;  atraumatic  CHEST/LUNG: Breathing nonlabored; clear to auscultation bilaterally.   HEART: +S1 +S2  regular; no murmurs, gallops or rubs  ABDOMEN: Softly distended, Nontender;  Bowel sounds present  EXTREMITIES: +2 bilateral radial pulses. No edema.  SKIN: warm and dry; no rash  NERVOUS SYSTEM:  Awake and alert; Oriented  to place, person  ; no new deficits    _________________________________________________  LABS:                        10.0   8.15  )-----------( 232      ( 28 Sep 2021 05:58 )             29.8     09-28    138  |  100  |  16  ----------------------------<  112<H>  3.5   |  28  |  0.99    Ca    11.5<H>      28 Sep 2021 05:58  Phos  3.7     09-27  Mg     1.8     09-27              RADIOLOGY & ADDITIONAL TESTS:  < from: CT Head No Cont (09.25.21 @ 07:24) >    EXAM:  CT BRAIN                            PROCEDURE DATE:  09/25/2021          INTERPRETATION:  CLINICAL STATEMENT: Lung mass    TECHNIQUE: CT of the head was performed without IV contrast.  RAPID artificial intelligence was utilized for the preliminary evaluation of intracranial hemorrhage.    COMPARISON: MRI brain 9/22/2021, 5/27/2018    FINDINGS:  There is moderate diffuse parenchymal volume loss. There are areas of low attenuation in the periventricular white matter likely related to mild chronic microvascular ischemic changes.    There is no acute intracranial hemorrhage, mass effect or midline shift. There is no acute territorial infarct. There is no hydrocephalus.    The cranium is intact. Multiple lytic lesions noted.    Mucosal thickening sphenoid sinus    IMPRESSION:  Multiple lytic lesions suspicious for metastases or multiple myeloma. Correlate clinically    If intraparenchymal metastasis is a clinical concern, MRI exam with contrast recommended    No acute intracranial hemorrhage.    --- End of Report ---      MARTY MILES MD; Attending Radiologist  This document has been electronically signed. Sep 25 2021  1:00PM  < from: CT Abdomen and Pelvis w/ Oral Cont and w/ IV Cont (09.22.21 @ 10:19) >  XAM:  CT ABDOMEN AND PELVIS OC IC                            PROCEDURE DATE:  09/22/2021          INTERPRETATION:  CLINICAL INFORMATION: CAT chest demonstrated right hilar mass and probable liver metastases. Evaluate for metastatic disease    COMPARISON: CT chest of 9/20/2021    CONTRAST/COMPLICATIONS:  IV Contrast: Omnipaque 350  90 cc administered   30 cc discarded  Oral Contrast: Omnipaque 300  Complications: None reported at time of study completion    PROCEDURE:  CT of the Abdomen and Pelvis was performed.  Sagittal and coronal reformats were performed.    FINDINGS:  LOWER CHEST: Partially visualized known right hilar/infrahilar mass with atelectatic lung at the right lung base and small right pleural effusion more fully described on chest CT of 9/20/2021. Coronary artery calcification is also noted.    LIVER: Within normal limits. Suspected lesion in the right hepatic lobe on the noncontrast study is not confirmed on this portal venous phase study.  BILE DUCTS: Normal caliber.  GALLBLADDER: Personally calcified gallstone versus gallbladder wall calcification. This may be correlated with ultrasound on a nonemergent basis.  SPLEEN: Within normal limits.  PANCREAS: Within normal limits.  ADRENALS: Within normal limits.  KIDNEYS/URETERS: Within normal limits.    BLADDER: Within normal limits.  REPRODUCTIVE ORGANS: Uterus and adnexa within normal limits. Incidental note is made of left adnexal calcification    BOWEL: No bowel obstruction. Appendix is normal. Changes of gastricbypass  PERITONEUM: No ascites. Right mid abdominal peritoneal nodules are noted measuring up to 1.0 cm suspicious for metastatic peritoneal implants.  VESSELS: Atherosclerotic changes.  RETROPERITONEUM/LYMPH NODES: Upper abdominal lymph nodes at thelevel of the celiac axis and superior mesenteric artery are seen measuring up to 8 mm in short axis.  ABDOMINAL WALL: Within normal limits.  BONES: Degenerative changes. Pedicle screws are seen on the left at the L4 and L5 levels with artificial discat L4-L5 and mild grade 1 anterolisthesis of L4 on L5. Mild superior T12 compression fracture    IMPRESSION:  Liver lesions suspected on the noncontrast study not confirmed on this portal venous phase study. Consider arterial phase imaging if needed for patient's diagnosis.  Group of peritoneal nodules in the right mid abdomen may represent metastatic peritoneal implants.  Calcified gallstone versus gallbladder wall calcification which may be further evaluated with ultrasound on a nonemergent basis  Evidence of gastric bypass    --- End of Report ---    < end of copied text >      < from: CT Chest No Cont (09.20.21 @ 22:05) >    EXAM:  CT CHEST                            PROCEDURE DATE:  09/20/2021          INTERPRETATION:  CLINICAL INFORMATION: Pleural effusion.    COMPARISON: None.    CONTRAST/COMPLICATIONS:  IV Contrast: NONE  Oral Contrast: NONE  Complications: None reported at time of study completion    PROCEDURE:  CT of the Chest was performed.  Sagittal and coronal reformats were performed.    FINDINGS:    LUNGS AND AIRWAYS: Central mass encasing the right hilum, measuring at least 5.4 x 5.2 cm. Postobstructiveatelectasis in the right middle and lower lobes. Patchy consolidation in the right upper lobe, possibly pneumonia.  PLEURA: Small right pleural effusion.  MEDIASTINUM AND DESIREE: Mediastinal and right hilar adenopathy. For example:  *  Lower right paratracheal node, measuring 1.9 x 1.8 cm  *  Subcarinal node (series 3, image 65), measuring 2.0 x 1.5 cm  VESSELS: Atherosclerotic calcifications.  HEART: Heart size is normal. No pericardial effusion.  CHEST WALL AND LOWER NECK: Within normal limits.  VISUALIZED UPPER ABDOMEN: Peripherally calcified gallstone or gallbladder wall. Multiple indeterminate liver lesions, concerning for metastases. For example:  *  Segment 6 (series 3, image 135), measuring 1.7 x 1.5 cm  *  Segment 4A (series 3, image 104), measuring 2.2 x 2.2 cm  BONES: Mild compression of the T12 superior endplate. Degenerative changes. ACDF hardware.    IMPRESSION:  Central mass encasing the right hilum with postobstructive atelectasis in the right middle and lower lobes.    Mediastinal and right hilar adenopathy.    Probable liver metastases.    Patchy consolidation in the right upper lobe, possibly pneumonia.    --- End of Report ---      GERARDO CLAYTON MD; Attending Radiologist  This document has been electronically signed. Sep 21 2021  9:16AM

## 2021-09-28 NOTE — PROGRESS NOTE ADULT - ASSESSMENT
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 mediastinal/hilar adenopathy and mult indeterminate liver lesions, largest 2.2cm  reviewed findings with pt and discussed likely lung cancer with met disease to liver; however need a biopsy to dx and then formulate prognosis and tx plan  CEA is normal, likely non-expressor if lung CA or other pathology  CT A/P shows Right mid abdominal peritoneal nodules are noted measuring up to 1.0 cm suspicious for metastatic peritoneal implants. Upper abdominal lymph nodes at thelevel of the celiac axis and superior mesenteric artery are seen measuring up to 8 mm in short axis. Liver lesions suspected on the noncontrast study not confirmed on this portal venous phase study.   appreciate IR consult- peritoneal Bx not feasible d/t size would not yield sufficient tissue  -AMS, Head CT shows multiple lytic lesions, persistent hypercalcemia despite IVF, Aredia given   -Pall Care consult appreciated, pt is full-code,  is HCP and agree with Bx  Rec's:  -pt still confused, newly Dx ? Met Lung CA, Neuro consult to r/o paraneoplastic process  -will need Bronch/EBUS with Bx when clinically stable inpt  -MRI of liver to further evaluate liver lesions for possible Bx  -monitor Ca  -PET/CT as outpt  -PT eval when clinically stable  -Await results for further recommendations    Problem# Normocytic Anemia  Hgb=on admit 10.8 and today 10.0 stable  pt thinks she has a hx of anemia, but unsure  Hep C RNA (-)  Cr nl  Rec's:  -CBC daily  -retic 2.1%, Iron Sat 21%, check B12/folate/TSH/SPEP  -Transfuse PRBC if Hgb <7.0 or if symptomatic  -will reach out to pt's PCP for baseline CBC and other hx    Thank you for the referral. Will continue to monitor the patient.  Please call with any questions [phone]  Above reviewed with Attending Dr. 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 mediastinal/hilar adenopathy and mult indeterminate liver lesions, largest 2.2cm  reviewed findings with pt and discussed likely lung cancer with met disease to liver; however need a biopsy to dx and then formulate prognosis and tx plan  CEA is normal, likely non-expressor if lung CA or other pathology  CT A/P shows Right mid abdominal peritoneal nodules are noted measuring up to 1.0 cm suspicious for metastatic peritoneal implants. Upper abdominal lymph nodes at thelevel of the celiac axis and superior mesenteric artery are seen measuring up to 8 mm in short axis. Liver lesions suspected on the noncontrast study not confirmed on this portal venous phase study.   appreciate IR consult- peritoneal Bx not feasible d/t size would not yield sufficient tissue  -AMS, Head CT shows multiple lytic lesions, persistent hypercalcemia despite IVF, Aredia given   -Pall Care consult appreciated, pt is full-code,  is HCP and agree with Bx  Rec's:  -pt still confused, newly Dx ? Met Lung CA, Neuro consult to r/o paraneoplastic process  -will need Bronch/EBUS with Bx when clinically stable inpt  -MRI of liver to further evaluate liver lesions for possible Bx  -monitor Ca, persistent hypercalcemia, can give calcitonin 4u/kg SQ q 12 hours for 1-2 days  -check bone scan to assess addt'l bone mets (head CT confirmed lytic lesions)  -PET/CT as outpt  -PT eval when clinically stable  -Await results for further recommendations    Problem# Normocytic Anemia  Hgb=on admit 10.8 and today 10.0 stable  pt thinks she has a hx of anemia, but unsure  Hep C RNA (-)  Cr nl  Rec's:  -CBC daily  -retic 2.1%, Iron Sat 21%, check B12/folate/TSH/SPEP  -Transfuse PRBC if Hgb <7.0 or if symptomatic  -will reach out to pt's PCP for baseline CBC and other hx    Thank you for the referral. Will continue to monitor the patient.  Please call with any questions [phone]  Above reviewed with Attending Dr. 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 mediastinal/hilar adenopathy and mult indeterminate liver lesions, largest 2.2cm  reviewed findings with pt and discussed likely lung cancer with met disease to liver; however need a biopsy to dx and then formulate prognosis and tx plan  CEA is normal, likely non-expressor if lung CA or other pathology  CT A/P shows Right mid abdominal peritoneal nodules are noted measuring up to 1.0 cm suspicious for metastatic peritoneal implants. Upper abdominal lymph nodes at thelevel of the celiac axis and superior mesenteric artery are seen measuring up to 8 mm in short axis. Liver lesions suspected on the noncontrast study not confirmed on this portal venous phase study.   appreciate IR consult- peritoneal Bx not feasible d/t size would not yield sufficient tissue  -AMS, Head CT shows multiple lytic lesions, persistent hypercalcemia despite IVF, Aredia given   -Pall Care consult appreciated, pt is full-code,  is HCP and agree with Bx  Rec's:  -pt still confused, newly Dx ? Met Lung CA, Neuro consult to r/o paraneoplastic process  -will need Bronch/EBUS with Bx when clinically stable inpt  -MRI of liver to further evaluate liver lesions for possible Bx  -monitor Ca, persistent hypercalcemia, lytic lesions seen on head CT, can give calcitonin 4u/kg SQ q 12 hours for 1-2 days  -check vitamin D, PTH and Ionized Ca in am  -PET/CT as outpt  -PT eval when clinically stable  -Await results for further recommendations    Problem# Normocytic Anemia  Hgb=on admit 10.8 and today 10.0 stable  pt thinks she has a hx of anemia, but unsure  Hep C RNA (-)  Cr nl  Rec's:  -CBC daily  -retic 2.1%, Iron Sat 21%, check B12/folate/TSH/SPEP  -Transfuse PRBC if Hgb <7.0 or if symptomatic  -will reach out to pt's PCP for baseline CBC and other hx    Thank you for the referral. Will continue to monitor the patient.  Please call with any questions [phone]  Above reviewed with Attending Dr. Trinh

## 2021-09-28 NOTE — PROGRESS NOTE ADULT - PROBLEM SELECTOR PLAN 3
IR advised liver lesions not large enough for biopsy  Pt to follow up as outpatient for PET scan per heme/onc recommendations  Dr. Mathews, Heme/Onc, following  Dr. Arnett, Surg-Onc, following  Palliative care following IR advised liver lesions not large enough for biopsy  - MRI ordered to further evaluate lesions per Onc rec  Pt to follow up as outpatient for PET scan per heme/onc recommendations  Dr. Mathews, Heme/Onc, following  Dr. Arnett, Surg-Onc, following  Palliative care following

## 2021-09-28 NOTE — PROGRESS NOTE ADULT - PROBLEM SELECTOR PLAN 5
Likely secondary to metastatic disease  - s/p Aredia 9/27   - f/u ionized Ca   - Heme/Onc following Likely secondary to metastatic disease  - s/p Aredia 9/27   - continue to monitor Ca levels   - Heme/Onc following Likely secondary to metastatic disease  - s/p Aredia 9/27.   - Cacitonin 4 IU /kg q12 hrs x 1 day for now   - continue to monitor Ca levels   - Heme/Onc following

## 2021-09-28 NOTE — PROGRESS NOTE ADULT - SUBJECTIVE AND OBJECTIVE BOX
HPI:  70 y/o F hx of HTN and HLD BIBEMS for hypoxia and hypotension at outpatient clinic. Patient states that for the past weak she has been feeling nauseous, fatigue and decreased appetite. She had 1episode of non bloody vomit and a couple of episodes of non bloody diarrhea early last week which has also  resolved. She states she developed a cough last week as well which is associated with phlegm but denies fevers, chills, shortness of breath or night sweats. Denies chest pain, palpitations, diarrhea, constipation, headache or any other complaints (20 Sep 2021 17:45)      Patient is a 71y old  Female who presents with a chief complaint of Pneumonia (28 Sep 2021 12:10)      INTERVAL HPI/OVERNIGHT EVENTS:  T(C): 38.9 (09-28-21 @ 12:24), Max: 38.9 (09-28-21 @ 12:24)  HR: 74 (09-28-21 @ 12:24) (66 - 93)  BP: 163/73 (09-28-21 @ 12:24) (127/72 - 192/75)  RR: 17 (09-28-21 @ 12:24) (17 - 18)  SpO2: 96% (09-28-21 @ 12:24) (92% - 96%)  Wt(kg): --  I&O's Summary      REVIEW OF SYSTEMS: denies fever, chills, SOB, palpitations, chest pain, abdominal pain, nausea, vomitting, diarrhea, constipation, dizziness    MEDICATIONS  (STANDING):  acetaminophen   Tablet .. 650 milliGRAM(s) Oral once  amLODIPine   Tablet 5 milliGRAM(s) Oral daily  chlorhexidine 2% Cloths 1 Application(s) Topical daily  folic acid 1 milliGRAM(s) Oral daily  heparin   Injectable 5000 Unit(s) SubCutaneous every 8 hours  influenza   Vaccine 0.5 milliLiter(s) IntraMuscular once  lisinopril 10 milliGRAM(s) Oral every 12 hours  metoprolol tartrate 100 milliGRAM(s) Oral two times a day  nystatin Powder 1 Application(s) Topical every 8 hours  PARoxetine 40 milliGRAM(s) Oral daily  senna 2 Tablet(s) Oral at bedtime  simvastatin 40 milliGRAM(s) Oral at bedtime    MEDICATIONS  (PRN):  acetaminophen   Tablet .. 650 milliGRAM(s) Oral every 6 hours PRN Temp greater or equal to 38C (100.4F), Mild Pain (1 - 3)  ALBUTerol    90 MICROgram(s) HFA Inhaler 2 Puff(s) Inhalation every 6 hours PRN Shortness of Breath and/or Wheezing  ondansetron    Tablet 4 milliGRAM(s) Oral every 8 hours PRN Nausea and/or Vomiting      PHYSICAL EXAM:  GENERAL: NAD, well-groomed, well-developed  HEAD:  Atraumatic, Normocephalic  EYES: EOMI, PERRLA, conjunctiva and sclera clear  ENMT: No tonsillar erythema, exudates, or enlargement; Moist mucous membranes, Good dentition, No lesions  NECK: Supple, No JVD, Normal thyroid  NERVOUS SYSTEM:  Alert & Oriented X3, Good concentration; Motor Strength 5/5 B/L upper and lower extremities; DTRs 2+ intact and symmetric  CHEST/LUNG: Clear to percussion bilaterally; No rales, rhonchi, wheezing, or rubs  HEART: Regular rate and rhythm; No murmurs, rubs, or gallops  ABDOMEN: Soft, Nontender, Nondistended; Bowel sounds present  EXTREMITIES:  2+ Peripheral Pulses, No clubbing, cyanosis, or edema  LYMPH: No lymphadenopathy noted  SKIN: No rashes or lesions  LABS:                        10.0   8.15  )-----------( 232      ( 28 Sep 2021 05:58 )             29.8     09-28    138  |  100  |  16  ----------------------------<  112<H>  3.5   |  28  |  0.99    Ca    11.5<H>      28 Sep 2021 05:58  Phos  3.7     09-27  Mg     1.8     09-27          CAPILLARY BLOOD GLUCOSE

## 2021-09-28 NOTE — PROGRESS NOTE ADULT - ASSESSMENT
72 y/o F hx of HTN and HLD BIBEMS for hypoxia and hypotension at outpatient clinic. Patient admitted w/ Sepsis secondary to RUL PNA. CT showed RUL PNA. Additionally, her UA was  positive with UCx growing E coli (10-49K).  She is now s/p  azithromycin and Rocephin. Bcx no growth. Hospital course c/b finding on  CT  chest scan of R hilar lung mass and Mediastinal  adenopathy, CT A/P revealed liver lesions, probable liver metastases and CT head with multiple lytic lesions suspicious for metastases or multiple myeloma. She is additionally hypercalcemic. Pulmonary dr. Lombardi and Oncology A group- Dr. Mathews following.

## 2021-09-29 DIAGNOSIS — G92 TOXIC ENCEPHALOPATHY: ICD-10-CM

## 2021-09-29 LAB
ALBUMIN SERPL ELPH-MCNC: 2.9 G/DL — LOW (ref 3.5–5)
ALP SERPL-CCNC: 96 U/L — SIGNIFICANT CHANGE UP (ref 40–120)
ALT FLD-CCNC: 26 U/L DA — SIGNIFICANT CHANGE UP (ref 10–60)
ANION GAP SERPL CALC-SCNC: 6 MMOL/L — SIGNIFICANT CHANGE UP (ref 5–17)
ANION GAP SERPL CALC-SCNC: 8 MMOL/L — SIGNIFICANT CHANGE UP (ref 5–17)
AST SERPL-CCNC: 73 U/L — HIGH (ref 10–40)
BILIRUB SERPL-MCNC: 0.5 MG/DL — SIGNIFICANT CHANGE UP (ref 0.2–1.2)
BUN SERPL-MCNC: 13 MG/DL — SIGNIFICANT CHANGE UP (ref 7–18)
BUN SERPL-MCNC: 19 MG/DL — HIGH (ref 7–18)
CA-I BLD-SCNC: 1.35 MMOL/L — HIGH (ref 1.15–1.33)
CALCIUM SERPL-MCNC: 10 MG/DL — SIGNIFICANT CHANGE UP (ref 8.4–10.5)
CALCIUM SERPL-MCNC: 10.2 MG/DL — SIGNIFICANT CHANGE UP (ref 8.4–10.5)
CHLORIDE SERPL-SCNC: 95 MMOL/L — LOW (ref 96–108)
CHLORIDE SERPL-SCNC: 97 MMOL/L — SIGNIFICANT CHANGE UP (ref 96–108)
CO2 SERPL-SCNC: 28 MMOL/L — SIGNIFICANT CHANGE UP (ref 22–31)
CO2 SERPL-SCNC: 30 MMOL/L — SIGNIFICANT CHANGE UP (ref 22–31)
CREAT SERPL-MCNC: 0.84 MG/DL — SIGNIFICANT CHANGE UP (ref 0.5–1.3)
CREAT SERPL-MCNC: 1.09 MG/DL — SIGNIFICANT CHANGE UP (ref 0.5–1.3)
FOLATE SERPL-MCNC: >20 NG/ML — SIGNIFICANT CHANGE UP
GLUCOSE SERPL-MCNC: 119 MG/DL — HIGH (ref 70–99)
GLUCOSE SERPL-MCNC: 127 MG/DL — HIGH (ref 70–99)
HCT VFR BLD CALC: 26.5 % — LOW (ref 34.5–45)
HGB BLD-MCNC: 9.1 G/DL — LOW (ref 11.5–15.5)
MCHC RBC-ENTMCNC: 29.7 PG — SIGNIFICANT CHANGE UP (ref 27–34)
MCHC RBC-ENTMCNC: 34.3 GM/DL — SIGNIFICANT CHANGE UP (ref 32–36)
MCV RBC AUTO: 86.6 FL — SIGNIFICANT CHANGE UP (ref 80–100)
NRBC # BLD: 0 /100 WBCS — SIGNIFICANT CHANGE UP (ref 0–0)
PLATELET # BLD AUTO: 224 K/UL — SIGNIFICANT CHANGE UP (ref 150–400)
POTASSIUM SERPL-MCNC: 3.2 MMOL/L — LOW (ref 3.5–5.3)
POTASSIUM SERPL-MCNC: 4.3 MMOL/L — SIGNIFICANT CHANGE UP (ref 3.5–5.3)
POTASSIUM SERPL-SCNC: 3.2 MMOL/L — LOW (ref 3.5–5.3)
POTASSIUM SERPL-SCNC: 4.3 MMOL/L — SIGNIFICANT CHANGE UP (ref 3.5–5.3)
PROT SERPL-MCNC: 7.1 G/DL — SIGNIFICANT CHANGE UP (ref 6–8.3)
PROT SERPL-MCNC: 7.1 G/DL — SIGNIFICANT CHANGE UP (ref 6–8.3)
PROT SERPL-MCNC: 7.6 G/DL — SIGNIFICANT CHANGE UP (ref 6–8.3)
RBC # BLD: 3.06 M/UL — LOW (ref 3.8–5.2)
RBC # FLD: 13.3 % — SIGNIFICANT CHANGE UP (ref 10.3–14.5)
SODIUM SERPL-SCNC: 131 MMOL/L — LOW (ref 135–145)
SODIUM SERPL-SCNC: 133 MMOL/L — LOW (ref 135–145)
VIT B12 SERPL-MCNC: 240 PG/ML — SIGNIFICANT CHANGE UP (ref 232–1245)
WBC # BLD: 8.11 K/UL — SIGNIFICANT CHANGE UP (ref 3.8–10.5)
WBC # FLD AUTO: 8.11 K/UL — SIGNIFICANT CHANGE UP (ref 3.8–10.5)

## 2021-09-29 PROCEDURE — 74181 MRI ABDOMEN W/O CONTRAST: CPT | Mod: 26

## 2021-09-29 PROCEDURE — 99223 1ST HOSP IP/OBS HIGH 75: CPT

## 2021-09-29 RX ORDER — LANOLIN ALCOHOL/MO/W.PET/CERES
1 CREAM (GRAM) TOPICAL AT BEDTIME
Refills: 0 | Status: DISCONTINUED | OUTPATIENT
Start: 2021-09-29 | End: 2021-10-04

## 2021-09-29 RX ORDER — POTASSIUM CHLORIDE 20 MEQ
40 PACKET (EA) ORAL ONCE
Refills: 0 | Status: COMPLETED | OUTPATIENT
Start: 2021-09-29 | End: 2021-09-29

## 2021-09-29 RX ORDER — SODIUM CHLORIDE 9 MG/ML
1000 INJECTION INTRAMUSCULAR; INTRAVENOUS; SUBCUTANEOUS
Refills: 0 | Status: DISCONTINUED | OUTPATIENT
Start: 2021-09-29 | End: 2021-10-04

## 2021-09-29 RX ADMIN — PANTOPRAZOLE SODIUM 40 MILLIGRAM(S): 20 TABLET, DELAYED RELEASE ORAL at 05:59

## 2021-09-29 RX ADMIN — Medication 1 MILLIGRAM(S): at 11:21

## 2021-09-29 RX ADMIN — NYSTATIN CREAM 1 APPLICATION(S): 100000 CREAM TOPICAL at 05:59

## 2021-09-29 RX ADMIN — AMLODIPINE BESYLATE 5 MILLIGRAM(S): 2.5 TABLET ORAL at 05:59

## 2021-09-29 RX ADMIN — CALCITONIN SALMON 320 INTERNATIONAL UNIT(S): 200 INJECTION, SOLUTION INTRAMUSCULAR at 07:24

## 2021-09-29 RX ADMIN — SIMVASTATIN 40 MILLIGRAM(S): 20 TABLET, FILM COATED ORAL at 21:04

## 2021-09-29 RX ADMIN — LISINOPRIL 10 MILLIGRAM(S): 2.5 TABLET ORAL at 05:59

## 2021-09-29 RX ADMIN — HEPARIN SODIUM 5000 UNIT(S): 5000 INJECTION INTRAVENOUS; SUBCUTANEOUS at 05:59

## 2021-09-29 RX ADMIN — Medication 40 MILLIEQUIVALENT(S): at 11:22

## 2021-09-29 RX ADMIN — CHLORHEXIDINE GLUCONATE 1 APPLICATION(S): 213 SOLUTION TOPICAL at 11:22

## 2021-09-29 RX ADMIN — Medication 100 MILLIGRAM(S): at 05:59

## 2021-09-29 RX ADMIN — SODIUM CHLORIDE 60 MILLILITER(S): 9 INJECTION INTRAMUSCULAR; INTRAVENOUS; SUBCUTANEOUS at 10:59

## 2021-09-29 RX ADMIN — Medication 40 MILLIGRAM(S): at 11:21

## 2021-09-29 RX ADMIN — HEPARIN SODIUM 5000 UNIT(S): 5000 INJECTION INTRAVENOUS; SUBCUTANEOUS at 21:04

## 2021-09-29 RX ADMIN — Medication 1 MILLIGRAM(S): at 22:16

## 2021-09-29 RX ADMIN — Medication 100 MILLIGRAM(S): at 18:29

## 2021-09-29 RX ADMIN — LISINOPRIL 10 MILLIGRAM(S): 2.5 TABLET ORAL at 18:29

## 2021-09-29 NOTE — PROGRESS NOTE ADULT - ASSESSMENT
70 y/o F hx of HTN and HLD BIBEMS for hypoxia and hypotension at outpatient clinic. Patient admitted w/ Sepsis secondary to RUL PNA. CT showed RUL PNA. Additionally, her UA was  positive with UCx growing E coli (10-49K).  She is now s/p  azithromycin and Rocephin. Bcx no growth. Hospital course c/b finding on  CT  chest scan of R hilar lung mass and Mediastinal  adenopathy, CT A/P revealed liver lesions, probable liver metastases and CT head with multiple lytic lesions suspicious for metastases or multiple myeloma. She is additionally hypercalcemic. Pulmonary dr. Lombardi and Oncology A group- Dr. Mathews following.

## 2021-09-29 NOTE — CONSULT NOTE ADULT - SUBJECTIVE AND OBJECTIVE BOX
HPI:  72 y/o F hx of HTN and HLD BIBEMS for hypoxia and hypotension at outpatient clinic. Patient states that for the past weak she has been feeling nauseous, fatigue and decreased appetite. She had 1episode of non bloody vomit and a couple of episodes of non bloody diarrhea early last week which has also  resolved. She states she developed a cough last week as well which is associated with phlegm but denies fevers, chills, shortness of breath or night sweats. Denies chest pain, palpitations, diarrhea, constipation, headache or any other complaints (20 Sep 2021 17:45)    History as above, Pt who was admitted with pneumonia but diagnosed with metastatic lung cancer.      PAST MEDICAL & SURGICAL HISTORY:  Lumbar stenosis    H/O: HTN (hypertension)    Hypercholesterolemia    Bleeding ulcer  stomach ulcer     Anxiety    Spinal stenosis in cervical region    Acute hepatitis C virus infection without hepatic coma  treated - Resolved    Balance disorder    Coronary artery disease involving native coronary artery of native heart without angina pectoris    HTN (hypertension)    HLD (hyperlipidemia)    H/O colonoscopy    Bleeding ulcer  stomach surgery for bleeding ulcer    Fracture  ORIF Left wrist  13    History of lumbar surgery      Chronic UTI  Pt reports presently on 2nd dose of antibiotics 18 10 days, Dr Herman aware, pt going for m/eval 18.    H/O cardiac catheterization  , no intervention needed, treated medically        No Known Allergies      Meds:  acetaminophen   Tablet .. 650 milliGRAM(s) Oral every 6 hours PRN  ALBUTerol    90 MICROgram(s) HFA Inhaler 2 Puff(s) Inhalation every 6 hours PRN  amLODIPine   Tablet 5 milliGRAM(s) Oral daily  chlorhexidine 2% Cloths 1 Application(s) Topical daily  folic acid 1 milliGRAM(s) Oral daily  heparin   Injectable 5000 Unit(s) SubCutaneous every 8 hours  influenza   Vaccine 0.5 milliLiter(s) IntraMuscular once  lisinopril 10 milliGRAM(s) Oral every 12 hours  metoprolol tartrate 100 milliGRAM(s) Oral two times a day  nystatin Powder 1 Application(s) Topical every 8 hours  ondansetron    Tablet 4 milliGRAM(s) Oral every 8 hours PRN  pantoprazole    Tablet 40 milliGRAM(s) Oral before breakfast  PARoxetine 40 milliGRAM(s) Oral daily  senna 2 Tablet(s) Oral at bedtime  simvastatin 40 milliGRAM(s) Oral at bedtime  sodium chloride 0.9%. 1000 milliLiter(s) IV Continuous <Continuous>      SOCIAL HISTORY:  Smoker:  YES / NO        PACK YEARS:                         WHEN QUIT?  ETOH use:  YES / NO               FREQUENCY / QUANTITY:  Ilicit Drug use:  YES / NO  Occupation:  Assisted device use (Cane / Walker):  Live with:    FAMILY HISTORY:  No pertinent family history in first degree relatives        VITALS:  Vital Signs Last 24 Hrs  T(C): 36.9 (29 Sep 2021 05:20), Max: 37 (28 Sep 2021 20:05)  T(F): 98.5 (29 Sep 2021 05:20), Max: 98.6 (28 Sep 2021 20:05)  HR: 94 (29 Sep 2021 05:20) (93 - 94)  BP: 152/82 (29 Sep 2021 05:20) (152/82 - 157/73)  BP(mean): --  RR: 18 (29 Sep 2021 06:25) (16 - 18)  SpO2: 96% (29 Sep 2021 06:25) (92% - 97%)    LABS/DIAGNOSTIC TESTS:                          9.1    8.11  )-----------( 224      ( 29 Sep 2021 07:48 )             26.5     WBC Count: 8.11 K/uL ( @ 07:48)  WBC Count: 8.15 K/uL ( @ 05:58)  WBC Count: 10.87 K/uL ( @ 07:48)          131<L>  |  95<L>  |  13  ----------------------------<  127<H>  3.2<L>   |  28  |  0.84    Ca    10.2      29 Sep 2021 07:48    TPro  7.6  /  Alb  2.9<L>  /  TBili  0.5  /  DBili  x   /  AST  73<H>  /  ALT  26  /  AlkPhos  96        Urinalysis Basic - ( 28 Sep 2021 16:32 )    Color: Yellow / Appearance: Slightly Turbid / S.020 / pH: x  Gluc: x / Ketone: Negative  / Bili: Negative / Urobili: Negative   Blood: x / Protein: 30 mg/dL / Nitrite: Negative   Leuk Esterase: Negative / RBC: 2-5 /HPF / WBC 3-5 /HPF   Sq Epi: x / Non Sq Epi: Few /HPF / Bacteria: Few /HPF        LIVER FUNCTIONS - ( 29 Sep 2021 07:48 )  Alb: 2.9 g/dL / Pro: 7.6 g/dL / ALK PHOS: 96 U/L / ALT: 26 U/L DA / AST: 73 U/L / GGT: x                 LACTATE:    ABG -     CULTURES:   Clean Catch Clean Catch (Midstream)   @ 14:05   10,000 - 49,000 CFU/mL Escherichia coli  Normal Urogenital bello present  --  Escherichia coli      .Blood Blood-Peripheral   @ 18:38   No Growth Final  --  --          RADIOLOGY:< from: CT Chest No Cont (21 @ 22:05) >  EXAM:  CT CHEST                            PROCEDURE DATE:  2021          INTERPRETATION:  CLINICAL INFORMATION: Pleural effusion.    COMPARISON: None.    CONTRAST/COMPLICATIONS:  IV Contrast: NONE  Oral Contrast: NONE  Complications: None reported at time of study completion    PROCEDURE:  CT of the Chest was performed.  Sagittal and coronal reformats were performed.    FINDINGS:    LUNGS AND AIRWAYS: Central mass encasing the right hilum, measuring at least 5.4 x 5.2 cm. Postobstructiveatelectasis in the right middle and lower lobes. Patchy consolidation in the right upper lobe, possibly pneumonia.  PLEURA: Small right pleural effusion.  MEDIASTINUM AND DESIREE: Mediastinal and right hilar adenopathy. For example:  *  Lower right paratracheal node, measuring 1.9 x 1.8 cm  *  Subcarinal node (series 3, image 65), measuring 2.0 x 1.5 cm  VESSELS: Atherosclerotic calcifications.  HEART: Heart size is normal. No pericardial effusion.  CHEST WALL AND LOWER NECK: Within normal limits.  VISUALIZED UPPER ABDOMEN: Peripherally calcified gallstone or gallbladder wall. Multiple indeterminate liver lesions, concerning for metastases. For example:  *  Segment 6 (series 3, image 135), measuring 1.7 x 1.5 cm  *  Segment 4A (series 3, image 104), measuring 2.2 x 2.2 cm  BONES: Mild compression of the T12 superior endplate. Degenerative changes. ACDF hardware.    IMPRESSION:  Central mass encasing the right hilum with postobstructive atelectasis in the right middle and lower lobes.    Mediastinal and right hilar adenopathy.    Probable liver metastases.    Patchy consolidation in the right upper lobe, possibly pneumonia.    --- End of Report ---            GERARDO CLAYTON MD; Attending Radiologist  This document has been electronically signed. Sep 21 2021  9:16AM    < end of copied text >        ROS  [  ] UNABLE TO ELICIT               HPI:  72 y/o F hx of HTN and HLD BIBEMS for hypoxia and hypotension at outpatient clinic. Patient states that for the past weak she has been feeling nauseous, fatigue and decreased appetite. She had 1episode of non bloody vomit and a couple of episodes of non bloody diarrhea early last week which has also  resolved. She states she developed a cough last week as well which is associated with phlegm but denies fevers, chills, shortness of breath or night sweats. Denies chest pain, palpitations, diarrhea, constipation, headache or any other complaints (20 Sep 2021 17:45)    History as above, Pt who was admitted with pneumonia but diagnosed with metastatic lung cancer. Asked to this patient who has been in the hospital for the last 8-9 days now and completed her antibiotic therapy already but spiked a temp to 102. She is having some loose stools but does not know how many per day she has been having. She has a cough which is chronic , no chest pain, no abdominal pain, no dysuria or other complaints at this time.      PAST MEDICAL & SURGICAL HISTORY:  Lumbar stenosis    H/O: HTN (hypertension)    Hypercholesterolemia    Bleeding ulcer  stomach ulcer     Anxiety    Spinal stenosis in cervical region    Acute hepatitis C virus infection without hepatic coma  treated - Resolved    Balance disorder    Coronary artery disease involving native coronary artery of native heart without angina pectoris    HTN (hypertension)    HLD (hyperlipidemia)    H/O colonoscopy    Bleeding ulcer  stomach surgery for bleeding ulcer    Fracture  ORIF Left wrist  13    History of lumbar surgery      Chronic UTI  Pt reports presently on 2nd dose of antibiotics 18 10 days, Dr Herman aware, pt going for m/eval 18.    H/O cardiac catheterization  , no intervention needed, treated medically        No Known Allergies      Meds:  acetaminophen   Tablet .. 650 milliGRAM(s) Oral every 6 hours PRN  ALBUTerol    90 MICROgram(s) HFA Inhaler 2 Puff(s) Inhalation every 6 hours PRN  amLODIPine   Tablet 5 milliGRAM(s) Oral daily  chlorhexidine 2% Cloths 1 Application(s) Topical daily  folic acid 1 milliGRAM(s) Oral daily  heparin   Injectable 5000 Unit(s) SubCutaneous every 8 hours  influenza   Vaccine 0.5 milliLiter(s) IntraMuscular once  lisinopril 10 milliGRAM(s) Oral every 12 hours  metoprolol tartrate 100 milliGRAM(s) Oral two times a day  nystatin Powder 1 Application(s) Topical every 8 hours  ondansetron    Tablet 4 milliGRAM(s) Oral every 8 hours PRN  pantoprazole    Tablet 40 milliGRAM(s) Oral before breakfast  PARoxetine 40 milliGRAM(s) Oral daily  senna 2 Tablet(s) Oral at bedtime  simvastatin 40 milliGRAM(s) Oral at bedtime  sodium chloride 0.9%. 1000 milliLiter(s) IV Continuous <Continuous>      SOCIAL HISTORY:  Smoker:  ex smoker quit 20 years ago.  ETOH use:  no    FAMILY HISTORY:  No pertinent family history in first degree relatives        VITALS:  Vital Signs Last 24 Hrs  T(C): 36.9 (29 Sep 2021 05:20), Max: 37 (28 Sep 2021 20:05)  T(F): 98.5 (29 Sep 2021 05:20), Max: 98.6 (28 Sep 2021 20:05)  HR: 94 (29 Sep 2021 05:20) (93 - 94)  BP: 152/82 (29 Sep 2021 05:20) (152/82 - 157/73)  BP(mean): --  RR: 18 (29 Sep 2021 06:25) (16 - 18)  SpO2: 96% (29 Sep 2021 06:25) (92% - 97%)    LABS/DIAGNOSTIC TESTS:                          9.1    8.11  )-----------( 224      ( 29 Sep 2021 07:48 )             26.5     WBC Count: 8.11 K/uL ( @ 07:48)  WBC Count: 8.15 K/uL ( @ 05:58)  WBC Count: 10.87 K/uL ( @ 07:48)          131<L>  |  95<L>  |  13  ----------------------------<  127<H>  3.2<L>   |  28  |  0.84    Ca    10.2      29 Sep 2021 07:48    TPro  7.6  /  Alb  2.9<L>  /  TBili  0.5  /  DBili  x   /  AST  73<H>  /  ALT  26  /  AlkPhos  96        Urinalysis Basic - ( 28 Sep 2021 16:32 )    Color: Yellow / Appearance: Slightly Turbid / S.020 / pH: x  Gluc: x / Ketone: Negative  / Bili: Negative / Urobili: Negative   Blood: x / Protein: 30 mg/dL / Nitrite: Negative   Leuk Esterase: Negative / RBC: 2-5 /HPF / WBC 3-5 /HPF   Sq Epi: x / Non Sq Epi: Few /HPF / Bacteria: Few /HPF        LIVER FUNCTIONS - ( 29 Sep 2021 07:48 )  Alb: 2.9 g/dL / Pro: 7.6 g/dL / ALK PHOS: 96 U/L / ALT: 26 U/L DA / AST: 73 U/L / GGT: x                 LACTATE:    ABG -     CULTURES:   Clean Catch Clean Catch (Midstream)   @ 14:05   10,000 - 49,000 CFU/mL Escherichia coli  Normal Urogenital bello present  --  Escherichia coli      .Blood Blood-Peripheral   @ 18:38   No Growth Final  --  --          RADIOLOGY:< from: CT Chest No Cont (21 @ 22:05) >  EXAM:  CT CHEST                            PROCEDURE DATE:  2021          INTERPRETATION:  CLINICAL INFORMATION: Pleural effusion.    COMPARISON: None.    CONTRAST/COMPLICATIONS:  IV Contrast: NONE  Oral Contrast: NONE  Complications: None reported at time of study completion    PROCEDURE:  CT of the Chest was performed.  Sagittal and coronal reformats were performed.    FINDINGS:    LUNGS AND AIRWAYS: Central mass encasing the right hilum, measuring at least 5.4 x 5.2 cm. Postobstructiveatelectasis in the right middle and lower lobes. Patchy consolidation in the right upper lobe, possibly pneumonia.  PLEURA: Small right pleural effusion.  MEDIASTINUM AND DESIREE: Mediastinal and right hilar adenopathy. For example:  *  Lower right paratracheal node, measuring 1.9 x 1.8 cm  *  Subcarinal node (series 3, image 65), measuring 2.0 x 1.5 cm  VESSELS: Atherosclerotic calcifications.  HEART: Heart size is normal. No pericardial effusion.  CHEST WALL AND LOWER NECK: Within normal limits.  VISUALIZED UPPER ABDOMEN: Peripherally calcified gallstone or gallbladder wall. Multiple indeterminate liver lesions, concerning for metastases. For example:  *  Segment 6 (series 3, image 135), measuring 1.7 x 1.5 cm  *  Segment 4A (series 3, image 104), measuring 2.2 x 2.2 cm  BONES: Mild compression of the T12 superior endplate. Degenerative changes. ACDF hardware.    IMPRESSION:  Central mass encasing the right hilum with postobstructive atelectasis in the right middle and lower lobes.    Mediastinal and right hilar adenopathy.    Probable liver metastases.    Patchy consolidation in the right upper lobe, possibly pneumonia.    --- End of Report ---            GERARDO CLAYTON MD; Attending Radiologist  This document has been electronically signed. Sep 21 2021  9:16AM    < end of copied text >        ROS  [  ] UNABLE TO ELICIT

## 2021-09-29 NOTE — PROGRESS NOTE ADULT - SUBJECTIVE AND OBJECTIVE BOX
Patient is a 71y old  Female who presents with a chief complaint of Pneumonia (29 Sep 2021 12:49)      SUBJECTIVE / OVERNIGHT EVENTS:    ADDITIONAL REVIEW OF SYSTEMS:    MEDICATIONS  (STANDING):  amLODIPine   Tablet 5 milliGRAM(s) Oral daily  chlorhexidine 2% Cloths 1 Application(s) Topical daily  folic acid 1 milliGRAM(s) Oral daily  heparin   Injectable 5000 Unit(s) SubCutaneous every 8 hours  influenza   Vaccine 0.5 milliLiter(s) IntraMuscular once  lisinopril 10 milliGRAM(s) Oral every 12 hours  metoprolol tartrate 100 milliGRAM(s) Oral two times a day  nystatin Powder 1 Application(s) Topical every 8 hours  pantoprazole    Tablet 40 milliGRAM(s) Oral before breakfast  PARoxetine 40 milliGRAM(s) Oral daily  senna 2 Tablet(s) Oral at bedtime  simvastatin 40 milliGRAM(s) Oral at bedtime  sodium chloride 0.9%. 1000 milliLiter(s) (60 mL/Hr) IV Continuous <Continuous>    MEDICATIONS  (PRN):  acetaminophen   Tablet .. 650 milliGRAM(s) Oral every 6 hours PRN Temp greater or equal to 38C (100.4F), Mild Pain (1 - 3)  ALBUTerol    90 MICROgram(s) HFA Inhaler 2 Puff(s) Inhalation every 6 hours PRN Shortness of Breath and/or Wheezing  ondansetron    Tablet 4 milliGRAM(s) Oral every 8 hours PRN Nausea and/or Vomiting      Vital Signs Last 24 Hrs  T(C): 36.9 (29 Sep 2021 13:26), Max: 37 (28 Sep 2021 20:05)  T(F): 98.5 (29 Sep 2021 13:26), Max: 98.6 (28 Sep 2021 20:05)  HR: 79 (29 Sep 2021 13:26) (79 - 94)  BP: 114/83 (29 Sep 2021 13:26) (114/83 - 157/73)  BP(mean): --  RR: 16 (29 Sep 2021 13:26) (16 - 18)  SpO2: 93% (29 Sep 2021 13:26) (92% - 97%)    LABS:                        9.1    8.11  )-----------( 224      ( 29 Sep 2021 07:48 )             26.5     09-29    131<L>  |  95<L>  |  13  ----------------------------<  127<H>  3.2<L>   |  28  |  0.84    Ca    10.2      29 Sep 2021 07:48    TPro  7.6  /  Alb  2.9<L>  /  TBili  0.5  /  DBili  x   /  AST  73<H>  /  ALT  26  /  AlkPhos  96            Urinalysis Basic - ( 28 Sep 2021 16:32 )    Color: Yellow / Appearance: Slightly Turbid / S.020 / pH: x  Gluc: x / Ketone: Negative  / Bili: Negative / Urobili: Negative   Blood: x / Protein: 30 mg/dL / Nitrite: Negative   Leuk Esterase: Negative / RBC: 2-5 /HPF / WBC 3-5 /HPF   Sq Epi: x / Non Sq Epi: Few /HPF / Bacteria: Few /HPF        COVID-19 PCR: NotDetec (23 Sep 2021 20:35)  COVID-19 PCR: NotDetec (20 Sep 2021 11:38)         Patient is a 71y old  Female who presents with a chief complaint of Pneumonia (29 Sep 2021 12:49)      SUBJECTIVE / OVERNIGHT EVENTS: events noted. Pt just returned from MRI of liver. No new complaints. Pt oriented to person only      MEDICATIONS  (STANDING):  amLODIPine   Tablet 5 milliGRAM(s) Oral daily  chlorhexidine 2% Cloths 1 Application(s) Topical daily  folic acid 1 milliGRAM(s) Oral daily  heparin   Injectable 5000 Unit(s) SubCutaneous every 8 hours  influenza   Vaccine 0.5 milliLiter(s) IntraMuscular once  lisinopril 10 milliGRAM(s) Oral every 12 hours  metoprolol tartrate 100 milliGRAM(s) Oral two times a day  nystatin Powder 1 Application(s) Topical every 8 hours  pantoprazole    Tablet 40 milliGRAM(s) Oral before breakfast  PARoxetine 40 milliGRAM(s) Oral daily  senna 2 Tablet(s) Oral at bedtime  simvastatin 40 milliGRAM(s) Oral at bedtime  sodium chloride 0.9%. 1000 milliLiter(s) (60 mL/Hr) IV Continuous <Continuous>    MEDICATIONS  (PRN):  acetaminophen   Tablet .. 650 milliGRAM(s) Oral every 6 hours PRN Temp greater or equal to 38C (100.4F), Mild Pain (1 - 3)  ALBUTerol    90 MICROgram(s) HFA Inhaler 2 Puff(s) Inhalation every 6 hours PRN Shortness of Breath and/or Wheezing  ondansetron    Tablet 4 milliGRAM(s) Oral every 8 hours PRN Nausea and/or Vomiting      Vital Signs Last 24 Hrs  T(C): 36.9 (29 Sep 2021 13:26), Max: 37 (28 Sep 2021 20:05)  T(F): 98.5 (29 Sep 2021 13:26), Max: 98.6 (28 Sep 2021 20:05)  HR: 79 (29 Sep 2021 13:26) (79 - 94)  BP: 114/83 (29 Sep 2021 13:26) (114/83 - 157/73)  BP(mean): --  RR: 16 (29 Sep 2021 13:26) (16 - 18)  SpO2: 93% (29 Sep 2021 13:26) (92% - 97%)    GEN: NAD, confused, lethargic  LUNGS: decreased BS on right with expiratory wheeze, Left CTA  HEART: S1 S2  ABDOMEN: soft, non-tender, non-distended, + BS  EXTREMITIES: no edema    LABS:                        9.1    8.11  )-----------( 224      ( 29 Sep 2021 07:48 )             26.5         131<L>  |  95<L>  |  13  ----------------------------<  127<H>  3.2<L>   |  28  |  0.84    Ca    10.2      29 Sep 2021 07:48    TPro  7.6  /  Alb  2.9<L>  /  TBili  0.5  /  DBili  x   /  AST  73<H>  /  ALT  26  /  AlkPhos  96            Urinalysis Basic - ( 28 Sep 2021 16:32 )    Color: Yellow / Appearance: Slightly Turbid / S.020 / pH: x  Gluc: x / Ketone: Negative  / Bili: Negative / Urobili: Negative   Blood: x / Protein: 30 mg/dL / Nitrite: Negative   Leuk Esterase: Negative / RBC: 2-5 /HPF / WBC 3-5 /HPF   Sq Epi: x / Non Sq Epi: Few /HPF / Bacteria: Few /HPF        COVID-19 PCR: NotDetec (23 Sep 2021 20:35)  COVID-19 PCR: NotDetec (20 Sep 2021 11:38)

## 2021-09-29 NOTE — CONSULT NOTE ADULT - CONSULT REQUESTED DATE/TIME
21-Sep-2021 13:12
29-Sep-2021 12:49
21-Sep-2021
29-Sep-2021 12:23
29-Sep-2021 15:05
23-Sep-2021 12:48
24-Sep-2021
29-Sep-2021 12:49
22-Sep-2021 18:19

## 2021-09-29 NOTE — PROGRESS NOTE ADULT - PROBLEM SELECTOR PLAN 3
IR advised liver lesions not large enough for biopsy  - MRI ordered to further evaluate lesions per Onc rec  Pt to follow up as outpatient for PET scan per heme/onc recommendations  Dr. Mathews, Heme/Onc, following  Dr. Arnett, Surg-Onc, following  Palliative care following

## 2021-09-29 NOTE — PROGRESS NOTE ADULT - SUBJECTIVE AND OBJECTIVE BOX
Patient is a 71y old  Female who presents with a chief complaint of Pneumonia (28 Sep 2021 20:26)      INTERVAL HPI/OVERNIGHT EVENTS:  Patient seen and examined at bedside. No new complaints    MEDICATIONS  (STANDING):  amLODIPine   Tablet 5 milliGRAM(s) Oral daily  chlorhexidine 2% Cloths 1 Application(s) Topical daily  folic acid 1 milliGRAM(s) Oral daily  heparin   Injectable 5000 Unit(s) SubCutaneous every 8 hours  influenza   Vaccine 0.5 milliLiter(s) IntraMuscular once  lisinopril 10 milliGRAM(s) Oral every 12 hours  metoprolol tartrate 100 milliGRAM(s) Oral two times a day  nystatin Powder 1 Application(s) Topical every 8 hours  pantoprazole    Tablet 40 milliGRAM(s) Oral before breakfast  PARoxetine 40 milliGRAM(s) Oral daily  senna 2 Tablet(s) Oral at bedtime  simvastatin 40 milliGRAM(s) Oral at bedtime    MEDICATIONS  (PRN):  acetaminophen   Tablet .. 650 milliGRAM(s) Oral every 6 hours PRN Temp greater or equal to 38C (100.4F), Mild Pain (1 - 3)  ALBUTerol    90 MICROgram(s) HFA Inhaler 2 Puff(s) Inhalation every 6 hours PRN Shortness of Breath and/or Wheezing  ondansetron    Tablet 4 milliGRAM(s) Oral every 8 hours PRN Nausea and/or Vomiting      __________________________________________________  REVIEW OF SYSTEMS:    CONSTITUTIONAL: No fever,   RESPIRATORY: No cough; No shortness of breath  CARDIOVASCULAR: No chest pain, no palpitations  GASTROINTESTINAL: No pain. No nausea or vomiting; No diarrhea   NEUROLOGICAL: No headache or numbness, no tremors  MUSCULOSKELETAL: No joint pain, no muscle pain  GENITOURINARY: no dysuria, no frequency, no hematuria   ALL OTHER  ROS negative      Vital Signs Last 24 Hrs  T(C): 36.9 (29 Sep 2021 05:20), Max: 38.9 (28 Sep 2021 12:24)  T(F): 98.5 (29 Sep 2021 05:20), Max: 102 (28 Sep 2021 12:24)  HR: 94 (29 Sep 2021 05:20) (74 - 94)  BP: 152/82 (29 Sep 2021 05:20) (152/82 - 163/73)  BP(mean): --  RR: 18 (29 Sep 2021 06:25) (16 - 18)  SpO2: 96% (29 Sep 2021 06:25) (92% - 97%)    ________________________________________________  PHYSICAL EXAM:  GENERAL: NAD  HEENT: Normocephalic;   atraumatic   CHEST/LUNG:  breathing nonlabored; clear to auscultation bilaterally with good air entry   HEART: +S1 +S2  regular  ABDOMEN: Softly distended, nontender ; Bowel sounds present  EXTREMITIES: no cyanosis; no edema.  SKIN: warm and dry; no rash  NERVOUS SYSTEM:  Awake and alert; Oriented  to place, person and date not fully oriented to situation; no new deficits    _________________________________________________  LABS:                        9.1    8.11  )-----------( 224      ( 29 Sep 2021 07:48 )             26.5     09-29    131<L>  |  95<L>  |  13  ----------------------------<  127<H>  3.2<L>   |  28  |  0.84    Ca    10.2      29 Sep 2021 07:48    TPro  7.6  /  Alb  2.9<L>  /  TBili  0.5  /  DBili  x   /  AST  73<H>  /  ALT  26  /  AlkPhos  96  09-      Urinalysis Basic - ( 28 Sep 2021 16:32 )    Color: Yellow / Appearance: Slightly Turbid / S.020 / pH: x  Gluc: x / Ketone: Negative  / Bili: Negative / Urobili: Negative   Blood: x / Protein: 30 mg/dL / Nitrite: Negative   Leuk Esterase: Negative / RBC: 2-5 /HPF / WBC 3-5 /HPF   Sq Epi: x / Non Sq Epi: Few /HPF / Bacteria: Few /HPF      CAPILLARY BLOOD GLUCOSE            RADIOLOGY & ADDITIONAL TESTS:    < from: CT Head No Cont (21 @ 07:24) >  EXAM:  CT BRAIN                            PROCEDURE DATE:  2021          INTERPRETATION:  CLINICAL STATEMENT: Lung mass    TECHNIQUE: CT of the head was performed without IV contrast.  RAPID artificial intelligence was utilized for the preliminary evaluation of intracranial hemorrhage.    COMPARISON: MRI brain 2021, 2018    FINDINGS:  There is moderate diffuse parenchymal volume loss. There are areas of low attenuation in the periventricular white matter likely related to mild chronic microvascular ischemic changes.    There is no acute intracranial hemorrhage, mass effect or midline shift. There is no acute territorial infarct. There is no hydrocephalus.    The cranium is intact. Multiple lytic lesions noted.    Mucosal thickening sphenoid sinus    IMPRESSION:  Multiple lytic lesions suspicious for metastases or multiple myeloma. Correlate clinically    If intraparenchymal metastasis is a clinical concern, MRI exam with contrast recommended    No acute intracranial hemorrhage.    --- End of Report ---    MARTY MILES MD; Attending Radiologist  This document has been electronically signed. Sep 25 2021  1:00PM      < from: CT Abdomen and Pelvis w/ Oral Cont and w/ IV Cont (21 @ 10:19) >  EXAM:  CT ABDOMEN AND PELVIS OC IC                            PROCEDURE DATE:  2021          INTERPRETATION:  CLINICAL INFORMATION: CAT chest demonstrated right hilar mass and probable liver metastases. Evaluate for metastatic disease    COMPARISON: CT chest of 2021    CONTRAST/COMPLICATIONS:  IV Contrast: Omnipaque 350  90 cc administered   30 cc discarded  Oral Contrast: Omnipaque 300  Complications: None reported at time of study completion    PROCEDURE:  CT of the Abdomen and Pelvis was performed.  Sagittal and coronal reformats were performed.    FINDINGS:  LOWER CHEST: Partially visualized known right hilar/infrahilar mass with atelectatic lung at the right lung base and small right pleural effusion more fully described on chest CT of 2021. Coronary artery calcification is also noted.    LIVER: Within normal limits. Suspected lesion in the right hepatic lobe on the noncontrast study is not confirmed on this portal venous phase study.  BILE DUCTS: Normal caliber.  GALLBLADDER: Personally calcified gallstone versus gallbladder wall calcification. This may be correlated with ultrasound on a nonemergent basis.  SPLEEN: Within normal limits.  PANCREAS: Within normal limits.  ADRENALS: Within normal limits.  KIDNEYS/URETERS: Within normal limits.    BLADDER: Within normal limits.  REPRODUCTIVE ORGANS: Uterus and adnexa within normal limits. Incidental note is made of left adnexal calcification    BOWEL: No bowel obstruction. Appendix is normal. Changes of gastricbypass  PERITONEUM: No ascites. Right mid abdominal peritoneal nodules are noted measuring up to 1.0 cm suspicious for metastatic peritoneal implants.  VESSELS: Atherosclerotic changes.  RETROPERITONEUM/LYMPH NODES: Upper abdominal lymph nodes at thelevel of the celiac axis and superior mesenteric artery are seen measuring up to 8 mm in short axis.  ABDOMINAL WALL: Within normal limits.  BONES: Degenerative changes. Pedicle screws are seen on the left at the L4 and L5 levels with artificial discat L4-L5 and mild grade 1 anterolisthesis of L4 on L5. Mild superior T12 compression fracture    IMPRESSION:  Liver lesions suspected on the noncontrast study not confirmed on this portal venous phase study. Consider arterial phase imaging if needed for patient's diagnosis.  Group of peritoneal nodules in the right mid abdomen may represent metastatic peritoneal implants.  Calcified gallstone versus gallbladder wall calcification which may be further evaluated with ultrasound on a nonemergent basis  Evidence of gastric bypass    --- End of Report ---      LAVONNE CHE MD; Attending Radiologist  This document has been electronically signed. Sep 22 2021  2:03PM    < from: CT Chest No Cont (21 @ 22:05) >  EXAM:  CT CHEST                            PROCEDURE DATE:  2021          INTERPRETATION:  CLINICAL INFORMATION: Pleural effusion.    COMPARISON: None.    CONTRAST/COMPLICATIONS:  IV Contrast: NONE  Oral Contrast: NONE  Complications: None reported at time of study completion    PROCEDURE:  CT of the Chest was performed.  Sagittal and coronal reformats were performed.    FINDINGS:    LUNGS AND AIRWAYS: Central mass encasing the right hilum, measuring at least 5.4 x 5.2 cm. Postobstructiveatelectasis in the right middle and lower lobes. Patchy consolidation in the right upper lobe, possibly pneumonia.  PLEURA: Small right pleural effusion.  MEDIASTINUM AND DESIREE: Mediastinal and right hilar adenopathy. For example:  *  Lower right paratracheal node, measuring 1.9 x 1.8 cm  *  Subcarinal node (series 3, image 65), measuring 2.0 x 1.5 cm  VESSELS: Atherosclerotic calcifications.  HEART: Heart size is normal. No pericardial effusion.  CHEST WALL AND LOWER NECK: Within normal limits.  VISUALIZED UPPER ABDOMEN: Peripherally calcified gallstone or gallbladder wall. Multiple indeterminate liver lesions, concerning for metastases. For example:  *  Segment 6 (series 3, image 135), measuring 1.7 x 1.5 cm  *  Segment 4A (series 3, image 104), measuring 2.2 x 2.2 cm  BONES: Mild compression of the T12 superior endplate. Degenerative changes. ACDF hardware.    IMPRESSION:  Central mass encasing the right hilum with postobstructive atelectasis in the right middle and lower lobes.    Mediastinal and right hilar adenopathy.    Probable liver metastases.    Patchy consolidation in the right upper lobe, possibly pneumonia.    --- End of Report ---      GERARDO CLAYTON MD; Attending Radiologist  This document has been electronically signed. Sep 21 2021  9:16AM       Patient is a 71 year old  Female who presents with a chief complaint of Pneumonia (28 Sep 2021 20:26)      INTERVAL HPI/ OVERNIGHT EVENTS:  Patient seen and examined at bedside. No new complaints    MEDICATIONS  (STANDING):  amLODIPine   Tablet 5 milliGRAM(s) Oral daily  chlorhexidine 2% Cloths 1 Application(s) Topical daily  folic acid 1 milliGRAM(s) Oral daily  heparin   Injectable 5000 Unit(s) SubCutaneous every 8 hours  influenza   Vaccine 0.5 milliLiter(s) IntraMuscular once  lisinopril 10 milliGRAM(s) Oral every 12 hours  metoprolol tartrate 100 milliGRAM(s) Oral two times a day  nystatin Powder 1 Application(s) Topical every 8 hours  pantoprazole    Tablet 40 milliGRAM(s) Oral before breakfast  PARoxetine 40 milliGRAM(s) Oral daily  senna 2 Tablet(s) Oral at bedtime  simvastatin 40 milliGRAM(s) Oral at bedtime    MEDICATIONS  (PRN):  acetaminophen   Tablet .. 650 milliGRAM(s) Oral every 6 hours PRN Temp greater or equal to 38C (100.4F), Mild Pain (1 - 3)  ALBUTerol    90 MICROgram(s) HFA Inhaler 2 Puff(s) Inhalation every 6 hours PRN Shortness of Breath and/or Wheezing  ondansetron    Tablet 4 milliGRAM(s) Oral every 8 hours PRN Nausea and/or Vomiting      __________________________________________________  REVIEW OF SYSTEMS:    CONSTITUTIONAL: No fever,   RESPIRATORY: No cough; No shortness of breath  CARDIOVASCULAR: No chest pain, no palpitations  GASTROINTESTINAL: No pain. No nausea or vomiting; No diarrhea   NEUROLOGICAL: No headache or numbness, no tremors  MUSCULOSKELETAL: No joint pain, no muscle pain  GENITOURINARY: no dysuria, no frequency, no hematuria   ALL OTHER  ROS negative      Vital Signs Last 24 Hrs  T(C): 36.9 (29 Sep 2021 05:20), Max: 38.9 (28 Sep 2021 12:24)  T(F): 98.5 (29 Sep 2021 05:20), Max: 102 (28 Sep 2021 12:24)  HR: 94 (29 Sep 2021 05:20) (74 - 94)  BP: 152/82 (29 Sep 2021 05:20) (152/82 - 163/73)  RR: 18 (29 Sep 2021 06:25) (16 - 18)  SpO2: 96% (29 Sep 2021 06:25) (92% - 97%)    ________________________________________________  PHYSICAL EXAM:  GENERAL: NAD  HEENT: Normocephalic;   atraumatic   CHEST/LUNG:  breathing nonlabored; clear to auscultation bilaterally with good air entry   HEART: +S1 +S2  regular  ABDOMEN: Softly distended, nontender ; Bowel sounds present  EXTREMITIES: no cyanosis; no edema.  SKIN: warm and dry; no rash  NERVOUS SYSTEM:  Awake and alert; Oriented  to place, person and date not fully oriented to situation; no new deficits    _________________________________________________  LABS:                        9.1    8.11  )-----------( 224      ( 29 Sep 2021 07:48 )             26.5     09-29    131<L>  |  95<L>  |  13  ----------------------------<  127<H>  3.2<L>   |  28  |  0.84    Ca    10.2      29 Sep 2021 07:48    TPro  7.6  /  Alb  2.9<L>  /  TBili  0.5  /  DBili  x   /  AST  73<H>  /  ALT  26  /  AlkPhos  96  09-      Urinalysis Basic - ( 28 Sep 2021 16:32 )    Color: Yellow / Appearance: Slightly Turbid / S.020 / pH: x  Gluc: x / Ketone: Negative  / Bili: Negative / Urobili: Negative   Blood: x / Protein: 30 mg/dL / Nitrite: Negative   Leuk Esterase: Negative / RBC: 2-5 /HPF / WBC 3-5 /HPF   Sq Epi: x / Non Sq Epi: Few /HPF / Bacteria: Few /HPF        RADIOLOGY & ADDITIONAL TESTS:    < from: CT Head No Cont (21 @ 07:24) >  EXAM:  CT BRAIN                            PROCEDURE DATE:  2021          INTERPRETATION:  CLINICAL STATEMENT: Lung mass    TECHNIQUE: CT of the head was performed without IV contrast.  RAPID artificial intelligence was utilized for the preliminary evaluation of intracranial hemorrhage.    COMPARISON: MRI brain 2021, 2018    FINDINGS:  There is moderate diffuse parenchymal volume loss. There are areas of low attenuation in the periventricular white matter likely related to mild chronic microvascular ischemic changes.    There is no acute intracranial hemorrhage, mass effect or midline shift. There is no acute territorial infarct. There is no hydrocephalus.    The cranium is intact. Multiple lytic lesions noted.    Mucosal thickening sphenoid sinus    IMPRESSION:  Multiple lytic lesions suspicious for metastases or multiple myeloma. Correlate clinically    If intraparenchymal metastasis is a clinical concern, MRI exam with contrast recommended    No acute intracranial hemorrhage.    --- End of Report ---    AMRTY MILES MD; Attending Radiologist  This document has been electronically signed. Sep 25 2021  1:00PM      < from: CT Abdomen and Pelvis w/ Oral Cont and w/ IV Cont (21 @ 10:19) >  EXAM:  CT ABDOMEN AND PELVIS OC IC                            PROCEDURE DATE:  2021          INTERPRETATION:  CLINICAL INFORMATION: CAT chest demonstrated right hilar mass and probable liver metastases. Evaluate for metastatic disease    COMPARISON: CT chest of 2021    CONTRAST/COMPLICATIONS:  IV Contrast: Omnipaque 350  90 cc administered   30 cc discarded  Oral Contrast: Omnipaque 300  Complications: None reported at time of study completion    PROCEDURE:  CT of the Abdomen and Pelvis was performed.  Sagittal and coronal reformats were performed.    FINDINGS:  LOWER CHEST: Partially visualized known right hilar/infrahilar mass with atelectatic lung at the right lung base and small right pleural effusion more fully described on chest CT of 2021. Coronary artery calcification is also noted.    LIVER: Within normal limits. Suspected lesion in the right hepatic lobe on the noncontrast study is not confirmed on this portal venous phase study.  BILE DUCTS: Normal caliber.  GALLBLADDER: Personally calcified gallstone versus gallbladder wall calcification. This may be correlated with ultrasound on a nonemergent basis.  SPLEEN: Within normal limits.  PANCREAS: Within normal limits.  ADRENALS: Within normal limits.  KIDNEYS/URETERS: Within normal limits.    BLADDER: Within normal limits.  REPRODUCTIVE ORGANS: Uterus and adnexa within normal limits. Incidental note is made of left adnexal calcification    BOWEL: No bowel obstruction. Appendix is normal. Changes of gastricbypass  PERITONEUM: No ascites. Right mid abdominal peritoneal nodules are noted measuring up to 1.0 cm suspicious for metastatic peritoneal implants.  VESSELS: Atherosclerotic changes.  RETROPERITONEUM/LYMPH NODES: Upper abdominal lymph nodes at thelevel of the celiac axis and superior mesenteric artery are seen measuring up to 8 mm in short axis.  ABDOMINAL WALL: Within normal limits.  BONES: Degenerative changes. Pedicle screws are seen on the left at the L4 and L5 levels with artificial discat L4-L5 and mild grade 1 anterolisthesis of L4 on L5. Mild superior T12 compression fracture    IMPRESSION:  Liver lesions suspected on the noncontrast study not confirmed on this portal venous phase study. Consider arterial phase imaging if needed for patient's diagnosis.  Group of peritoneal nodules in the right mid abdomen may represent metastatic peritoneal implants.  Calcified gallstone versus gallbladder wall calcification which may be further evaluated with ultrasound on a nonemergent basis  Evidence of gastric bypass    --- End of Report ---      LAVONNE CHE MD; Attending Radiologist  This document has been electronically signed. Sep 22 2021  2:03PM    < from: CT Chest No Cont (21 @ 22:05) >  EXAM:  CT CHEST                            PROCEDURE DATE:  2021          INTERPRETATION:  CLINICAL INFORMATION: Pleural effusion.    COMPARISON: None.    CONTRAST/COMPLICATIONS:  IV Contrast: NONE  Oral Contrast: NONE  Complications: None reported at time of study completion    PROCEDURE:  CT of the Chest was performed.  Sagittal and coronal reformats were performed.    FINDINGS:    LUNGS AND AIRWAYS: Central mass encasing the right hilum, measuring at least 5.4 x 5.2 cm. Postobstructiveatelectasis in the right middle and lower lobes. Patchy consolidation in the right upper lobe, possibly pneumonia.  PLEURA: Small right pleural effusion.  MEDIASTINUM AND DESIREE: Mediastinal and right hilar adenopathy. For example:  *  Lower right paratracheal node, measuring 1.9 x 1.8 cm  *  Subcarinal node (series 3, image 65), measuring 2.0 x 1.5 cm  VESSELS: Atherosclerotic calcifications.  HEART: Heart size is normal. No pericardial effusion.  CHEST WALL AND LOWER NECK: Within normal limits.  VISUALIZED UPPER ABDOMEN: Peripherally calcified gallstone or gallbladder wall. Multiple indeterminate liver lesions, concerning for metastases. For example:  *  Segment 6 (series 3, image 135), measuring 1.7 x 1.5 cm  *  Segment 4A (series 3, image 104), measuring 2.2 x 2.2 cm  BONES: Mild compression of the T12 superior endplate. Degenerative changes. ACDF hardware.    IMPRESSION:  Central mass encasing the right hilum with postobstructive atelectasis in the right middle and lower lobes.    Mediastinal and right hilar adenopathy.    Probable liver metastases.    Patchy consolidation in the right upper lobe, possibly pneumonia.    --- End of Report ---      GERARDO CLAYTON MD; Attending Radiologist  This document has been electronically signed. Sep 21 2021  9:16AM

## 2021-09-29 NOTE — PROGRESS NOTE ADULT - ASSESSMENT
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 mediastinal/hilar adenopathy and mult indeterminate liver lesions, largest 2.2cm  reviewed findings with pt and discussed likely lung cancer with met disease to liver; however need a biopsy to dx and then formulate prognosis and tx plan  CEA is normal, likely non-expressor if lung CA or other pathology  CT A/P shows Right mid abdominal peritoneal nodules are noted measuring up to 1.0 cm suspicious for metastatic peritoneal implants. Upper abdominal lymph nodes at thelevel of the celiac axis and superior mesenteric artery are seen measuring up to 8 mm in short axis. Liver lesions suspected on the noncontrast study not confirmed on this portal venous phase study.   appreciate IR consult- peritoneal Bx not feasible d/t size would not yield sufficient tissue  AMS, Head CT shows multiple lytic lesions, persistent hypercalcemia despite IVF, Aredia given   Pall Care consult appreciated, pt is full-code,  is HCP and agree with Bx  Rec's:  -pt still confused, newly Dx ? Met Lung CA, CEA nl  -appreciate Neuro consult to r/o paraneoplastic process, rec EEG  -Bronch/EBUS with Bx scheduled for 10/04  -MRI of liver to further evaluate liver lesions for possible Bx, results pending  -hypercalcemia resolved, lytic lesions seen on head CT, s/p aredia and calcitonin   -check vitamin D, PTH and Ionized Ca in am  -PET/CT as outpt  -PT eval when clinically stable  -abnormal EKG, await cardiology eval  -febrile, await ID eval  -Await results for further recommendations    Problem# Normocytic Anemia  Hgb stable  pt thinks she has a hx of anemia, but unsure  Hep C RNA (-)  Cr nl  Rec's:  -CBC daily  -retic 2.1%, Iron Sat 21%  -B12 low at 240, give B12 1,000mcg IM xx 1  -folate/TSH nl  -SPEP pending  -Transfuse PRBC if Hgb <7.0 or if symptomatic  -will reach out to pt's PCP for baseline CBC and other hx    Thank you for the referral. Will continue to monitor the patient.  Please call with any questions [phone]  Above reviewed with Attending Dr. Trinh

## 2021-09-29 NOTE — PROGRESS NOTE ADULT - PROBLEM SELECTOR PLAN 6
Patient with periods of confusion in the setting of metastatic disease and hypercalcemia;   - s/p Aredia 9/27 and Calcitonin Serum Ca 10.2;  corrected 11  - Neurology, Dr Lorenzo, consulted ;  Dr Jackson to see today   - Heme/ Onc follow up

## 2021-09-29 NOTE — CHART NOTE - NSCHARTNOTEFT_GEN_A_CORE
Pat deferred to  to make decisions for her.  I called spouse Marcos Gray @ [phone]  He gave telephone consent  for proposed procedure.. EBUS / Bronch with Bx . All his questions were answered .  Conversation witnessed by NP covering unit

## 2021-09-29 NOTE — CONSULT NOTE ADULT - ATTENDING COMMENTS
71 y o W ex s,moker adm for respiratory symptoms w/u with  CT scan + 5.4x5.2cm central mass encasing Right hilum. mediastinal/hilar adenopathy and mult indeterminate liver lesions, largest 2.2cm  #  suspicious for met lung ca  complete w/u with CT a/p  CT guided bx for tissue diagnosis when  stable from respiratory standpoint   check CEA  PET scan  can be done as an outpt  further recommendations will depend on findings    #  ANEMIA  routine anemia w/u as above  f/u CBC if HB<7 PRBC TX prn    Thank you for the consult
79 yo W with lung CA with mets with presentation and treatment for sepsis due to PNA evaluated for altered mental status present for few days.  The patient has been febrile.  Neuro exam is non focal on my exam.  Labs are notable for hyponatremia, hypercalcemia.  MRI brain with and w/o farzana shows lytic lesions but no intercranial mets.  Patient's presentation is cw toxic metabolic enecephalpathy due to hyponatremia, hypercalcemia and sepsis.  Should also consider seziures, mets not noted on MRI brain, and will recommend to get EEG to eval for such (will consider AED if epileptoform activity is noted).  rosales NP and Dr. Vidales
I reviewed the above and edited where appropriate.  Chart and relevant imaging reviewed.   Briefly, 71F presented with hypoxia and hypotension, found to have central right pulmonary/hilar mass, indeterminant liver lesions, and subcentimeter peritoneal nodules. Needle biopsy of peritoneal nodule was requested.  -Given the size of the peritoneal nodules, and the need for core biopsy, yield of obtaining sufficient tissue for diagnosis/testing is very low. May consider surgical evaluation for excisional biopsy, if clinically warranted.  -Liver lesion is not adequately characterized on the available imaging. May consider MRI for better characterization of the liver lesion.  -Agree with PET-CT, as it may provide additional information/sites for biopsy.    Call IR if clinical situation changes and/or new information becomes available.    Case was d/w the primary team (ROMULO Kaur) and oncology (Dr. Mathews).
71 y F hx HTN, obesity, adm for hypoxia, fatigue, found to have hilar encasing lung mass. Mental status waxes and wanes. A&Ox1, on NC, NAD.  wishes to pursue diagnostic workup and treatment as necessary. Remains FULL CODE per his wishes. Onc following. ECOG 3 at present. Palliative will follow.

## 2021-09-29 NOTE — CONSULT NOTE ADULT - GASTROINTESTINAL
Amilcar Gordillo Patient Age: 39 SCHEDULING QUESTIONS    Have you (your child) been previously diagnosed with a mental health condition OR have you been RECENTLY hospitalized for a mental health condition? NO    Are you interested in scheduling with a provider who can prescribe medication? NO    Our doctors recommend talk therapy for most patients; Are you interested in scheduling with a talk therapist? YES     Are you seeking:   Individual Counseling? YES  Family Counseling? NO  Couple's Counseling? NO    Are there any concerns that you (your child) might have an eating disorder?  NO     Is patient's age 10 years or older? Yes    Issues with use of:  Alcohol? NO  Recreational substances? NO  Problems with other addictive behaviors (e.g. gambling, sexual issues, etc.)? NO  YES TO ANY- Consider Addictions Therapist (Refer to Scheduling Grid)      Have you seen a therapist outside of [facility] within the past year? NO        PATIENT ADVISORIES    If you have a family member being treated by one of our therapists or physicians, you need to schedule with a different therapist or physician than that family member (except for Child Psychiatrists): YES    Because our current access may be several weeks out we like you to know that, between now and your appointment, if you (your child) feels unsafe, you can call 911 or go to nearest hospital, without needing a provider's permission? YES    Would you like the 24 hour help lines for the Behavioral Health Hospitals in our area? Yes, Adult - Denis Davila in [place] ([phone])     Is patient under 18 years old? NO    Patient enrolled in [facility] (Ages 13 and Up Only)? Yes   -   Email Address: [email or web address]    Did the patient answer \"Yes\" to any of the Triage questions?   No - Proceed with Scheduling    Appointment scheduled with: Therapist Only - Carlos Walker ([facility], ages 18 and up) On 10-16-20    Does the patient want to be on the Wait List? YES   Patient was placed on waitlist  [id number] [place] [address] AT [facility]  [address] [facility] [address]  Phone: [phone] Fax: [phone]      CALL BACK INFO: Ok to leave response (including medical information) on answering machine  ROUTING: Patient's physician/staff        PCP: Chester Jara MD         INS: Payor: [employer]UE [employer] PPO [employer] EMPLOYEE / Plan: [employer]UE [employer]OSS PPO [employer] EMPLOYEE / Product Type: COMM   PATIENT ADDRESS:  [address] [place] [id number]   details… detailed exam

## 2021-09-29 NOTE — CONSULT NOTE ADULT - PROVIDER SPECIALTY LIST ADULT
Neurology
Surgery
Cardiology
Infectious Disease
Pulmonology
Electrophysiology
Intervent Radiology
Heme/Onc
Palliative Care

## 2021-09-29 NOTE — PROGRESS NOTE ADULT - PROBLEM SELECTOR PLAN 1
In the setting of lung mass and PNA;  - s/p antimicrobial therapy x 5 days   - SPO2 92-96 % on 3L ( recent spo2on RA was 88%; will require home oxygen therapy)   - last blood cx neg  - Repeat Cxray  ordered as patient w/ Tmax of 102 yesterday ; result pending.   - Pulmonary, Dr Lombardi following; planned for EBUS w/ biopsy on Monday at 10/4  1pm

## 2021-09-29 NOTE — CONSULT NOTE ADULT - SUBJECTIVE AND OBJECTIVE BOX
C A R D I O L O G Y  *********************    DATE OF SERVICE: 09-29-21    HISTORY OF PRESENT ILLNESS: HPI:  72 y/o F hx of HTN and HLD BIBEMS for hypoxia and hypotension at outpatient clinic. Patient states that for the past weak she has been feeling nauseous, fatigue and decreased appetite. She had 1episode of non bloody vomit and a couple of episodes of non bloody diarrhea early last week which has also  resolved. She states she developed a cough last week as well which is associated with phlegm but denies fevers, chills, shortness of breath or night sweats. Denies chest pain, palpitations, diarrhea, constipation, headache or any other complaints.  Hospital evaluation reveals a hilar mass and post obstructive PNA.    PAST MEDICAL & SURGICAL HISTORY:  Lumbar stenosis    H/O: HTN (hypertension)    Hypercholesterolemia    Bleeding ulcer  stomach ulcer 2011    Anxiety    Spinal stenosis in cervical region    Acute hepatitis C virus infection without hepatic coma  treated 2015- Resolved    Balance disorder    Coronary artery disease involving native coronary artery of native heart without angina pectoris    HTN (hypertension)    HLD (hyperlipidemia)    H/O colonoscopy    Bleeding ulcer  stomach surgery for bleeding ulcer    Fracture  ORIF Left wrist  1/17/13    History of lumbar surgery  2013    Chronic UTI  Pt reports presently on 2nd dose of antibiotics 7/5/18 10 days, Dr Virgli smith, pt going for m/eval 7/13/18.    H/O cardiac catheterization  2013, no intervention needed, treated medically            MEDICATIONS:  MEDICATIONS  (STANDING):  amLODIPine   Tablet 5 milliGRAM(s) Oral daily  chlorhexidine 2% Cloths 1 Application(s) Topical daily  folic acid 1 milliGRAM(s) Oral daily  heparin   Injectable 5000 Unit(s) SubCutaneous every 8 hours  influenza   Vaccine 0.5 milliLiter(s) IntraMuscular once  lisinopril 10 milliGRAM(s) Oral every 12 hours  metoprolol tartrate 100 milliGRAM(s) Oral two times a day  nystatin Powder 1 Application(s) Topical every 8 hours  pantoprazole    Tablet 40 milliGRAM(s) Oral before breakfast  PARoxetine 40 milliGRAM(s) Oral daily  senna 2 Tablet(s) Oral at bedtime  simvastatin 40 milliGRAM(s) Oral at bedtime  sodium chloride 0.9%. 1000 milliLiter(s) (60 mL/Hr) IV Continuous <Continuous>      Allergies    No Known Allergies    Intolerances        FAMILY HISTORY:  No pertinent family history in first degree relatives      Non-contributary for premature coronary disease or sudden cardiac death    SOCIAL HISTORY:    [X ] Non-smoker  [ ] Smoker  [ ] Alcohol    REVIEW OF SYSTEMS:  [ ]chest pain  [ X ]shortness of breath  [  ]palpitations  [  ]syncope  [ ]near syncope [ ]upper extremity weakness   [ ] lower extremity weakness  [  ]diplopia  [  ]altered mental status   [  ]fevers  [ ]chills [ ]nausea  [ ]vomitting  [  ]dysphagia    [ ]abdominal pain  [ ]melena  [ ]BRBPR    [  ]epistaxis  [  ]rash    [ ]lower extremity edema        [X] All others negative	  [ ] Unable to obtain      LABS:	 	    CARDIAC MARKERS:                              9.1    8.11  )-----------( 224      ( 29 Sep 2021 07:48 )             26.5     Hb Trend: 9.1<--    09-29    131<L>  |  95<L>  |  13  ----------------------------<  127<H>  3.2<L>   |  28  |  0.84    Ca    10.2      29 Sep 2021 07:48    TPro  7.6  /  Alb  2.9<L>  /  TBili  0.5  /  DBili  x   /  AST  73<H>  /  ALT  26  /  AlkPhos  96  09-29    Creatinine Trend: 0.84<--, 0.99<--, 0.89<--, 1.11<--, 1.33<--, 1.05<--        PHYSICAL EXAM:  T(C): 36.9 (09-29-21 @ 05:20), Max: 38.9 (09-28-21 @ 12:24)  HR: 94 (09-29-21 @ 05:20) (74 - 94)  BP: 152/82 (09-29-21 @ 05:20) (152/82 - 163/73)  RR: 18 (09-29-21 @ 06:25) (16 - 18)  SpO2: 96% (09-29-21 @ 06:25) (92% - 97%)  Wt(kg): --   BMI (kg/m2): 32.2 (09-20-21 @ 11:08)  I&O's Summary      Gen: Appears well in NAD  HEENT:  (-)icterus (-)pallor  CV: N S1 S2 1/6 KAREN (+)2 Pulses B/l  Resp:  Clear to ausculatation B/L, normal effort  GI: (+) BS Soft, NT, ND  Lymph:  (-)Edema, (-)obvious lymphadenopathy  Skin: Warm to touch, Normal turgor  Psych: Appropriate mood and affect        ECG:  	Sinus pprolonged QT nonspecific ST/T wave abnormality     RADIOLOGY:         CXR:   < from: Xray Chest 1 View- PORTABLE-Urgent (09.20.21 @ 11:45) >  Right infiltrate/effusion.    Further information may be obtained from the patient's subsequent CT of the chest.      < end of copied text >      ASSESSMENT/PLAN: 	71y Female  HTN and HLD BIBEMS for hypoxia and hypotension at outpatient clinic found with hilar mass and post obstructive PNA Normal LV function    - Abnormal EKG, nonspecific findings however her QT is prolonged.  Avoid QT prolonging meds  - Monitor Ca,  EP eval Dr. Junior called  - Her ST T wave abnormality will require a stress test once optimized for further evaluation  - Will follow     I once again thank you for allowing me to participate in the care of your patient.  If you have any questions or concerns please do not hesitate to contact me.    Scooter Gutiérrez MD, [facility]  BEEPER [phone]

## 2021-09-29 NOTE — PROGRESS NOTE ADULT - PROBLEM SELECTOR PLAN 8
- S/p Antimicrobial therapy  - Now with Tmax 102 likely in the setting of metastatic disease however cannot r/o infectious etiology; blood cx,    Cxray result pending   - ID Dr Loepz consulted

## 2021-09-29 NOTE — CONSULT NOTE ADULT - SUBJECTIVE AND OBJECTIVE BOX
++++++++++++NOTE NOT COMPLETED+++++++++++++++++ ++++++++++++NOTE NOT COMPLETED+++++++++++++++++    Patient is a 71y old  Female who presents with a chief complaint of Pneumonia (29 Sep 2021 12:49)      HPI:  70 y/o F hx of HTN and HLD BIBEMS for hypoxia and hypotension at outpatient clinic. Patient states that for the past weak she has been feeling nauseous, fatigue and decreased appetite. She had 1episode of non bloody vomit and a couple of episodes of non bloody diarrhea early last week which has also  resolved. She states she developed a cough last week as well which is associated with phlegm but denies fevers, chills, shortness of breath or night sweats. Denies chest pain, palpitations, diarrhea, constipation, headache or any other complaints (20 Sep 2021 17:45)    Pt unable to provide history.  States, I don't know."     Neurological Review of Systems:  No difficulty with language.  No vision loss or double vision.  No dizziness, vertigo or new hearing loss.  (+) Difficulty with speech or swallowing.  No focal weakness.  No focal sensory changes.  No numbness or tingling in the bilateral lower extremities.  (+) Difficulty with balance.  (+) Difficulty with ambulation.        MEDICATIONS  (STANDING):  amLODIPine   Tablet 5 milliGRAM(s) Oral daily  chlorhexidine 2% Cloths 1 Application(s) Topical daily  folic acid 1 milliGRAM(s) Oral daily  heparin   Injectable 5000 Unit(s) SubCutaneous every 8 hours  influenza   Vaccine 0.5 milliLiter(s) IntraMuscular once  lisinopril 10 milliGRAM(s) Oral every 12 hours  metoprolol tartrate 100 milliGRAM(s) Oral two times a day  nystatin Powder 1 Application(s) Topical every 8 hours  pantoprazole    Tablet 40 milliGRAM(s) Oral before breakfast  PARoxetine 40 milliGRAM(s) Oral daily  senna 2 Tablet(s) Oral at bedtime  simvastatin 40 milliGRAM(s) Oral at bedtime  sodium chloride 0.9%. 1000 milliLiter(s) (60 mL/Hr) IV Continuous <Continuous>    MEDICATIONS  (PRN):  acetaminophen   Tablet .. 650 milliGRAM(s) Oral every 6 hours PRN Temp greater or equal to 38C (100.4F), Mild Pain (1 - 3)  ALBUTerol    90 MICROgram(s) HFA Inhaler 2 Puff(s) Inhalation every 6 hours PRN Shortness of Breath and/or Wheezing  ondansetron    Tablet 4 milliGRAM(s) Oral every 8 hours PRN Nausea and/or Vomiting    Allergies    No Known Allergies    Intolerances      PAST MEDICAL & SURGICAL HISTORY:  Lumbar stenosis    H/O: HTN (hypertension)    Hypercholesterolemia    Bleeding ulcer  stomach ulcer     Anxiety    Spinal stenosis in cervical region    Acute hepatitis C virus infection without hepatic coma  treated - Resolved    Balance disorder    Coronary artery disease involving native coronary artery of native heart without angina pectoris    HTN (hypertension)    HLD (hyperlipidemia)    H/O colonoscopy    Bleeding ulcer  stomach surgery for bleeding ulcer    Fracture  ORIF Left wrist  13    History of lumbar surgery      Chronic UTI  Pt reports presently on 2nd dose of antibiotics 18 10 days, Dr Virgil smith, pt going for m/eval 18.    H/O cardiac catheterization  , no intervention needed, treated medically      FAMILY HISTORY:  No pertinent family history in first degree relatives      SOCIAL HISTORY: Former smoker    Review of Systems:  Constitutional: No generalized weakness. No fevers or chills                  Eyes, Ears, Mouth, Throat: No vision loss   Respiratory: No shortness of breath or cough                              Cardiovascular: No chest pain or palpitations  Gastrointestinal: No nausea or vomiting                                       Genitourinary: No urinary incontinence or burning on urination  Musculoskeletal: No joint pain                                                        Dermatologic: B/l upper extremity ecchymosis  Neurological: as per HPI                                                                      Psychiatric: No behavioral problems  Endocrine: No known hypoglycemia              Hematologic/Lymphatic: No easy bleeding    O:  Vital Signs Last 24 Hrs  T(C): 36.9 (29 Sep 2021 13:26), Max: 37 (28 Sep 2021 20:05)  T(F): 98.5 (29 Sep 2021 13:26), Max: 98.6 (28 Sep 2021 20:05)  HR: 79 (29 Sep 2021 13:26) (79 - 94)  BP: 114/83 (29 Sep 2021 13:26) (114/83 - 157/73)  RR: 16 (29 Sep 2021 13:26) (16 - 18)  SpO2: 93% (29 Sep 2021 13:26) (92% - 97%)    General Exam:   General appearance: No acute distress                 Cardiovascular: Pedal dorsalis pulses intact bilaterally    Mental Status: Oriented to self, date and place.  Attention intact.  No dysarthria, aphasia or neglect.  Knowledge intact.  Registration intact.  Short and long term memory grossly intact.      Cranial Nerves: CN I - not tested.  PERRL, EOMI, VFF, no nystagmus or diplopia.  No APD.  Fundi not visualized.  CN V1-3 intact to light touch and pinprick.  No facial asymmetry.  Hearing intact to finger rub bilaterally.  Tongue, uvula and palate midline.  Sternocleidomastoid and Trapezius intact bilaterally.    Motor:   Tone: normal.                  Strength intact throughout  No pronator drift bilaterally                      No dysmetria on finger-nose-finger or heel-shin-heel  No truncal ataxia.  No resting, postural or action tremor.  No myoclonus.    Sensation: intact to light touch, pinprick, vibration and proprioception    Deep Tendon Reflexes: 1+ bilateral biceps, triceps, brachioradialis, knee and ankle  Toes flexor bilaterally    Gait: normal and stable.  Romberg (-).    Other:     LABS:                        9.1    8.11  )-----------( 224      ( 29 Sep 2021 07:48 )             26.5         131<L>  |  95<L>  |  13  ----------------------------<  127<H>  3.2<L>   |  28  |  0.84    Ca    10.2      29 Sep 2021 07:48    TPro  7.6  /  Alb  2.9<L>  /  TBili  0.5  /  DBili  x   /  AST  73<H>  /  ALT  26  /  AlkPhos  96        Urinalysis Basic - ( 28 Sep 2021 16:32 )    Color: Yellow / Appearance: Slightly Turbid / S.020 / pH: x  Gluc: x / Ketone: Negative  / Bili: Negative / Urobili: Negative   Blood: x / Protein: 30 mg/dL / Nitrite: Negative   Leuk Esterase: Negative / RBC: 2-5 /HPF / WBC 3-5 /HPF   Sq Epi: x / Non Sq Epi: Few /HPF / Bacteria: Few /HPF          RADIOLOGY & ADDITIONAL STUDIES:  < from: CT Head No Cont (21 @ 07:24) >    EXAM:  CT BRAIN                            PROCEDURE DATE:  2021          INTERPRETATION:  CLINICAL STATEMENT: Lung mass    TECHNIQUE: CT of the head was performed without IV contrast.  RAPID artificial intelligence was utilized for the preliminary evaluation of intracranial hemorrhage.    COMPARISON: MRI brain 2021, 2018    FINDINGS:  There is moderate diffuse parenchymal volume loss. There are areas of low attenuation in the periventricular white matter likely related to mild chronic microvascular ischemic changes.    There is no acute intracranial hemorrhage, mass effect or midline shift. There is no acute territorial infarct. There is no hydrocephalus.    The cranium is intact. Multiple lytic lesions noted.    Mucosal thickening sphenoid sinus    IMPRESSION:  Multiple lytic lesions suspicious for metastases or multiple myeloma. Correlate clinically    If intraparenchymal metastasis is a clinical concern, MRI exam with contrast recommended    No acute intracranial hemorrhage.    --- End of Report ---            MARTY MILES MD; Attending Radiologist  This document has been electronically signed. Sep 25 2021  1:00PM    < end of copied text >  < from: MR Head w/wo IV Cont (21 @ 18:47) >  EXAM:  MR BRAIN WAW IC                            PROCEDURE DATE:  2021          INTERPRETATION:  CLINICAL INDICATIONS: AMS    COMPARISON: MRI brain dated 2018    TECHNIQUE: MRI brain: Multiplanar, multisequence MR imaging of the brain are obtained with and without the administration of 7.5 cc intravenous Gadavist contrast. 0 cc of contrast was discarded.    FINDINGS:  There is no abnormal restricted diffusion to suggest acute infarction. Scattered periventricular and subcortical white matter T2 /FLAIR hyperintensities are seen without mass effect, nonspecific, likely representing moderate chronic microvascular changes. No abnormal leptomeningeal or parenchymal enhancement.  Normal T2 flow-voids are seen within  the intracranial vasculature. Moderate size cava septum pellucidum and cavum vergae. The lateral ventricles and cortical sulci are age-appropriate in size and configuration. There is no mass, mass effect, or extra-axial fluid collection. There is no susceptibility artifact to suggest hemorrhage. Midline structures are normal. The patient is status post bilateral ocular lens replacement surgery. Small right mastoid air cell tip effusion. The visualized paranasal sinuses, mastoid air cells and orbits are otherwise unremarkable.      IMPRESSION: No acute infarction. No abnormal intracranial enhancement. Abnormal diffuse heterogeneous T1 marrow signal suspicious for osseous metastasis. Consider noncontrast head CT and/or nuclear medicine bone scan for further evaluation.    --- End of Report ---            NARA VANN MD; Attending Radiologist  This document has been electronically signed. Sep 23 2021  7:11PM    < end of copied text >   Patient is a 71y old  Female who presents with a chief complaint of Pneumonia (29 Sep 2021 12:49)      HPI:  72 y/o F hx of HTN and HLD BIBEMS for hypoxia and hypotension at outpatient clinic. Patient states that for the past weak she has been feeling nauseous, fatigue and decreased appetite. She had 1episode of non bloody vomit and a couple of episodes of non bloody diarrhea early last week which has also  resolved. She states she developed a cough last week as well which is associated with phlegm but denies fevers, chills, shortness of breath or night sweats. Denies chest pain, palpitations, diarrhea, constipation, headache or any other complaints (20 Sep 2021 17:45)    Pt unable to provide history.  States, I don't know."     Neurological Review of Systems:  No difficulty with language.  No vision loss or double vision.  No dizziness, vertigo or new hearing loss.  (+) Difficulty with speech or swallowing.  No focal weakness.  No focal sensory changes.  No numbness or tingling in the bilateral lower extremities.  (+) Difficulty with balance.  (+) Difficulty with ambulation.        MEDICATIONS  (STANDING):  amLODIPine   Tablet 5 milliGRAM(s) Oral daily  chlorhexidine 2% Cloths 1 Application(s) Topical daily  folic acid 1 milliGRAM(s) Oral daily  heparin   Injectable 5000 Unit(s) SubCutaneous every 8 hours  influenza   Vaccine 0.5 milliLiter(s) IntraMuscular once  lisinopril 10 milliGRAM(s) Oral every 12 hours  metoprolol tartrate 100 milliGRAM(s) Oral two times a day  nystatin Powder 1 Application(s) Topical every 8 hours  pantoprazole    Tablet 40 milliGRAM(s) Oral before breakfast  PARoxetine 40 milliGRAM(s) Oral daily  senna 2 Tablet(s) Oral at bedtime  simvastatin 40 milliGRAM(s) Oral at bedtime  sodium chloride 0.9%. 1000 milliLiter(s) (60 mL/Hr) IV Continuous <Continuous>    MEDICATIONS  (PRN):  acetaminophen   Tablet .. 650 milliGRAM(s) Oral every 6 hours PRN Temp greater or equal to 38C (100.4F), Mild Pain (1 - 3)  ALBUTerol    90 MICROgram(s) HFA Inhaler 2 Puff(s) Inhalation every 6 hours PRN Shortness of Breath and/or Wheezing  ondansetron    Tablet 4 milliGRAM(s) Oral every 8 hours PRN Nausea and/or Vomiting    Allergies    No Known Allergies    Intolerances      PAST MEDICAL & SURGICAL HISTORY:  Lumbar stenosis    H/O: HTN (hypertension)    Hypercholesterolemia    Bleeding ulcer  stomach ulcer     Anxiety    Spinal stenosis in cervical region    Acute hepatitis C virus infection without hepatic coma  treated - Resolved    Balance disorder    Coronary artery disease involving native coronary artery of native heart without angina pectoris    HTN (hypertension)    HLD (hyperlipidemia)    H/O colonoscopy    Bleeding ulcer  stomach surgery for bleeding ulcer    Fracture  ORIF Left wrist  13    History of lumbar surgery      Chronic UTI  Pt reports presently on 2nd dose of antibiotics 18 10 days, Dr Herman aware, pt going for m/eval 18.    H/O cardiac catheterization  , no intervention needed, treated medically      FAMILY HISTORY:  No pertinent family history in first degree relatives      SOCIAL HISTORY: Former smoker    Review of Systems:  Constitutional: No generalized weakness. No fevers or chills                  Eyes, Ears, Mouth, Throat: No vision loss   Respiratory: No shortness of breath or cough                              Cardiovascular: No chest pain or palpitations  Gastrointestinal: No nausea or vomiting                                       Genitourinary: No urinary incontinence or burning on urination  Musculoskeletal: No joint pain                                                        Dermatologic: B/l upper extremity ecchymosis  Neurological: as per HPI                                                                      Psychiatric: No behavioral problems  Endocrine: No known hypoglycemia              Hematologic/Lymphatic: No easy bleeding    O:  Vital Signs Last 24 Hrs  T(C): 36.9 (29 Sep 2021 13:26), Max: 37 (28 Sep 2021 20:05)  T(F): 98.5 (29 Sep 2021 13:26), Max: 98.6 (28 Sep 2021 20:05)  HR: 79 (29 Sep 2021 13:26) (79 - 94)  BP: 114/83 (29 Sep 2021 13:26) (114/83 - 157/73)  RR: 16 (29 Sep 2021 13:26) (16 - 18)  SpO2: 93% (29 Sep 2021 13:26) (92% - 97%)    General Exam:   General appearance: No acute distress                 Cardiovascular: Pedal dorsalis pulses intact bilaterally    Mental Status: Oriented to self and place, not date.  Attention intact.  (+) Dysarthria.  No aphasia or neglect.  Knowledge, registration, short and long term memory impaired.      Cranial Nerves: CN I - not tested.  PERRL, EOMI, VFF, no nystagmus or diplopia.  No APD.  Fundi not visualized.  CN V1-3 intact to light touch.  No facial asymmetry.  Hearing intact to finger rub bilaterally.  Tongue, uvula and palate midline.  Sternocleidomastoid and Trapezius intact bilaterally.    Motor:   Tone: Normal                  Strength impaired in all extremities  No pronator drift bilaterally                      No dysmetria on finger-nose-finger or heel-shin-heel  No truncal ataxia.  No resting, postural or action tremor.  No myoclonus.    Sensation: Intact to light touch    Deep Tendon Reflexes: 2+ right biceps, triceps, brachioradialis.   1+ left biceps, triceps, brachioradialis.   3+ Bilateral knees.  Mute ankles.  Toes equivocal bilaterally    Gait: Deferred at this time.    Other:   NIHSS=1 (Dysarthria-1)  MRS=4    LABS:                        9.1    8.11  )-----------( 224      ( 29 Sep 2021 07:48 )             26.5         131<L>  |  95<L>  |  13  ----------------------------<  127<H>  3.2<L>   |  28  |  0.84    Ca    10.2      29 Sep 2021 07:48    TPro  7.6  /  Alb  2.9<L>  /  TBili  0.5  /  DBili  x   /  AST  73<H>  /  ALT  26  /  AlkPhos  96        Urinalysis Basic - ( 28 Sep 2021 16:32 )    Color: Yellow / Appearance: Slightly Turbid / S.020 / pH: x  Gluc: x / Ketone: Negative  / Bili: Negative / Urobili: Negative   Blood: x / Protein: 30 mg/dL / Nitrite: Negative   Leuk Esterase: Negative / RBC: 2-5 /HPF / WBC 3-5 /HPF   Sq Epi: x / Non Sq Epi: Few /HPF / Bacteria: Few /HPF          RADIOLOGY & ADDITIONAL STUDIES:  < from: CT Head No Cont (21 @ 07:24) >    EXAM:  CT BRAIN                            PROCEDURE DATE:  2021          INTERPRETATION:  CLINICAL STATEMENT: Lung mass    TECHNIQUE: CT of the head was performed without IV contrast.  RAPID artificial intelligence was utilized for the preliminary evaluation of intracranial hemorrhage.    COMPARISON: MRI brain 2021, 2018    FINDINGS:  There is moderate diffuse parenchymal volume loss. There are areas of low attenuation in the periventricular white matter likely related to mild chronic microvascular ischemic changes.    There is no acute intracranial hemorrhage, mass effect or midline shift. There is no acute territorial infarct. There is no hydrocephalus.    The cranium is intact. Multiple lytic lesions noted.    Mucosal thickening sphenoid sinus    IMPRESSION:  Multiple lytic lesions suspicious for metastases or multiple myeloma. Correlate clinically    If intraparenchymal metastasis is a clinical concern, MRI exam with contrast recommended    No acute intracranial hemorrhage.    --- End of Report ---            MARTY MILES MD; Attending Radiologist  This document has been electronically signed. Sep 25 2021  1:00PM    < end of copied text >  < from: MR Head w/wo IV Cont (21 @ 18:47) >  EXAM:  MR BRAIN WAW IC                            PROCEDURE DATE:  2021          INTERPRETATION:  CLINICAL INDICATIONS: AMS    COMPARISON: MRI brain dated 2018    TECHNIQUE: MRI brain: Multiplanar, multisequence MR imaging of the brain are obtained with and without the administration of 7.5 cc intravenous Gadavist contrast. 0 cc of contrast was discarded.    FINDINGS:  There is no abnormal restricted diffusion to suggest acute infarction. Scattered periventricular and subcortical white matter T2 /FLAIR hyperintensities are seen without mass effect, nonspecific, likely representing moderate chronic microvascular changes. No abnormal leptomeningeal or parenchymal enhancement.  Normal T2 flow-voids are seen within  the intracranial vasculature. Moderate size cava septum pellucidum and cavum vergae. The lateral ventricles and cortical sulci are age-appropriate in size and configuration. There is no mass, mass effect, or extra-axial fluid collection. There is no susceptibility artifact to suggest hemorrhage. Midline structures are normal. The patient is status post bilateral ocular lens replacement surgery. Small right mastoid air cell tip effusion. The visualized paranasal sinuses, mastoid air cells and orbits are otherwise unremarkable.      IMPRESSION: No acute infarction. No abnormal intracranial enhancement. Abnormal diffuse heterogeneous T1 marrow signal suspicious for osseous metastasis. Consider noncontrast head CT and/or nuclear medicine bone scan for further evaluation.    --- End of Report ---            NARA VANN MD; Attending Radiologist  This document has been electronically signed. Sep 23 2021  7:11PM    < end of copied text >   Patient is a 71y old  Female who presents with a chief complaint of Pneumonia (29 Sep 2021 12:49)      HPI:  70 y/o F hx of HTN and HLD BIBEMS for hypoxia and hypotension at outpatient clinic. Patient states that for the past weak she has been feeling nauseous, fatigue and decreased appetite. She had 1episode of non bloody vomit and a couple of episodes of non bloody diarrhea early last week which has also  resolved. She states she developed a cough last week as well which is associated with phlegm but denies fevers, chills, shortness of breath or night sweats. Denies chest pain, palpitations, diarrhea, constipation, headache or any other complaints (20 Sep 2021 17:45)    Pt unable to provide history.  States, I don't know."     Neurological Review of Systems:  No difficulty with language.  No vision loss or double vision.  No dizziness, vertigo or new hearing loss.  (+) Difficulty with speech or swallowing.  No focal weakness.  No focal sensory changes.  No numbness or tingling in the bilateral lower extremities.  (+) Difficulty with balance.  (+) Difficulty with ambulation.        MEDICATIONS  (STANDING):  amLODIPine   Tablet 5 milliGRAM(s) Oral daily  chlorhexidine 2% Cloths 1 Application(s) Topical daily  folic acid 1 milliGRAM(s) Oral daily  heparin   Injectable 5000 Unit(s) SubCutaneous every 8 hours  influenza   Vaccine 0.5 milliLiter(s) IntraMuscular once  lisinopril 10 milliGRAM(s) Oral every 12 hours  metoprolol tartrate 100 milliGRAM(s) Oral two times a day  nystatin Powder 1 Application(s) Topical every 8 hours  pantoprazole    Tablet 40 milliGRAM(s) Oral before breakfast  PARoxetine 40 milliGRAM(s) Oral daily  senna 2 Tablet(s) Oral at bedtime  simvastatin 40 milliGRAM(s) Oral at bedtime  sodium chloride 0.9%. 1000 milliLiter(s) (60 mL/Hr) IV Continuous <Continuous>    MEDICATIONS  (PRN):  acetaminophen   Tablet .. 650 milliGRAM(s) Oral every 6 hours PRN Temp greater or equal to 38C (100.4F), Mild Pain (1 - 3)  ALBUTerol    90 MICROgram(s) HFA Inhaler 2 Puff(s) Inhalation every 6 hours PRN Shortness of Breath and/or Wheezing  ondansetron    Tablet 4 milliGRAM(s) Oral every 8 hours PRN Nausea and/or Vomiting    Allergies    No Known Allergies    Intolerances      PAST MEDICAL & SURGICAL HISTORY:  Lumbar stenosis    H/O: HTN (hypertension)    Hypercholesterolemia    Bleeding ulcer  stomach ulcer     Anxiety    Spinal stenosis in cervical region    Acute hepatitis C virus infection without hepatic coma  treated - Resolved    Balance disorder    Coronary artery disease involving native coronary artery of native heart without angina pectoris    HTN (hypertension)    HLD (hyperlipidemia)    H/O colonoscopy    Bleeding ulcer  stomach surgery for bleeding ulcer    Fracture  ORIF Left wrist  13    History of lumbar surgery      Chronic UTI  Pt reports presently on 2nd dose of antibiotics 18 10 days, Dr Hemran aware, pt going for m/eval 18.    H/O cardiac catheterization  , no intervention needed, treated medically      FAMILY HISTORY:  No pertinent family history in first degree relatives      SOCIAL HISTORY: Former smoker    Review of Systems:  Constitutional: No generalized weakness. + fevers .                 Eyes, Ears, Mouth, Throat: No vision loss   Respiratory: + cough                              Cardiovascular: No chest pain  Gastrointestinal: No nausea or vomiting                                       Genitourinary: No  burning on urination  Musculoskeletal: No joint pain                                                        Dermatologic: B/l upper extremity ecchymosis  Neurological: as per HPI                                                                      Psychiatric: No behavioral problems  Endocrine: No known hypoglycemia              Hematologic/Lymphatic: No easy bleeding    O:  Vital Signs Last 24 Hrs  T(C): 36.9 (29 Sep 2021 13:26), Max: 37 (28 Sep 2021 20:05)  T(F): 98.5 (29 Sep 2021 13:26), Max: 98.6 (28 Sep 2021 20:05)  HR: 79 (29 Sep 2021 13:26) (79 - 94)  BP: 114/83 (29 Sep 2021 13:26) (114/83 - 157/73)  RR: 16 (29 Sep 2021 13:26) (16 - 18)  SpO2: 93% (29 Sep 2021 13:26) (92% - 97%)    General Exam:   General appearance: No acute distress                 Cardiovascular: Pedal dorsalis pulses intact bilaterally    Mental Status: Oriented to self and place, not date.  Attention intact.  (+) Dysarthria.  No aphasia or neglect.  Knowledge, registration, short and long term memory impaired.      Cranial Nerves: CN I - not tested.  PERRL, EOMI, VFF, no nystagmus or diplopia.  No APD.  Fundi not visualized.  CN V1-3 intact to light touch.  No facial asymmetry.  Hearing intact to finger rub bilaterally.  Tongue, uvula and palate midline.  Sternocleidomastoid and Trapezius intact bilaterally.    Motor:   Tone: Normal                  Strength impaired in all extremities  No pronator drift bilaterally                      No dysmetria on finger-nose-finger or heel-shin-heel  No truncal ataxia.  No resting, postural or action tremor.  No myoclonus.    Sensation: Intact to light touch    Deep Tendon Reflexes: 2+ right biceps, triceps, brachioradialis.   1+ left biceps, triceps, brachioradialis.   3+ Bilateral knees.  Mute ankles.  Toes equivocal bilaterally    Gait: Deferred at this time.    Other:   NIHSS=1 (Dysarthria-1)  MRS=4    LABS:                        9.1    8.11  )-----------( 224      ( 29 Sep 2021 07:48 )             26.5         131<L>  |  95<L>  |  13  ----------------------------<  127<H>  3.2<L>   |  28  |  0.84    Ca    10.2      29 Sep 2021 07:48    TPro  7.6  /  Alb  2.9<L>  /  TBili  0.5  /  DBili  x   /  AST  73<H>  /  ALT  26  /  AlkPhos  96        Urinalysis Basic - ( 28 Sep 2021 16:32 )    Color: Yellow / Appearance: Slightly Turbid / S.020 / pH: x  Gluc: x / Ketone: Negative  / Bili: Negative / Urobili: Negative   Blood: x / Protein: 30 mg/dL / Nitrite: Negative   Leuk Esterase: Negative / RBC: 2-5 /HPF / WBC 3-5 /HPF   Sq Epi: x / Non Sq Epi: Few /HPF / Bacteria: Few /HPF          RADIOLOGY & ADDITIONAL STUDIES:  < from: CT Head No Cont (21 @ 07:24) >    EXAM:  CT BRAIN                            PROCEDURE DATE:  2021          INTERPRETATION:  CLINICAL STATEMENT: Lung mass    TECHNIQUE: CT of the head was performed without IV contrast.  RAPID artificial intelligence was utilized for the preliminary evaluation of intracranial hemorrhage.    COMPARISON: MRI brain 2021, 2018    FINDINGS:  There is moderate diffuse parenchymal volume loss. There are areas of low attenuation in the periventricular white matter likely related to mild chronic microvascular ischemic changes.    There is no acute intracranial hemorrhage, mass effect or midline shift. There is no acute territorial infarct. There is no hydrocephalus.    The cranium is intact. Multiple lytic lesions noted.    Mucosal thickening sphenoid sinus    IMPRESSION:  Multiple lytic lesions suspicious for metastases or multiple myeloma. Correlate clinically    If intraparenchymal metastasis is a clinical concern, MRI exam with contrast recommended    No acute intracranial hemorrhage.    --- End of Report ---            MARTY MILES MD; Attending Radiologist  This document has been electronically signed. Sep 25 2021  1:00PM    < end of copied text >  < from: MR Head w/wo IV Cont (21 @ 18:47) >  EXAM:  MR BRAIN WAW IC                            PROCEDURE DATE:  2021          INTERPRETATION:  CLINICAL INDICATIONS: AMS    COMPARISON: MRI brain dated 2018    TECHNIQUE: MRI brain: Multiplanar, multisequence MR imaging of the brain are obtained with and without the administration of 7.5 cc intravenous Gadavist contrast. 0 cc of contrast was discarded.    FINDINGS:  There is no abnormal restricted diffusion to suggest acute infarction. Scattered periventricular and subcortical white matter T2 /FLAIR hyperintensities are seen without mass effect, nonspecific, likely representing moderate chronic microvascular changes. No abnormal leptomeningeal or parenchymal enhancement.  Normal T2 flow-voids are seen within  the intracranial vasculature. Moderate size cava septum pellucidum and cavum vergae. The lateral ventricles and cortical sulci are age-appropriate in size and configuration. There is no mass, mass effect, or extra-axial fluid collection. There is no susceptibility artifact to suggest hemorrhage. Midline structures are normal. The patient is status post bilateral ocular lens replacement surgery. Small right mastoid air cell tip effusion. The visualized paranasal sinuses, mastoid air cells and orbits are otherwise unremarkable.      IMPRESSION: No acute infarction. No abnormal intracranial enhancement. Abnormal diffuse heterogeneous T1 marrow signal suspicious for osseous metastasis. Consider noncontrast head CT and/or nuclear medicine bone scan for further evaluation.    --- End of Report ---            NARA VANN MD; Attending Radiologist  This document has been electronically signed. Sep 23 2021  7:11PM    < end of copied text >

## 2021-09-29 NOTE — PROGRESS NOTE ADULT - PROBLEM SELECTOR PLAN 5
Likely secondary to metastatic disease  - s/p Aredia 9/27.   - Calcitonin 4 IU /kg q12 hrs x 1 day  - Calcium level 10.2 { corrected 11} today ; continue to monitor with possible additional Calcitonin x1 more day if Ca remains elevated.  - Heme/Onc following

## 2021-09-29 NOTE — CONSULT NOTE ADULT - ASSESSMENT
77yo female w/ toxic metabolic encephalopathy    Recommendations:    - Please obtain EEG to evaluate for abnormality     - Continued mgmt per primary team     77yo female w/ toxic metabolic encephalopathy    Recommendations:    - Please obtain EEG to evaluate for abnormality     - Continued mgmt per primary team    - PT as tolerated

## 2021-09-29 NOTE — CONSULT NOTE ADULT - SUBJECTIVE AND OBJECTIVE BOX
EP Attending    HISTORY OF PRESENT ILLNESS: HPI:  70 y/o F hx of HTN and HLD BIBEMS for hypoxia and hypotension at outpatient clinic. Patient states that for the past weak she has been feeling nauseous, fatigue and decreased appetite. She had 1episode of non bloody vomit and a couple of episodes of non bloody diarrhea early last week which has also  resolved. She states she developed a cough last week as well which is associated with phlegm but denies fevers, chills, shortness of breath or night sweats. Denies chest pain, palpitations, diarrhea, constipation, headache or any other complaints (20 Sep 2021 17:45)    Date of Service 9/29- seen at bedside on 4S.  states no fevers chills or cough at this point.  Called re: abnormal EKG - QTc > 500ms.  Ms Gray reports no history of angina ,palpitations, lightheadedness or fainting, personal or family history of sudden cardiac arrest, or history of exercise intolerance. A 10 pt ROS is otherwise negative.  She does not have her current medication list memorized.    PAST MEDICAL & SURGICAL HISTORY:  Lumbar stenosis    H/O: HTN (hypertension)    Hypercholesterolemia    Bleeding ulcer  stomach ulcer 2011    Anxiety    Spinal stenosis in cervical region    Acute hepatitis C virus infection without hepatic coma  treated 2015- Resolved    Balance disorder    Coronary artery disease involving native coronary artery of native heart without angina pectoris    HTN (hypertension)    HLD (hyperlipidemia)    H/O colonoscopy    Bleeding ulcer  stomach surgery for bleeding ulcer    Fracture  ORIF Left wrist  1/17/13    History of lumbar surgery  2013    Chronic UTI  Pt reports presently on 2nd dose of antibiotics 7/5/18 10 days, Dr Herman aware, pt going for m/eval 7/13/18.    H/O cardiac catheterization  2013, no intervention needed, treated medically      MEDICATIONS  (STANDING):  amLODIPine   Tablet 5 milliGRAM(s) Oral daily  chlorhexidine 2% Cloths 1 Application(s) Topical daily  folic acid 1 milliGRAM(s) Oral daily  heparin   Injectable 5000 Unit(s) SubCutaneous every 8 hours  influenza   Vaccine 0.5 milliLiter(s) IntraMuscular once  lisinopril 10 milliGRAM(s) Oral every 12 hours  metoprolol tartrate 100 milliGRAM(s) Oral two times a day  nystatin Powder 1 Application(s) Topical every 8 hours  pantoprazole    Tablet 40 milliGRAM(s) Oral before breakfast  PARoxetine 40 milliGRAM(s) Oral daily  senna 2 Tablet(s) Oral at bedtime  simvastatin 40 milliGRAM(s) Oral at bedtime  sodium chloride 0.9%. 1000 milliLiter(s) (60 mL/Hr) IV Continuous <Continuous>      Allergies    No Known Allergies    Intolerances    FAMILY HISTORY:  No pertinent family history in first degree relatives      Non-contributary for premature coronary disease or sudden cardiac death    SOCIAL HISTORY:    [ x] Non-smoker  [ ] Smoker  [ ] Alcohol    PHYSICAL EXAM:  T(C): 36.9 (09-29-21 @ 13:26), Max: 37 (09-28-21 @ 20:05)  HR: 79 (09-29-21 @ 13:26) (79 - 94)  BP: 114/83 (09-29-21 @ 13:26) (114/83 - 157/73)  RR: 16 (09-29-21 @ 13:26) (16 - 18)  SpO2: 93% (09-29-21 @ 13:26) (92% - 97%)  Wt(kg): --    Appearance: well appearing elderly woman in no acute distress, oriented x 3	  HEENT:   Normal oral mucosa, PERRL, EOMI	  Lymphatic: No lymphadenopathy , no edema  Cardiovascular: Normal S1 S2, No JVD, No murmurs , Peripheral pulses palpable 2+ bilaterally  Respiratory: right side rhonchi.  no rales or wheezing.  Gastrointestinal:  Soft, Non-tender, + BS	  Skin: No rashes, No ecchymoses, No cyanosis, warm to touch  Musculoskeletal: Normal range of motion, normal strength  Psychiatry:  Mood & affect appropriate    TELEMETRY: none	    ECG: NSR, ST segment stretched so QTc is 500-520ms.	  Echo:  normal LV EF and biventricular size/function  CT Chest- right hilar mass and atelectasis.  RUL possible pneumonia.  Liver mets.  MRI head w/ osseous mets.  	  	  LABS:	 	                          9.1    8.11  )-----------( 224      ( 29 Sep 2021 07:48 )             26.5     09-29    131<L>  |  95<L>  |  13  ----------------------------<  127<H>  3.2<L>   |  28  |  0.84    Ca    10.2      29 Sep 2021 07:48    TPro  7.6  /  Alb  2.9<L>  /  TBili  0.5  /  DBili  x   /  AST  73<H>  /  ALT  26  /  AlkPhos  96  09-29    ASSESSMENT/PLAN: 	71y  Female with metastatic lung cancer.  Called re: prolonged QTc.    QT is prolonged out of proportion to her low dose of paroxetine.  No overt arrhythmia symptoms (fainting, palpitations)  Echo is reassuring.  Avoid QT-prolonging medication.  Periodic EKG's during chemotherapy.  No further inpatient EP workup anticipated.    Carroll Carrasco M.D.  Cardiac Electrophysiology    office [phone]  pager [phone]

## 2021-09-29 NOTE — CONSULT NOTE ADULT - ASSESSMENT
Fever - possible tumor fever but could also be C. Difficile if her diarrhea is real  Pneumonia - completed treatment    Plan - Hold off abxs for now  monitor bowel movements.

## 2021-09-29 NOTE — CONSULT NOTE ADULT - CONSULT REASON
AMS
Surgery consulted due to the presence of right hilar lung mass and peritoneal lesions concerning for neoplasia.
Preop Abnormal EKG
abnormal EKG
Lung mass
biopsy of peritoneal nodule
Pneumonia (s/p treatment) , new onset fever.
PNA / Lung mass
Discuss complex medical decision making related to goals of care

## 2021-09-30 DIAGNOSIS — R94.31 ABNORMAL ELECTROCARDIOGRAM [ECG] [EKG]: ICD-10-CM

## 2021-09-30 LAB
% ALBUMIN: 46.3 % — SIGNIFICANT CHANGE UP
% ALPHA 1: 6 % — SIGNIFICANT CHANGE UP
% ALPHA 2: 14.8 % — SIGNIFICANT CHANGE UP
% BETA: 10.7 % — SIGNIFICANT CHANGE UP
% GAMMA: 22.2 % — SIGNIFICANT CHANGE UP
% M SPIKE: SIGNIFICANT CHANGE UP
ALBUMIN SERPL ELPH-MCNC: 2.8 G/DL — LOW (ref 3.5–5)
ALBUMIN SERPL ELPH-MCNC: 3.3 G/DL — LOW (ref 3.6–5.5)
ALBUMIN/GLOB SERPL ELPH: 0.9 RATIO — SIGNIFICANT CHANGE UP
ALP SERPL-CCNC: 84 U/L — SIGNIFICANT CHANGE UP (ref 40–120)
ALPHA1 GLOB SERPL ELPH-MCNC: 0.4 G/DL — SIGNIFICANT CHANGE UP (ref 0.1–0.4)
ALPHA2 GLOB SERPL ELPH-MCNC: 1.1 G/DL — HIGH (ref 0.5–1)
ALT FLD-CCNC: 23 U/L DA — SIGNIFICANT CHANGE UP (ref 10–60)
ANION GAP SERPL CALC-SCNC: 10 MMOL/L — SIGNIFICANT CHANGE UP (ref 5–17)
AST SERPL-CCNC: 62 U/L — HIGH (ref 10–40)
B-GLOBULIN SERPL ELPH-MCNC: 0.8 G/DL — SIGNIFICANT CHANGE UP (ref 0.5–1)
BILIRUB SERPL-MCNC: 0.5 MG/DL — SIGNIFICANT CHANGE UP (ref 0.2–1.2)
BUN SERPL-MCNC: 20 MG/DL — HIGH (ref 7–18)
CALCIUM SERPL-MCNC: 9.5 MG/DL — SIGNIFICANT CHANGE UP (ref 8.4–10.5)
CHLORIDE SERPL-SCNC: 100 MMOL/L — SIGNIFICANT CHANGE UP (ref 96–108)
CO2 SERPL-SCNC: 26 MMOL/L — SIGNIFICANT CHANGE UP (ref 22–31)
CREAT SERPL-MCNC: 0.96 MG/DL — SIGNIFICANT CHANGE UP (ref 0.5–1.3)
GAMMA GLOBULIN: 1.6 G/DL — SIGNIFICANT CHANGE UP (ref 0.6–1.6)
GLUCOSE SERPL-MCNC: 105 MG/DL — HIGH (ref 70–99)
HCT VFR BLD CALC: 27.2 % — LOW (ref 34.5–45)
HGB BLD-MCNC: 9.2 G/DL — LOW (ref 11.5–15.5)
INTERPRETATION SERPL IFE-IMP: SIGNIFICANT CHANGE UP
M-SPIKE: SIGNIFICANT CHANGE UP (ref 0–0)
MCHC RBC-ENTMCNC: 29.9 PG — SIGNIFICANT CHANGE UP (ref 27–34)
MCHC RBC-ENTMCNC: 33.8 GM/DL — SIGNIFICANT CHANGE UP (ref 32–36)
MCV RBC AUTO: 88.3 FL — SIGNIFICANT CHANGE UP (ref 80–100)
NRBC # BLD: 0 /100 WBCS — SIGNIFICANT CHANGE UP (ref 0–0)
PLATELET # BLD AUTO: 230 K/UL — SIGNIFICANT CHANGE UP (ref 150–400)
POTASSIUM SERPL-MCNC: 3.7 MMOL/L — SIGNIFICANT CHANGE UP (ref 3.5–5.3)
POTASSIUM SERPL-SCNC: 3.7 MMOL/L — SIGNIFICANT CHANGE UP (ref 3.5–5.3)
PROT PATTERN SERPL ELPH-IMP: SIGNIFICANT CHANGE UP
PROT SERPL-MCNC: 7.5 G/DL — SIGNIFICANT CHANGE UP (ref 6–8.3)
RBC # BLD: 3.08 M/UL — LOW (ref 3.8–5.2)
RBC # FLD: 13.8 % — SIGNIFICANT CHANGE UP (ref 10.3–14.5)
SODIUM SERPL-SCNC: 136 MMOL/L — SIGNIFICANT CHANGE UP (ref 135–145)
WBC # BLD: 9.3 K/UL — SIGNIFICANT CHANGE UP (ref 3.8–10.5)
WBC # FLD AUTO: 9.3 K/UL — SIGNIFICANT CHANGE UP (ref 3.8–10.5)

## 2021-09-30 PROCEDURE — 95819 EEG AWAKE AND ASLEEP: CPT | Mod: 26

## 2021-09-30 RX ADMIN — Medication 650 MILLIGRAM(S): at 21:32

## 2021-09-30 RX ADMIN — NYSTATIN CREAM 1 APPLICATION(S): 100000 CREAM TOPICAL at 13:35

## 2021-09-30 RX ADMIN — HEPARIN SODIUM 5000 UNIT(S): 5000 INJECTION INTRAVENOUS; SUBCUTANEOUS at 13:35

## 2021-09-30 RX ADMIN — AMLODIPINE BESYLATE 5 MILLIGRAM(S): 2.5 TABLET ORAL at 05:28

## 2021-09-30 RX ADMIN — Medication 100 MILLIGRAM(S): at 05:28

## 2021-09-30 RX ADMIN — LISINOPRIL 10 MILLIGRAM(S): 2.5 TABLET ORAL at 05:28

## 2021-09-30 RX ADMIN — HEPARIN SODIUM 5000 UNIT(S): 5000 INJECTION INTRAVENOUS; SUBCUTANEOUS at 21:33

## 2021-09-30 RX ADMIN — NYSTATIN CREAM 1 APPLICATION(S): 100000 CREAM TOPICAL at 21:41

## 2021-09-30 RX ADMIN — PANTOPRAZOLE SODIUM 40 MILLIGRAM(S): 20 TABLET, DELAYED RELEASE ORAL at 05:28

## 2021-09-30 RX ADMIN — HEPARIN SODIUM 5000 UNIT(S): 5000 INJECTION INTRAVENOUS; SUBCUTANEOUS at 05:28

## 2021-09-30 RX ADMIN — LISINOPRIL 10 MILLIGRAM(S): 2.5 TABLET ORAL at 17:21

## 2021-09-30 RX ADMIN — NYSTATIN CREAM 1 APPLICATION(S): 100000 CREAM TOPICAL at 05:28

## 2021-09-30 RX ADMIN — Medication 1 MILLIGRAM(S): at 21:32

## 2021-09-30 RX ADMIN — Medication 1 MILLIGRAM(S): at 12:13

## 2021-09-30 RX ADMIN — Medication 40 MILLIGRAM(S): at 12:13

## 2021-09-30 RX ADMIN — SIMVASTATIN 40 MILLIGRAM(S): 20 TABLET, FILM COATED ORAL at 21:33

## 2021-09-30 RX ADMIN — Medication 100 MILLIGRAM(S): at 17:21

## 2021-09-30 RX ADMIN — Medication 650 MILLIGRAM(S): at 22:15

## 2021-09-30 RX ADMIN — CHLORHEXIDINE GLUCONATE 1 APPLICATION(S): 213 SOLUTION TOPICAL at 12:14

## 2021-09-30 NOTE — PROGRESS NOTE ADULT - PROBLEM SELECTOR PLAN 9
Pt takes Metoprolol, Amlodipine and Benazepril at home  -Continue home regimen bb and Norvasc with parameters  -Continue therapeutic interchange Lisinopril with parameters and monitor BMP   - Continue to monitor BP

## 2021-09-30 NOTE — EEG REPORT - NS EEG TEXT BOX
REPORT OF ROUTINE EEG WITH VIDEO  [facility]: [address] [place] [address], Phone: [phone] [facility]: [address] [place] [address], [place] [address], Phone: [phone] Office: [address] [place] [address], [place] [address], [place] [address], Phone: [phone]  Patient Name: ELAINA JAMA   Age: 71 year : 1950 MRN #: -, Location: - Referring Physician: -  EEG #: [id number] Study Date: 2021   Start Time: 10:13:53 AM    Study Duration: 24.9 		  Technical Information:					 On Instrument: - Placement and Labeling of Electrodes: The EEG was performed utilizing 20 channels referential EEG connections (coronal over temporal over parasagittal montage) using all standard 10-20 electrode placements with EKG.  Recording was at a sampling rate of 256 samples per second per channel.  Time synchronized digital video recording was done simultaneously with EEG recording.  A low light infrared camera was used for low light recording.  Andres and seizure detection algorithms were utilized. CSA Technical Component: Quantitative EEG analysis using a separate Compressed Spectral Array (CSA) software package was conducted in real-time and run at bedside after set up by the technician, digitally displaying the power of electrographic frequencies included in the 1-30Hz band using a graded color map.  This data was reviewed and interpreted independently, and is reported in a separate section below.  History:  Routine study. Performed bedside Patient awake and confused HV not performed and photic performed 70 Y/O female presents with: Anxiety Pneumonia due to organism Balance disorder Lung mass Encephalopathy  Medication No Data.  Study Interpretation:  FINDINGS:  The background was continuous, spontaneously variable and reactive.  During wakefulness, the posteriorly dominant rhythm consisted of symmetric, well modulated 8.5 Hz activity, with an amplitude to 30 uV, that attenuated to eye opening.  Low amplitude central beta was noted in wakefulness.  Background Slowing: Generalized slowing: theta/delta slowing. Focal slowing: none was present.  Sleep Background: Drowsiness was characterized by fragmentation, attenuation, and slowing of the background activity.   Stage II sleep transients were not recorded.  Non-epileptiform activity: None.  Epileptiform Activity:  No epileptiform discharges were present.  Events: No clinical events were recorded. No seizures were recorded.  Activation Procedures:  -Hyperventilation was not performed.   -Photic driving response was noted intermittently.  Artifacts: Intermittent myogenic and movement artifacts were noted.  ECG: The heart rate on single channel ECG was predominantly between  BPM.  EEG Classification / Summary:  Abnormal EEG in the awake, drowsy states. -Mild generalized slowing  Clinical Impression:  Mild nonspecific diffuse or multifocal cerebral dysfunction.  No epileptiform pattern or seizure seen.  Preliminary Fellow Report, final report pending attending review  Marvin Russell MD Epilepsy Fellow  Reading Room: [phone] On Call Service After Hours: [phone]    REPORT OF ROUTINE EEG WITH VIDEO  [facility]: [address] [place] [address], Phone: [phone] [facility]: [address] [place] [address], [place] [address], Phone: [phone] Office: [address] [place] [address], [place] [address], [place] [address], Phone: [phone]  Patient Name: ELAINA JAMA   Age: 71 year : 1950 MRN #: -, Location: - Referring Physician: -  EEG #: [id number] Study Date: 2021   Start Time: 10:13:53 AM    		  Technical Information:					 On Instrument: - Placement and Labeling of Electrodes: The EEG was performed utilizing 20 channels referential EEG connections (coronal over temporal over parasagittal montage) using all standard 10-20 electrode placements with EKG.  Recording was at a sampling rate of 256 samples per second per channel.  Time synchronized digital video recording was done simultaneously with EEG recording.  A low light infrared camera was used for low light recording.  Andres and seizure detection algorithms were utilized. CSA Technical Component: Quantitative EEG analysis using a separate Compressed Spectral Array (CSA) software package was conducted in real-time and run at bedside after set up by the technician, digitally displaying the power of electrographic frequencies included in the 1-30Hz band using a graded color map.  This data was reviewed and interpreted independently, and is reported in a separate section below.  History:  Routine study. Performed bedside Patient awake and confused HV not performed and photic performed 70 Y/O female presents with: Anxiety Pneumonia due to organism Balance disorder Lung mass Encephalopathy  Medication No Data.  Study Interpretation:  FINDINGS:  The background was continuous, spontaneously variable and reactive.  During wakefulness, the posteriorly dominant rhythm consisted of symmetric, well modulated 8.5 Hz activity, with an amplitude to 30 uV, that attenuated to eye opening.  Low amplitude central beta was noted in wakefulness.  Background Slowing: Generalized slowing: theta/delta slowing. Focal slowing: none was present.  Sleep Background: Drowsiness was characterized by fragmentation, attenuation, and slowing of the background activity.   Stage II sleep transients were not recorded.  Non-epileptiform activity: None.  Epileptiform Activity:  No epileptiform discharges were present.  Events: No clinical events were recorded. No seizures were recorded.  Activation Procedures:  -Hyperventilation was not performed.   -Photic driving response was noted intermittently.  Artifacts: Intermittent myogenic and movement artifacts were noted.  ECG: The heart rate on single channel ECG was predominantly between  BPM.  EEG Classification / Summary:  Abnormal EEG in the awake, drowsy states. -Mild generalized slowing  Clinical Impression:  Mild nonspecific diffuse or multifocal cerebral dysfunction.  No epileptiform pattern or seizure seen.  Marvin Russell MD Epilepsy Fellow  Willy Osei MD EEG/Epilepsy Attending  Reading Room: [phone] On Call Service After Hours: [phone]

## 2021-09-30 NOTE — PROGRESS NOTE ADULT - PROBLEM SELECTOR PLAN 5
Likely secondary to metastatic disease  - normalizing  - s/p Aredia 9/27.   - s/p Calcitonin 4 IU /kg q12 hrs x 1 day  - Heme/Onc following

## 2021-09-30 NOTE — PROGRESS NOTE ADULT - SUBJECTIVE AND OBJECTIVE BOX
Time of Visit:  Patient seen and examined.     MEDICATIONS  (STANDING):  amLODIPine   Tablet 5 milliGRAM(s) Oral daily  chlorhexidine 2% Cloths 1 Application(s) Topical daily  folic acid 1 milliGRAM(s) Oral daily  heparin   Injectable 5000 Unit(s) SubCutaneous every 8 hours  influenza   Vaccine 0.5 milliLiter(s) IntraMuscular once  lisinopril 10 milliGRAM(s) Oral every 12 hours  melatonin 1 milliGRAM(s) Oral at bedtime  metoprolol tartrate 100 milliGRAM(s) Oral two times a day  nystatin Powder 1 Application(s) Topical every 8 hours  pantoprazole    Tablet 40 milliGRAM(s) Oral before breakfast  PARoxetine 40 milliGRAM(s) Oral daily  simvastatin 40 milliGRAM(s) Oral at bedtime  sodium chloride 0.9%. 1000 milliLiter(s) (60 mL/Hr) IV Continuous <Continuous>      MEDICATIONS  (PRN):  acetaminophen   Tablet .. 650 milliGRAM(s) Oral every 6 hours PRN Temp greater or equal to 38C (100.4F), Mild Pain (1 - 3)  ALBUTerol    90 MICROgram(s) HFA Inhaler 2 Puff(s) Inhalation every 6 hours PRN Shortness of Breath and/or Wheezing  ondansetron    Tablet 4 milliGRAM(s) Oral every 8 hours PRN Nausea and/or Vomiting       Medications up to date at time of exam.    ROS; No fever, chills, cough, congestion on exam. No s/sx of SOB on exam.   PHYSICAL EXAMINATION:    Vital Signs Last 24 Hrs  T(C): 36.4 (30 Sep 2021 05:15), Max: 36.9 (29 Sep 2021 13:26)  T(F): 97.6 (30 Sep 2021 05:15), Max: 98.5 (29 Sep 2021 13:26)  HR: 89 (30 Sep 2021 05:15) (79 - 89)  BP: 136/78 (30 Sep 2021 05:15) (114/83 - 138/66)  BP(mean): 90 (29 Sep 2021 21:33) (90 - 90)  RR: 16 (30 Sep 2021 05:15) (16 - 17)  SpO2: 95% (30 Sep 2021 05:15) (93% - 95%)   (if applicable)    General: Alert and oriented, forgetful  . Poor historian . No acute distress.     HEENT: Head is normocephalic and atraumatic. No nasal tenderness. Extraocular muscles are intact. Mucous membranes are moist.     NECK: Supple, no palpable adenopathy.    LUNGS: Decreased breath sounds, no wheezing on exam. Non labored. No use of accessory muscle.     HEART: S1 S2 Regular rate and no click/ rub.     ABDOMEN: Soft, nontender, and nondistended.  No hepatosplenomegaly is noted. Active bowel sounds.     EXTREMITIES: Without any cyanosis, clubbing, rash, lesions or edema.    NEUROLOGIC: Awake, alert, oriented, forgetful but able to follow command .     SKIN: Warm, dry, good turgor.      LABS:                        9.2    9.30  )-----------( 230      ( 30 Sep 2021 06:26 )             27.2         136  |  100  |  20<H>  ----------------------------<  105<H>  3.7   |  26  |  0.96    Ca    9.5      30 Sep 2021 06:26    TPro  7.5  /  Alb  2.8<L>  /  TBili  0.5  /  DBili  x   /  AST  62<H>  /  ALT  23  /  AlkPhos  84        Urinalysis Basic - ( 28 Sep 2021 16:32 )    Color: Yellow / Appearance: Slightly Turbid / S.020 / pH: x  Gluc: x / Ketone: Negative  / Bili: Negative / Urobili: Negative   Blood: x / Protein: 30 mg/dL / Nitrite: Negative   Leuk Esterase: Negative / RBC: 2-5 /HPF / WBC 3-5 /HPF   Sq Epi: x / Non Sq Epi: Few /HPF / Bacteria: Few /HPF                      MICROBIOLOGY: (if applicable)    RADIOLOGY & ADDITIONAL STUDIES:  EKG:   CXR:  ECHO:    IMPRESSION: 71y Female PAST MEDICAL & SURGICAL HISTORY:  Lumbar stenosis    H/O: HTN (hypertension)    Hypercholesterolemia    Bleeding ulcer  stomach ulcer     Anxiety    Spinal stenosis in cervical region    Acute hepatitis C virus infection without hepatic coma  treated - Resolved    Balance disorder    Coronary artery disease involving native coronary artery of native heart without angina pectoris    HTN (hypertension)    HLD (hyperlipidemia)    H/O colonoscopy    Bleeding ulcer  stomach surgery for bleeding ulcer    Fracture  ORIF Left wrist  13    History of lumbar surgery  2013    Chronic UTI  Pt reports presently on 2nd dose of antibiotics 18 10 days, Dr Herman aware, pt going for m/eval 18.    H/O cardiac catheterization  , no intervention needed, treated medically     Impression; 72 Y/O Female presented to ED with  Hypoxia due to post obstructive pna with right lung mass with meds. Former smoker. Had hypoxia and hypotension at outpatient clinic. 21 Negative for Covid 19 PCR. Has an Acute hypoxic respiratory failure secondary to Post obstructive Pneumonia and Has Right Hilar Lung Mass.      Suggestion ;  O2 saturation 95% room air . Can have  Oxygen supplementation 2L NC if needed.    Continue PRN Albuterol 2 puffs Q 6 hours.    DVT/ GI prophylactic . No Heparin SQ starting Saturday evening dose till Monday .     Patient is schedule for bronch 10/4 @ 1300 hr. NPO post midnight  to Monday .  Marcos Gray  at bedside and aware of the Bronch procedure on Monday and answer all pulmonary questions.       Time of Visit:  Patient seen and examined.     MEDICATIONS  (STANDING):  amLODIPine   Tablet 5 milliGRAM(s) Oral daily  chlorhexidine 2% Cloths 1 Application(s) Topical daily  folic acid 1 milliGRAM(s) Oral daily  heparin   Injectable 5000 Unit(s) SubCutaneous every 8 hours  influenza   Vaccine 0.5 milliLiter(s) IntraMuscular once  lisinopril 10 milliGRAM(s) Oral every 12 hours  melatonin 1 milliGRAM(s) Oral at bedtime  metoprolol tartrate 100 milliGRAM(s) Oral two times a day  nystatin Powder 1 Application(s) Topical every 8 hours  pantoprazole    Tablet 40 milliGRAM(s) Oral before breakfast  PARoxetine 40 milliGRAM(s) Oral daily  simvastatin 40 milliGRAM(s) Oral at bedtime  sodium chloride 0.9%. 1000 milliLiter(s) (60 mL/Hr) IV Continuous <Continuous>      MEDICATIONS  (PRN):  acetaminophen   Tablet .. 650 milliGRAM(s) Oral every 6 hours PRN Temp greater or equal to 38C (100.4F), Mild Pain (1 - 3)  ALBUTerol    90 MICROgram(s) HFA Inhaler 2 Puff(s) Inhalation every 6 hours PRN Shortness of Breath and/or Wheezing  ondansetron    Tablet 4 milliGRAM(s) Oral every 8 hours PRN Nausea and/or Vomiting       Medications up to date at time of exam.    ROS; No fever, chills, cough, congestion on exam. No s/sx of SOB on exam.   PHYSICAL EXAMINATION:    Vital Signs Last 24 Hrs  T(C): 36.4 (30 Sep 2021 05:15), Max: 36.9 (29 Sep 2021 13:26)  T(F): 97.6 (30 Sep 2021 05:15), Max: 98.5 (29 Sep 2021 13:26)  HR: 89 (30 Sep 2021 05:15) (79 - 89)  BP: 136/78 (30 Sep 2021 05:15) (114/83 - 138/66)  BP(mean): 90 (29 Sep 2021 21:33) (90 - 90)  RR: 16 (30 Sep 2021 05:15) (16 - 17)  SpO2: 95% (30 Sep 2021 05:15) (93% - 95%)   (if applicable)    General: Alert and oriented, forgetful  . Poor historian . No acute distress.     HEENT: Head is normocephalic and atraumatic. No nasal tenderness. Extraocular muscles are intact. Mucous membranes are moist.     NECK: Supple, no palpable adenopathy.    LUNGS: Decreased breath sounds, no wheezing on exam. Non labored. No use of accessory muscle.     HEART: S1 S2 Regular rate and no click/ rub.     ABDOMEN: Soft, nontender, and nondistended.  No hepatosplenomegaly is noted. Active bowel sounds.     EXTREMITIES: Without any cyanosis, clubbing, rash, lesions or edema.    NEUROLOGIC: Awake, alert, oriented, forgetful but able to follow command .     SKIN: Warm, dry, good turgor.      LABS:                        9.2    9.30  )-----------( 230      ( 30 Sep 2021 06:26 )             27.2         136  |  100  |  20<H>  ----------------------------<  105<H>  3.7   |  26  |  0.96    Ca    9.5      30 Sep 2021 06:26    TPro  7.5  /  Alb  2.8<L>  /  TBili  0.5  /  DBili  x   /  AST  62<H>  /  ALT  23  /  AlkPhos  84        Urinalysis Basic - ( 28 Sep 2021 16:32 )    Color: Yellow / Appearance: Slightly Turbid / S.020 / pH: x  Gluc: x / Ketone: Negative  / Bili: Negative / Urobili: Negative   Blood: x / Protein: 30 mg/dL / Nitrite: Negative   Leuk Esterase: Negative / RBC: 2-5 /HPF / WBC 3-5 /HPF   Sq Epi: x / Non Sq Epi: Few /HPF / Bacteria: Few /HPF                      MICROBIOLOGY: (if applicable)    RADIOLOGY & ADDITIONAL STUDIES:  EKG:   CXR:  ECHO:    IMPRESSION: 71y Female PAST MEDICAL & SURGICAL HISTORY:  Lumbar stenosis    H/O: HTN (hypertension)    Hypercholesterolemia    Bleeding ulcer  stomach ulcer     Anxiety    Spinal stenosis in cervical region    Acute hepatitis C virus infection without hepatic coma  treated - Resolved    Balance disorder    Coronary artery disease involving native coronary artery of native heart without angina pectoris    HTN (hypertension)    HLD (hyperlipidemia)    H/O colonoscopy    Bleeding ulcer  stomach surgery for bleeding ulcer    Fracture  ORIF Left wrist  13    History of lumbar surgery  2013    Chronic UTI  Pt reports presently on 2nd dose of antibiotics 18 10 days, Dr Herman aware, pt going for m/eval 18.    H/O cardiac catheterization  , no intervention needed, treated medically     Impression; 70 Y/O Female presented to ED with  Hypoxia due to post obstructive pna with right lung mass with meds. Former smoker. Had hypoxia and hypotension at outpatient clinic. 21 Negative for Covid 19 PCR. Has an Acute hypoxic respiratory failure secondary to Post obstructive Pneumonia and Has Right Hilar Lung Mass.      Suggestion ;  O2 saturation 95% room air . Can have  Oxygen supplementation 2L NC if needed.    Continue PRN Albuterol 2 puffs Q 6 hours.    DVT/ GI prophylactic . No Heparin SQ starting Saturday evening dose till Monday .     Patient is schedule for bronch 10/4 @ 1300 hr. NPO post midnight  to Monday .  Marcos Gray  at bedside and aware of the Bronch procedure on Monday and answer all pulmonary questions.      medical clearance pending     Agree with above assessment and plan as transcribed.

## 2021-09-30 NOTE — PROGRESS NOTE ADULT - SUBJECTIVE AND OBJECTIVE BOX
C A R D I O L O G Y  **********************************     DATE OF SERVICE: 09-30-21    Patient denies chest pain or shortness of breath.   Review of systems otherwise (-)  	  MEDICATIONS:  MEDICATIONS  (STANDING):  amLODIPine   Tablet 5 milliGRAM(s) Oral daily  chlorhexidine 2% Cloths 1 Application(s) Topical daily  folic acid 1 milliGRAM(s) Oral daily  heparin   Injectable 5000 Unit(s) SubCutaneous every 8 hours  influenza   Vaccine 0.5 milliLiter(s) IntraMuscular once  lisinopril 10 milliGRAM(s) Oral every 12 hours  melatonin 1 milliGRAM(s) Oral at bedtime  metoprolol tartrate 100 milliGRAM(s) Oral two times a day  nystatin Powder 1 Application(s) Topical every 8 hours  pantoprazole    Tablet 40 milliGRAM(s) Oral before breakfast  PARoxetine 40 milliGRAM(s) Oral daily  simvastatin 40 milliGRAM(s) Oral at bedtime  sodium chloride 0.9%. 1000 milliLiter(s) (60 mL/Hr) IV Continuous <Continuous>      LABS:	 	    CARDIAC MARKERS:                                9.2    9.30  )-----------( 230      ( 30 Sep 2021 06:26 )             27.2     Hemoglobin: 9.2 g/dL (09-30 @ 06:26)  Hemoglobin: 9.1 g/dL (09-29 @ 07:48)  Hemoglobin: 10.0 g/dL (09-28 @ 05:58)  Hemoglobin: 9.7 g/dL (09-27 @ 07:48)  Hemoglobin: 9.6 g/dL (09-26 @ 10:57)      09-30    136  |  100  |  20<H>  ----------------------------<  105<H>  3.7   |  26  |  0.96    Ca    9.5      30 Sep 2021 06:26    TPro  7.5  /  Alb  2.8<L>  /  TBili  0.5  /  DBili  x   /  AST  62<H>  /  ALT  23  /  AlkPhos  84  09-30    Creatinine Trend: 0.96<--, 1.09<--, 0.84<--, 0.99<--, 0.89<--, 1.11<--      PHYSICAL EXAM:  T(C): 36.4 (09-30-21 @ 05:15), Max: 36.9 (09-29-21 @ 13:26)  HR: 89 (09-30-21 @ 05:15) (79 - 89)  BP: 136/78 (09-30-21 @ 05:15) (114/83 - 138/66)  RR: 16 (09-30-21 @ 05:15) (16 - 17)  SpO2: 95% (09-30-21 @ 05:15) (93% - 95%)  Wt(kg): --  I&O's Summary        HEENT:  (-)icterus (-)pallor  CV: N S1 S2 1/6 KAREN (+)2 Pulses B/l  Resp:  Clear to ausculatation B/L, normal effort  GI: (+) BS Soft, NT, ND  Lymph:  (-)Edema, (-)obvious lymphadenopathy  Skin: Warm to touch, Normal turgor  Psych: Appropriate mood and affect          ASSESSMENT/PLAN: 	71y  Female HTN and HLD BIBEMS for hypoxia and hypotension at outpatient clinic found with hilar mass and post obstructive PNA Normal LV function    - Abnormal EKG, nonspecific findings however her QT is prolonged.  Avoid QT prolonging meds  - EP eval appreciated   - Her ST T wave abnormality will require a stress test once optimized for further evaluation  - Plan for Stress test aarti Gutiérrez MD, [facility]  BEEPER [phone]

## 2021-09-30 NOTE — PROGRESS NOTE ADULT - SUBJECTIVE AND OBJECTIVE BOX
EP Attending    HISTORY OF PRESENT ILLNESS: HPI:  72 y/o F hx of HTN and HLD BIBEMS for hypoxia and hypotension at outpatient clinic. Patient states that for the past weak she has been feeling nauseous, fatigue and decreased appetite. She had 1episode of non bloody vomit and a couple of episodes of non bloody diarrhea early last week which has also  resolved. She states she developed a cough last week as well which is associated with phlegm but denies fevers, chills, shortness of breath or night sweats. Denies chest pain, palpitations, diarrhea, constipation, headache or any other complaints (20 Sep 2021 17:45)    9/29- seen at bedside on 4S.  states no fevers chills or cough at this point.  Called re: abnormal EKG - QTc > 500ms.  Ms Gray reports no history of angina ,palpitations, lightheadedness or fainting, personal or family history of sudden cardiac arrest, or history of exercise intolerance. A 10 pt ROS is otherwise negative.  She does not have her current medication list memorized.  Date of service 9/30- uneventful overnight.  awaiting stress testing (had coffee today).  EEG is completed.  No new complaints.    acetaminophen   Tablet .. 650 milliGRAM(s) Oral every 6 hours PRN  ALBUTerol    90 MICROgram(s) HFA Inhaler 2 Puff(s) Inhalation every 6 hours PRN  amLODIPine   Tablet 5 milliGRAM(s) Oral daily  chlorhexidine 2% Cloths 1 Application(s) Topical daily  folic acid 1 milliGRAM(s) Oral daily  heparin   Injectable 5000 Unit(s) SubCutaneous every 8 hours  influenza   Vaccine 0.5 milliLiter(s) IntraMuscular once  lisinopril 10 milliGRAM(s) Oral every 12 hours  melatonin 1 milliGRAM(s) Oral at bedtime  metoprolol tartrate 100 milliGRAM(s) Oral two times a day  nystatin Powder 1 Application(s) Topical every 8 hours  ondansetron    Tablet 4 milliGRAM(s) Oral every 8 hours PRN  pantoprazole    Tablet 40 milliGRAM(s) Oral before breakfast  PARoxetine 40 milliGRAM(s) Oral daily  simvastatin 40 milliGRAM(s) Oral at bedtime  sodium chloride 0.9%. 1000 milliLiter(s) IV Continuous <Continuous>                        9.2    9.30  )-----------( 230      ( 30 Sep 2021 06:26 )             27.2       09-30    136  |  100  |  20<H>  ----------------------------<  105<H>  3.7   |  26  |  0.96    Ca    9.5      30 Sep 2021 06:26    TPro  7.5  /  Alb  2.8<L>  /  TBili  0.5  /  DBili  x   /  AST  62<H>  /  ALT  23  /  AlkPhos  84  09-30      T(C): 36.3 (09-30-21 @ 13:07), Max: 36.9 (09-29-21 @ 13:26)  HR: 93 (09-30-21 @ 13:07) (79 - 93)  BP: 152/65 (09-30-21 @ 13:07) (114/83 - 152/65)  RR: 17 (09-30-21 @ 13:07) (16 - 17)  SpO2: 93% (09-30-21 @ 13:07) (93% - 95%)  Wt(kg): --    I&O's Summary    Appearance: well appearing elderly woman in no acute distress, oriented x 3	  HEENT:   Normal oral mucosa, PERRL, EOMI	  Lymphatic: No lymphadenopathy , no edema  Cardiovascular: Normal S1 S2, No JVD, No murmurs , Peripheral pulses palpable 2+ bilaterally  Respiratory: right side rhonchi.  no rales or wheezing.  Gastrointestinal:  Soft, Non-tender, + BS	  Skin: No rashes, No ecchymoses, No cyanosis, warm to touch  Musculoskeletal: Normal range of motion, normal strength  Psychiatry:  Mood & affect appropriate    TELEMETRY: none	    ECG: NSR, ST segment stretched so QTc is 500-520ms.	  Echo:  normal LV EF and biventricular size/function  CT Chest- right hilar mass and atelectasis.  RUL possible pneumonia.  Liver mets.  MRI head w/ osseous mets.    ASSESSMENT/PLAN: 	71y  Female with metastatic lung cancer.  Called re: prolonged QTc.    QT is prolonged out of proportion to her low dose of paroxetine.  No overt arrhythmia symptoms (fainting, palpitations)  Echo is reassuring.  Avoid QT-prolonging medication.  Periodic EKG's during chemotherapy.  No further inpatient EP workup anticipated.    Carroll Carrasco M.D.  Cardiac Electrophysiology    office [phone]  pager [phone]

## 2021-09-30 NOTE — PROGRESS NOTE ADULT - PROBLEM SELECTOR PLAN 1
In the setting of lung mass and PNA;  - s/p antimicrobial therapy x 5 days   - SPO2 92-96 % on 3L ( recent spo2on RA was 88%; will require home oxygen therapy)   - most recent blood cx neg  - Pulmonary, Dr Lombardi following; planned for EBUS w/ biopsy on Monday at 10/4  1pm

## 2021-09-30 NOTE — PROGRESS NOTE ADULT - SUBJECTIVE AND OBJECTIVE BOX
Chart and meds reviewed.  Patient seen and examined.    CC:    HPI:          MEDICATIONS  (STANDING):  amLODIPine   Tablet 5 milliGRAM(s) Oral daily  chlorhexidine 2% Cloths 1 Application(s) Topical daily  folic acid 1 milliGRAM(s) Oral daily  heparin   Injectable 5000 Unit(s) SubCutaneous every 8 hours  influenza   Vaccine 0.5 milliLiter(s) IntraMuscular once  lisinopril 10 milliGRAM(s) Oral every 12 hours  melatonin 1 milliGRAM(s) Oral at bedtime  metoprolol tartrate 100 milliGRAM(s) Oral two times a day  nystatin Powder 1 Application(s) Topical every 8 hours  pantoprazole    Tablet 40 milliGRAM(s) Oral before breakfast  PARoxetine 40 milliGRAM(s) Oral daily  simvastatin 40 milliGRAM(s) Oral at bedtime  sodium chloride 0.9%. 1000 milliLiter(s) (60 mL/Hr) IV Continuous <Continuous>    MEDICATIONS  (PRN):  acetaminophen   Tablet .. 650 milliGRAM(s) Oral every 6 hours PRN Temp greater or equal to 38C (100.4F), Mild Pain (1 - 3)  ALBUTerol    90 MICROgram(s) HFA Inhaler 2 Puff(s) Inhalation every 6 hours PRN Shortness of Breath and/or Wheezing  ondansetron    Tablet 4 milliGRAM(s) Oral every 8 hours PRN Nausea and/or Vomiting      VITALS:  Vital Signs Last 24 Hrs  T(C): 36.3 (30 Sep 2021 13:07), Max: 36.9 (29 Sep 2021 13:26)  T(F): 97.3 (30 Sep 2021 13:07), Max: 98.5 (29 Sep 2021 13:26)  HR: 93 (30 Sep 2021 13:07) (79 - 93)  BP: 152/65 (30 Sep 2021 13:07) (114/83 - 152/65)  BP(mean): 90 (29 Sep 2021 21:33) (90 - 90)  RR: 17 (30 Sep 2021 13:07) (16 - 17)  SpO2: 93% (30 Sep 2021 13:07) (93% - 95%)        LABS:                        9.2    9.30  )-----------( 230      ( 30 Sep 2021 06:26 )             27.2     09-30    136  |  100  |  20<H>  ----------------------------<  105<H>  3.7   |  26  |  0.96    Ca    9.5      30 Sep 2021 06:26    TPro  7.5  /  Alb  2.8<L>  /  TBili  0.5  /  DBili  x   /  AST  62<H>  /  ALT  23  /  AlkPhos  84          LIVER FUNCTIONS - ( 30 Sep 2021 06:26 )  Alb: 2.8 g/dL / Pro: 7.5 g/dL / ALK PHOS: 84 U/L / ALT: 23 U/L DA / AST: 62 U/L / GGT: x             Urinalysis Basic - ( 28 Sep 2021 16:32 )    Color: Yellow / Appearance: Slightly Turbid / S.020 / pH: x  Gluc: x / Ketone: Negative  / Bili: Negative / Urobili: Negative   Blood: x / Protein: 30 mg/dL / Nitrite: Negative   Leuk Esterase: Negative / RBC: 2-5 /HPF / WBC 3-5 /HPF   Sq Epi: x / Non Sq Epi: Few /HPF / Bacteria: Few /HPF    Culture - Urine (collected 21 @ 20:59)  Source: Clean Catch Clean Catch (Midstream)  Preliminary Report (21 @ 10:18):    >100,000 CFU/ml Gram positive organisms    Culture - Blood (collected 21 @ 20:53)  Source: .Blood Blood-Peripheral  Preliminary Report (21 @ 21:01):    No growth to date.    Culture - Blood (collected 21 @ 20:53)  Source: .Blood Blood  Preliminary Report (21 @ 21:01):    No growth to date.                             Chart reviewed.  Patient seen and examined.    Patient is a 71y old  Female who presents with a chief complaint of Pneumonia (30 Sep 2021 13:12)    HPI: 70 y/o F hx of HTN and HLD BIBEMS for hypoxia and hypotension at outpatient clinic. Patient states that for the past weak she has been feeling nauseous, fatigue and decreased appetite. She had 1episode of non bloody vomit and a couple of episodes of non bloody diarrhea early last week which has also  resolved. She states she developed a cough last week as well which is associated with phlegm but denies fevers, chills, shortness of breath or night sweats. Denies chest pain, palpitations, diarrhea, constipation, headache or any other complaints (20 Sep 2021 17:45)    ROS: no complaints offered, NAD    MEDICATIONS  (STANDING):  amLODIPine   Tablet 5 milliGRAM(s) Oral daily  chlorhexidine 2% Cloths 1 Application(s) Topical daily  folic acid 1 milliGRAM(s) Oral daily  heparin   Injectable 5000 Unit(s) SubCutaneous every 8 hours  influenza   Vaccine 0.5 milliLiter(s) IntraMuscular once  lisinopril 10 milliGRAM(s) Oral every 12 hours  melatonin 1 milliGRAM(s) Oral at bedtime  metoprolol tartrate 100 milliGRAM(s) Oral two times a day  nystatin Powder 1 Application(s) Topical every 8 hours  pantoprazole    Tablet 40 milliGRAM(s) Oral before breakfast  PARoxetine 40 milliGRAM(s) Oral daily  simvastatin 40 milliGRAM(s) Oral at bedtime  sodium chloride 0.9%. 1000 milliLiter(s) (60 mL/Hr) IV Continuous <Continuous>    MEDICATIONS  (PRN):  acetaminophen   Tablet .. 650 milliGRAM(s) Oral every 6 hours PRN Temp greater or equal to 38C (100.4F), Mild Pain (1 - 3)  ALBUTerol    90 MICROgram(s) HFA Inhaler 2 Puff(s) Inhalation every 6 hours PRN Shortness of Breath and/or Wheezing  ondansetron    Tablet 4 milliGRAM(s) Oral every 8 hours PRN Nausea and/or Vomiting      VITALS:  Vital Signs Last 24 Hrs  T(C): 36.3 (30 Sep 2021 13:07), Max: 36.9 (29 Sep 2021 13:26)  T(F): 97.3 (30 Sep 2021 13:07), Max: 98.5 (29 Sep 2021 13:26)  HR: 93 (30 Sep 2021 13:07) (79 - 93)  BP: 152/65 (30 Sep 2021 13:07) (114/83 - 152/65)  BP(mean): 90 (29 Sep 2021 21:33) (90 - 90)  RR: 17 (30 Sep 2021 13:07) (16 - 17)  SpO2: 93% (30 Sep 2021 13:07) (93% - 95%)      LABS:                        9.2    9.30  )-----------( 230      ( 30 Sep 2021 06:26 )             27.2         136  |  100  |  20<H>  ----------------------------<  105<H>  3.7   |  26  |  0.96    Ca    9.5      30 Sep 2021 06:26    TPro  7.5  /  Alb  2.8<L>  /  TBili  0.5  /  DBili  x   /  AST  62<H>  /  ALT  23  /  AlkPhos  84      LIVER FUNCTIONS - ( 30 Sep 2021 06:26 )  Alb: 2.8 g/dL / Pro: 7.5 g/dL / ALK PHOS: 84 U/L / ALT: 23 U/L DA / AST: 62 U/L / GGT: x           Urinalysis Basic - ( 28 Sep 2021 16:32 )    Color: Yellow / Appearance: Slightly Turbid / S.020 / pH: x  Gluc: x / Ketone: Negative  / Bili: Negative / Urobili: Negative   Blood: x / Protein: 30 mg/dL / Nitrite: Negative   Leuk Esterase: Negative / RBC: 2-5 /HPF / WBC 3-5 /HPF   Sq Epi: x / Non Sq Epi: Few /HPF / Bacteria: Few /HPF    Culture - Urine (collected 21 @ 20:59)  Source: Clean Catch Clean Catch (Midstream)  Preliminary Report (21 @ 10:18):    >100,000 CFU/ml Gram positive organisms    Culture - Blood (collected 21 @ 20:53)  Source: .Blood Blood-Peripheral  Preliminary Report (21 @ 21:01):    No growth to date.    Culture - Blood (collected 21 @ 20:53)  Source: .Blood Blood  Preliminary Report (21 @ 21:01):    No growth to date.    PERTINENT DIAGNOSTICS    EEG Classification / Summary:    Abnormal EEG in the awake, drowsy states.  -Mild generalized slowing    Clinical Impression:    Mild nonspecific diffuse or multifocal cerebral dysfunction.   No epileptiform pattern or seizure seen.

## 2021-09-30 NOTE — PROGRESS NOTE ADULT - ASSESSMENT
71 year old Woman with hx of HTN and HLD BIBEMS for hypoxia and hypotension at outpatient clinic. Patient admitted w/ Sepsis secondary to RUL PNA. CT showed RUL PNA. Additionally, her UA was  positive with UCx growing E coli (10-49K).  She is s/p azithromycin and Rocephin. Bcx no growth. Hospital course c/b finding on  CT chest scan of R hilar lung mass and Mediastinal  adenopathy, CT A/P revealed liver lesions, probable liver metastases and CT head with multiple lytic lesions suspicious for metastases or multiple myeloma.

## 2021-09-30 NOTE — PROGRESS NOTE ADULT - PROBLEM SELECTOR PLAN 6
Patient with periods of confusion in the setting of metastatic disease, hypercalcemia and UTI  - s/p Aredia 9/27 and Calcitonin Serum Ca 10.2;  corrected 11  - Neurology evaluation  - Heme/ Onc follow up  - EEG completed at bedside this am

## 2021-09-30 NOTE — PROGRESS NOTE ADULT - PROBLEM SELECTOR PLAN 3
IR advised liver lesions not large enough for biopsy  Pt to follow up as outpatient for PET scan per heme/onc recommendations  Dr. Mathews, Heme/Onc, following  Dr. Arnett, Surg-Onc, following  Palliative care following

## 2021-09-30 NOTE — PROGRESS NOTE ADULT - ASSESSMENT
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 mediastinal/hilar adenopathy and mult indeterminate liver lesions, largest 2.2cm  reviewed findings with pt and discussed likely lung cancer with met disease to liver; however need a biopsy to dx and then formulate prognosis and tx plan  CEA is normal, likely non-expressor if lung CA or other pathology  CT A/P shows Right mid abdominal peritoneal nodules are noted measuring up to 1.0 cm suspicious for metastatic peritoneal implants. Upper abdominal lymph nodes at thelevel of the celiac axis and superior mesenteric artery are seen measuring up to 8 mm in short axis. Liver lesions suspected on the noncontrast study not confirmed on this portal venous phase study.   appreciate IR consult- peritoneal Bx not feasible d/t size would not yield sufficient tissue  AMS, Head CT shows multiple lytic lesions, persistent hypercalcemia despite IVF, Aredia given   Pall Care consult appreciated, pt is full-code,  is HCP and agree with Bx  Rec's:  -pt still confused, newly Dx ? Met Lung CA, CEA nl  -appreciate Neuro consult to r/o paraneoplastic process, EEG pending  -Bronch/EBUS with Bx scheduled for 10/04  -MRI of liver shows bilobar hepatic mets confirmed, diffuse met disease throughout the bone marrow, rec  -hypercalcemia resolved, lytic lesions seen on head CT, s/p aredia and calcitonin   -PET/CT as outpt  -PT eval when clinically stable  -abnormal EKG, cardiology following  -febrile, await ID eval  -Await results for further recommendations    Problem# Normocytic Anemia  Hgb stable  pt thinks she has a hx of anemia, but unsure  Hep C RNA (-)  Cr nl  Rec's:  -CBC daily  -retic 2.1%, Iron Sat 21%  -B12 low at 240, give B12 1,000mcg IM xx 1  -folate/TSH nl  -MM w/u shows weak kappa/lambda band, m-spike unable to quantitate c/w MGUS  -Transfuse PRBC if Hgb <7.0 or if symptomatic    Thank you for the referral. Will continue to monitor the patient.  Please call with any questions [phone]  Above reviewed with Attending Dr. 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 mediastinal/hilar adenopathy and mult indeterminate liver lesions, largest 2.2cm  reviewed findings with pt and discussed likely lung cancer with met disease to liver; however need a biopsy to dx and then formulate prognosis and tx plan  CEA is normal, likely non-expressor if lung CA or other pathology  CT A/P shows Right mid abdominal peritoneal nodules are noted measuring up to 1.0 cm suspicious for metastatic peritoneal implants. Upper abdominal lymph nodes at thelevel of the celiac axis and superior mesenteric artery are seen measuring up to 8 mm in short axis. Liver lesions suspected on the noncontrast study not confirmed on this portal venous phase study.   appreciate IR consult- peritoneal Bx not feasible d/t size would not yield sufficient tissue  AMS, Head CT shows multiple lytic lesions, persistent hypercalcemia despite IVF, Aredia given   Pall Care consult appreciated, pt is full-code,  is HCP and agree with Bx  Rec's:  -pt still confused, newly Dx ? Met Lung CA, CEA nl  -appreciate Neuro consult to r/o paraneoplastic process, EEG pending  -Bronch/EBUS with Bx scheduled for 10/04  -MRI of liver shows bilobar hepatic mets confirmed, diffuse met disease throughout the bone marrow  -rec IR consult to assess if liver Bx is feasible, if so, would be in place of EBUS with Bx  -hypercalcemia resolved, lytic lesions seen on head CT, s/p aredia and calcitonin   -PET/CT as outpt  -PT eval when clinically stable  -abnormal EKG, cardiology following  -febrile, await ID eval  -Await results for further recommendations    Problem# Normocytic Anemia  Hgb stable  pt thinks she has a hx of anemia, but unsure  Hep C RNA (-)  Cr nl  Rec's:  -CBC daily  -retic 2.1%, Iron Sat 21%  -B12 low at 240, give B12 1,000mcg IM xx 1  -folate/TSH nl  -MM w/u shows weak kappa/lambda band, m-spike unable to quantitate c/w MGUS  -Transfuse PRBC if Hgb <7.0 or if symptomatic    Thank you for the referral. Will continue to monitor the patient.  Please call with any questions [phone]  Above reviewed with Attending Dr. Trinh

## 2021-09-30 NOTE — PROGRESS NOTE ADULT - SUBJECTIVE AND OBJECTIVE BOX
Patient is a 71y old  Female who presents with a chief complaint of Pneumonia (30 Sep 2021 09:55)      SUBJECTIVE / OVERNIGHT EVENTS:    ADDITIONAL REVIEW OF SYSTEMS:    MEDICATIONS  (STANDING):  amLODIPine   Tablet 5 milliGRAM(s) Oral daily  chlorhexidine 2% Cloths 1 Application(s) Topical daily  folic acid 1 milliGRAM(s) Oral daily  heparin   Injectable 5000 Unit(s) SubCutaneous every 8 hours  influenza   Vaccine 0.5 milliLiter(s) IntraMuscular once  lisinopril 10 milliGRAM(s) Oral every 12 hours  melatonin 1 milliGRAM(s) Oral at bedtime  metoprolol tartrate 100 milliGRAM(s) Oral two times a day  nystatin Powder 1 Application(s) Topical every 8 hours  pantoprazole    Tablet 40 milliGRAM(s) Oral before breakfast  PARoxetine 40 milliGRAM(s) Oral daily  simvastatin 40 milliGRAM(s) Oral at bedtime  sodium chloride 0.9%. 1000 milliLiter(s) (60 mL/Hr) IV Continuous <Continuous>    MEDICATIONS  (PRN):  acetaminophen   Tablet .. 650 milliGRAM(s) Oral every 6 hours PRN Temp greater or equal to 38C (100.4F), Mild Pain (1 - 3)  ALBUTerol    90 MICROgram(s) HFA Inhaler 2 Puff(s) Inhalation every 6 hours PRN Shortness of Breath and/or Wheezing  ondansetron    Tablet 4 milliGRAM(s) Oral every 8 hours PRN Nausea and/or Vomiting      Vital Signs Last 24 Hrs  T(C): 36.4 (30 Sep 2021 05:15), Max: 36.9 (29 Sep 2021 13:26)  T(F): 97.6 (30 Sep 2021 05:15), Max: 98.5 (29 Sep 2021 13:26)  HR: 89 (30 Sep 2021 05:15) (79 - 89)  BP: 136/78 (30 Sep 2021 05:15) (114/83 - 138/66)  BP(mean): 90 (29 Sep 2021 21:33) (90 - 90)  RR: 16 (30 Sep 2021 05:15) (16 - 17)  SpO2: 95% (30 Sep 2021 05:15) (93% - 95%)    LABS:                        9.2    9.30  )-----------( 230      ( 30 Sep 2021 06:26 )             27.2     09    136  |  100  |  20<H>  ----------------------------<  105<H>  3.7   |  26  |  0.96    Ca    9.5      30 Sep 2021 06:26    TPro  7.5  /  Alb  2.8<L>  /  TBili  0.5  /  DBili  x   /  AST  62<H>  /  ALT  23  /  AlkPhos  84            Urinalysis Basic - ( 28 Sep 2021 16:32 )    Color: Yellow / Appearance: Slightly Turbid / S.020 / pH: x  Gluc: x / Ketone: Negative  / Bili: Negative / Urobili: Negative   Blood: x / Protein: 30 mg/dL / Nitrite: Negative   Leuk Esterase: Negative / RBC: 2-5 /HPF / WBC 3-5 /HPF   Sq Epi: x / Non Sq Epi: Few /HPF / Bacteria: Few /HPF        Culture - Urine (collected 28 Sep 2021 20:59)  Source: Clean Catch Clean Catch (Midstream)  Preliminary Report (30 Sep 2021 10:18):    >100,000 CFU/ml Gram positive organisms    Culture - Blood (collected 28 Sep 2021 20:53)  Source: .Blood Blood-Peripheral  Preliminary Report (29 Sep 2021 21:01):    No growth to date.    Culture - Blood (collected 28 Sep 2021 20:53)  Source: .Blood Blood  Preliminary Report (29 Sep 2021 21:01):    No growth to date.      COVID-19 PCR: NotDetec (23 Sep 2021 20:35)  COVID-19 PCR: NotDetec (20 Sep 2021 11:38)         Patient is a 71y old  Female who presents with a chief complaint of Pneumonia (30 Sep 2021 09:55)    SUBJECTIVE / OVERNIGHT EVENTS: events noted. No new complaints. Still confused      MEDICATIONS  (STANDING):  amLODIPine   Tablet 5 milliGRAM(s) Oral daily  chlorhexidine 2% Cloths 1 Application(s) Topical daily  folic acid 1 milliGRAM(s) Oral daily  heparin   Injectable 5000 Unit(s) SubCutaneous every 8 hours  influenza   Vaccine 0.5 milliLiter(s) IntraMuscular once  lisinopril 10 milliGRAM(s) Oral every 12 hours  melatonin 1 milliGRAM(s) Oral at bedtime  metoprolol tartrate 100 milliGRAM(s) Oral two times a day  nystatin Powder 1 Application(s) Topical every 8 hours  pantoprazole    Tablet 40 milliGRAM(s) Oral before breakfast  PARoxetine 40 milliGRAM(s) Oral daily  simvastatin 40 milliGRAM(s) Oral at bedtime  sodium chloride 0.9%. 1000 milliLiter(s) (60 mL/Hr) IV Continuous <Continuous>    MEDICATIONS  (PRN):  acetaminophen   Tablet .. 650 milliGRAM(s) Oral every 6 hours PRN Temp greater or equal to 38C (100.4F), Mild Pain (1 - 3)  ALBUTerol    90 MICROgram(s) HFA Inhaler 2 Puff(s) Inhalation every 6 hours PRN Shortness of Breath and/or Wheezing  ondansetron    Tablet 4 milliGRAM(s) Oral every 8 hours PRN Nausea and/or Vomiting      Vital Signs Last 24 Hrs  T(C): 36.4 (30 Sep 2021 05:15), Max: 36.9 (29 Sep 2021 13:26)  T(F): 97.6 (30 Sep 2021 05:15), Max: 98.5 (29 Sep 2021 13:26)  HR: 89 (30 Sep 2021 05:15) (79 - 89)  BP: 136/78 (30 Sep 2021 05:15) (114/83 - 138/66)  BP(mean): 90 (29 Sep 2021 21:33) (90 - 90)  RR: 16 (30 Sep 2021 05:15) (16 - 17)  SpO2: 95% (30 Sep 2021 05:15) (93% - 95%)      GEN: NAD, confused  LUNGS: decreased BS on right, Left CTA  HEART: S1 S2  ABDOMEN: soft, non-tender, non-distended, + BS  EXTREMITIES: no edema      LABS:                        9.2    9.30  )-----------( 230      ( 30 Sep 2021 06:26 )             27.2         136  |  100  |  20<H>  ----------------------------<  105<H>  3.7   |  26  |  0.96    Ca    9.5      30 Sep 2021 06:26    TPro  7.5  /  Alb  2.8<L>  /  TBili  0.5  /  DBili  x   /  AST  62<H>  /  ALT  23  /  AlkPhos  84            Urinalysis Basic - ( 28 Sep 2021 16:32 )    Color: Yellow / Appearance: Slightly Turbid / S.020 / pH: x  Gluc: x / Ketone: Negative  / Bili: Negative / Urobili: Negative   Blood: x / Protein: 30 mg/dL / Nitrite: Negative   Leuk Esterase: Negative / RBC: 2-5 /HPF / WBC 3-5 /HPF   Sq Epi: x / Non Sq Epi: Few /HPF / Bacteria: Few /HPF        Culture - Urine (collected 28 Sep 2021 20:59)  Source: Clean Catch Clean Catch (Midstream)  Preliminary Report (30 Sep 2021 10:18):    >100,000 CFU/ml Gram positive organisms    Culture - Blood (collected 28 Sep 2021 20:53)  Source: .Blood Blood-Peripheral  Preliminary Report (29 Sep 2021 21:01):    No growth to date.    Culture - Blood (collected 28 Sep 2021 20:53)  Source: .Blood Blood  Preliminary Report (29 Sep 2021 21:01):    No growth to date.      COVID-19 PCR: NotDetec (23 Sep 2021 20:35)  COVID-19 PCR: NotDetec (20 Sep 2021 11:38)      Radiology:  < from: MR Abdomen No Cont (21 @ 17:53) >    EXAM:  MR ABDOMEN                            PROCEDURE DATE:  2021          INTERPRETATION:  CLINICAL INFORMATION: Lung mass and liver lesions    COMPARISON: CT abdomen pelvis 2021, chest CT 2021    CONTRAST/COMPLICATIONS:  IV Contrast: NOne  Oral Contrast: None  Complications: None    PROCEDURE:  MRI of the abdomen was performed.      FINDINGS:  LOWER CHEST: Right lung mass and small right pleural effusion.    LIVER: Multiple bilobar hepatic metastatic disease. For reference:  -Left medial segment lesion measures 1.9 x 1.9 cm (4-10)  -Right posterior segment lesion measures 1.5 x 1.4 cm (4-16)  BILE DUCTS: Mild dilation. No choledocholithiasis.  GALLBLADDER: Contracted with stones.  SPLEEN: Normal.  PANCREAS: Normal.  ADRENALS: Normal.  KIDNEYS/URETERS: No hydronephrosis.    VISUALIZED PORTIONS:  BOWEL: No bowel-related abnormality.  PERITONEUM: No ascites. Right upper quadrant peritoneal implants were better seen on CT.  VESSELS: Aortic atherosclerosis without aneurysm.  RETROPERITONEUM/LYMPH NODES: No adenopathy.  ABDOMINAL WALL: Normal.  BONES: Diffusely abnormal marrow signal and restricted diffusion indicative of metastatic disease.    IMPRESSION:  *  Bilobar hepatic metastases are confirmed.  *  Right upper quadrant peritoneal implants better seen on CT.  *  Diffuse metastatic disease throughout the bone marrow.    < end of copied text >

## 2021-10-01 LAB
-  AMPICILLIN: SIGNIFICANT CHANGE UP
-  CIPROFLOXACIN: SIGNIFICANT CHANGE UP
-  DAPTOMYCIN: SIGNIFICANT CHANGE UP
-  LEVOFLOXACIN: SIGNIFICANT CHANGE UP
-  LINEZOLID: SIGNIFICANT CHANGE UP
-  NITROFURANTOIN: SIGNIFICANT CHANGE UP
-  TETRACYCLINE: SIGNIFICANT CHANGE UP
-  VANCOMYCIN: SIGNIFICANT CHANGE UP
ANION GAP SERPL CALC-SCNC: 9 MMOL/L — SIGNIFICANT CHANGE UP (ref 5–17)
BUN SERPL-MCNC: 14 MG/DL — SIGNIFICANT CHANGE UP (ref 7–18)
CALCIUM SERPL-MCNC: 9.1 MG/DL — SIGNIFICANT CHANGE UP (ref 8.4–10.5)
CHLORIDE SERPL-SCNC: 103 MMOL/L — SIGNIFICANT CHANGE UP (ref 96–108)
CO2 SERPL-SCNC: 27 MMOL/L — SIGNIFICANT CHANGE UP (ref 22–31)
CREAT SERPL-MCNC: 0.75 MG/DL — SIGNIFICANT CHANGE UP (ref 0.5–1.3)
CULTURE RESULTS: SIGNIFICANT CHANGE UP
GLUCOSE SERPL-MCNC: 109 MG/DL — HIGH (ref 70–99)
HCT VFR BLD CALC: 26.8 % — LOW (ref 34.5–45)
HGB BLD-MCNC: 9.2 G/DL — LOW (ref 11.5–15.5)
MAGNESIUM SERPL-MCNC: 1.7 MG/DL — SIGNIFICANT CHANGE UP (ref 1.6–2.6)
MCHC RBC-ENTMCNC: 29.9 PG — SIGNIFICANT CHANGE UP (ref 27–34)
MCHC RBC-ENTMCNC: 34.3 GM/DL — SIGNIFICANT CHANGE UP (ref 32–36)
MCV RBC AUTO: 87 FL — SIGNIFICANT CHANGE UP (ref 80–100)
METHOD TYPE: SIGNIFICANT CHANGE UP
NRBC # BLD: 0 /100 WBCS — SIGNIFICANT CHANGE UP (ref 0–0)
ORGANISM # SPEC MICROSCOPIC CNT: SIGNIFICANT CHANGE UP
ORGANISM # SPEC MICROSCOPIC CNT: SIGNIFICANT CHANGE UP
PLATELET # BLD AUTO: 217 K/UL — SIGNIFICANT CHANGE UP (ref 150–400)
POTASSIUM SERPL-MCNC: 3.4 MMOL/L — LOW (ref 3.5–5.3)
POTASSIUM SERPL-SCNC: 3.4 MMOL/L — LOW (ref 3.5–5.3)
RBC # BLD: 3.08 M/UL — LOW (ref 3.8–5.2)
RBC # FLD: 13.3 % — SIGNIFICANT CHANGE UP (ref 10.3–14.5)
SODIUM SERPL-SCNC: 139 MMOL/L — SIGNIFICANT CHANGE UP (ref 135–145)
SPECIMEN SOURCE: SIGNIFICANT CHANGE UP
WBC # BLD: 9.37 K/UL — SIGNIFICANT CHANGE UP (ref 3.8–10.5)
WBC # FLD AUTO: 9.37 K/UL — SIGNIFICANT CHANGE UP (ref 3.8–10.5)

## 2021-10-01 PROCEDURE — 99232 SBSQ HOSP IP/OBS MODERATE 35: CPT

## 2021-10-01 RX ORDER — NITROFURANTOIN MACROCRYSTAL 50 MG
100 CAPSULE ORAL
Refills: 0 | Status: DISCONTINUED | OUTPATIENT
Start: 2021-10-01 | End: 2021-10-04

## 2021-10-01 RX ADMIN — Medication 100 MILLIGRAM(S): at 18:16

## 2021-10-01 RX ADMIN — HEPARIN SODIUM 5000 UNIT(S): 5000 INJECTION INTRAVENOUS; SUBCUTANEOUS at 13:53

## 2021-10-01 RX ADMIN — HEPARIN SODIUM 5000 UNIT(S): 5000 INJECTION INTRAVENOUS; SUBCUTANEOUS at 05:46

## 2021-10-01 RX ADMIN — PANTOPRAZOLE SODIUM 40 MILLIGRAM(S): 20 TABLET, DELAYED RELEASE ORAL at 05:47

## 2021-10-01 RX ADMIN — Medication 650 MILLIGRAM(S): at 13:00

## 2021-10-01 RX ADMIN — Medication 100 MILLIGRAM(S): at 05:45

## 2021-10-01 RX ADMIN — NYSTATIN CREAM 1 APPLICATION(S): 100000 CREAM TOPICAL at 05:47

## 2021-10-01 RX ADMIN — Medication 1 MILLIGRAM(S): at 12:27

## 2021-10-01 RX ADMIN — NYSTATIN CREAM 1 APPLICATION(S): 100000 CREAM TOPICAL at 13:54

## 2021-10-01 RX ADMIN — HEPARIN SODIUM 5000 UNIT(S): 5000 INJECTION INTRAVENOUS; SUBCUTANEOUS at 22:28

## 2021-10-01 RX ADMIN — AMLODIPINE BESYLATE 5 MILLIGRAM(S): 2.5 TABLET ORAL at 05:46

## 2021-10-01 RX ADMIN — Medication 1 MILLIGRAM(S): at 22:28

## 2021-10-01 RX ADMIN — Medication 40 MILLIGRAM(S): at 12:27

## 2021-10-01 RX ADMIN — Medication 650 MILLIGRAM(S): at 12:27

## 2021-10-01 RX ADMIN — LISINOPRIL 10 MILLIGRAM(S): 2.5 TABLET ORAL at 18:16

## 2021-10-01 RX ADMIN — CHLORHEXIDINE GLUCONATE 1 APPLICATION(S): 213 SOLUTION TOPICAL at 12:28

## 2021-10-01 RX ADMIN — LISINOPRIL 10 MILLIGRAM(S): 2.5 TABLET ORAL at 05:45

## 2021-10-01 RX ADMIN — SIMVASTATIN 40 MILLIGRAM(S): 20 TABLET, FILM COATED ORAL at 22:28

## 2021-10-01 RX ADMIN — NYSTATIN CREAM 1 APPLICATION(S): 100000 CREAM TOPICAL at 22:29

## 2021-10-01 RX ADMIN — ALBUTEROL 2 PUFF(S): 90 AEROSOL, METERED ORAL at 13:54

## 2021-10-01 NOTE — PROGRESS NOTE ADULT - SUBJECTIVE AND OBJECTIVE BOX
(***TEMPLATE ONLY***''    Neurology Follow up note    Name  ELAINA JAMA    HPI:  72 y/o F hx of HTN and HLD BIBEMS for hypoxia and hypotension at outpatient clinic. Patient states that for the past weak she has been feeling nauseous, fatigue and decreased appetite. She had 1episode of non bloody vomit and a couple of episodes of non bloody diarrhea early last week which has also  resolved. She states she developed a cough last week as well which is associated with phlegm but denies fevers, chills, shortness of breath or night sweats. Denies chest pain, palpitations, diarrhea, constipation, headache or any other complaints (20 Sep 2021 17:45)      Interval History -        Subjective:    Review of Systems:  Constitutional:        Eyes, Ears, Mouth, Throat:   Respiratory:                            Cardiovascular:   Gastrointestinal:                                     Genitourinary:   Musculoskeletal:                                    Dermatologic:   Neurological: as per above                                                                 Psychiatric:   Endocrine:              Hematologic/Lymphatic:     MEDICATIONS  (STANDING):  amLODIPine   Tablet 5 milliGRAM(s) Oral daily  chlorhexidine 2% Cloths 1 Application(s) Topical daily  folic acid 1 milliGRAM(s) Oral daily  heparin   Injectable 5000 Unit(s) SubCutaneous every 8 hours  influenza   Vaccine 0.5 milliLiter(s) IntraMuscular once  lisinopril 10 milliGRAM(s) Oral every 12 hours  melatonin 1 milliGRAM(s) Oral at bedtime  metoprolol tartrate 100 milliGRAM(s) Oral two times a day  nystatin Powder 1 Application(s) Topical every 8 hours  pantoprazole    Tablet 40 milliGRAM(s) Oral before breakfast  PARoxetine 40 milliGRAM(s) Oral daily  simvastatin 40 milliGRAM(s) Oral at bedtime  sodium chloride 0.9%. 1000 milliLiter(s) (60 mL/Hr) IV Continuous <Continuous>    MEDICATIONS  (PRN):  acetaminophen   Tablet .. 650 milliGRAM(s) Oral every 6 hours PRN Temp greater or equal to 38C (100.4F), Mild Pain (1 - 3)  ALBUTerol    90 MICROgram(s) HFA Inhaler 2 Puff(s) Inhalation every 6 hours PRN Shortness of Breath and/or Wheezing  ondansetron    Tablet 4 milliGRAM(s) Oral every 8 hours PRN Nausea and/or Vomiting      Allergies    No Known Allergies    Intolerances        Objective:   Vital Signs Last 24 Hrs  T(C): 36.4 (01 Oct 2021 05:38), Max: 36.6 (30 Sep 2021 22:01)  T(F): 97.6 (01 Oct 2021 05:38), Max: 97.8 (30 Sep 2021 22:01)  HR: 75 (01 Oct 2021 05:38) (70 - 93)  BP: 157/84 (01 Oct 2021 05:38) (132/77 - 157/84)  BP(mean): --  RR: 18 (01 Oct 2021 09:12) (17 - 18)  SpO2: 93% (01 Oct 2021 09:12) (92% - 94%)    General Exam:   General appearance: No acute distress                 Cardiovascular: Pedal dorsalis pulses intact bilaterally    Neurological Exam:  Mental Status: Orientated to self, date and place.  Attention intact.  No dysarthria, aphasia or neglect.  Knowledge intact.  Registration intact.  Short and long term memory grossly intact.      Cranial Nerves: CN I - not tested.  PERRL, EOMI, VFF, no nystagmus or diplopia.  No APD.  Fundi not visualized bilaterally.  CN V1-3 intact to light touch and pinprick.  No facial asymmetry.  Hearing intact to finger rub bilaterally.  Tongue, uvula and palate midline.  Sternocleidomastoid and Trapezius intact bilaterally.    Motor:   Tone: normal.                  Strength: intact throughout    EEG Classification / Summary:    Abnormal EEG in the awake, drowsy states.  -Mild generalized slowing    Clinical Impression:    Mild nonspecific diffuse or multifocal cerebral dysfunction.   No epileptiform pattern or seizure seen.Pronator drift: none                 Dysmeria: None to finger-nose-finger or heel-shin-heel  No truncal ataxia.    Tremor: No resting, postural or action tremor.  No myoclonus.    Sensation: intact to light touch, pinprick, vibration and proprioception    Deep Tendon Reflexes: 1+ bilateral biceps, triceps, brachioradialis, knee and ankle  Toes flexor bilaterally    Gait: normal and stable.      Other:    10-01    139  |  103  |  14  ----------------------------<  109<H>  3.4<L>   |  27  |  0.75    Ca    9.1      01 Oct 2021 07:37  Mg     1.7     10-01    TPro  7.5  /  Alb  2.8<L>  /  TBili  0.5  /  DBili  x   /  AST  62<H>  /  ALT  23  /  AlkPhos  84  09-30    10-01    139  |  103  |  14  ----------------------------<  109<H>  3.4<L>   |  27  |  0.75    Ca    9.1      01 Oct 2021 07:37  Mg     1.7     10-01    TPro  7.5  /  Alb  2.8<L>  /  TBili  0.5  /  DBili  x   /  AST  62<H>  /  ALT  23  /  AlkPhos  84  09-30    LIVER FUNCTIONS - ( 30 Sep 2021 06:26 )  Alb: 2.8 g/dL / Pro: 7.5 g/dL / ALK PHOS: 84 U/L / ALT: 23 U/L DA / AST: 62 U/L / GGT: x             Radiology                    Neurology Follow up note    Name  ELAINA JAMA    Subjective:  patient feels well  no c/o  had eeg    Review of Systems:  Constitutional:      no fever  Respiratory:                     no cough           MEDICATIONS  (STANDING):  amLODIPine   Tablet 5 milliGRAM(s) Oral daily  chlorhexidine 2% Cloths 1 Application(s) Topical daily  folic acid 1 milliGRAM(s) Oral daily  heparin   Injectable 5000 Unit(s) SubCutaneous every 8 hours  influenza   Vaccine 0.5 milliLiter(s) IntraMuscular once  lisinopril 10 milliGRAM(s) Oral every 12 hours  melatonin 1 milliGRAM(s) Oral at bedtime  metoprolol tartrate 100 milliGRAM(s) Oral two times a day  nystatin Powder 1 Application(s) Topical every 8 hours  pantoprazole    Tablet 40 milliGRAM(s) Oral before breakfast  PARoxetine 40 milliGRAM(s) Oral daily  simvastatin 40 milliGRAM(s) Oral at bedtime  sodium chloride 0.9%. 1000 milliLiter(s) (60 mL/Hr) IV Continuous <Continuous>    MEDICATIONS  (PRN):  acetaminophen   Tablet .. 650 milliGRAM(s) Oral every 6 hours PRN Temp greater or equal to 38C (100.4F), Mild Pain (1 - 3)  ALBUTerol    90 MICROgram(s) HFA Inhaler 2 Puff(s) Inhalation every 6 hours PRN Shortness of Breath and/or Wheezing  ondansetron    Tablet 4 milliGRAM(s) Oral every 8 hours PRN Nausea and/or Vomiting      Allergies    No Known Allergies    Intolerances        Objective:   Vital Signs Last 24 Hrs  T(C): 36.4 (01 Oct 2021 05:38), Max: 36.6 (30 Sep 2021 22:01)  T(F): 97.6 (01 Oct 2021 05:38), Max: 97.8 (30 Sep 2021 22:01)  HR: 75 (01 Oct 2021 05:38) (70 - 93)  BP: 157/84 (01 Oct 2021 05:38) (132/77 - 157/84)  BP(mean): --  RR: 18 (01 Oct 2021 09:12) (17 - 18)  SpO2: 93% (01 Oct 2021 09:12) (92% - 94%)    General Exam:   General appearance: No acute distress                 Cardiovascular: Pedal dorsalis pulses intact bilaterally    Neurological Exam:  Mental Status: Orientated to self, date and place.  Attention intact.  No dysarthria, aphasia or neglect.      Cranial Nerves: CN I - not tested.  PERRL, EOMI, VFF, no nystagmus or diplopia.  No APD.  Fundi not visualized bilaterally.  CN V1-3 intact to light touch.  No facial asymmetry.      Motor:   Tone: normal.                  Strength: intact throughout  Dysmeria: None to finger-nose-finger or heel-shin-heel  No truncal ataxia.    Tremor: No resting, postural or action tremor.  No myoclonus.    Sensation: intact to light touch, pinprick, vibration and proprioception    Deep Tendon Reflexes: 1+ bilateral biceps, triceps, brachioradialis, knee and ankle  Toes flexor bilaterally    Gait: normal and stable.      Labs/tests:    EEG Classification / Summary:    Abnormal EEG in the awake, drowsy states.  -Mild generalized slowing    Clinical Impression:    Mild nonspecific diffuse or multifocal cerebral dysfunction.   No epileptiform pattern or seizure seen.                   10-01    139  |  103  |  14  ----------------------------<  109<H>  3.4<L>   |  27  |  0.75    Ca    9.1      01 Oct 2021 07:37  Mg     1.7     10-01    TPro  7.5  /  Alb  2.8<L>  /  TBili  0.5  /  DBili  x   /  AST  62<H>  /  ALT  23  /  AlkPhos  84  09-30    10-01    139  |  103  |  14  ----------------------------<  109<H>  3.4<L>   |  27  |  0.75    Ca    9.1      01 Oct 2021 07:37  Mg     1.7     10-01    TPro  7.5  /  Alb  2.8<L>  /  TBili  0.5  /  DBili  x   /  AST  62<H>  /  ALT  23  /  AlkPhos  84  09-30    LIVER FUNCTIONS - ( 30 Sep 2021 06:26 )  Alb: 2.8 g/dL / Pro: 7.5 g/dL / ALK PHOS: 84 U/L / ALT: 23 U/L DA / AST: 62 U/L / GGT: x

## 2021-10-01 NOTE — PROGRESS NOTE ADULT - ASSESSMENT
Fever - improving  Pneumonia - completed treatment  UTI - VRE    Plan -  Fever - improving  Pneumonia - completed treatment  UTI - VRE    Plan - Start Macrobid 100mg po bid for 5 days Fever - improving  Pneumonia - completed treatment  UTI - VRE    Plan - Start Macrobid 100mg po bid for 5 days    I agree with above

## 2021-10-01 NOTE — PROGRESS NOTE ADULT - ASSESSMENT
Patient's presentation is cw toxic metabolic enecephalpathy due to hyponatremia, hypercalcemia and sepsis.  MRI brain and EEG are unremarakble for masses, seizure foci/ seizures or strokes.   Will recommend to cw treatment for medical conditions above as per primary team.   Please call back with questions.  rosales Vidales

## 2021-10-01 NOTE — PROGRESS NOTE ADULT - SUBJECTIVE AND OBJECTIVE BOX
C A R D I O L O G Y  **********************************     DATE OF SERVICE: 10-01-21    Patient denies CPM, breathing comfortable, refused stress today as she is tired.  Requesting to be done on monday  Review of systems otherwise (-)      acetaminophen   Tablet .. 650 milliGRAM(s) Oral every 6 hours PRN  ALBUTerol    90 MICROgram(s) HFA Inhaler 2 Puff(s) Inhalation every 6 hours PRN  amLODIPine   Tablet 5 milliGRAM(s) Oral daily  chlorhexidine 2% Cloths 1 Application(s) Topical daily  folic acid 1 milliGRAM(s) Oral daily  heparin   Injectable 5000 Unit(s) SubCutaneous every 8 hours  influenza   Vaccine 0.5 milliLiter(s) IntraMuscular once  lisinopril 10 milliGRAM(s) Oral every 12 hours  melatonin 1 milliGRAM(s) Oral at bedtime  metoprolol tartrate 100 milliGRAM(s) Oral two times a day  nystatin Powder 1 Application(s) Topical every 8 hours  ondansetron    Tablet 4 milliGRAM(s) Oral every 8 hours PRN  pantoprazole    Tablet 40 milliGRAM(s) Oral before breakfast  PARoxetine 40 milliGRAM(s) Oral daily  simvastatin 40 milliGRAM(s) Oral at bedtime  sodium chloride 0.9%. 1000 milliLiter(s) IV Continuous <Continuous>                            9.2    9.37  )-----------( 217      ( 01 Oct 2021 07:37 )             26.8       Hemoglobin: 9.2 g/dL (10-01 @ 07:37)  Hemoglobin: 9.2 g/dL (09-30 @ 06:26)  Hemoglobin: 9.1 g/dL (09-29 @ 07:48)  Hemoglobin: 10.0 g/dL (09-28 @ 05:58)  Hemoglobin: 9.7 g/dL (09-27 @ 07:48)      10-01    139  |  103  |  14  ----------------------------<  109<H>  3.4<L>   |  27  |  0.75    Ca    9.1      01 Oct 2021 07:37  Mg     1.7     10-01    TPro  7.5  /  Alb  2.8<L>  /  TBili  0.5  /  DBili  x   /  AST  62<H>  /  ALT  23  /  AlkPhos  84  09-30    Creatinine Trend: 0.75<--, 0.96<--, 1.09<--, 0.84<--, 0.99<--, 0.89<--    COAGS:           T(C): 36.4 (10-01-21 @ 05:38), Max: 36.6 (09-30-21 @ 22:01)  HR: 75 (10-01-21 @ 05:38) (70 - 93)  BP: 157/84 (10-01-21 @ 05:38) (132/77 - 157/84)  RR: 18 (10-01-21 @ 09:12) (17 - 18)  SpO2: 93% (10-01-21 @ 09:12) (92% - 94%)  Wt(kg): --    I&O's Summary      HEENT:  (-)icterus (-)pallor  CV: N S1 S2 1/6 KAREN (+)2 Pulses B/l  Resp:  Clear to ausculatation B/L, normal effort  GI: (+) BS Soft, NT, ND  Lymph:  (-)Edema, (-)obvious lymphadenopathy  Skin: Warm to touch, Normal turgor  Psych: Appropriate mood and affect          ASSESSMENT/PLAN: 	71y  Female HTN and HLD BIBEMS for hypoxia and hypotension at outpatient clinic found with hilar mass and post obstructive PNA Normal LV function    - Abnormal EKG, nonspecific findings however her QT is prolonged.  Avoid QT prolonging meds  - EP eval appreciated   - Her ST T wave abnormality will require a stress test once optimized for further evaluation  - Plan for Stress test Monday per patient request    Scooter Gutiérrez MD, [facility]  BEEPER [phone]

## 2021-10-01 NOTE — PROGRESS NOTE ADULT - SUBJECTIVE AND OBJECTIVE BOX
INTERVAL HPI/OVERNIGHT EVENTS:  Patient seen, she refused her stress test this am.  Patient and  complained that they were unaware of the plan for a stress test.  I had a long conversation with the patient as well as her  Marcos about the stress test and EBUSS scheduled for Monday.     MEDICATIONS  (STANDING):  amLODIPine   Tablet 5 milliGRAM(s) Oral daily  chlorhexidine 2% Cloths 1 Application(s) Topical daily  folic acid 1 milliGRAM(s) Oral daily  heparin   Injectable 5000 Unit(s) SubCutaneous every 8 hours  influenza   Vaccine 0.5 milliLiter(s) IntraMuscular once  lisinopril 10 milliGRAM(s) Oral every 12 hours  LORazepam     Tablet 1 milliGRAM(s) Oral at bedtime  melatonin 1 milliGRAM(s) Oral at bedtime  metoprolol tartrate 100 milliGRAM(s) Oral two times a day  nitrofurantoin monohydrate/macrocrystals (MACROBID) 100 milliGRAM(s) Oral two times a day  nystatin Powder 1 Application(s) Topical every 8 hours  pantoprazole    Tablet 40 milliGRAM(s) Oral before breakfast  PARoxetine 40 milliGRAM(s) Oral daily  simvastatin 40 milliGRAM(s) Oral at bedtime  sodium chloride 0.9%. 1000 milliLiter(s) (60 mL/Hr) IV Continuous <Continuous>    MEDICATIONS  (PRN):  acetaminophen   Tablet .. 650 milliGRAM(s) Oral every 6 hours PRN Temp greater or equal to 38C (100.4F), Mild Pain (1 - 3)  ALBUTerol    90 MICROgram(s) HFA Inhaler 2 Puff(s) Inhalation every 6 hours PRN Shortness of Breath and/or Wheezing  ondansetron    Tablet 4 milliGRAM(s) Oral every 8 hours PRN Nausea and/or Vomiting      Allergies    No Known Allergies            Vital Signs Last 24 Hrs  T(C): 36.8 (01 Oct 2021 13:17), Max: 36.8 (01 Oct 2021 13:17)  T(F): 98.3 (01 Oct 2021 13:17), Max: 98.3 (01 Oct 2021 13:17)  HR: 70 (01 Oct 2021 13:17) (70 - 75)  BP: 137/77 (01 Oct 2021 13:17) (132/77 - 157/84)  BP(mean): --  RR: 20 (01 Oct 2021 13:17) (17 - 20)  SpO2: 93% (01 Oct 2021 13:17) (92% - 94%)    General:  Cardiovascular:  Respiratory:  Chest tube:    LABS:                        9.2    9.37  )-----------( 217      ( 01 Oct 2021 07:37 )             26.8     10-01    139  |  103  |  14  ----------------------------<  109<H>  3.4<L>   |  27  |  0.75    Ca    9.1      01 Oct 2021 07:37  Mg     1.7     10-01    TPro  7.5  /  Alb  2.8<L>  /  TBili  0.5  /  DBili  x   /  AST  62<H>  /  ALT  23  /  AlkPhos  84  09-30          RADIOLOGY & ADDITIONAL TESTS:   INTERVAL HPI/OVERNIGHT EVENTS:  Patient seen, she refused her stress test this am.  Patient and  complained that they were unaware of the plan for a stress test.  I had a long conversation with the patient as well as her  Marcos about the stress test and EBUSS now scheduled for Monday.     MEDICATIONS  (STANDING):  amLODIPine   Tablet 5 milliGRAM(s) Oral daily  chlorhexidine 2% Cloths 1 Application(s) Topical daily  folic acid 1 milliGRAM(s) Oral daily  heparin   Injectable 5000 Unit(s) SubCutaneous every 8 hours  influenza   Vaccine 0.5 milliLiter(s) IntraMuscular once  lisinopril 10 milliGRAM(s) Oral every 12 hours  LORazepam     Tablet 1 milliGRAM(s) Oral at bedtime  melatonin 1 milliGRAM(s) Oral at bedtime  metoprolol tartrate 100 milliGRAM(s) Oral two times a day  nitrofurantoin monohydrate/macrocrystals (MACROBID) 100 milliGRAM(s) Oral two times a day  nystatin Powder 1 Application(s) Topical every 8 hours  pantoprazole    Tablet 40 milliGRAM(s) Oral before breakfast  PARoxetine 40 milliGRAM(s) Oral daily  simvastatin 40 milliGRAM(s) Oral at bedtime  sodium chloride 0.9%. 1000 milliLiter(s) (60 mL/Hr) IV Continuous <Continuous>    MEDICATIONS  (PRN):  acetaminophen   Tablet .. 650 milliGRAM(s) Oral every 6 hours PRN Temp greater or equal to 38C (100.4F), Mild Pain (1 - 3)  ALBUTerol    90 MICROgram(s) HFA Inhaler 2 Puff(s) Inhalation every 6 hours PRN Shortness of Breath and/or Wheezing  ondansetron    Tablet 4 milliGRAM(s) Oral every 8 hours PRN Nausea and/or Vomiting      Allergies    No Known Allergies      Vital Signs Last 24 Hrs  T(C): 36.8 (01 Oct 2021 13:17), Max: 36.8 (01 Oct 2021 13:17)  T(F): 98.3 (01 Oct 2021 13:17), Max: 98.3 (01 Oct 2021 13:17)  HR: 70 (01 Oct 2021 13:17) (70 - 75)  BP: 137/77 (01 Oct 2021 13:17) (132/77 - 157/84)  BP(mean): --  RR: 20 (01 Oct 2021 13:17) (17 - 20)  SpO2: 93% (01 Oct 2021 13:17) (92% - 94%)    General: NAD  Cardiovascular: S1, S2  Respiratory: CTA  Abd: soft, NT    LABS:                        9.2    9.37  )-----------( 217      ( 01 Oct 2021 07:37 )             26.8     10-01    139  |  103  |  14  ----------------------------<  109<H>  3.4<L>   |  27  |  0.75    Ca    9.1      01 Oct 2021 07:37  Mg     1.7     10-01    TPro  7.5  /  Alb  2.8<L>  /  TBili  0.5  /  DBili  x   /  AST  62<H>  /  ALT  23  /  AlkPhos  84  09-30         INTERVAL HPI/OVERNIGHT EVENTS:  Patient seen, she refused her stress test this am.  Patient and  complained that they were unaware of the plan for a stress test.  I had a long conversation with the patient as well as her  Marcos about the stress test and EBUSS now scheduled for Monday, they are agreeable.     MEDICATIONS  (STANDING):  amLODIPine   Tablet 5 milliGRAM(s) Oral daily  chlorhexidine 2% Cloths 1 Application(s) Topical daily  folic acid 1 milliGRAM(s) Oral daily  heparin   Injectable 5000 Unit(s) SubCutaneous every 8 hours  influenza   Vaccine 0.5 milliLiter(s) IntraMuscular once  lisinopril 10 milliGRAM(s) Oral every 12 hours  LORazepam     Tablet 1 milliGRAM(s) Oral at bedtime  melatonin 1 milliGRAM(s) Oral at bedtime  metoprolol tartrate 100 milliGRAM(s) Oral two times a day  nitrofurantoin monohydrate/macrocrystals (MACROBID) 100 milliGRAM(s) Oral two times a day  nystatin Powder 1 Application(s) Topical every 8 hours  pantoprazole    Tablet 40 milliGRAM(s) Oral before breakfast  PARoxetine 40 milliGRAM(s) Oral daily  simvastatin 40 milliGRAM(s) Oral at bedtime  sodium chloride 0.9%. 1000 milliLiter(s) (60 mL/Hr) IV Continuous <Continuous>    MEDICATIONS  (PRN):  acetaminophen   Tablet .. 650 milliGRAM(s) Oral every 6 hours PRN Temp greater or equal to 38C (100.4F), Mild Pain (1 - 3)  ALBUTerol    90 MICROgram(s) HFA Inhaler 2 Puff(s) Inhalation every 6 hours PRN Shortness of Breath and/or Wheezing  ondansetron    Tablet 4 milliGRAM(s) Oral every 8 hours PRN Nausea and/or Vomiting      Allergies    No Known Allergies      Vital Signs Last 24 Hrs  T(C): 36.8 (01 Oct 2021 13:17), Max: 36.8 (01 Oct 2021 13:17)  T(F): 98.3 (01 Oct 2021 13:17), Max: 98.3 (01 Oct 2021 13:17)  HR: 70 (01 Oct 2021 13:17) (70 - 75)  BP: 137/77 (01 Oct 2021 13:17) (132/77 - 157/84)  BP(mean): --  RR: 20 (01 Oct 2021 13:17) (17 - 20)  SpO2: 93% (01 Oct 2021 13:17) (92% - 94%)    General: NAD  Cardiovascular: S1, S2  Respiratory: CTA  Abd: soft, NT    LABS:                        9.2    9.37  )-----------( 217      ( 01 Oct 2021 07:37 )             26.8     10-01    139  |  103  |  14  ----------------------------<  109<H>  3.4<L>   |  27  |  0.75    Ca    9.1      01 Oct 2021 07:37  Mg     1.7     10-01    TPro  7.5  /  Alb  2.8<L>  /  TBili  0.5  /  DBili  x   /  AST  62<H>  /  ALT  23  /  AlkPhos  84  09-30

## 2021-10-01 NOTE — PROGRESS NOTE ADULT - ASSESSMENT
Assessment and Plan:   · Assessment	  71 year old Woman with hx of HTN and HLD BIBEMS for hypoxia and hypotension at outpatient clinic. Patient admitted w/ Sepsis secondary to RUL PNA. CT showed RUL PNA. Additionally, her UA was  positive with UCx growing E coli (10-49K).  She is s/p azithromycin and Rocephin. Bcx no growth. Hospital course c/b finding on  CT chest scan of R hilar lung mass and Mediastinal  adenopathy, CT A/P revealed liver lesions, probable liver metastases and CT head with multiple lytic lesions suspicious for metastases or multiple myeloma.    and patient asked me to speak to their primary care physician who called.  I spoke with him about patients condition, and he suggested that we start Lorazepam as she was on a standing daily dose of Lorazepam 1mg qHS for many years. He suggested it may help with her confusion as there may be a withdrawal component.   I called her pharmacy and confirmed. I discussed with Dr. Arias and she agreed to start the Lorazapam.       Problem/Plan - 1:  ·  Problem: Acute respiratory failure with hypoxia.   ·  Plan: In the setting of lung mass and PNA;  - s/p antimicrobial therapy x 5 days   - SPO2 92-96 % on 3L ( recent spo2on RA was 88%; will require home oxygen therapy)   - most recent blood cx neg  - Pulmonary, Dr Lombardi following; planned for EBUS w/ biopsy on Monday at 10/4  1pm.     Problem/Plan - 2:  ·  Problem: Lung mass.   ·  Plan: Heme/Onc recommends EBUS with biopsy  Dr. Lombardi, Pulmonary, following  Dr. Mathews, Heme/Onc, following  Palliative care following.     Problem/Plan - 3:  ·  Problem: Abdominal mass.   ·  Plan: IR advised liver lesions not large enough for biopsy  Pt to follow up as outpatient for PET scan per heme/onc recommendations  Dr. Mathews, Heme/Onc, following  Dr. Arnett, Surg-Onc, following  Palliative care following.     Problem/Plan - 4:  ·  Problem: Malignancy.   ·  Plan: Heme/Onc following  -likely metastatic  Lung cancer  to bone/liver/peritoneum;  -EBUS bx recommended  -outpatient PET scan  s/p palliative eval; patient is full code.     Problem/Plan - 5:  ·  Problem: Hypercalcemia.   ·  Plan: Likely secondary to metastatic disease  - normalizing  - s/p Aredia 9/27.   - s/p Calcitonin 4 IU /kg q12 hrs x 1 day  - Heme/Onc following.     Problem/Plan - 6:  ·  Problem: Confused.   ·  Plan: Patient with periods of confusion in the setting of metastatic disease, hypercalcemia and UTI  - s/p Aredia 9/27 and Calcitonin Serum Ca 10.2;  corrected 11  - Neurology evaluation  - Heme/ Onc follow up  - EEG completed at bedside this am.  -restarted Lorazapam 1mg qHS (home med for many years)     Problem/Plan - 7:  ·  Problem: Community acquired pneumonia.   ·  Plan: Ecoli UTI  - s/p Rocephin therapy   - ID Dr Lopez consulted.     Problem/Plan - 8:  ·  Problem: BRENDA (acute kidney injury).   ·  Plan: Since resolved  - Avoid nephrotoxic agents  - Continue to monitor BMP.     Problem/Plan - 9:  ·  Problem: HTN (hypertension).   ·  Plan: Pt takes Metoprolol, Amlodipine and Benazepril at home  -Continue home regimen bb and Norvasc with parameters  -Continue therapeutic interchange Lisinopril with parameters and monitor BMP   - Continue to monitor BP.     Problem/Plan - 10:  ·  Problem: QT prolongation.   ·  Plan; QT prolongation of ECG  seen by EP - Dr Carrasco:  appreciate input (no further work-up at this time)  f/b cardiology likely stress test when optimized (outpt)  monitor electrolytes.     Problem/Plan - 11:  ·  Problem: Prophylactic measure.   ·  Plan: DVT: Heparin SQ  GI: on protonix.     Problem/Plan - 12:  ·  Problem: Discharge planning issues.   ·  Plan: PT recommends JANETTE, family refuses JANETTE wants pt home with HPT (with oxygen therapy)   Care management following for discharge planning  dc planning pending TST and EBUS on Monday.   Assessment and Plan:   · Assessment	  71 year old Woman with hx of HTN and HLD BIBEMS for hypoxia and hypotension at outpatient clinic. Patient admitted w/ Sepsis secondary to RUL PNA. CT showed RUL PNA. Additionally, her UA was  positive with UCx growing E coli (10-49K).  She is s/p azithromycin and Rocephin. Bcx no growth. Hospital course c/b finding on  CT chest scan of R hilar lung mass and Mediastinal  adenopathy, CT A/P revealed liver lesions, probable liver metastases and CT head with multiple lytic lesions suspicious for metastases or multiple myeloma.    and patient asked me to speak to their primary care physician who called.  I spoke with him about patients condition, and he suggested that we start Lorazepam as she was on a standing daily dose of Lorazepam 1mg qHS for many years. He suggested it may help with her confusion as there may be a withdrawal component.   I called her pharmacy and confirmed. I discussed with Dr. Arias and she agreed to start the Lorazapam.         Problem/Plan - 1:  ·  Problem: Acute respiratory failure with hypoxia.   ·  Plan: In the setting of lung mass and PNA;  - s/p antimicrobial therapy x 5 days   - SPO2 92-96 % on 3L ( recent spo2on RA was 88%; will require home oxygen therapy)   - most recent blood cx neg  - Pulmonary, Dr Lombardi following; planned for EBUS w/ biopsy on Monday at 10/4  1pm.     Problem/Plan - 2:  ·  Problem: Lung mass.   ·  Plan: Heme/Onc recommends EBUS with biopsy  Dr. Lombardi, Pulmonary, following  Dr. Mathews, Heme/Onc, following  Palliative care following.     Problem/Plan - 3:  ·  Problem: Abdominal mass.   ·  Plan: IR advised liver lesions not large enough for biopsy  Pt to follow up as outpatient for PET scan per heme/onc recommendations  Dr. Mathews, Heme/Onc, following  Dr. Arnett, Surg-Onc, following  Palliative care following.     Problem/Plan - 4:  ·  Problem: Malignancy.   ·  Plan: Heme/Onc following  -likely metastatic  Lung cancer  to bone/liver/peritoneum;  -EBUS bx recommended  -outpatient PET scan  s/p palliative eval; patient is full code.     Problem/Plan - 5:  ·  Problem: Hypercalcemia.   ·  Plan: Likely secondary to metastatic disease  - normalizing  - s/p Aredia 9/27.   - s/p Calcitonin 4 IU /kg q12 hrs x 1 day  - Heme/Onc following.     Problem/Plan - 6:  ·  Problem: Confused.   ·  Plan: Patient with periods of confusion in the setting of metastatic disease, hypercalcemia and UTI  - s/p Aredia 9/27 and Calcitonin Serum Ca 10.2;  corrected 11  - Neurology evaluation  - Heme/ Onc follow up  - EEG completed at bedside this am.  -restarted Lorazapam 1mg qHS (home med for many years)     Problem/Plan - 7:  ·  Problem: Community acquired pneumonia.   ·  Plan: Ecoli UTI  - s/p Rocephin therapy   - ID Dr Lopez consulted.     Problem/Plan - 8:  ·  Problem: BRENDA (acute kidney injury).   ·  Plan: Since resolved  - Avoid nephrotoxic agents  - Continue to monitor BMP.     Problem/Plan - 9:  ·  Problem: HTN (hypertension).   ·  Plan: Pt takes Metoprolol, Amlodipine and Benazepril at home  -Continue home regimen bb and Norvasc with parameters  -Continue therapeutic interchange Lisinopril with parameters and monitor BMP   - Continue to monitor BP.     Problem/Plan - 10:  ·  Problem: QT prolongation.   ·  Plan; QT prolongation of ECG  seen by EP - Dr Carrasco:  appreciate input (no further work-up at this time)  f/b cardiology likely stress test when optimized (outpt)  monitor electrolytes.    Problem/Plan 11:  UTI  ID started Macrobid BID  ID note appreciated     Problem/Plan - 11:  ·  Problem: Prophylactic measure.   ·  Plan: DVT: Heparin SQ  GI: on protonix.     Problem/Plan - 12:  ·  Problem: Discharge planning issues.   ·  Plan: PT recommends JANETTE, family refuses JANETTE wants pt home with HPT (with oxygen therapy)   Care management following for discharge planning  dc planning pending TST and EBUS on Monday.

## 2021-10-01 NOTE — PROGRESS NOTE ADULT - SUBJECTIVE AND OBJECTIVE BOX
71y Female is under our care for     REVIEW OF SYSTEMS:  [  ] Not able to elicit  General:	  Chest:	  GI:	  :  Skin:	  Musculoskeletal:	  Neuro:	    MEDS:    ALLERGIES: Allergies    No Known Allergies    Intolerances        VITALS:  Vital Signs Last 24 Hrs  T(C): 36.4 (01 Oct 2021 05:38), Max: 36.6 (30 Sep 2021 22:01)  T(F): 97.6 (01 Oct 2021 05:38), Max: 97.8 (30 Sep 2021 22:01)  HR: 75 (01 Oct 2021 05:38) (70 - 93)  BP: 157/84 (01 Oct 2021 05:38) (132/77 - 157/84)  BP(mean): --  RR: 18 (01 Oct 2021 09:12) (17 - 18)  SpO2: 93% (01 Oct 2021 09:12) (92% - 94%)      PHYSICAL EXAM:  HEENT:  Neck:  Respiratory:  Cardiovascular:  Gastrointestinal:  Extremities:  Skin:  Ortho:  Neuro:    LABS/DIAGNOSTIC TESTS:                        9.2    9.37  )-----------( 217      ( 01 Oct 2021 07:37 )             26.8     WBC Count: 9.37 K/uL (10-01 @ 07:37)  WBC Count: 9.30 K/uL (09-30 @ 06:26)  WBC Count: 8.11 K/uL (09-29 @ 07:48)  WBC Count: 8.15 K/uL (09-28 @ 05:58)  WBC Count: 10.87 K/uL (09-27 @ 07:48)    10-01    139  |  103  |  14  ----------------------------<  109<H>  3.4<L>   |  27  |  0.75    Ca    9.1      01 Oct 2021 07:37  Mg     1.7     10-01    TPro  7.5  /  Alb  2.8<L>  /  TBili  0.5  /  DBili  x   /  AST  62<H>  /  ALT  23  /  AlkPhos  84  09-30      CULTURES:   Clean Catch Clean Catch (Midstream)  09-28 @ 20:59   >100,000 CFU/ml Enterococcus faecium (vancomycin resistant)  --  Enterococcus faecium (vancomycin resistant)      .Blood Blood  09-28 @ 20:53   No growth to date.  --  --      Clean Catch Clean Catch (Midstream)  09-21 @ 14:05   10,000 - 49,000 CFU/mL Escherichia coli  Normal Urogenital bello present  --  Escherichia coli      .Blood Blood-Peripheral  09-20 @ 18:38   No Growth Final  --  --        RADIOLOGY:  no new studies 71y Female is under our care for fevers.  Patient was seen laying comfortably in bed with no acute distress.  Urine culture is positive for VRE and patient denies any urinary symptoms at this time.  She remains afebrile, blood cultures are negative and WBC count is WNL.    REVIEW OF SYSTEMS:  [  ] Not able to elicit  General: no fevers, malaise +  Chest: no cough no sob  GI: no nvd  : no urinary sxs   Skin: no rashes  Musculoskeletal: no trauma no LBP  Neuro: no ha's no dizziness     MEDS:    ALLERGIES: Allergies    No Known Allergies    Intolerances        VITALS:  Vital Signs Last 24 Hrs  T(C): 36.4 (01 Oct 2021 05:38), Max: 36.6 (30 Sep 2021 22:01)  T(F): 97.6 (01 Oct 2021 05:38), Max: 97.8 (30 Sep 2021 22:01)  HR: 75 (01 Oct 2021 05:38) (70 - 93)  BP: 157/84 (01 Oct 2021 05:38) (132/77 - 157/84)  BP(mean): --  RR: 18 (01 Oct 2021 09:12) (17 - 18)  SpO2: 93% (01 Oct 2021 09:12) (92% - 94%)      PHYSICAL EXAM:  HEENT: n/a  Neck: supple no LN's   Respiratory: lungs clear no rales  Cardiovascular: S1 S2 reg no murmurs  Gastrointestinal: +BS with soft, nondistended abdomen; nontender  Extremities: no edema  Skin: no rashes  Ortho: n/a  Neuro: AAO x 3      LABS/DIAGNOSTIC TESTS:                        9.2    9.37  )-----------( 217      ( 01 Oct 2021 07:37 )             26.8     WBC Count: 9.37 K/uL (10-01 @ 07:37)  WBC Count: 9.30 K/uL (09-30 @ 06:26)  WBC Count: 8.11 K/uL (09-29 @ 07:48)  WBC Count: 8.15 K/uL (09-28 @ 05:58)  WBC Count: 10.87 K/uL (09-27 @ 07:48)    10-01    139  |  103  |  14  ----------------------------<  109<H>  3.4<L>   |  27  |  0.75    Ca    9.1      01 Oct 2021 07:37  Mg     1.7     10-01    TPro  7.5  /  Alb  2.8<L>  /  TBili  0.5  /  DBili  x   /  AST  62<H>  /  ALT  23  /  AlkPhos  84  09-30      CULTURES:   Clean Catch Clean Catch (Midstream)  09-28 @ 20:59   >100,000 CFU/ml Enterococcus faecium (vancomycin resistant)  --  Enterococcus faecium (vancomycin resistant)      .Blood Blood  09-28 @ 20:53   No growth to date.  --  --      Clean Catch Clean Catch (Midstream)  09-21 @ 14:05   10,000 - 49,000 CFU/mL Escherichia coli  Normal Urogenital bello present  --  Escherichia coli      .Blood Blood-Peripheral  09-20 @ 18:38   No Growth Final  --  --        RADIOLOGY:  no new studies 71y Female is under our care for fevers.  Patient was seen laying comfortably in bed with no acute distress.  Urine culture is positive for VRE and patient denies any urinary symptoms at this time.  She remains afebrile, blood cultures are negative and WBC count is WNL.  Patient denies any diarrhea and according to the RN, she did not have any bowel movements today.    REVIEW OF SYSTEMS:  [  ] Not able to elicit  General: no fevers, malaise +  Chest: no cough no sob  GI: no nvd  : no urinary sxs   Skin: no rashes  Musculoskeletal: no trauma no LBP  Neuro: no ha's no dizziness     MEDS:    ALLERGIES: Allergies    No Known Allergies    Intolerances        VITALS:  Vital Signs Last 24 Hrs  T(C): 36.4 (01 Oct 2021 05:38), Max: 36.6 (30 Sep 2021 22:01)  T(F): 97.6 (01 Oct 2021 05:38), Max: 97.8 (30 Sep 2021 22:01)  HR: 75 (01 Oct 2021 05:38) (70 - 93)  BP: 157/84 (01 Oct 2021 05:38) (132/77 - 157/84)  BP(mean): --  RR: 18 (01 Oct 2021 09:12) (17 - 18)  SpO2: 93% (01 Oct 2021 09:12) (92% - 94%)      PHYSICAL EXAM:  HEENT: n/a  Neck: supple no LN's   Respiratory: lungs clear no rales  Cardiovascular: S1 S2 reg no murmurs  Gastrointestinal: +BS with soft, nondistended abdomen; nontender  Extremities: no edema  Skin: no rashes  Ortho: n/a  Neuro: AAO x 3      LABS/DIAGNOSTIC TESTS:                        9.2    9.37  )-----------( 217      ( 01 Oct 2021 07:37 )             26.8     WBC Count: 9.37 K/uL (10-01 @ 07:37)  WBC Count: 9.30 K/uL (09-30 @ 06:26)  WBC Count: 8.11 K/uL (09-29 @ 07:48)  WBC Count: 8.15 K/uL (09-28 @ 05:58)  WBC Count: 10.87 K/uL (09-27 @ 07:48)    10-01    139  |  103  |  14  ----------------------------<  109<H>  3.4<L>   |  27  |  0.75    Ca    9.1      01 Oct 2021 07:37  Mg     1.7     10-01    TPro  7.5  /  Alb  2.8<L>  /  TBili  0.5  /  DBili  x   /  AST  62<H>  /  ALT  23  /  AlkPhos  84  09-30      CULTURES:   Clean Catch Clean Catch (Midstream)  09-28 @ 20:59   >100,000 CFU/ml Enterococcus faecium (vancomycin resistant)  --  Enterococcus faecium (vancomycin resistant)      .Blood Blood  09-28 @ 20:53   No growth to date.  --  --      Clean Catch Clean Catch (Midstream)  09-21 @ 14:05   10,000 - 49,000 CFU/mL Escherichia coli  Normal Urogenital bello present  --  Escherichia coli      .Blood Blood-Peripheral  09-20 @ 18:38   No Growth Final  --  --        RADIOLOGY:  no new studies

## 2021-10-01 NOTE — PROGRESS NOTE ADULT - SUBJECTIVE AND OBJECTIVE BOX
Time of Visit:  Patient seen and examined.     MEDICATIONS  (STANDING):  amLODIPine   Tablet 5 milliGRAM(s) Oral daily  chlorhexidine 2% Cloths 1 Application(s) Topical daily  folic acid 1 milliGRAM(s) Oral daily  heparin   Injectable 5000 Unit(s) SubCutaneous every 8 hours  influenza   Vaccine 0.5 milliLiter(s) IntraMuscular once  lisinopril 10 milliGRAM(s) Oral every 12 hours  LORazepam     Tablet 1 milliGRAM(s) Oral at bedtime  melatonin 1 milliGRAM(s) Oral at bedtime  metoprolol tartrate 100 milliGRAM(s) Oral two times a day  nitrofurantoin monohydrate/macrocrystals (MACROBID) 100 milliGRAM(s) Oral two times a day  nystatin Powder 1 Application(s) Topical every 8 hours  pantoprazole    Tablet 40 milliGRAM(s) Oral before breakfast  PARoxetine 40 milliGRAM(s) Oral daily  simvastatin 40 milliGRAM(s) Oral at bedtime  sodium chloride 0.9%. 1000 milliLiter(s) (60 mL/Hr) IV Continuous <Continuous>      MEDICATIONS  (PRN):  acetaminophen   Tablet .. 650 milliGRAM(s) Oral every 6 hours PRN Temp greater or equal to 38C (100.4F), Mild Pain (1 - 3)  ALBUTerol    90 MICROgram(s) HFA Inhaler 2 Puff(s) Inhalation every 6 hours PRN Shortness of Breath and/or Wheezing  ondansetron    Tablet 4 milliGRAM(s) Oral every 8 hours PRN Nausea and/or Vomiting       Medications up to date at time of exam.    ROS; No fever, chills, cough, congestion on exam.    PHYSICAL EXAMINATION:    Vital Signs Last 24 Hrs  T(C): 36.8 (01 Oct 2021 13:17), Max: 36.8 (01 Oct 2021 13:17)  T(F): 98.3 (01 Oct 2021 13:17), Max: 98.3 (01 Oct 2021 13:17)  HR: 70 (01 Oct 2021 13:17) (70 - 75)  BP: 137/77 (01 Oct 2021 13:17) (132/77 - 157/84)  BP(mean): --  RR: 20 (01 Oct 2021 13:17) (17 - 20)  SpO2: 93% (01 Oct 2021 13:17) (92% - 94%)   (if applicable)    General: Alert and oriented, forgetful  . Poor historian . No acute distress.     HEENT: Head is normocephalic and atraumatic. No nasal tenderness. Extraocular muscles are intact. Mucous membranes are moist.     NECK: Supple, no palpable adenopathy.    LUNGS: Decreased breath sounds, no wheezing on exam. Non labored. No use of accessory muscle.     HEART: S1 S2 Regular rate and no click/ rub.     ABDOMEN: Soft, nontender, and nondistended.  No hepatosplenomegaly is noted. Active bowel sounds.     EXTREMITIES: Without any cyanosis, clubbing, rash, lesions or edema.    NEUROLOGIC: Awake, alert, oriented, forgetful but able to follow command .     SKIN: Warm and moist . Non diaphoretic.       LABS:                        9.2    9.37  )-----------( 217      ( 01 Oct 2021 07:37 )             26.8     10-01    139  |  103  |  14  ----------------------------<  109<H>  3.4<L>   |  27  |  0.75    Ca    9.1      01 Oct 2021 07:37  Mg     1.7     10-01    TPro  7.5  /  Alb  2.8<L>  /  TBili  0.5  /  DBili  x   /  AST  62<H>  /  ALT  23  /  AlkPhos  84  09-30    MICROBIOLOGY: (if applicable)    RADIOLOGY & ADDITIONAL STUDIES:  EKG:   CXR:  ECHO:    IMPRESSION: 71y Female PAST MEDICAL & SURGICAL HISTORY:  Lumbar stenosis    H/O: HTN (hypertension)    Hypercholesterolemia    Bleeding ulcer  stomach ulcer 2011    Anxiety    Spinal stenosis in cervical region    Acute hepatitis C virus infection without hepatic coma  treated 2015- Resolved    Balance disorder    Coronary artery disease involving native coronary artery of native heart without angina pectoris    HTN (hypertension)    HLD (hyperlipidemia)    H/O colonoscopy    Bleeding ulcer  stomach surgery for bleeding ulcer    Fracture  ORIF Left wrist  1/17/13    History of lumbar surgery  2013    Chronic UTI  Pt reports presently on 2nd dose of antibiotics 7/5/18 10 days, Dr Herman aware, pt going for m/eval 7/13/18.    H/O cardiac catheterization  2013, no intervention needed, treated medically     Impression; 70 Y/O Female presented to ED with  Hypoxia due to post obstructive pna with right lung mass with meds. Former smoker. Had hypoxia and hypotension at outpatient clinic. 09-23-21 Negative for Covid 19 PCR. Has an Acute hypoxic respiratory failure secondary to Post obstructive Pneumonia and Has Right Hilar Lung Mass.      Suggestion ;  O2 saturation 94% room air . Can have  Oxygen supplementation 2L NC if needed.    Continue PRN Albuterol 2 puffs Q 6 hours.    DVT/ GI prophylactic . No Heparin SQ starting Saturday evening dose till Monday .     Patient is schedule for bronch 10/4 @ 1300 hr. NPO post midnight on Sunday.  Time of Visit:  Patient seen and examined.     MEDICATIONS  (STANDING):  amLODIPine   Tablet 5 milliGRAM(s) Oral daily  chlorhexidine 2% Cloths 1 Application(s) Topical daily  folic acid 1 milliGRAM(s) Oral daily  heparin   Injectable 5000 Unit(s) SubCutaneous every 8 hours  influenza   Vaccine 0.5 milliLiter(s) IntraMuscular once  lisinopril 10 milliGRAM(s) Oral every 12 hours  LORazepam     Tablet 1 milliGRAM(s) Oral at bedtime  melatonin 1 milliGRAM(s) Oral at bedtime  metoprolol tartrate 100 milliGRAM(s) Oral two times a day  nitrofurantoin monohydrate/macrocrystals (MACROBID) 100 milliGRAM(s) Oral two times a day  nystatin Powder 1 Application(s) Topical every 8 hours  pantoprazole    Tablet 40 milliGRAM(s) Oral before breakfast  PARoxetine 40 milliGRAM(s) Oral daily  simvastatin 40 milliGRAM(s) Oral at bedtime  sodium chloride 0.9%. 1000 milliLiter(s) (60 mL/Hr) IV Continuous <Continuous>      MEDICATIONS  (PRN):  acetaminophen   Tablet .. 650 milliGRAM(s) Oral every 6 hours PRN Temp greater or equal to 38C (100.4F), Mild Pain (1 - 3)  ALBUTerol    90 MICROgram(s) HFA Inhaler 2 Puff(s) Inhalation every 6 hours PRN Shortness of Breath and/or Wheezing  ondansetron    Tablet 4 milliGRAM(s) Oral every 8 hours PRN Nausea and/or Vomiting       Medications up to date at time of exam.    ROS; No fever, chills, cough, congestion on exam.    PHYSICAL EXAMINATION:    Vital Signs Last 24 Hrs  T(C): 36.8 (01 Oct 2021 13:17), Max: 36.8 (01 Oct 2021 13:17)  T(F): 98.3 (01 Oct 2021 13:17), Max: 98.3 (01 Oct 2021 13:17)  HR: 70 (01 Oct 2021 13:17) (70 - 75)  BP: 137/77 (01 Oct 2021 13:17) (132/77 - 157/84)  BP(mean): --  RR: 20 (01 Oct 2021 13:17) (17 - 20)  SpO2: 93% (01 Oct 2021 13:17) (92% - 94%)   (if applicable)    General: Alert and oriented, forgetful  . Poor historian . No acute distress.     HEENT: Head is normocephalic and atraumatic. No nasal tenderness. Extraocular muscles are intact. Mucous membranes are moist.     NECK: Supple, no palpable adenopathy.    LUNGS: Decreased breath sounds, no wheezing on exam. Non labored. No use of accessory muscle.     HEART: S1 S2 Regular rate and no click/ rub.     ABDOMEN: Soft, nontender, and nondistended.  No hepatosplenomegaly is noted. Active bowel sounds.     EXTREMITIES: Without any cyanosis, clubbing, rash, lesions or edema.    NEUROLOGIC: Awake, alert, oriented, forgetful but able to follow command .     SKIN: Warm and moist . Non diaphoretic.       LABS:                        9.2    9.37  )-----------( 217      ( 01 Oct 2021 07:37 )             26.8     10-01    139  |  103  |  14  ----------------------------<  109<H>  3.4<L>   |  27  |  0.75    Ca    9.1      01 Oct 2021 07:37  Mg     1.7     10-01    TPro  7.5  /  Alb  2.8<L>  /  TBili  0.5  /  DBili  x   /  AST  62<H>  /  ALT  23  /  AlkPhos  84  09-30    MICROBIOLOGY: (if applicable)    RADIOLOGY & ADDITIONAL STUDIES:  EKG:   CXR:  ECHO:    IMPRESSION: 71y Female PAST MEDICAL & SURGICAL HISTORY:  Lumbar stenosis    H/O: HTN (hypertension)    Hypercholesterolemia    Bleeding ulcer  stomach ulcer 2011    Anxiety    Spinal stenosis in cervical region    Acute hepatitis C virus infection without hepatic coma  treated 2015- Resolved    Balance disorder    Coronary artery disease involving native coronary artery of native heart without angina pectoris    HTN (hypertension)    HLD (hyperlipidemia)    H/O colonoscopy    Bleeding ulcer  stomach surgery for bleeding ulcer    Fracture  ORIF Left wrist  1/17/13    History of lumbar surgery  2013    Chronic UTI  Pt reports presently on 2nd dose of antibiotics 7/5/18 10 days, Dr Herman aware, pt going for m/eval 7/13/18.    H/O cardiac catheterization  2013, no intervention needed, treated medically     Impression; 70 Y/O Female presented to ED with  Hypoxia due to post obstructive pna with right lung mass with meds. Former smoker. Had hypoxia and hypotension at outpatient clinic. 09-23-21 Negative for Covid 19 PCR. Has an Acute hypoxic respiratory failure secondary to Post obstructive Pneumonia and Has Right Hilar Lung Mass.      Suggestion ;  O2 saturation 94% room air . Can have  Oxygen supplementation 2L NC if needed.    Continue PRN Albuterol 2 puffs Q 6 hours.    DVT/ GI prophylactic . No Heparin SQ starting Saturday evening dose till Monday .     Patient is schedule for bronch 10/4 @ 1300 hr. NPO post midnight on Sunday.     Agree with above assessment and plan as transcribed.

## 2021-10-02 LAB
ANION GAP SERPL CALC-SCNC: 12 MMOL/L — SIGNIFICANT CHANGE UP (ref 5–17)
BUN SERPL-MCNC: 13 MG/DL — SIGNIFICANT CHANGE UP (ref 7–18)
CALCIUM SERPL-MCNC: 8.9 MG/DL — SIGNIFICANT CHANGE UP (ref 8.4–10.5)
CHLORIDE SERPL-SCNC: 101 MMOL/L — SIGNIFICANT CHANGE UP (ref 96–108)
CO2 SERPL-SCNC: 24 MMOL/L — SIGNIFICANT CHANGE UP (ref 22–31)
CREAT SERPL-MCNC: 0.78 MG/DL — SIGNIFICANT CHANGE UP (ref 0.5–1.3)
GLUCOSE SERPL-MCNC: 141 MG/DL — HIGH (ref 70–99)
HCT VFR BLD CALC: 27.5 % — LOW (ref 34.5–45)
HGB BLD-MCNC: 9.4 G/DL — LOW (ref 11.5–15.5)
MAGNESIUM SERPL-MCNC: 1.5 MG/DL — LOW (ref 1.6–2.6)
MCHC RBC-ENTMCNC: 29.8 PG — SIGNIFICANT CHANGE UP (ref 27–34)
MCHC RBC-ENTMCNC: 34.2 GM/DL — SIGNIFICANT CHANGE UP (ref 32–36)
MCV RBC AUTO: 87.3 FL — SIGNIFICANT CHANGE UP (ref 80–100)
NRBC # BLD: 0 /100 WBCS — SIGNIFICANT CHANGE UP (ref 0–0)
PLATELET # BLD AUTO: 222 K/UL — SIGNIFICANT CHANGE UP (ref 150–400)
POTASSIUM SERPL-MCNC: 3.4 MMOL/L — LOW (ref 3.5–5.3)
POTASSIUM SERPL-SCNC: 3.4 MMOL/L — LOW (ref 3.5–5.3)
RBC # BLD: 3.15 M/UL — LOW (ref 3.8–5.2)
RBC # FLD: 13.4 % — SIGNIFICANT CHANGE UP (ref 10.3–14.5)
SODIUM SERPL-SCNC: 137 MMOL/L — SIGNIFICANT CHANGE UP (ref 135–145)
WBC # BLD: 16.36 K/UL — HIGH (ref 3.8–10.5)
WBC # FLD AUTO: 16.36 K/UL — HIGH (ref 3.8–10.5)

## 2021-10-02 RX ORDER — MAGNESIUM SULFATE 500 MG/ML
2 VIAL (ML) INJECTION ONCE
Refills: 0 | Status: COMPLETED | OUTPATIENT
Start: 2021-10-02 | End: 2021-10-02

## 2021-10-02 RX ORDER — POTASSIUM CHLORIDE 20 MEQ
40 PACKET (EA) ORAL ONCE
Refills: 0 | Status: COMPLETED | OUTPATIENT
Start: 2021-10-02 | End: 2021-10-02

## 2021-10-02 RX ADMIN — AMLODIPINE BESYLATE 5 MILLIGRAM(S): 2.5 TABLET ORAL at 06:39

## 2021-10-02 RX ADMIN — HEPARIN SODIUM 5000 UNIT(S): 5000 INJECTION INTRAVENOUS; SUBCUTANEOUS at 22:01

## 2021-10-02 RX ADMIN — CHLORHEXIDINE GLUCONATE 1 APPLICATION(S): 213 SOLUTION TOPICAL at 12:07

## 2021-10-02 RX ADMIN — LISINOPRIL 10 MILLIGRAM(S): 2.5 TABLET ORAL at 17:11

## 2021-10-02 RX ADMIN — NYSTATIN CREAM 1 APPLICATION(S): 100000 CREAM TOPICAL at 22:01

## 2021-10-02 RX ADMIN — PANTOPRAZOLE SODIUM 40 MILLIGRAM(S): 20 TABLET, DELAYED RELEASE ORAL at 06:39

## 2021-10-02 RX ADMIN — Medication 100 MILLIGRAM(S): at 17:11

## 2021-10-02 RX ADMIN — LISINOPRIL 10 MILLIGRAM(S): 2.5 TABLET ORAL at 06:39

## 2021-10-02 RX ADMIN — HEPARIN SODIUM 5000 UNIT(S): 5000 INJECTION INTRAVENOUS; SUBCUTANEOUS at 14:00

## 2021-10-02 RX ADMIN — SIMVASTATIN 40 MILLIGRAM(S): 20 TABLET, FILM COATED ORAL at 22:01

## 2021-10-02 RX ADMIN — Medication 1 MILLIGRAM(S): at 12:07

## 2021-10-02 RX ADMIN — Medication 50 GRAM(S): at 13:58

## 2021-10-02 RX ADMIN — NYSTATIN CREAM 1 APPLICATION(S): 100000 CREAM TOPICAL at 06:39

## 2021-10-02 RX ADMIN — NYSTATIN CREAM 1 APPLICATION(S): 100000 CREAM TOPICAL at 13:59

## 2021-10-02 RX ADMIN — Medication 1 MILLIGRAM(S): at 22:01

## 2021-10-02 RX ADMIN — HEPARIN SODIUM 5000 UNIT(S): 5000 INJECTION INTRAVENOUS; SUBCUTANEOUS at 06:39

## 2021-10-02 RX ADMIN — Medication 40 MILLIGRAM(S): at 12:07

## 2021-10-02 RX ADMIN — Medication 100 MILLIGRAM(S): at 06:39

## 2021-10-02 RX ADMIN — Medication 100 MILLIGRAM(S): at 17:10

## 2021-10-02 RX ADMIN — Medication 40 MILLIEQUIVALENT(S): at 14:03

## 2021-10-02 NOTE — PROGRESS NOTE ADULT - ASSESSMENT
Assessment and Plan:   · Assessment	  71 year old Woman with hx of HTN and HLD BIBEMS for hypoxia and hypotension at outpatient clinic. Patient admitted w/ Sepsis secondary to RUL PNA. CT showed RUL PNA. Additionally, her UA was  positive with UCx growing E coli (10-49K).  She is s/p azithromycin and Rocephin. Bcx no growth. Hospital course c/b finding on  CT chest scan of R hilar lung mass and Mediastinal  adenopathy, CT A/P revealed liver lesions, probable liver metastases and CT head with multiple lytic lesions suspicious for metastases or multiple myeloma.    and patient asked me to speak to their primary care physician who called.  I spoke with him about patients condition, and he suggested that we start Lorazepam as she was on a standing daily dose of Lorazepam 1mg qHS for many years. He suggested it may help with her confusion as there may be a withdrawal component.   I called her pharmacy and confirmed. I discussed with Dr. Arias and she agreed to start the Lorazapam.         Problem/Plan - 1:  ·  Problem: Acute respiratory failure with hypoxia.   ·  Plan: In the setting of lung mass and PNA;  - s/p antimicrobial therapy x 5 days   - SPO2 92-96 % on 3L ( recent spo2on RA was 88%; will require home oxygen therapy)   - most recent blood cx neg  - Pulmonary, Dr Lombardi following; planned for EBUS w/ biopsy on Monday at 10/4  1pm.     Problem/Plan - 2:  ·  Problem: Lung mass.   ·  Plan: Heme/Onc recommends EBUS with biopsy  Dr. Lombardi, Pulmonary, following  Dr. Mathews, Heme/Onc, following  Palliative care following.     Problem/Plan - 3:  ·  Problem: Abdominal mass.   ·  Plan: IR advised liver lesions not large enough for biopsy  Pt to follow up as outpatient for PET scan per heme/onc recommendations  Dr. Mathews, Heme/Onc, following  Dr. Arnett, Surg-Onc, following  Palliative care following.     Problem/Plan - 4:  ·  Problem: Malignancy.   ·  Plan: Heme/Onc following  -likely metastatic  Lung cancer  to bone/liver/peritoneum;  -EBUS bx recommended  -outpatient PET scan  s/p palliative eval; patient is full code.     Problem/Plan - 5:  ·  Problem: Hypercalcemia.   ·  Plan: Likely secondary to metastatic disease  - normalizing  - s/p Aredia 9/27.   - s/p Calcitonin 4 IU /kg q12 hrs x 1 day  - Heme/Onc following.     Problem/Plan - 6:  ·  Problem: Confused. resolved   ·  Plan: Patient with periods of confusion in the setting of metastatic disease, hypercalcemia and UTI  - s/p Aredia 9/27 and Calcitonin Serum Ca 10.2;  corrected 11  - Neurology evaluation  - Heme/ Onc follow up  - EEG completed at bedside this am.  -restarted Lorazapam 1mg qHS (home med for many years)     Problem/Plan - 7:  ·  Problem: Community acquired pneumonia.   ·  Plan: Ecoli UTI  - s/p Rocephin therapy   - ID Dr Lopez consulted.     Problem/Plan - 8:  ·  Problem: BRENDA (acute kidney injury).   ·  Plan: Since resolved  - Avoid nephrotoxic agents  - Continue to monitor BMP.     Problem/Plan - 9:  ·  Problem: HTN (hypertension).   ·  Plan: Pt takes Metoprolol, Amlodipine and Benazepril at home  -Continue home regimen bb and Norvasc with parameters  -Continue therapeutic interchange Lisinopril with parameters and monitor BMP   - Continue to monitor BP.     Problem/Plan - 10:  ·  Problem: QT prolongation.   ·  Plan; QT prolongation of ECG  seen by EP - Dr Carrasco:  appreciate input (no further work-up at this time)  f/b cardiology likely stress test when optimized (outpt)  monitor electrolytes.    Problem/Plan 11:  UTI  ID started Macrobid BID  ID note appreciated     Problem/Plan - 11:  ·  Problem: Prophylactic measure.   ·  Plan: DVT: Heparin SQ  GI: on protonix.     Problem/Plan - 12:  ·  Problem: Discharge planning issues.   ·  Plan: PT recommends JANETTE, family refuses JANETTE wants pt home with HPT (with oxygen therapy)   Care management following for discharge planning  dc planning pending TST and EBUS on Monday.

## 2021-10-02 NOTE — PROGRESS NOTE ADULT - SUBJECTIVE AND OBJECTIVE BOX
Time of Visit:  Patient seen and examined.     MEDICATIONS  (STANDING):  amLODIPine   Tablet 5 milliGRAM(s) Oral daily  chlorhexidine 2% Cloths 1 Application(s) Topical daily  folic acid 1 milliGRAM(s) Oral daily  heparin   Injectable 5000 Unit(s) SubCutaneous every 8 hours  influenza   Vaccine 0.5 milliLiter(s) IntraMuscular once  lisinopril 10 milliGRAM(s) Oral every 12 hours  LORazepam     Tablet 1 milliGRAM(s) Oral at bedtime  melatonin 1 milliGRAM(s) Oral at bedtime  metoprolol tartrate 100 milliGRAM(s) Oral two times a day  nitrofurantoin monohydrate/macrocrystals (MACROBID) 100 milliGRAM(s) Oral two times a day  nystatin Powder 1 Application(s) Topical every 8 hours  pantoprazole    Tablet 40 milliGRAM(s) Oral before breakfast  PARoxetine 40 milliGRAM(s) Oral daily  simvastatin 40 milliGRAM(s) Oral at bedtime  sodium chloride 0.9%. 1000 milliLiter(s) (60 mL/Hr) IV Continuous <Continuous>      MEDICATIONS  (PRN):  acetaminophen   Tablet .. 650 milliGRAM(s) Oral every 6 hours PRN Temp greater or equal to 38C (100.4F), Mild Pain (1 - 3)  ALBUTerol    90 MICROgram(s) HFA Inhaler 2 Puff(s) Inhalation every 6 hours PRN Shortness of Breath and/or Wheezing  ondansetron    Tablet 4 milliGRAM(s) Oral every 8 hours PRN Nausea and/or Vomiting       Medications up to date at time of exam.      PHYSICAL EXAMINATION:  Patient has no new complaints.  GENERAL: The patient is a well-developed, well-nourished, in no apparent distress.     Vital Signs Last 24 Hrs  T(C): 37.6 (02 Oct 2021 13:14), Max: 37.6 (02 Oct 2021 05:04)  T(F): 99.7 (02 Oct 2021 13:14), Max: 99.7 (02 Oct 2021 05:04)  HR: 72 (02 Oct 2021 13:14) (66 - 97)  BP: 143/77 (02 Oct 2021 13:14) (136/70 - 167/93)  BP(mean): --  RR: 18 (02 Oct 2021 13:14) (18 - 20)  SpO2: 92% (02 Oct 2021 13:14) (91% - 94%)   (if applicable)    Chest Tube (if applicable)    HEENT: Head is normocephalic and atraumatic. Extraocular muscles are intact. Mucous membranes are moist.     NECK: Supple, no palpable adenopathy.    LUNGS: Clear to auscultation, no wheezing, rales, or rhonchi.    HEART: Regular rate and rhythm without murmur.    ABDOMEN: Soft, nontender, and nondistended.  No hepatosplenomegaly is noted.    : No painful voiding, no pelvic pain    EXTREMITIES: Without any cyanosis, clubbing, rash, lesions or edema.    NEUROLOGIC: Awake, alert, oriented, grossly intact    SKIN: Warm, dry, good turgor.      LABS:                        9.4    16.36 )-----------( 222      ( 02 Oct 2021 08:48 )             27.5     10-02    137  |  101  |  13  ----------------------------<  141<H>  3.4<L>   |  24  |  0.78    Ca    8.9      02 Oct 2021 08:48  Mg     1.5     10-02                          MICROBIOLOGY: (if applicable)    RADIOLOGY & ADDITIONAL STUDIES:  EKG:   CXR:  ECHO:    IMPRESSION: 71y Female PAST MEDICAL & SURGICAL HISTORY:  Lumbar stenosis    H/O: HTN (hypertension)    Hypercholesterolemia    Bleeding ulcer  stomach ulcer 2011    Anxiety    Spinal stenosis in cervical region    Acute hepatitis C virus infection without hepatic coma  treated 2015- Resolved    Balance disorder    Coronary artery disease involving native coronary artery of native heart without angina pectoris    HTN (hypertension)    HLD (hyperlipidemia)    H/O colonoscopy    Bleeding ulcer  stomach surgery for bleeding ulcer    Fracture  ORIF Left wrist  1/17/13    History of lumbar surgery  2013    Chronic UTI  Pt reports presently on 2nd dose of antibiotics 7/5/18 10 days, Dr Virgil smith, pt going for m/eval 7/13/18.    H/O cardiac catheterization  2013, no intervention needed, treated medically     p/w           Impression; 70 Y/O Female presented to ED with  Hypoxia due to post obstructive pna with right lung mass with meds. Former smoker. Had hypoxia and hypotension at outpatient clinic. 09-23-21 Negative for Covid 19 PCR. Has an Acute hypoxic respiratory failure seconadry to Post obstructive Pneumonia and Has Right Hilar Lung Mass.   Pat is spiked  fever , normal wbc with neg cultures and completed 8 days of antibx . Fever may be related to mass or malignancy.  AMS probable due to combination of  hypercalcemia , sepsis      Pat is afebrile with sharp elevation of WBC , off antibx and afebrile . No diarrhea       Plan   -schedule for EBUS/ bronch 10/4   -nuclear cardiac stress test 10/4 AM   -medical clearance pending   -do not resume antibx for now   -CXR in AM   -hypercalcemia resolved   -dvt/gi prophy      Time of Visit:  Patient seen and examined.     MEDICATIONS  (STANDING):  amLODIPine   Tablet 5 milliGRAM(s) Oral daily  chlorhexidine 2% Cloths 1 Application(s) Topical daily  folic acid 1 milliGRAM(s) Oral daily  heparin   Injectable 5000 Unit(s) SubCutaneous every 8 hours  influenza   Vaccine 0.5 milliLiter(s) IntraMuscular once  lisinopril 10 milliGRAM(s) Oral every 12 hours  LORazepam     Tablet 1 milliGRAM(s) Oral at bedtime  melatonin 1 milliGRAM(s) Oral at bedtime  metoprolol tartrate 100 milliGRAM(s) Oral two times a day  nitrofurantoin monohydrate/macrocrystals (MACROBID) 100 milliGRAM(s) Oral two times a day  nystatin Powder 1 Application(s) Topical every 8 hours  pantoprazole    Tablet 40 milliGRAM(s) Oral before breakfast  PARoxetine 40 milliGRAM(s) Oral daily  simvastatin 40 milliGRAM(s) Oral at bedtime  sodium chloride 0.9%. 1000 milliLiter(s) (60 mL/Hr) IV Continuous <Continuous>      MEDICATIONS  (PRN):  acetaminophen   Tablet .. 650 milliGRAM(s) Oral every 6 hours PRN Temp greater or equal to 38C (100.4F), Mild Pain (1 - 3)  ALBUTerol    90 MICROgram(s) HFA Inhaler 2 Puff(s) Inhalation every 6 hours PRN Shortness of Breath and/or Wheezing  ondansetron    Tablet 4 milliGRAM(s) Oral every 8 hours PRN Nausea and/or Vomiting       Medications up to date at time of exam.      PHYSICAL EXAMINATION:  Patient has no new complaints.  GENERAL: The patient is a well-developed, well-nourished, in no apparent distress.     Vital Signs Last 24 Hrs  T(C): 37.6 (02 Oct 2021 13:14), Max: 37.6 (02 Oct 2021 05:04)  T(F): 99.7 (02 Oct 2021 13:14), Max: 99.7 (02 Oct 2021 05:04)  HR: 72 (02 Oct 2021 13:14) (66 - 97)  BP: 143/77 (02 Oct 2021 13:14) (136/70 - 167/93)  BP(mean): --  RR: 18 (02 Oct 2021 13:14) (18 - 20)  SpO2: 92% (02 Oct 2021 13:14) (91% - 94%)   (if applicable)    Chest Tube (if applicable)    HEENT: Head is normocephalic and atraumatic. Extraocular muscles are intact. Mucous membranes are moist.     NECK: Supple, no palpable adenopathy.    LUNGS: Clear to auscultation, no wheezing, rales, or rhonchi.    HEART: Regular rate and rhythm without murmur.    ABDOMEN: Soft, nontender, and nondistended.  No hepatosplenomegaly is noted.    : No painful voiding, no pelvic pain    EXTREMITIES: Without any cyanosis, clubbing, rash, lesions or edema.    NEUROLOGIC: Awake, alert, oriented, grossly intact    SKIN: Warm, dry, good turgor.      LABS:                        9.4    16.36 )-----------( 222      ( 02 Oct 2021 08:48 )             27.5     10-02    137  |  101  |  13  ----------------------------<  141<H>  3.4<L>   |  24  |  0.78    Ca    8.9      02 Oct 2021 08:48  Mg     1.5     10-02                          MICROBIOLOGY: (if applicable)    RADIOLOGY & ADDITIONAL STUDIES:  EKG:   CXR:  ECHO:    IMPRESSION: 71y Female PAST MEDICAL & SURGICAL HISTORY:  Lumbar stenosis    H/O: HTN (hypertension)    Hypercholesterolemia    Bleeding ulcer  stomach ulcer 2011    Anxiety    Spinal stenosis in cervical region    Acute hepatitis C virus infection without hepatic coma  treated 2015- Resolved    Balance disorder    Coronary artery disease involving native coronary artery of native heart without angina pectoris    HTN (hypertension)    HLD (hyperlipidemia)    H/O colonoscopy    Bleeding ulcer  stomach surgery for bleeding ulcer    Fracture  ORIF Left wrist  1/17/13    History of lumbar surgery  2013    Chronic UTI  Pt reports presently on 2nd dose of antibiotics 7/5/18 10 days, Dr Virgil smith, pt going for m/eval 7/13/18.    H/O cardiac catheterization  2013, no intervention needed, treated medically     p/w           Impression; 70 Y/O Female presented to ED with  Hypoxia due to post obstructive pna with right lung mass with meds. Former smoker. Had hypoxia and hypotension at outpatient clinic. 09-23-21 Negative for Covid 19 PCR. Has an Acute hypoxic respiratory failure seconadry to Post obstructive Pneumonia and Has Right Hilar Lung Mass.   Pat is spiked  fever , normal wbc with neg cultures and completed 8 days of antibx . Fever may be related to mass or malignancy.  AMS probable due to combination of  hypercalcemia , sepsis      Pat is afebrile with sharp elevation of WBC , off antibx and afebrile . No diarrhea       Plan   -schedule for EBUS/ bronch 10/4   -nuclear cardiac stress test 10/4 AM   -medical clearance pending   -do not resume antibx for now   -CXR in AM   -CBC, CMP . Coag in AM   -hypercalcemia resolved   -dvt/gi prophy   -hold anticoag

## 2021-10-02 NOTE — PROGRESS NOTE ADULT - SUBJECTIVE AND OBJECTIVE BOX
INTERVAL HPI/OVERNIGHT EVENTS:  Patient seen and examined   EBUS 10/04     T(C): 36.7 (10-02-21 @ 21:17), Max: 37.6 (10-02-21 @ 13:14)  HR: 76 (10-02-21 @ 21:17) (72 - 76)  BP: 164/77 (10-02-21 @ 21:17) (143/77 - 164/77)  RR: 18 (10-02-21 @ 21:17) (18 - 18)  SpO2: 91% (10-02-21 @ 21:17) (91% - 92%)      MEDICATIONS  (STANDING):  amLODIPine   Tablet 5 milliGRAM(s) Oral daily  chlorhexidine 2% Cloths 1 Application(s) Topical daily  folic acid 1 milliGRAM(s) Oral daily  heparin   Injectable 5000 Unit(s) SubCutaneous every 8 hours  influenza   Vaccine 0.5 milliLiter(s) IntraMuscular once  lisinopril 10 milliGRAM(s) Oral every 12 hours  LORazepam     Tablet 1 milliGRAM(s) Oral at bedtime  melatonin 1 milliGRAM(s) Oral at bedtime  metoprolol tartrate 100 milliGRAM(s) Oral two times a day  nitrofurantoin monohydrate/macrocrystals (MACROBID) 100 milliGRAM(s) Oral two times a day  nystatin Powder 1 Application(s) Topical every 8 hours  pantoprazole    Tablet 40 milliGRAM(s) Oral before breakfast  PARoxetine 40 milliGRAM(s) Oral daily  simvastatin 40 milliGRAM(s) Oral at bedtime  sodium chloride 0.9%. 1000 milliLiter(s) (60 mL/Hr) IV Continuous <Continuous>    MEDICATIONS  (PRN):  acetaminophen   Tablet .. 650 milliGRAM(s) Oral every 6 hours PRN Temp greater or equal to 38C (100.4F), Mild Pain (1 - 3)  ALBUTerol    90 MICROgram(s) HFA Inhaler 2 Puff(s) Inhalation every 6 hours PRN Shortness of Breath and/or Wheezing  ondansetron    Tablet 4 milliGRAM(s) Oral every 8 hours PRN Nausea and/or Vomiting  General: NAD  Cardiovascular: S1, S2  Respiratory: CTA  Abd: soft, NT                          9.4    16.36 )-----------( 222      ( 02 Oct 2021 08:48 )             27.5               137|101|13<141  3.4|24|0.78  8.9,1.5,--  10-02 @ 08:48

## 2021-10-03 ENCOUNTER — RESULT REVIEW (OUTPATIENT)
Age: 71
End: 2021-10-03

## 2021-10-03 LAB
ANION GAP SERPL CALC-SCNC: 7 MMOL/L — SIGNIFICANT CHANGE UP (ref 5–17)
BUN SERPL-MCNC: 13 MG/DL — SIGNIFICANT CHANGE UP (ref 7–18)
CALCIUM SERPL-MCNC: 8.9 MG/DL — SIGNIFICANT CHANGE UP (ref 8.4–10.5)
CHLORIDE SERPL-SCNC: 105 MMOL/L — SIGNIFICANT CHANGE UP (ref 96–108)
CO2 SERPL-SCNC: 29 MMOL/L — SIGNIFICANT CHANGE UP (ref 22–31)
CREAT SERPL-MCNC: 1.01 MG/DL — SIGNIFICANT CHANGE UP (ref 0.5–1.3)
CULTURE RESULTS: SIGNIFICANT CHANGE UP
CULTURE RESULTS: SIGNIFICANT CHANGE UP
GLUCOSE SERPL-MCNC: 150 MG/DL — HIGH (ref 70–99)
HCT VFR BLD CALC: 27 % — LOW (ref 34.5–45)
HGB BLD-MCNC: 9.1 G/DL — LOW (ref 11.5–15.5)
MAGNESIUM SERPL-MCNC: 2.3 MG/DL — SIGNIFICANT CHANGE UP (ref 1.6–2.6)
MCHC RBC-ENTMCNC: 30.2 PG — SIGNIFICANT CHANGE UP (ref 27–34)
MCHC RBC-ENTMCNC: 33.7 GM/DL — SIGNIFICANT CHANGE UP (ref 32–36)
MCV RBC AUTO: 89.7 FL — SIGNIFICANT CHANGE UP (ref 80–100)
NRBC # BLD: 0 /100 WBCS — SIGNIFICANT CHANGE UP (ref 0–0)
PLATELET # BLD AUTO: 197 K/UL — SIGNIFICANT CHANGE UP (ref 150–400)
POTASSIUM SERPL-MCNC: 4.1 MMOL/L — SIGNIFICANT CHANGE UP (ref 3.5–5.3)
POTASSIUM SERPL-SCNC: 4.1 MMOL/L — SIGNIFICANT CHANGE UP (ref 3.5–5.3)
RBC # BLD: 3.01 M/UL — LOW (ref 3.8–5.2)
RBC # FLD: 13.9 % — SIGNIFICANT CHANGE UP (ref 10.3–14.5)
SARS-COV-2 RNA SPEC QL NAA+PROBE: SIGNIFICANT CHANGE UP
SODIUM SERPL-SCNC: 141 MMOL/L — SIGNIFICANT CHANGE UP (ref 135–145)
SPECIMEN SOURCE: SIGNIFICANT CHANGE UP
SPECIMEN SOURCE: SIGNIFICANT CHANGE UP
WBC # BLD: 15.59 K/UL — HIGH (ref 3.8–10.5)
WBC # FLD AUTO: 15.59 K/UL — HIGH (ref 3.8–10.5)

## 2021-10-03 RX ADMIN — PANTOPRAZOLE SODIUM 40 MILLIGRAM(S): 20 TABLET, DELAYED RELEASE ORAL at 06:55

## 2021-10-03 RX ADMIN — Medication 100 MILLIGRAM(S): at 18:10

## 2021-10-03 RX ADMIN — AMLODIPINE BESYLATE 5 MILLIGRAM(S): 2.5 TABLET ORAL at 05:22

## 2021-10-03 RX ADMIN — LISINOPRIL 10 MILLIGRAM(S): 2.5 TABLET ORAL at 18:09

## 2021-10-03 RX ADMIN — HEPARIN SODIUM 5000 UNIT(S): 5000 INJECTION INTRAVENOUS; SUBCUTANEOUS at 05:22

## 2021-10-03 RX ADMIN — Medication 40 MILLIGRAM(S): at 11:25

## 2021-10-03 RX ADMIN — SIMVASTATIN 40 MILLIGRAM(S): 20 TABLET, FILM COATED ORAL at 21:17

## 2021-10-03 RX ADMIN — Medication 100 MILLIGRAM(S): at 18:09

## 2021-10-03 RX ADMIN — CHLORHEXIDINE GLUCONATE 1 APPLICATION(S): 213 SOLUTION TOPICAL at 11:25

## 2021-10-03 RX ADMIN — NYSTATIN CREAM 1 APPLICATION(S): 100000 CREAM TOPICAL at 15:38

## 2021-10-03 RX ADMIN — Medication 650 MILLIGRAM(S): at 20:37

## 2021-10-03 RX ADMIN — Medication 100 MILLIGRAM(S): at 05:22

## 2021-10-03 RX ADMIN — NYSTATIN CREAM 1 APPLICATION(S): 100000 CREAM TOPICAL at 21:17

## 2021-10-03 RX ADMIN — Medication 1 MILLIGRAM(S): at 21:44

## 2021-10-03 RX ADMIN — Medication 1 MILLIGRAM(S): at 21:18

## 2021-10-03 RX ADMIN — NYSTATIN CREAM 1 APPLICATION(S): 100000 CREAM TOPICAL at 05:22

## 2021-10-03 RX ADMIN — LISINOPRIL 10 MILLIGRAM(S): 2.5 TABLET ORAL at 05:22

## 2021-10-03 RX ADMIN — Medication 650 MILLIGRAM(S): at 20:22

## 2021-10-03 RX ADMIN — Medication 1 MILLIGRAM(S): at 11:24

## 2021-10-03 NOTE — PROGRESS NOTE ADULT - SUBJECTIVE AND OBJECTIVE BOX
INTERVAL HPI/OVERNIGHT EVENTS:  Patient seen and examined   EBUS and stress test planned for 10/04      Vital Signs Last 24 Hrs  T(C): 36.7 (03 Oct 2021 21:02), Max: 37 (03 Oct 2021 05:12)  T(F): 98.1 (03 Oct 2021 21:02), Max: 98.6 (03 Oct 2021 05:12)  HR: 71 (03 Oct 2021 21:02) (71 - 92)  BP: 137/70 (03 Oct 2021 21:02) (127/79 - 164/77)  BP(mean): --  RR: 18 (03 Oct 2021 21:02) (18 - 18)  SpO2: 93% (03 Oct 2021 21:02) (91% - 94%)    MEDICATIONS  (STANDING):  amLODIPine   Tablet 5 milliGRAM(s) Oral daily  chlorhexidine 2% Cloths 1 Application(s) Topical daily  folic acid 1 milliGRAM(s) Oral daily  influenza   Vaccine 0.5 milliLiter(s) IntraMuscular once  lisinopril 10 milliGRAM(s) Oral every 12 hours  LORazepam     Tablet 1 milliGRAM(s) Oral at bedtime  melatonin 1 milliGRAM(s) Oral at bedtime  metoprolol tartrate 100 milliGRAM(s) Oral two times a day  nitrofurantoin monohydrate/macrocrystals (MACROBID) 100 milliGRAM(s) Oral two times a day  nystatin Powder 1 Application(s) Topical every 8 hours  pantoprazole    Tablet 40 milliGRAM(s) Oral before breakfast  PARoxetine 40 milliGRAM(s) Oral daily  simvastatin 40 milliGRAM(s) Oral at bedtime  sodium chloride 0.9%. 1000 milliLiter(s) (60 mL/Hr) IV Continuous <Continuous>    MEDICATIONS  (PRN):  acetaminophen   Tablet .. 650 milliGRAM(s) Oral every 6 hours PRN Temp greater or equal to 38C (100.4F), Mild Pain (1 - 3)  ALBUTerol    90 MICROgram(s) HFA Inhaler 2 Puff(s) Inhalation every 6 hours PRN Shortness of Breath and/or Wheezing  ondansetron    Tablet 4 milliGRAM(s) Oral every 8 hours PRN Nausea and/or Vomiting       General: NAD  Cardiovascular: S1, S2  Respiratory: CTA  Abd: soft, NT                                 9.1    15.59 )-----------( 197      ( 03 Oct 2021 07:00 )             27.0   10-03    141  |  105  |  13  ----------------------------<  150<H>  4.1   |  29  |  1.01    Ca    8.9      03 Oct 2021 07:00  Mg     2.3     10-03

## 2021-10-03 NOTE — PROGRESS NOTE ADULT - SUBJECTIVE AND OBJECTIVE BOX
pt seen and examined. anxious and mild dyspnea c/o fatigue and weakness  ICU Vital Signs Last 24 Hrs  T(C): 36.7 (03 Oct 2021 15:05), Max: 37 (03 Oct 2021 05:12)  T(F): 98 (03 Oct 2021 15:05), Max: 98.6 (03 Oct 2021 05:12)  HR: 85 (03 Oct 2021 15:05) (76 - 92)  BP: 127/79 (03 Oct 2021 15:05) (127/79 - 164/77)  BP(mean): --  ABP: --  ABP(mean): --  RR: 18 (03 Oct 2021 15:05) (18 - 18)  SpO2: 93% (03 Oct 2021 15:05) (91% - 94%)  general AAOx3 NAd   HEENT normal   abd soft nontender   extrem no edema   neurology grossly intact   skin normal                         9.1    15.59 )-----------( 197      ( 03 Oct 2021 07:00 )             27.0   10-03    141  |  105  |  13  ----------------------------<  150<H>  4.1   |  29  |  1.01    Ca    8.9      03 Oct 2021 07:00  Mg     2.3     10-03  MEDICATIONS  (STANDING):  amLODIPine   Tablet 5 milliGRAM(s) Oral daily  chlorhexidine 2% Cloths 1 Application(s) Topical daily  folic acid 1 milliGRAM(s) Oral daily  influenza   Vaccine 0.5 milliLiter(s) IntraMuscular once  lisinopril 10 milliGRAM(s) Oral every 12 hours  LORazepam     Tablet 1 milliGRAM(s) Oral at bedtime  melatonin 1 milliGRAM(s) Oral at bedtime  metoprolol tartrate 100 milliGRAM(s) Oral two times a day  nitrofurantoin monohydrate/macrocrystals (MACROBID) 100 milliGRAM(s) Oral two times a day  nystatin Powder 1 Application(s) Topical every 8 hours  pantoprazole    Tablet 40 milliGRAM(s) Oral before breakfast  PARoxetine 40 milliGRAM(s) Oral daily  simvastatin 40 milliGRAM(s) Oral at bedtime  sodium chloride 0.9%. 1000 milliLiter(s) (60 mL/Hr) IV Continuous <Continuous>

## 2021-10-03 NOTE — PROGRESS NOTE ADULT - ASSESSMENT
72 y/o female admit for respiratory failure due to pneumonia and was also found to have lung mass, liver lesion and bone lytic lesion     suspect metastatic disease likely for lung origin   s/p CT/MRI imagine   lung mass with liver mets likely and lytic skull lesion   need tissue diagnosis   TM normal   MM workup nonrevealing   scheduled to EBUS biopsy tomorrow  cardiology evaluation in process   will f/u biopsy result

## 2021-10-03 NOTE — PROGRESS NOTE ADULT - ASSESSMENT
Assessment and Plan:   · Assessment	  71 year old Woman with hx of HTN and HLD BIBEMS for hypoxia and hypotension at outpatient clinic. Patient admitted w/ Sepsis secondary to RUL PNA. CT showed RUL PNA. Additionally, her UA was  positive with UCx growing E coli (10-49K).  She is s/p azithromycin and Rocephin. Bcx no growth. Hospital course c/b finding on  CT chest scan of R hilar lung mass and Mediastinal  adenopathy, CT A/P revealed liver lesions, probable liver metastases and CT head with multiple lytic lesions suspicious for metastases or multiple myeloma.    and patient asked me to speak to their primary care physician who called.  I spoke with him about patients condition, and he suggested that we start Lorazepam as she was on a standing daily dose of Lorazepam 1mg qHS for many years. He suggested it may help with her confusion as there may be a withdrawal component.   I called her pharmacy and confirmed. I discussed with Dr. Arias and she agreed to start the Lorazapam.         Problem/Plan - 1:  ·  Problem: Acute respiratory failure with hypoxia.   ·  Plan: In the setting of lung mass and PNA;  - s/p antimicrobial therapy x 5 days   - SPO2 92-96 % on 3L ( recent spo2on RA was 88%; will require home oxygen therapy)   - most recent blood cx neg  - Pulmonary, Dr Lombardi following; planned for EBUS w/ biopsy on Monday at 10/4  1pm.     Problem/Plan - 2:  ·  Problem: Lung mass.   ·  Plan: Heme/Onc recommends EBUS with biopsy  Dr. Lombardi, Pulmonary, following  Dr. Mathews, Heme/Onc, following  Palliative care following.     Problem/Plan - 3:  ·  Problem: Abdominal mass.   ·  Plan: IR advised liver lesions not large enough for biopsy  Pt to follow up as outpatient for PET scan per heme/onc recommendations  Dr. Mathews, Heme/Onc, following  Dr. Arnett, Surg-Onc, following  Palliative care following.     Problem/Plan - 4:  ·  Problem: Malignancy.   ·  Plan: Heme/Onc following  -likely metastatic  Lung cancer  to bone/liver/peritoneum;  -EBUS bx recommended  -outpatient PET scan  s/p palliative eval; patient is full code.     Problem/Plan - 5:  ·  Problem: Hypercalcemia.   ·  Plan: Likely secondary to metastatic disease  - normalizing  - s/p Aredia 9/27.   - s/p Calcitonin 4 IU /kg q12 hrs x 1 day  - Heme/Onc following.     Problem/Plan - 6:  ·  Problem: Confused. resolved   ·  Plan: Patient with periods of confusion in the setting of metastatic disease, hypercalcemia and UTI  - s/p Aredia 9/27 and Calcitonin Serum Ca 10.2;  corrected 11  - Neurology evaluation  - Heme/ Onc follow up  - EEG completed at bedside this am.  -restarted Lorazapam 1mg qHS (home med for many years)     Problem/Plan - 7:  ·  Problem: Community acquired pneumonia.   ·  Plan: Ecoli UTI  - s/p Rocephin therapy   - ID Dr Lopez consulted.     Problem/Plan - 8:  ·  Problem: BRENDA (acute kidney injury).   ·  Plan: Since resolved  - Avoid nephrotoxic agents  - Continue to monitor BMP.     Problem/Plan - 9:  ·  Problem: HTN (hypertension).   ·  Plan: Pt takes Metoprolol, Amlodipine and Benazepril at home  -Continue home regimen bb and Norvasc with parameters  -Continue therapeutic interchange Lisinopril with parameters and monitor BMP   - Continue to monitor BP.     Problem/Plan - 10:  ·  Problem: QT prolongation.   ·  Plan; QT prolongation of ECG  seen by EP - Dr Carrasco:  appreciate input (no further work-up at this time)  f/b cardiology  planned stress test tomorrow   monitor electrolytes.    Problem/Plan 11:  UTI  ID started Macrobid BID  ID note appreciated     Problem/Plan - 11:  ·  Problem: Prophylactic measure.   ·  Plan: DVT: Heparin SQ  GI: on protonix.     Problem/Plan - 12:  ·  Problem: Discharge planning issues.   ·  Plan: PT recommends JANETTE, family refuses JANETTE wants pt home with HPT (with oxygen therapy)   Care management following for discharge planning

## 2021-10-03 NOTE — PROGRESS NOTE ADULT - SUBJECTIVE AND OBJECTIVE BOX
C A R D I O L O G Y  **********************************     DATE OF SERVICE: 10-03-21    Patient denies chest pain or shortness of breath.   Review of symptoms otherwise negative.    acetaminophen   Tablet .. 650 milliGRAM(s) Oral every 6 hours PRN  ALBUTerol    90 MICROgram(s) HFA Inhaler 2 Puff(s) Inhalation every 6 hours PRN  amLODIPine   Tablet 5 milliGRAM(s) Oral daily  chlorhexidine 2% Cloths 1 Application(s) Topical daily  folic acid 1 milliGRAM(s) Oral daily  influenza   Vaccine 0.5 milliLiter(s) IntraMuscular once  lisinopril 10 milliGRAM(s) Oral every 12 hours  LORazepam     Tablet 1 milliGRAM(s) Oral at bedtime  melatonin 1 milliGRAM(s) Oral at bedtime  metoprolol tartrate 100 milliGRAM(s) Oral two times a day  nitrofurantoin monohydrate/macrocrystals (MACROBID) 100 milliGRAM(s) Oral two times a day  nystatin Powder 1 Application(s) Topical every 8 hours  ondansetron    Tablet 4 milliGRAM(s) Oral every 8 hours PRN  pantoprazole    Tablet 40 milliGRAM(s) Oral before breakfast  PARoxetine 40 milliGRAM(s) Oral daily  simvastatin 40 milliGRAM(s) Oral at bedtime  sodium chloride 0.9%. 1000 milliLiter(s) IV Continuous <Continuous>                            9.1    15.59 )-----------( 197      ( 03 Oct 2021 07:00 )             27.0       Hemoglobin: 9.1 g/dL (10-03 @ 07:00)  Hemoglobin: 9.4 g/dL (10-02 @ 08:48)  Hemoglobin: 9.2 g/dL (10-01 @ 07:37)  Hemoglobin: 9.2 g/dL (09-30 @ 06:26)  Hemoglobin: 9.1 g/dL (09-29 @ 07:48)      10-03    141  |  105  |  13  ----------------------------<  150<H>  4.1   |  29  |  1.01    Ca    8.9      03 Oct 2021 07:00  Mg     2.3     10-03      Creatinine Trend: 1.01<--, 0.78<--, 0.75<--, 0.96<--, 1.09<--, 0.84<--    COAGS:           T(C): 36.7 (10-03-21 @ 15:05), Max: 37 (10-03-21 @ 05:12)  HR: 85 (10-03-21 @ 15:05) (76 - 92)  BP: 127/79 (10-03-21 @ 15:05) (127/79 - 164/77)  RR: 18 (10-03-21 @ 15:05) (18 - 18)  SpO2: 93% (10-03-21 @ 15:05) (91% - 94%)  Wt(kg): --    I&O's Summary      HEENT:  (-)icterus (-)pallor  CV: N S1 S2 1/6 KAREN (+)2 Pulses B/l  Resp:  Clear to ausculatation B/L, normal effort  GI: (+) BS Soft, NT, ND  Lymph:  (-)Edema, (-)obvious lymphadenopathy  Skin: Warm to touch, Normal turgor  Psych: Appropriate mood and affect          ASSESSMENT/PLAN: 	71y  Female HTN and HLD BIBEMS for hypoxia and hypotension at outpatient clinic found with hilar mass and post obstructive PNA Normal LV function    - Abnormal EKG, nonspecific findings however her QT is prolonged.  Avoid QT prolonging meds  - EP eval appreciated   - Her ST T wave abnormality will require a stress test once optimized for further evaluation  - Plan for Stress test in AM  - PAtient agrees    Scooter Gutiérrez MD, [facility]  BEEPER [phone]

## 2021-10-03 NOTE — PROGRESS NOTE ADULT - SUBJECTIVE AND OBJECTIVE BOX
Time of Visit:  Patient seen and examined.     MEDICATIONS  (STANDING):  amLODIPine   Tablet 5 milliGRAM(s) Oral daily  chlorhexidine 2% Cloths 1 Application(s) Topical daily  folic acid 1 milliGRAM(s) Oral daily  influenza   Vaccine 0.5 milliLiter(s) IntraMuscular once  lisinopril 10 milliGRAM(s) Oral every 12 hours  LORazepam     Tablet 1 milliGRAM(s) Oral at bedtime  melatonin 1 milliGRAM(s) Oral at bedtime  metoprolol tartrate 100 milliGRAM(s) Oral two times a day  nitrofurantoin monohydrate/macrocrystals (MACROBID) 100 milliGRAM(s) Oral two times a day  nystatin Powder 1 Application(s) Topical every 8 hours  pantoprazole    Tablet 40 milliGRAM(s) Oral before breakfast  PARoxetine 40 milliGRAM(s) Oral daily  simvastatin 40 milliGRAM(s) Oral at bedtime  sodium chloride 0.9%. 1000 milliLiter(s) (60 mL/Hr) IV Continuous <Continuous>      MEDICATIONS  (PRN):  acetaminophen   Tablet .. 650 milliGRAM(s) Oral every 6 hours PRN Temp greater or equal to 38C (100.4F), Mild Pain (1 - 3)  ALBUTerol    90 MICROgram(s) HFA Inhaler 2 Puff(s) Inhalation every 6 hours PRN Shortness of Breath and/or Wheezing  ondansetron    Tablet 4 milliGRAM(s) Oral every 8 hours PRN Nausea and/or Vomiting       Medications up to date at time of exam.      PHYSICAL EXAMINATION:  Patient has no new complaints.  GENERAL: The patient is a well-developed, well-nourished, in no apparent distress.     Vital Signs Last 24 Hrs  T(C): 37 (03 Oct 2021 05:12), Max: 37 (03 Oct 2021 05:12)  T(F): 98.6 (03 Oct 2021 05:12), Max: 98.6 (03 Oct 2021 05:12)  HR: 92 (03 Oct 2021 05:12) (76 - 92)  BP: 159/76 (03 Oct 2021 05:12) (159/76 - 164/77)  BP(mean): --  RR: 18 (03 Oct 2021 05:12) (18 - 18)  SpO2: 93% (03 Oct 2021 05:12) (91% - 94%)   (if applicable)    Chest Tube (if applicable)    HEENT: Head is normocephalic and atraumatic. Extraocular muscles are intact. Mucous membranes are moist.     NECK: Supple, no palpable adenopathy.    LUNGS: Clear to auscultation, no wheezing, rales, or rhonchi.    HEART: Regular rate and rhythm without murmur.    ABDOMEN: Soft, nontender, and nondistended.  No hepatosplenomegaly is noted.    : No painful voiding, no pelvic pain    EXTREMITIES: Without any cyanosis, clubbing, rash, lesions or edema.    NEUROLOGIC: Awake, alert, oriented, grossly intact    SKIN: Warm, dry, good turgor.      LABS:                        9.1    15.59 )-----------( 197      ( 03 Oct 2021 07:00 )             27.0     10-03    141  |  105  |  13  ----------------------------<  150<H>  4.1   |  29  |  1.01    Ca    8.9      03 Oct 2021 07:00  Mg     2.3     10-03                          MICROBIOLOGY: (if applicable)    RADIOLOGY & ADDITIONAL STUDIES:  EKG:   CXR:  ECHO:    IMPRESSION: 71y Female PAST MEDICAL & SURGICAL HISTORY:  Lumbar stenosis    H/O: HTN (hypertension)    Hypercholesterolemia    Bleeding ulcer  stomach ulcer 2011    Anxiety    Spinal stenosis in cervical region    Acute hepatitis C virus infection without hepatic coma  treated 2015- Resolved    Balance disorder    Coronary artery disease involving native coronary artery of native heart without angina pectoris    HTN (hypertension)    HLD (hyperlipidemia)    H/O colonoscopy    Bleeding ulcer  stomach surgery for bleeding ulcer    Fracture  ORIF Left wrist  1/17/13    History of lumbar surgery  2013    Chronic UTI  Pt reports presently on 2nd dose of antibiotics 7/5/18 10 days, Dr Herman aware, pt going for m/eval 7/13/18.    H/O cardiac catheterization  2013, no intervention needed, treated medically     p/w           Impression; 70 Y/O Female presented to ED with  Hypoxia due to post obstructive pna with right lung mass with meds. Former smoker. Had hypoxia and hypotension at outpatient clinic. 09-23-21 Negative for Covid 19 PCR. Has an Acute hypoxic respiratory failure seconadry to Post obstructive Pneumonia and Has Right Hilar Lung Mass.   Pat is spiked  fever , normal wbc with neg cultures and completed 8 days of antibx . Fever may be related to mass or malignancy.  AMS probable due to combination of  hypercalcemia , sepsis      Pat is afebrile with sharp elevation of WBC , off antibx and afebrile . No diarrhea       Plan   -schedule for EBUS/ bronch 10/4   -nuclear cardiac stress test 10/4 AM   -medical clearance pending   -pre-op tonight   -npo after midnight   -do not resume antibx for now   -CXR in AM   -hypercalcemia resolved   -dvt/gi prophy

## 2021-10-04 ENCOUNTER — RESULT REVIEW (OUTPATIENT)
Age: 71
End: 2021-10-04

## 2021-10-04 LAB
ALBUMIN SERPL ELPH-MCNC: 2.8 G/DL — LOW (ref 3.5–5)
ALP SERPL-CCNC: 93 U/L — SIGNIFICANT CHANGE UP (ref 40–120)
ALT FLD-CCNC: 23 U/L DA — SIGNIFICANT CHANGE UP (ref 10–60)
ANION GAP SERPL CALC-SCNC: 5 MMOL/L — SIGNIFICANT CHANGE UP (ref 5–17)
AST SERPL-CCNC: 71 U/L — HIGH (ref 10–40)
BILIRUB SERPL-MCNC: 0.4 MG/DL — SIGNIFICANT CHANGE UP (ref 0.2–1.2)
BUN SERPL-MCNC: 20 MG/DL — HIGH (ref 7–18)
CALCIUM SERPL-MCNC: 8.4 MG/DL — SIGNIFICANT CHANGE UP (ref 8.4–10.5)
CHLORIDE SERPL-SCNC: 106 MMOL/L — SIGNIFICANT CHANGE UP (ref 96–108)
CO2 SERPL-SCNC: 28 MMOL/L — SIGNIFICANT CHANGE UP (ref 22–31)
CREAT SERPL-MCNC: 1.34 MG/DL — HIGH (ref 0.5–1.3)
GLUCOSE SERPL-MCNC: 125 MG/DL — HIGH (ref 70–99)
HCT VFR BLD CALC: 25.7 % — LOW (ref 34.5–45)
HGB BLD-MCNC: 8.5 G/DL — LOW (ref 11.5–15.5)
INR BLD: 1.14 RATIO — SIGNIFICANT CHANGE UP (ref 0.88–1.16)
MAGNESIUM SERPL-MCNC: 2.2 MG/DL — SIGNIFICANT CHANGE UP (ref 1.6–2.6)
MCHC RBC-ENTMCNC: 30.1 PG — SIGNIFICANT CHANGE UP (ref 27–34)
MCHC RBC-ENTMCNC: 33.1 GM/DL — SIGNIFICANT CHANGE UP (ref 32–36)
MCV RBC AUTO: 91.1 FL — SIGNIFICANT CHANGE UP (ref 80–100)
NRBC # BLD: 0 /100 WBCS — SIGNIFICANT CHANGE UP (ref 0–0)
PLATELET # BLD AUTO: 178 K/UL — SIGNIFICANT CHANGE UP (ref 150–400)
POTASSIUM SERPL-MCNC: 3.7 MMOL/L — SIGNIFICANT CHANGE UP (ref 3.5–5.3)
POTASSIUM SERPL-SCNC: 3.7 MMOL/L — SIGNIFICANT CHANGE UP (ref 3.5–5.3)
PROT SERPL-MCNC: 7.3 G/DL — SIGNIFICANT CHANGE UP (ref 6–8.3)
PROTHROM AB SERPL-ACNC: 13.5 SEC — SIGNIFICANT CHANGE UP (ref 10.6–13.6)
RBC # BLD: 2.82 M/UL — LOW (ref 3.8–5.2)
RBC # FLD: 14.2 % — SIGNIFICANT CHANGE UP (ref 10.3–14.5)
SODIUM SERPL-SCNC: 139 MMOL/L — SIGNIFICANT CHANGE UP (ref 135–145)
WBC # BLD: 11.79 K/UL — HIGH (ref 3.8–10.5)
WBC # FLD AUTO: 11.79 K/UL — HIGH (ref 3.8–10.5)

## 2021-10-04 PROCEDURE — 88112 CYTOPATH CELL ENHANCE TECH: CPT | Mod: 26

## 2021-10-04 PROCEDURE — 71045 X-RAY EXAM CHEST 1 VIEW: CPT | Mod: 26

## 2021-10-04 PROCEDURE — 88104 CYTOPATH FL NONGYN SMEARS: CPT | Mod: 26,59

## 2021-10-04 PROCEDURE — 88305 TISSUE EXAM BY PATHOLOGIST: CPT | Mod: 26

## 2021-10-04 RX ORDER — ACETAMINOPHEN 500 MG
650 TABLET ORAL EVERY 6 HOURS
Refills: 0 | Status: DISCONTINUED | OUTPATIENT
Start: 2021-10-04 | End: 2021-10-06

## 2021-10-04 RX ORDER — METOPROLOL TARTRATE 50 MG
100 TABLET ORAL
Refills: 0 | Status: DISCONTINUED | OUTPATIENT
Start: 2021-10-04 | End: 2021-10-06

## 2021-10-04 RX ORDER — NYSTATIN CREAM 100000 [USP'U]/G
1 CREAM TOPICAL EVERY 8 HOURS
Refills: 0 | Status: DISCONTINUED | OUTPATIENT
Start: 2021-10-04 | End: 2021-10-06

## 2021-10-04 RX ORDER — FENTANYL CITRATE 50 UG/ML
25 INJECTION INTRAVENOUS
Refills: 0 | Status: DISCONTINUED | OUTPATIENT
Start: 2021-10-04 | End: 2021-10-04

## 2021-10-04 RX ORDER — ALBUTEROL 90 UG/1
2 AEROSOL, METERED ORAL EVERY 6 HOURS
Refills: 0 | Status: DISCONTINUED | OUTPATIENT
Start: 2021-10-04 | End: 2021-10-06

## 2021-10-04 RX ORDER — FOLIC ACID 0.8 MG
1 TABLET ORAL DAILY
Refills: 0 | Status: DISCONTINUED | OUTPATIENT
Start: 2021-10-04 | End: 2021-10-06

## 2021-10-04 RX ORDER — ONDANSETRON 8 MG/1
4 TABLET, FILM COATED ORAL ONCE
Refills: 0 | Status: DISCONTINUED | OUTPATIENT
Start: 2021-10-04 | End: 2021-10-04

## 2021-10-04 RX ORDER — SODIUM CHLORIDE 9 MG/ML
1000 INJECTION INTRAMUSCULAR; INTRAVENOUS; SUBCUTANEOUS
Refills: 0 | Status: DISCONTINUED | OUTPATIENT
Start: 2021-10-04 | End: 2021-10-04

## 2021-10-04 RX ORDER — SODIUM CHLORIDE 9 MG/ML
1000 INJECTION INTRAMUSCULAR; INTRAVENOUS; SUBCUTANEOUS
Refills: 0 | Status: DISCONTINUED | OUTPATIENT
Start: 2021-10-04 | End: 2021-10-06

## 2021-10-04 RX ORDER — NITROFURANTOIN MACROCRYSTAL 50 MG
100 CAPSULE ORAL
Refills: 0 | Status: COMPLETED | OUTPATIENT
Start: 2021-10-04 | End: 2021-10-06

## 2021-10-04 RX ORDER — AMLODIPINE BESYLATE 2.5 MG/1
5 TABLET ORAL DAILY
Refills: 0 | Status: DISCONTINUED | OUTPATIENT
Start: 2021-10-04 | End: 2021-10-06

## 2021-10-04 RX ORDER — ACETAMINOPHEN 500 MG
1000 TABLET ORAL ONCE
Refills: 0 | Status: DISCONTINUED | OUTPATIENT
Start: 2021-10-04 | End: 2021-10-04

## 2021-10-04 RX ORDER — LISINOPRIL 2.5 MG/1
10 TABLET ORAL EVERY 12 HOURS
Refills: 0 | Status: DISCONTINUED | OUTPATIENT
Start: 2021-10-04 | End: 2021-10-06

## 2021-10-04 RX ORDER — SIMVASTATIN 20 MG/1
40 TABLET, FILM COATED ORAL AT BEDTIME
Refills: 0 | Status: DISCONTINUED | OUTPATIENT
Start: 2021-10-04 | End: 2021-10-06

## 2021-10-04 RX ORDER — LANOLIN ALCOHOL/MO/W.PET/CERES
1 CREAM (GRAM) TOPICAL AT BEDTIME
Refills: 0 | Status: DISCONTINUED | OUTPATIENT
Start: 2021-10-04 | End: 2021-10-06

## 2021-10-04 RX ORDER — AMLODIPINE BESYLATE 2.5 MG/1
5 TABLET ORAL DAILY
Refills: 0 | Status: DISCONTINUED | OUTPATIENT
Start: 2021-10-04 | End: 2021-10-04

## 2021-10-04 RX ORDER — PANTOPRAZOLE SODIUM 20 MG/1
40 TABLET, DELAYED RELEASE ORAL
Refills: 0 | Status: DISCONTINUED | OUTPATIENT
Start: 2021-10-04 | End: 2021-10-06

## 2021-10-04 RX ADMIN — Medication 1 MILLIGRAM(S): at 17:48

## 2021-10-04 RX ADMIN — CHLORHEXIDINE GLUCONATE 1 APPLICATION(S): 213 SOLUTION TOPICAL at 12:24

## 2021-10-04 RX ADMIN — Medication 1 MILLIGRAM(S): at 22:08

## 2021-10-04 RX ADMIN — SODIUM CHLORIDE 75 MILLILITER(S): 9 INJECTION INTRAMUSCULAR; INTRAVENOUS; SUBCUTANEOUS at 12:43

## 2021-10-04 RX ADMIN — LISINOPRIL 10 MILLIGRAM(S): 2.5 TABLET ORAL at 05:31

## 2021-10-04 RX ADMIN — Medication 100 MILLIGRAM(S): at 05:29

## 2021-10-04 RX ADMIN — AMLODIPINE BESYLATE 5 MILLIGRAM(S): 2.5 TABLET ORAL at 05:31

## 2021-10-04 RX ADMIN — Medication 100 MILLIGRAM(S): at 17:49

## 2021-10-04 RX ADMIN — NYSTATIN CREAM 1 APPLICATION(S): 100000 CREAM TOPICAL at 05:30

## 2021-10-04 RX ADMIN — PANTOPRAZOLE SODIUM 40 MILLIGRAM(S): 20 TABLET, DELAYED RELEASE ORAL at 05:30

## 2021-10-04 RX ADMIN — NYSTATIN CREAM 1 APPLICATION(S): 100000 CREAM TOPICAL at 13:28

## 2021-10-04 RX ADMIN — NYSTATIN CREAM 1 APPLICATION(S): 100000 CREAM TOPICAL at 22:08

## 2021-10-04 RX ADMIN — Medication 100 MILLIGRAM(S): at 05:30

## 2021-10-04 RX ADMIN — SIMVASTATIN 40 MILLIGRAM(S): 20 TABLET, FILM COATED ORAL at 22:09

## 2021-10-04 RX ADMIN — Medication 40 MILLIGRAM(S): at 17:50

## 2021-10-04 NOTE — PROGRESS NOTE ADULT - SUBJECTIVE AND OBJECTIVE BOX
71y Female is under our care for     REVIEW OF SYSTEMS:  [  ] Not able to elicit  General:	  Chest:	  GI:	  :  Skin:	  Musculoskeletal:	  Neuro:	    MEDS:  nitrofurantoin monohydrate/macrocrystals (MACROBID) 100 milliGRAM(s) Oral two times a day    ALLERGIES: Allergies    No Known Allergies    Intolerances        VITALS:  Vital Signs Last 24 Hrs  T(C): 36.2 (04 Oct 2021 05:21), Max: 36.7 (03 Oct 2021 15:05)  T(F): 97.2 (04 Oct 2021 05:21), Max: 98.1 (03 Oct 2021 21:02)  HR: 77 (04 Oct 2021 05:21) (71 - 85)  BP: 139/72 (04 Oct 2021 05:21) (127/79 - 139/72)  BP(mean): --  RR: 18 (04 Oct 2021 05:21) (18 - 18)  SpO2: 93% (04 Oct 2021 05:21) (93% - 93%)      PHYSICAL EXAM:  HEENT:  Neck:  Respiratory:  Cardiovascular:  Gastrointestinal:  Extremities:  Skin:  Ortho:  Neuro:    LABS/DIAGNOSTIC TESTS:                        8.5    11.79 )-----------( 178      ( 04 Oct 2021 08:25 )             25.7     WBC Count: 11.79 K/uL (10-04 @ 08:25)  WBC Count: 15.59 K/uL (10-03 @ 07:00)  WBC Count: 16.36 K/uL (10-02 @ 08:48)  WBC Count: 9.37 K/uL (10-01 @ 07:37)  WBC Count: 9.30 K/uL (09-30 @ 06:26)    10-04    139  |  106  |  20<H>  ----------------------------<  125<H>  3.7   |  28  |  1.34<H>    Ca    8.4      04 Oct 2021 08:25  Mg     2.2     10-04    TPro  7.3  /  Alb  2.8<L>  /  TBili  0.4  /  DBili  x   /  AST  71<H>  /  ALT  23  /  AlkPhos  93  10-04      CULTURES:   Clean Catch Clean Catch (Midstream)  09-28 @ 20:59   >100,000 CFU/ml Enterococcus faecium (vancomycin resistant)  --  Enterococcus faecium (vancomycin resistant)      .Blood Blood  09-28 @ 20:53   No Growth Final  --  --      Clean Catch Clean Catch (Midstream)  09-21 @ 14:05   10,000 - 49,000 CFU/mL Escherichia coli  Normal Urogenital bello present  --  Escherichia coli      .Blood Blood-Peripheral  09-20 @ 18:38   No Growth Final  --  --        RADIOLOGY:  no new studies 71y Female is under our care for pneumonia and UTI.  Patient was seen laying comfortably in bed with no acute distress with  on the bedside.  She denies any urinary symptoms, pain or discomfort at this time.  Patient is currently NPO as she will be going to the OR for EBUS with biopsy later this afternoon.  Patient remains afebrile and WBC count is WNL.    REVIEW OF SYSTEMS:  [  ] Not able to elicit  General: no fevers no malaise  Chest: no cough no sob  GI: no nvd  : no urinary sxs   Skin: no rashes  Musculoskeletal: no trauma no LBP  Neuro: no ha's no dizziness     MEDS:  nitrofurantoin monohydrate/macrocrystals (MACROBID) 100 milliGRAM(s) Oral two times a day    ALLERGIES: Allergies    No Known Allergies    Intolerances        VITALS:  Vital Signs Last 24 Hrs  T(C): 36.2 (04 Oct 2021 05:21), Max: 36.7 (03 Oct 2021 15:05)  T(F): 97.2 (04 Oct 2021 05:21), Max: 98.1 (03 Oct 2021 21:02)  HR: 77 (04 Oct 2021 05:21) (71 - 85)  BP: 139/72 (04 Oct 2021 05:21) (127/79 - 139/72)  BP(mean): --  RR: 18 (04 Oct 2021 05:21) (18 - 18)  SpO2: 93% (04 Oct 2021 05:21) (93% - 93%)      PHYSICAL EXAM:  HEENT: n/a  Neck: supple no LN's   Respiratory: lungs clear no rales  Cardiovascular: S1 S2 reg no murmurs  Gastrointestinal: +BS with soft, nondistended abdomen; nontender  Extremities: no edema  Skin: no rashes  Ortho: n/a  Neuro: AAO x 3      LABS/DIAGNOSTIC TESTS:                        8.5    11.79 )-----------( 178      ( 04 Oct 2021 08:25 )             25.7     WBC Count: 11.79 K/uL (10-04 @ 08:25)  WBC Count: 15.59 K/uL (10-03 @ 07:00)  WBC Count: 16.36 K/uL (10-02 @ 08:48)  WBC Count: 9.37 K/uL (10-01 @ 07:37)  WBC Count: 9.30 K/uL (09-30 @ 06:26)    10-04    139  |  106  |  20<H>  ----------------------------<  125<H>  3.7   |  28  |  1.34<H>    Ca    8.4      04 Oct 2021 08:25  Mg     2.2     10-04    TPro  7.3  /  Alb  2.8<L>  /  TBili  0.4  /  DBili  x   /  AST  71<H>  /  ALT  23  /  AlkPhos  93  10-04      CULTURES:   Clean Catch Clean Catch (Midstream)  09-28 @ 20:59   >100,000 CFU/ml Enterococcus faecium (vancomycin resistant)  --  Enterococcus faecium (vancomycin resistant)      .Blood Blood  09-28 @ 20:53   No Growth Final  --  --      Clean Catch Clean Catch (Midstream)  09-21 @ 14:05   10,000 - 49,000 CFU/mL Escherichia coli  Normal Urogenital bello present  --  Escherichia coli      .Blood Blood-Peripheral  09-20 @ 18:38   No Growth Final  --  --        RADIOLOGY:  no new studies

## 2021-10-04 NOTE — PROGRESS NOTE ADULT - SUBJECTIVE AND OBJECTIVE BOX
C A R D I [facility] O [facility]  **********************************     DATE OF SERVICE: 10-04-21    Patient denies chest pain or shortness of breath.   Review of symptoms otherwise negative.    acetaminophen   Tablet .. 650 milliGRAM(s) Oral every 6 hours PRN  ALBUTerol    90 MICROgram(s) HFA Inhaler 2 Puff(s) Inhalation every 6 hours PRN  amLODIPine   Tablet 5 milliGRAM(s) Oral daily  chlorhexidine 2% Cloths 1 Application(s) Topical daily  folic acid 1 milliGRAM(s) Oral daily  influenza   Vaccine 0.5 milliLiter(s) IntraMuscular once  lisinopril 10 milliGRAM(s) Oral every 12 hours  LORazepam     Tablet 1 milliGRAM(s) Oral at bedtime  melatonin 1 milliGRAM(s) Oral at bedtime  metoprolol tartrate 100 milliGRAM(s) Oral two times a day  nitrofurantoin monohydrate/macrocrystals (MACROBID) 100 milliGRAM(s) Oral two times a day  nystatin Powder 1 Application(s) Topical every 8 hours  ondansetron    Tablet 4 milliGRAM(s) Oral every 8 hours PRN  pantoprazole    Tablet 40 milliGRAM(s) Oral before breakfast  PARoxetine 40 milliGRAM(s) Oral daily  simvastatin 40 milliGRAM(s) Oral at bedtime  sodium chloride 0.9%. 1000 milliLiter(s) IV Continuous <Continuous>                            8.5    11.79 )-----------( 178      ( 04 Oct 2021 08:25 )             25.7       Hemoglobin: 8.5 g/dL (10-04 @ 08:25)  Hemoglobin: 9.1 g/dL (10-03 @ 07:00)  Hemoglobin: 9.4 g/dL (10-02 @ 08:48)  Hemoglobin: 9.2 g/dL (10-01 @ 07:37)  Hemoglobin: 9.2 g/dL (09-30 @ 06:26)      10-04    139  |  106  |  20<H>  ----------------------------<  125<H>  3.7   |  28  |  1.34<H>    Ca    8.4      04 Oct 2021 08:25  Mg     2.2     10-04    TPro  7.3  /  Alb  2.8<L>  /  TBili  0.4  /  DBili  x   /  AST  71<H>  /  ALT  23  /  AlkPhos  93  10-04    Creatinine Trend: 1.34<--, 1.01<--, 0.78<--, 0.75<--, 0.96<--, 1.09<--    COAGS: PT/INR - ( 04 Oct 2021 08:25 )   PT: 13.5 sec;   INR: 1.14 ratio                   T(C): 36.2 (10-04-21 @ 05:21), Max: 36.7 (10-03-21 @ 15:05)  HR: 77 (10-04-21 @ 05:21) (71 - 85)  BP: 139/72 (10-04-21 @ 05:21) (127/79 - 139/72)  RR: 18 (10-04-21 @ 05:21) (18 - 18)  SpO2: 93% (10-04-21 @ 05:21) (93% - 93%)  Wt(kg): --    I&O's Summary      HEENT:  (-)icterus (-)pallor  CV: N S1 S2 1/6 KAREN (+)2 Pulses B/l  Resp:  Clear to ausculatation B/L, normal effort  GI: (+) BS Soft, NT, ND  Lymph:  (-)Edema, (-)obvious lymphadenopathy  Skin: Warm to touch, Normal turgor  Psych: Appropriate mood and affect          ASSESSMENT/PLAN: 	71y  Female HTN and HLD BIBEMS for hypoxia and hypotension at outpatient clinic found with hilar mass and post obstructive PNA Normal LV function    - Abnormal EKG, nonspecific findings however her QT is prolonged.  Avoid QT prolonging meds  - EP eval appreciated   -  stress test with no ischemia or infarct  - No need for further inpatient cardiac work up.  - Low cardiac risk for Bronch /EBUS    Scooter Gutiérrez MD, [facility]  BEEPER [phone]

## 2021-10-04 NOTE — PROGRESS NOTE ADULT - PROBLEM SELECTOR PLAN 6
/ toxic metabolic enceph.   patient previously with periods of confusion in the setting of metastatic disease and hypercalcemia;   - s/p Aredia 9/27 and Calcitonin x 2 doses  - Calcium now wnl  -  Dr Jackson was consulted; s/p EEG 9/30 which revealed mild nonspecific diffuse or multifocal cerebral dysfunction. No epileptiform pattern or seizure seen.  - Patient currently at baseline

## 2021-10-04 NOTE — PROGRESS NOTE ADULT - SUBJECTIVE AND OBJECTIVE BOX
pt seen and examined stable clinically with mild dyspnea. s/p EBUS with biopsy done today and no major event   Vital Signs Last 24 Hrs  T(C): 36.7 (04 Oct 2021 20:57), Max: 37.8 (04 Oct 2021 16:16)  T(F): 98.1 (04 Oct 2021 20:57), Max: 100 (04 Oct 2021 16:16)  HR: 97 (04 Oct 2021 20:57) (77 - 114)  BP: 125/57 (04 Oct 2021 20:57) (100/64 - 139/79)  BP(mean): 71 (04 Oct 2021 16:46) (67 - 92)  RR: 18 (04 Oct 2021 20:57) (18 - 22)  SpO2: 97% (04 Oct 2021 20:57) (93% - 98%)  general AAOx3 mild respiratory dystress  on NC  abd soft   extrem no edema  neurology no focal  skin normal                           8.5    11.79 )-----------( 178      ( 04 Oct 2021 08:25 )               25.7   10-04    139  |  106  |  20<H>  ----------------------------<  125<H>  3.7   |  28  |  1.34<H>    Ca    8.4      04 Oct 2021 08:25  Mg     2.2     10-04    TPro  7.3  /  Alb  2.8<L>  /  TBili  0.4  /  DBili  x   /  AST  71<H>  /  ALT  23  /  AlkPhos  93  10-04      `

## 2021-10-04 NOTE — PROGRESS NOTE ADULT - PROBLEM SELECTOR PLAN 3
IR advised liver lesions not large enough for biopsy. Likely metastatic lung ca; s/p EBUS w/ bx today  Pt to follow up as outpatient for PET scan per heme/onc recommendations  Dr. Mathews, Heme/Onc, following  Dr. Arnett, Surg-Onc, following

## 2021-10-04 NOTE — PROGRESS NOTE ADULT - PROBLEM SELECTOR PLAN 4
Heme/Onc following  -likely metastatic  Lung cancer  to bone/liver/peritoneum;  -s/p EBUS w/ biopsy today;   -outpatient PET scan  s/p palliative eval; patient is full code.

## 2021-10-04 NOTE — CHART NOTE - NSCHARTNOTEFT_GEN_A_CORE
I called  .. informed him of bronch finding ... endobronchial mass  informed him to f/u with oncology and my office as out patient

## 2021-10-04 NOTE — PROGRESS NOTE ADULT - SUBJECTIVE AND OBJECTIVE BOX
NP Note discussed with  Primary Attending; Dr Yepez     Patient is a 71y old  Female who presents with a chief complaint of Pneumonia (04 Oct 2021 13:35)      INTERVAL HPI/OVERNIGHT EVENTS: Patient seen and evaluated at bedside. s/p stress test today now pending EBUS w/ biopsy. No new complaints    MEDICATIONS  (STANDING):  amLODIPine   Tablet 5 milliGRAM(s) Oral daily  chlorhexidine 2% Cloths 1 Application(s) Topical daily  folic acid 1 milliGRAM(s) Oral daily  influenza   Vaccine 0.5 milliLiter(s) IntraMuscular once  lisinopril 10 milliGRAM(s) Oral every 12 hours  LORazepam     Tablet 1 milliGRAM(s) Oral at bedtime  melatonin 1 milliGRAM(s) Oral at bedtime  metoprolol tartrate 100 milliGRAM(s) Oral two times a day  nitrofurantoin monohydrate/macrocrystals (MACROBID) 100 milliGRAM(s) Oral two times a day  nystatin Powder 1 Application(s) Topical every 8 hours  pantoprazole    Tablet 40 milliGRAM(s) Oral before breakfast  PARoxetine 40 milliGRAM(s) Oral daily  simvastatin 40 milliGRAM(s) Oral at bedtime  sodium chloride 0.9%. 1000 milliLiter(s) (60 mL/Hr) IV Continuous <Continuous>  sodium chloride 0.9%. 1000 milliLiter(s) (75 mL/Hr) IV Continuous <Continuous>    MEDICATIONS  (PRN):  acetaminophen   Tablet .. 650 milliGRAM(s) Oral every 6 hours PRN Temp greater or equal to 38C (100.4F), Mild Pain (1 - 3)  ALBUTerol    90 MICROgram(s) HFA Inhaler 2 Puff(s) Inhalation every 6 hours PRN Shortness of Breath and/or Wheezing  ondansetron    Tablet 4 milliGRAM(s) Oral every 8 hours PRN Nausea and/or Vomiting      __________________________________________________  REVIEW OF SYSTEMS:    CONSTITUTIONAL: No fever,   EYES: no acute visual disturbances  NECK: No pain or stiffness  RESPIRATORY: No cough; No shortness of breath  CARDIOVASCULAR: No chest pain, no palpitations  GASTROINTESTINAL: No pain. No nausea or vomiting; No diarrhea   NEUROLOGICAL: No headache or numbness, no tremors  MUSCULOSKELETAL: No joint pain, no muscle pain  GENITOURINARY: no dysuria, no frequency, no hesitancy  PSYCHIATRY: no depression , no anxiety  ALL OTHER  ROS negative        Vital Signs Last 24 Hrs  T(C): 36.9 (04 Oct 2021 13:24), Max: 36.9 (04 Oct 2021 13:24)  T(F): 98.5 (04 Oct 2021 13:24), Max: 98.5 (04 Oct 2021 13:24)  HR: 100 (04 Oct 2021 13:24) (71 - 100)  BP: 138/83 (04 Oct 2021 13:24) (127/79 - 139/72)  BP(mean): --  RR: 18 (04 Oct 2021 13:24) (18 - 18)  SpO2: 94% (04 Oct 2021 13:24) (93% - 94%)    ________________________________________________  PHYSICAL EXAM:  GENERAL: NAD  CHEST/LUNG: Breathing nonlabored; clear to auscultation bilaterally.   HEART:+ S1 +S2  regular  ABDOMEN: Softly distended, nontender; Bowel sounds present  EXTREMITIES: +2 bilateral radial pulses. No pedal  edema.   SKIN: warm and dry; no rash  NERVOUS SYSTEM:  Awake and alert; Oriented  to place, person and time ; no new deficits    _________________________________________________  LABS:                        8.5    11.79 )-----------( 178      ( 04 Oct 2021 08:25 )             25.7     10-04    139  |  106  |  20<H>  ----------------------------<  125<H>  3.7   |  28  |  1.34<H>    Ca    8.4      04 Oct 2021 08:25  Mg     2.2     10-04    TPro  7.3  /  Alb  2.8<L>  /  TBili  0.4  /  DBili  x   /  AST  71<H>  /  ALT  23  /  AlkPhos  93  10-04    PT/INR - ( 04 Oct 2021 08:25 )   PT: 13.5 sec;   INR: 1.14 ratio             CAPILLARY BLOOD GLUCOSE            RADIOLOGY & ADDITIONAL TESTS:    < from: Nuclear Stress Test-Pharmacologic (10.04.21 @ 08:07) >  PATIENT: ELAINA JAMA  : 1950   AGE: 71 (F)   MR#: [id number]  STUDY DATE: 10/04/2021  LOCATION: [address] [place] [address]  REF. PHYSICIAN(S): Annie Arias MD  FELLOW:  ------------------------------------------------------------------------    TYPE OF TEST: Rest/Stress Pharmacologic  INDICATION: Pre-op exam  (Z01.818), Abnormal  electrocardiogram [ECG] [EKG] (R94.31)  HEIGHT: 158 cm  WEIGHT: 80.0 kg  ------------------------------------------------------------------------  HISTORY:  CARDIAC HISTORY: 71 years old female with  OTHER HISTORY: HLD,HTN,CAD  RISK FACTORS: Elevated Cholesterol, Hypertension, Post  Menopausal  MEDICATIONS: Heparin,Zestril,Zocor,Lopressor,Norvasc  ------------------------------------------------------------------------    BASELINE ELECTROCARDIOGRAM:  Rhythm: Normal Sinus Rhythm - 88 BPM  ST: Nonspecific ST-T wave abnormality.    ------------------------------------------------------------------------    HEMODYNAMIC PARAMETERS:      HR      BP  Baseline  Pre-Injection             88  149/67  00:00     Inject Regadenoson         0  00:30     Post Injection            95  120/73  01:00     Post Injection           100  133/56  01:00     Recovery                 100  130/56  02:00     Recovery                 100  128/59  03:00     Recovery                  98  130/59  04:00     Recovery                  99  134/62  ------------------------------------------------------------------------    Agent: Regadenoson 0.4 mg/5 ml NS. injected over 10 sec.  HR: Baseline HR: 88 bpm   Peak HR: 100 bpm (66% of MPHR)  MPHR: 151 bpm   85% of MPHR: 128 bpm  BP: Baseline BP: 149/67 mmHg   Peak BP: 149/67 mmHg   Peak  RPP: 70430 (Rate Pressure Product)  HR Isotope Injected: 88 bpm (Tc 99m Tetrofosmin)  95 bpm (Tc 99m Tetrofosmin)  Last Caffeine intake: 12 hrs  Terminated: Completion of protocol  ------------------------------------------------------------------------    SYMPTOMS/FINDINGS:  Symptoms: Flushing  Chest pain: No chest pain with administration of  Regadenoson  ------------------------------------------------------------------------    ECG ABNORMALITIES DURING/AFTER STRESS:   Abnormalities: None  ------------------------------------------------------------------------    NEW ARRHYTHMIAS DEVELOPED DURING/AFTER STRESS:  None  ------------------------------------------------------------------------    STRESS TEST IMPRESSIONS:  Negative ECG evidence of ischemia after IV administartion  of Regadenoson.  ------------------------------------------------------------------------    PROCEDURE:  10.1 mCi of Tc 99m Tetrofosmin were injected intravenously  at rest. Approximately 45 minutes later, tomographic  images were obtained in a 180 degree arc from right  anterior oblique to left anterior oblique with 64 stops.  At a separate time, 30.9 mCi of Tc 99m Tetrofosmin were  injected intravenously during stress protocol.  Approximately 45 minute(s) later, tomographic images were  obtained in a 180 degree arc from right anterior oblique  to left anterior oblique with 64 stops. The tomographic  slices were reconstructed in 3 orthogonal planes (short  axis, horizontal long axis and vertical long axis).  Interpretation was performed both by visual and  quantitative analysis.    ------------------------------------------------------------------------    NUCLEAR FINDINGS:  Review of raw data shows: The study is of good technical  quality.  The left ventricle was small in size. Normal myocardial  perfusion scan, with no evidence of infarction or  inducible ischemia.  ------------------------------------------------------------------------      GATED ANALYSIS:  Post-stress resting myocardial perfusion gated SPECT  imaging was performed (LVEF > 70%) with no regional wall  motion abnormalities.  ------------------------------------------------------------------------    IMPRESSIONS:Normal Study  * Negative ECG evidence of ischemia after IV  administartion of Regadenoson.  * Myocardial Perfusion SPECT results are normal.  * No clear evidence of ischemia or infarct.  * Post-stress resting myocardial perfusion gated SPECT  imaging was performed (LVEF > 70%) with no regional wall  motion abnormalities.    ------------------------------------------------------------------------      ------------------------------------------------------------------------    Confirmed on  10/4/2021 - 11:11:02 at [place] by  Scooter Gutiérrez MD  ------------------------------------------------------------------------

## 2021-10-04 NOTE — CHART NOTE - NSCHARTNOTEFT_GEN_A_CORE
I obtained informed consent form  Marcos at pat request.   Pat was intubated by anesthesia size #8 ETT in OR. Bronchoscope inserted via ETT. Distal to ETT , airway was inspected . Left endobronchial airway was inspected . no lesions, external compression .  Orifice  of right main bronchus had fresh blood with large pedunculated septated mass obstructing 80 % of bronchus . Unable to advance scope beyond mass. Distal to mass.. observed edematous tissue. Multiple bx done , brushing , washing done .  Imp:  Large pedunculated septated mass obstructing 80 % of right main bronchus due to malignancy     Sugg;  -f/u all bronch studies   -out pat oncology   -out pat pulmonary f/u     spoke with  and attending I obtained informed consent form  Marcos at pat request.   Pat was intubated by anesthesia size #8 ETT in OR. Bronchoscope inserted via ETT. Distal to ETT , airway was inspected . Left endobronchial airway was inspected . no lesions, external compression .  Orifice  of right main bronchus had fresh blood with large pedunculated septated mass obstructing 80 % of bronchus . Unable to advance scope beyond mass. Distal to mass.. observed edematous tissue. Multiple bx done , brushing , washing done .  Imp:  Large pedunculated septated mass obstructing 80 % of right main bronchus due to malignancy . EBUS done to evaluate for mediastinal nodes .. no adenopathy large enough for fine needle bx    Sugg;  -f/u all bronch studies   -out pat oncology   -out pat pulmonary f/u     spoke with  and attending

## 2021-10-04 NOTE — PROGRESS NOTE ADULT - SUBJECTIVE AND OBJECTIVE BOX
EP Attending    HISTORY OF PRESENT ILLNESS: HPI:  70 y/o F hx of HTN and HLD BIBEMS for hypoxia and hypotension at outpatient clinic. Patient states that for the past weak she has been feeling nauseous, fatigue and decreased appetite. She had 1episode of non bloody vomit and a couple of episodes of non bloody diarrhea early last week which has also  resolved. She states she developed a cough last week as well which is associated with phlegm but denies fevers, chills, shortness of breath or night sweats. Denies chest pain, palpitations, diarrhea, constipation, headache or any other complaints (20 Sep 2021 17:45)    9/29- seen at bedside on 4S.  states no fevers chills or cough at this point.  Called re: abnormal EKG - QTc > 500ms.  Ms Gray reports no history of angina ,palpitations, lightheadedness or fainting, personal or family history of sudden cardiac arrest, or history of exercise intolerance. A 10 pt ROS is otherwise negative.  She does not have her current medication list memorized.  9/30- uneventful overnight.  awaiting stress testing (had coffee today).  EEG is completed.  No new complaints.  Date of service 10/4-  resting comfortably, no complaints.    acetaminophen   Tablet .. 650 milliGRAM(s) Oral every 6 hours PRN  ALBUTerol    90 MICROgram(s) HFA Inhaler 2 Puff(s) Inhalation every 6 hours PRN  amLODIPine   Tablet 5 milliGRAM(s) Oral daily  chlorhexidine 2% Cloths 1 Application(s) Topical daily  folic acid 1 milliGRAM(s) Oral daily  influenza   Vaccine 0.5 milliLiter(s) IntraMuscular once  lisinopril 10 milliGRAM(s) Oral every 12 hours  LORazepam     Tablet 1 milliGRAM(s) Oral at bedtime  melatonin 1 milliGRAM(s) Oral at bedtime  metoprolol tartrate 100 milliGRAM(s) Oral two times a day  nitrofurantoin monohydrate/macrocrystals (MACROBID) 100 milliGRAM(s) Oral two times a day  nystatin Powder 1 Application(s) Topical every 8 hours  ondansetron    Tablet 4 milliGRAM(s) Oral every 8 hours PRN  pantoprazole    Tablet 40 milliGRAM(s) Oral before breakfast  PARoxetine 40 milliGRAM(s) Oral daily  simvastatin 40 milliGRAM(s) Oral at bedtime  sodium chloride 0.9%. 1000 milliLiter(s) IV Continuous <Continuous>  sodium chloride 0.9%. 1000 milliLiter(s) IV Continuous <Continuous>                         8.5    11.79 )-----------( 178      ( 04 Oct 2021 08:25 )             25.7       10-04    139  |  106  |  20<H>  ----------------------------<  125<H>  3.7   |  28  |  1.34<H>    Ca    8.4      04 Oct 2021 08:25  Mg     2.2     10-04    TPro  7.3  /  Alb  2.8<L>  /  TBili  0.4  /  DBili  x   /  AST  71<H>  /  ALT  23  /  AlkPhos  93  10-04    T(C): 36.9 (10-04-21 @ 13:24), Max: 36.9 (10-04-21 @ 13:24)  HR: 100 (10-04-21 @ 13:24) (71 - 100)  BP: 138/83 (10-04-21 @ 13:24) (127/79 - 139/72)  RR: 18 (10-04-21 @ 13:24) (18 - 18)  SpO2: 94% (10-04-21 @ 13:24) (93% - 94%)  Wt(kg): --    I&O's Summary    Appearance: well appearing elderly woman in no acute distress, oriented x 3	  HEENT:   Normal oral mucosa, PERRL, EOMI	  Lymphatic: No lymphadenopathy , no edema  Cardiovascular: Normal S1 S2, No JVD, No murmurs , Peripheral pulses palpable 2+ bilaterally  Respiratory: right side rhonchi.  no rales or wheezing.  Gastrointestinal:  Soft, Non-tender, + BS	  Skin: No rashes, No ecchymoses, No cyanosis, warm to touch  Musculoskeletal: Normal range of motion, normal strength  Psychiatry:  Mood & affect appropriate    TELEMETRY: none	    ECG: NSR, ST segment stretched so QTc is 500-520ms.	  Echo:  normal LV EF and biventricular size/function  CT Chest- right hilar mass and atelectasis.  RUL possible pneumonia.  Liver mets.  MRI head w/ osseous mets.    ASSESSMENT/PLAN: 	71y  Female with metastatic lung cancer.  Called re: prolonged QTc.    QT is prolonged out of proportion to her low dose of paroxetine.  No overt arrhythmia symptoms (fainting, palpitations)  Echo is reassuring.  Stress testing reassuring as well.  Avoid QT-prolonging medication.  Periodic EKG's during chemotherapy.  No further inpatient EP workup anticipated.    Carroll Carrasco M.D.  Cardiac Electrophysiology    office [phone]  pager [phone]

## 2021-10-04 NOTE — PROGRESS NOTE ADULT - PROBLEM SELECTOR PLAN 1
In the setting of lung mass and PNA;  - s/p antimicrobial therapy x 5 days   - SPO2 92-96 % on 3L ( recent spo2on RA was 88%; will require home oxygen therapy)   - last blood cx neg  - Pulmonary, Dr Lombardi following; s/p  EBUS w/ biopsy today

## 2021-10-04 NOTE — PROGRESS NOTE ADULT - ASSESSMENT
Fever - improving  Pneumonia - completed treatment  UTI - VRE    Plan - Continue Macrobid 100mg po bid for 1 more day to complete 5 day course     Fever - improving  Pneumonia - completed treatment  UTI - VRE    Plan - Continue Macrobid 100mg po bid for 1 more day to complete 5 day course    I agree with above

## 2021-10-04 NOTE — PROGRESS NOTE ADULT - ASSESSMENT
72 y/o F hx of HTN and HLD BIBEMS for hypoxia and hypotension at outpatient clinic. Patient admitted w/ Sepsis secondary to RUL PNA. CT showed RUL PNA. Additionally, her UA was  positive with UCx growing E coli (10-49K).  She is now s/p  azithromycin and Rocephin. Bcx no growth. Hospital course c/b finding on  CT  chest scan of R hilar lung mass and Mediastinal  adenopathy, CT A/P revealed liver lesions, probable liver metastases and CT head with multiple lytic lesions suspicious for metastases or multiple myeloma. She was found  additionally to be  hypercalcemic; s/p Aredia and calcitonin w/ calcium level now wnl. She is s/p stress test and EBUS w/ biopsy.

## 2021-10-05 LAB
ALBUMIN SERPL ELPH-MCNC: 2.6 G/DL — LOW (ref 3.5–5)
ALP SERPL-CCNC: 92 U/L — SIGNIFICANT CHANGE UP (ref 40–120)
ALT FLD-CCNC: 21 U/L DA — SIGNIFICANT CHANGE UP (ref 10–60)
ANION GAP SERPL CALC-SCNC: 10 MMOL/L — SIGNIFICANT CHANGE UP (ref 5–17)
ANISOCYTOSIS BLD QL: SLIGHT — SIGNIFICANT CHANGE UP
AST SERPL-CCNC: 74 U/L — HIGH (ref 10–40)
BASO STIPL BLD QL SMEAR: PRESENT — SIGNIFICANT CHANGE UP
BASOPHILS # BLD AUTO: 0 K/UL — SIGNIFICANT CHANGE UP (ref 0–0.2)
BASOPHILS NFR BLD AUTO: 0 % — SIGNIFICANT CHANGE UP (ref 0–2)
BILIRUB SERPL-MCNC: 0.4 MG/DL — SIGNIFICANT CHANGE UP (ref 0.2–1.2)
BUN SERPL-MCNC: 22 MG/DL — HIGH (ref 7–18)
CALCIUM SERPL-MCNC: 7.9 MG/DL — LOW (ref 8.4–10.5)
CHLORIDE SERPL-SCNC: 107 MMOL/L — SIGNIFICANT CHANGE UP (ref 96–108)
CO2 SERPL-SCNC: 24 MMOL/L — SIGNIFICANT CHANGE UP (ref 22–31)
CREAT SERPL-MCNC: 1.12 MG/DL — SIGNIFICANT CHANGE UP (ref 0.5–1.3)
EOSINOPHIL # BLD AUTO: 0 K/UL — SIGNIFICANT CHANGE UP (ref 0–0.5)
EOSINOPHIL NFR BLD AUTO: 0 % — SIGNIFICANT CHANGE UP (ref 0–6)
GLUCOSE SERPL-MCNC: 121 MG/DL — HIGH (ref 70–99)
GRAM STN FLD: SIGNIFICANT CHANGE UP
HCT VFR BLD CALC: 24.7 % — LOW (ref 34.5–45)
HGB BLD-MCNC: 8.1 G/DL — LOW (ref 11.5–15.5)
LYMPHOCYTES # BLD AUTO: 1.57 K/UL — SIGNIFICANT CHANGE UP (ref 1–3.3)
LYMPHOCYTES # BLD AUTO: 17 % — SIGNIFICANT CHANGE UP (ref 13–44)
MANUAL SMEAR VERIFICATION: SIGNIFICANT CHANGE UP
MCHC RBC-ENTMCNC: 30.1 PG — SIGNIFICANT CHANGE UP (ref 27–34)
MCHC RBC-ENTMCNC: 32.8 GM/DL — SIGNIFICANT CHANGE UP (ref 32–36)
MCV RBC AUTO: 91.8 FL — SIGNIFICANT CHANGE UP (ref 80–100)
METAMYELOCYTES # FLD: 2 % — HIGH (ref 0–0)
MICROCYTES BLD QL: SLIGHT — SIGNIFICANT CHANGE UP
MONOCYTES # BLD AUTO: 0.19 K/UL — SIGNIFICANT CHANGE UP (ref 0–0.9)
MONOCYTES NFR BLD AUTO: 2 % — SIGNIFICANT CHANGE UP (ref 2–14)
MYELOCYTES NFR BLD: 5 % — HIGH (ref 0–0)
NEUTROPHILS # BLD AUTO: 6.75 K/UL — SIGNIFICANT CHANGE UP (ref 1.8–7.4)
NEUTROPHILS NFR BLD AUTO: 69 % — SIGNIFICANT CHANGE UP (ref 43–77)
NEUTS BAND # BLD: 4 % — SIGNIFICANT CHANGE UP (ref 0–8)
NRBC # BLD: 0 /100 — SIGNIFICANT CHANGE UP (ref 0–0)
PLAT MORPH BLD: NORMAL — SIGNIFICANT CHANGE UP
PLATELET # BLD AUTO: 154 K/UL — SIGNIFICANT CHANGE UP (ref 150–400)
POLYCHROMASIA BLD QL SMEAR: SLIGHT — SIGNIFICANT CHANGE UP
POTASSIUM SERPL-MCNC: 3.5 MMOL/L — SIGNIFICANT CHANGE UP (ref 3.5–5.3)
POTASSIUM SERPL-SCNC: 3.5 MMOL/L — SIGNIFICANT CHANGE UP (ref 3.5–5.3)
PROT SERPL-MCNC: 7 G/DL — SIGNIFICANT CHANGE UP (ref 6–8.3)
RBC # BLD: 2.69 M/UL — LOW (ref 3.8–5.2)
RBC # FLD: 14.8 % — HIGH (ref 10.3–14.5)
RBC BLD AUTO: ABNORMAL
SODIUM SERPL-SCNC: 141 MMOL/L — SIGNIFICANT CHANGE UP (ref 135–145)
SPECIMEN SOURCE: SIGNIFICANT CHANGE UP
TOXIC GRANULES BLD QL SMEAR: PRESENT — SIGNIFICANT CHANGE UP
VARIANT LYMPHS # BLD: 1 % — SIGNIFICANT CHANGE UP (ref 0–6)
WBC # BLD: 9.25 K/UL — SIGNIFICANT CHANGE UP (ref 3.8–10.5)
WBC # FLD AUTO: 9.25 K/UL — SIGNIFICANT CHANGE UP (ref 3.8–10.5)

## 2021-10-05 RX ORDER — ALBUTEROL 90 UG/1
2 AEROSOL, METERED ORAL
Qty: 1 | Refills: 0
Start: 2021-10-05

## 2021-10-05 RX ADMIN — Medication 1 MILLIGRAM(S): at 12:14

## 2021-10-05 RX ADMIN — PANTOPRAZOLE SODIUM 40 MILLIGRAM(S): 20 TABLET, DELAYED RELEASE ORAL at 06:13

## 2021-10-05 RX ADMIN — Medication 1 MILLIGRAM(S): at 21:44

## 2021-10-05 RX ADMIN — NYSTATIN CREAM 1 APPLICATION(S): 100000 CREAM TOPICAL at 06:13

## 2021-10-05 RX ADMIN — Medication 40 MILLIGRAM(S): at 12:14

## 2021-10-05 RX ADMIN — LISINOPRIL 10 MILLIGRAM(S): 2.5 TABLET ORAL at 06:13

## 2021-10-05 RX ADMIN — AMLODIPINE BESYLATE 5 MILLIGRAM(S): 2.5 TABLET ORAL at 06:13

## 2021-10-05 RX ADMIN — Medication 100 MILLIGRAM(S): at 17:40

## 2021-10-05 RX ADMIN — NYSTATIN CREAM 1 APPLICATION(S): 100000 CREAM TOPICAL at 21:44

## 2021-10-05 RX ADMIN — LISINOPRIL 10 MILLIGRAM(S): 2.5 TABLET ORAL at 17:40

## 2021-10-05 RX ADMIN — Medication 100 MILLIGRAM(S): at 06:13

## 2021-10-05 RX ADMIN — NYSTATIN CREAM 1 APPLICATION(S): 100000 CREAM TOPICAL at 13:29

## 2021-10-05 RX ADMIN — SIMVASTATIN 40 MILLIGRAM(S): 20 TABLET, FILM COATED ORAL at 21:44

## 2021-10-05 NOTE — PROGRESS NOTE ADULT - ASSESSMENT
complete note to follow   Assessment and Plan:   · Assessment	  72 y/o female with respiratory failure due to pneumonia now likely mets lung cancer     #lung mass with mets to liver/bone/lung   EBUS done 10/04  pathology pending   pt is a candidate for palliative treatment if tissue diagnosis confirmed his metastatic cancer   f/u pathology   TM negative   Ca wnl s/p pamidronate and calcitonin  upon discharge pt to f/u with Oncologist Dr. Mathews    #respiratory failure   due to multifactorial   pneumonia and lung mets cancer   pulmonary f/u     #anemia   likely due to multifactorial   s/p workup   Hb treading down 9.1-->8.5-->8.1  ? post-procedure   close monitoring   RBC transfusion for <7.5    Thank you for the referral. Will continue to monitor the patient.  Please call with any questions [phone]  Above reviewed with Attending Dr. Plunkett

## 2021-10-05 NOTE — PROGRESS NOTE ADULT - PROBLEM SELECTOR PLAN 4
Heme/Onc following  -likely metastatic  Lung cancer  to bone/liver/peritoneum;  -s/p EBUS w/ biopsy   -outpatient PET scan  s/p palliative eval; patient is full code.

## 2021-10-05 NOTE — PROGRESS NOTE ADULT - PROBLEM SELECTOR PLAN 12
PT recommends JANETTE, family refuses JANETTE wants pt home with HPT (with oxygen therapy)   Care management following for discharge planning  dc planning pending EBUS on Monday
Pt will be medically stable for discharge when s/p oncology workup  Care management following for discharge planning
Planned for discharge tomorrow w/ home PT and home oxygen therapy ( delivered to home last week) as PT recommended  JANETTE however family refusing  Will need to f/u with pulm  & Onc for path result and further plan of care  Outpatient PET scan   Care management following for discharge planning
Pt will be medically stable for discharge when CA corrected  PT recommends JANETTE, family refuses JANETTE wants pt home with \[facility]\""   Care management following for discharge planning
Pt will be medically stable for discharge when CA corrected and when cleared by ID   PT recommends JANETTE, family refuses JANETTE wants pt home with HPT  ( with oxygen therapy)   Care management following for discharge planning
Planned for discharge tomorrow w/ home PT and home oxygen therapy ( delivered to home last week) PT recommended  JANETTE however family refusing  Will need to f/u with pulm  & Onc for path result and further plan of care  Outpatient PET scan   Care management following for discharge planning
Pt will be medically stable for discharge when CA corrected,  when cleared by ID & Neurology and after EBUS w/ bx on Monday   PT recommends JANETTE, family refuses JANETTE wants pt home with HPT  ( with oxygen therapy)   Care management following for discharge planning

## 2021-10-05 NOTE — PROGRESS NOTE ADULT - SUBJECTIVE AND OBJECTIVE BOX
71y Female is under our care for     REVIEW OF SYSTEMS:  [  ] Not able to elicit  General:	  Chest:	  GI:	  :  Skin:	  Musculoskeletal:	  Neuro:	    MEDS:  nitrofurantoin monohydrate/macrocrystals (MACROBID) 100 milliGRAM(s) Oral two times a day    ALLERGIES: Allergies    No Known Allergies    Intolerances        VITALS:  Vital Signs Last 24 Hrs  T(C): 36.2 (05 Oct 2021 05:23), Max: 37.8 (04 Oct 2021 16:16)  T(F): 97.2 (05 Oct 2021 05:23), Max: 100 (04 Oct 2021 16:16)  HR: 98 (05 Oct 2021 05:23) (97 - 114)  BP: 128/66 (05 Oct 2021 05:23) (100/64 - 139/79)  BP(mean): 71 (04 Oct 2021 16:46) (67 - 92)  RR: 18 (05 Oct 2021 05:23) (18 - 22)  SpO2: 96% (05 Oct 2021 05:23) (93% - 98%)      PHYSICAL EXAM:  HEENT:  Neck:  Respiratory:  Cardiovascular:  Gastrointestinal:  Extremities:  Skin:  Ortho:  Neuro:    LABS/DIAGNOSTIC TESTS:                        8.1    9.25  )-----------( 154      ( 05 Oct 2021 07:56 )             24.7     WBC Count: 9.25 K/uL (10-05 @ 07:56)  WBC Count: 11.79 K/uL (10-04 @ 08:25)  WBC Count: 15.59 K/uL (10-03 @ 07:00)  WBC Count: 16.36 K/uL (10-02 @ 08:48)  WBC Count: 9.37 K/uL (10-01 @ 07:37)    10-05    141  |  107  |  22<H>  ----------------------------<  121<H>  3.5   |  24  |  1.12    Ca    7.9<L>      05 Oct 2021 07:56  Mg     2.2     10-04    TPro  7.0  /  Alb  2.6<L>  /  TBili  0.4  /  DBili  x   /  AST  74<H>  /  ALT  21  /  AlkPhos  92  10-05      CULTURES:   Clean Catch Clean Catch (Midstream)  09-28 @ 20:59   >100,000 CFU/ml Enterococcus faecium (vancomycin resistant)  --  Enterococcus faecium (vancomycin resistant)      .Blood Blood  09-28 @ 20:53   No Growth Final  --  --      Clean Catch Clean Catch (Midstream)  09-21 @ 14:05   10,000 - 49,000 CFU/mL Escherichia coli  Normal Urogenital bello present  --  Escherichia coli      .Blood Blood-Peripheral  09-20 @ 18:38   No Growth Final  --  --        RADIOLOGY:  no new studies 71y Female is under our care for VRE UTI.  Patient was seen laying comfortably in bed with no acute distress.  She is s/p EBUS yesterday with findings of a large pedunculated mass obstructing the right main bronchus.  Patient denies any pain or discomfort at this time, remains afebrile and WBC count is WNL.    REVIEW OF SYSTEMS:  [  ] Not able to elicit  General: no fevers no malaise  Chest: no cough no sob  GI: no nvd  : no urinary sxs   Skin: no rashes  Musculoskeletal: no trauma no LBP  Neuro: no ha's no dizziness     MEDS:  nitrofurantoin monohydrate/macrocrystals (MACROBID) 100 milliGRAM(s) Oral two times a day    ALLERGIES: Allergies    No Known Allergies    Intolerances        VITALS:  Vital Signs Last 24 Hrs  T(C): 36.2 (05 Oct 2021 05:23), Max: 37.8 (04 Oct 2021 16:16)  T(F): 97.2 (05 Oct 2021 05:23), Max: 100 (04 Oct 2021 16:16)  HR: 98 (05 Oct 2021 05:23) (97 - 114)  BP: 128/66 (05 Oct 2021 05:23) (100/64 - 139/79)  BP(mean): 71 (04 Oct 2021 16:46) (67 - 92)  RR: 18 (05 Oct 2021 05:23) (18 - 22)  SpO2: 96% (05 Oct 2021 05:23) (93% - 98%)      PHYSICAL EXAM:  HEENT: n/a  Neck: supple no LN's   Respiratory: lungs clear no rales  Cardiovascular: S1 S2 reg no murmurs  Gastrointestinal: +BS with soft, nondistended abdomen; nontender  Extremities: no edema  Skin: no rashes  Ortho: n/a  Neuro: AAO x 3    LABS/DIAGNOSTIC TESTS:                        8.1    9.25  )-----------( 154      ( 05 Oct 2021 07:56 )             24.7     WBC Count: 9.25 K/uL (10-05 @ 07:56)  WBC Count: 11.79 K/uL (10-04 @ 08:25)  WBC Count: 15.59 K/uL (10-03 @ 07:00)  WBC Count: 16.36 K/uL (10-02 @ 08:48)  WBC Count: 9.37 K/uL (10-01 @ 07:37)    10-05    141  |  107  |  22<H>  ----------------------------<  121<H>  3.5   |  24  |  1.12    Ca    7.9<L>      05 Oct 2021 07:56  Mg     2.2     10-04    TPro  7.0  /  Alb  2.6<L>  /  TBili  0.4  /  DBili  x   /  AST  74<H>  /  ALT  21  /  AlkPhos  92  10-05      CULTURES:   Clean Catch Clean Catch (Midstream)  09-28 @ 20:59   >100,000 CFU/ml Enterococcus faecium (vancomycin resistant)  --  Enterococcus faecium (vancomycin resistant)      .Blood Blood  09-28 @ 20:53   No Growth Final  --  --      Clean Catch Clean Catch (Midstream)  09-21 @ 14:05   10,000 - 49,000 CFU/mL Escherichia coli  Normal Urogenital bello present  --  Escherichia coli      .Blood Blood-Peripheral  09-20 @ 18:38   No Growth Final  --  --        RADIOLOGY:  no new studies

## 2021-10-05 NOTE — PROGRESS NOTE ADULT - PROBLEM SELECTOR PROBLEM 1
Acute respiratory failure with hypoxia
Acute UTI
Toxic metabolic encephalopathy
Acute respiratory failure with hypoxia

## 2021-10-05 NOTE — PROGRESS NOTE ADULT - PROBLEM SELECTOR PROBLEM 11
DVT prophylaxis
DVT prophylaxis
Prophylactic measure

## 2021-10-05 NOTE — PROGRESS NOTE ADULT - PROBLEM SELECTOR PLAN 1
In the setting of lung mass and PNA;  - s/p antimicrobial therapy completed    - SPO2 92-96 % on 3L ( recent spo2on RA was 88%; will require home oxygen therapy)   - last blood cx neg  - Pulmonary, Dr Lombardi following; s/p  EBUS w/ biopsy   follow up outpatient for pathology

## 2021-10-05 NOTE — PROGRESS NOTE ADULT - PROBLEM SELECTOR PROBLEM 3
Abdominal mass
Acute UTI
Abdominal mass
Acute UTI
Abdominal mass
Acute UTI
Abdominal mass

## 2021-10-05 NOTE — PROGRESS NOTE ADULT - ATTENDING COMMENTS
Patient seen and examined    T(C): 36.8 (10-04-21 @ 17:31), Max: 37.8 (10-04-21 @ 16:16)  HR: 102 (10-04-21 @ 17:31) (100 - 114)  BP: 103/70 (10-04-21 @ 17:31) (100/64 - 139/79)  RR: 18 (10-04-21 @ 17:31) (18 - 22)  SpO2: 93% (10-04-21 @ 17:31) (93% - 98%)    Reviewed labs and imaging    Patient s/p EBUS Biopsy today   Spoke to Pulmonary   D/C planning today.
71 y o W ex smoker adm for respiratory symptoms w/u with  CT scan + 5.4x5.2cm central mass encasing Right hilum. mediastinal/hilar adenopathy and mult indeterminate liver lesions, largest 2.2cm  #  suspicious for met lung ca  noted  CT a/p  above,   eval by IR  for  peritoneal nodule bx  for tissue diagnosis   CEA normal   PET scan  can be done as an outpt  EBUS as per  pulmonary   further recommendations will depend on findings    #  ANEMIA  routine anemia w/u as above  f/u CBC if HB<7 PRBC TX prn    #  CONFUSION   MRI of  brain ordered   noted CA 10.6   improved with IV fluids, cannot calculate corrected CA level without  alb level  ? hypercalcemia d/t malignancy  give IV fluids, if  cont to increase  may consider  Pamidronate  f/u CA level  d/w primary team
Hypercalcemia- Resolved  Undoubtedly secondary to malignancy  Awaiting histologic tissue
As Above  Given Hx and findings, most consistent with lung cancer  Needs EBUS for evaluation of histology  Hypercalcemia, R/O Paraneoplastic process  On Biphosphonates
pt seen and examined. she is stable clinically. still pending pathology. pt possible d/c today and we will f/u her in [facility] office next wk. d/w pt about the plan and information for making appointment provided to patient
As above   Awaiting EBUS  Hypercalcemia- most likely related to malignancy, either paraneoplastic or bone involvement  S/P Biphosphonate  Calcitonin, fi no response to above
As above  Malignant liver lesions  For biopsy of liver lesions if readily accessible, to determine histology
Metastatic Lung cancer  to bone/liver/peritoneum;  EBUS on Monday     Hypercalcemia resolved   Pulm, Hem Onc following
71 y o W ex smoker adm for respiratory symptoms w/u with  CT scan + 5.4x5.2cm central mass encasing Right hilum. mediastinal/hilar adenopathy and mult indeterminate liver lesions, largest 2.2cm  #  suspicious for met lung ca  noted  CT a/p  above, eval by IR  for feasibility of  peritoneal nodule bx   CEA normal   PET scan  can be done as an outpt  EBUS as per  pulmonary   further recommendations will depend on findings    #  ANEMIA  routine anemia w/u as above  f/u CBC if HB<7 PRBC TX prn    #  CONFUSION  check MRI of  brain  noted CA 10.9 with alb 2.6 corrected  CA =12  ? hypercalcemia d/t malignancy  give IV fluids, if  cont to increase  may consider  Pamidronate  f/u CA level  d/w primary team
Reviewed labs and imaging  -CT of pelvis and abdomin- Group of peritoneal nodules in the right mid abdomen may represent metastatic peritoneal implants.  Pneumonia with abx   Surg onc Hem on consults    IR consult for possible Liver biopsy   Plan of care discussed at length with patient and her 
Patient seen and examined     T(C): 36.4 (09-21-21 @ 19:37), Max: 36.9 (09-21-21 @ 13:37)  HR: 91 (09-21-21 @ 19:37) (91 - 98)  BP: 179/84 (09-21-21 @ 19:37) (165/80 - 186/88)  RR: 19 (09-21-21 @ 19:37) (18 - 19)  SpO2: 95% (09-21-21 @ 19:37) (93% - 95%)    Reviewed labs and imaging
Patient seen and examined     IR consult appreciated, liver lesions too small to biopsy.   Out patient PET   D/C planning once patient completed antibiotics.
Patient seen and examined earlier today. Patient's history, vitals, labs, imaging studies reviewed. Agree with above. Patient c/o insomnia asking for sleep aid, melatonin ordered for tonight. Plan of care discussed with patient, and agrees, all questions answered.   Sravani Pappas MD  (Covering for Dr. JENNA Arias today)  9/29/2021
Patient seen and examined.     T(C): 36.9 (09-27-21 @ 19:24), Max: 36.9 (09-27-21 @ 19:24)  HR: 66 (09-27-21 @ 19:24) (65 - 79)  BP: 152/89 (09-27-21 @ 19:24) (107/66 - 152/89)  RR: 18 (09-27-21 @ 19:24) (17 - 18)  SpO2: 92% (09-27-21 @ 19:24) (92% - 96%)    Hypercalcemia- Hem onc following  iv fluids.   Metastatic lung ca- Onc recommend EBUS pulm following   Will discuss with pulmonary about biopsy     UTI s/p Ceftriaxone     Palliative care for GOC discussion   Plan of care discussed with House Staff
Patient seen and examined     T(C): 36.6 (09-30-21 @ 22:01), Max: 36.6 (09-30-21 @ 22:01)  HR: 70 (09-30-21 @ 22:01) (70 - 93)  BP: 132/77 (09-30-21 @ 22:01) (132/77 - 152/65)  RR: 17 (09-30-21 @ 22:01) (17 - 17)  SpO2: 94% (09-30-21 @ 22:01) (93% - 94%)    Patient EBUS on Monday
Patient seen and examined.     T(C): 36.8 (09-22-21 @ 21:19), Max: 37.1 (09-22-21 @ 11:14)  HR: 73 (09-22-21 @ 21:19) (73 - 76)  BP: 160/80 (09-22-21 @ 21:19) (107/82 - 160/80)  RR: 18 (09-22-21 @ 21:19) (18 - 18)  SpO2: 95% (09-22-21 @ 21:19) (92% - 95%)    Reviewed labs and imaging  -CT of pelvis and abdomin- Group of peritoneal nodules in the right mid abdomen may represent metastatic peritoneal implants.  Pneumonia with abx   Surg onc Hem on consults    Spoke to pulmonary needs lung biopsy

## 2021-10-05 NOTE — PROGRESS NOTE ADULT - PROBLEM SELECTOR PLAN 2
CEA 1.9.  : 7  - S/p EBUS w/ biopsy f/u path result   Dr. Lombardi, Pulmonary, following  Dr. Mathews, Heme/Onc, following  Palliative care following
-plan as above
CEA 1.9.  : 7  - S/p EBUS w/ biopsy today; f/u path result   Dr. Lombardi, Pulmonary, following  Dr. Mathews, Heme/Onc, following  Palliative care following
CEA 1.9.  : 7  Heme/Onc recommends EBUS with biopsy  Dr. Lombardi, Pulmonary, following  Dr. Mathews, Heme/Onc, following  Palliative care following
CT noted above  CEA 1.9   : 7  Heme/Onc recommends EBUS with biopsy  Dr. Lombardi, Pulmonary, following  Dr. Mathews, Heme/Onc, following  Palliative care following
-plan as above  - PT rec JANETTE
CEA 1.9.  : 7  Heme/Onc recommends EBUS with biopsy  Dr. Lombardi, Pulmonary, following  Dr. Mathews, Heme/Onc, following  Palliative care following
CTs noted above  CEA 1.9   : 7  Heme/Onc recommends EBUS with biopsy  Dr. Lombardi, Pulmonary, following  Dr. Mathews, Heme/Onc, following  Palliative care following
-plan as above
Heme/Onc recommends EBUS with biopsy  Dr. Lombardi, Pulmonary, following  Dr. Mathews, Heme/Onc, following  Palliative care following

## 2021-10-05 NOTE — PROGRESS NOTE ADULT - PROBLEM SELECTOR PROBLEM 10
Discharge planning issues
HTN (hypertension)
QT prolongation
HTN (hypertension)
QT prolongation
QT prolongation
HTN (hypertension)
HTN (hypertension)
R/O Malignancy

## 2021-10-05 NOTE — PROGRESS NOTE ADULT - SUBJECTIVE AND OBJECTIVE BOX
Time of Visit:  Patient seen and examined.     MEDICATIONS  (STANDING):  amLODIPine   Tablet 5 milliGRAM(s) Oral daily  folic acid 1 milliGRAM(s) Oral daily  influenza   Vaccine 0.5 milliLiter(s) IntraMuscular once  lisinopril 10 milliGRAM(s) Oral every 12 hours  LORazepam     Tablet 1 milliGRAM(s) Oral at bedtime  melatonin 1 milliGRAM(s) Oral at bedtime  metoprolol tartrate 100 milliGRAM(s) Oral two times a day  nitrofurantoin monohydrate/macrocrystals (MACROBID) 100 milliGRAM(s) Oral two times a day  nystatin Powder 1 Application(s) Topical every 8 hours  pantoprazole    Tablet 40 milliGRAM(s) Oral before breakfast  PARoxetine 40 milliGRAM(s) Oral daily  simvastatin 40 milliGRAM(s) Oral at bedtime  sodium chloride 0.9%. 1000 milliLiter(s) (75 mL/Hr) IV Continuous <Continuous>      MEDICATIONS  (PRN):  acetaminophen   Tablet .. 650 milliGRAM(s) Oral every 6 hours PRN Temp greater or equal to 38C (100.4F), Mild Pain (1 - 3)  ALBUTerol    90 MICROgram(s) HFA Inhaler 2 Puff(s) Inhalation every 6 hours PRN Shortness of Breath and/or Wheezing       Medications up to date at time of exam.      PHYSICAL EXAMINATION:  Patient has no new complaints.  GENERAL: The patient is a well-developed, well-nourished, in no apparent distress.     Vital Signs Last 24 Hrs  T(C): 36.9 (05 Oct 2021 20:10), Max: 36.9 (05 Oct 2021 20:10)  T(F): 98.5 (05 Oct 2021 20:10), Max: 98.5 (05 Oct 2021 20:10)  HR: 69 (05 Oct 2021 20:10) (69 - 98)  BP: 105/77 (05 Oct 2021 20:10) (105/77 - 137/79)  BP(mean): --  RR: 18 (05 Oct 2021 20:10) (17 - 18)  SpO2: 96% (05 Oct 2021 20:10) (96% - 97%)   (if applicable)    Chest Tube (if applicable)    HEENT: Head is normocephalic and atraumatic. Extraocular muscles are intact. Mucous membranes are moist.     NECK: Supple, no palpable adenopathy.    LUNGS: Clear to auscultation, no wheezing, rales, or rhonchi.    HEART: Regular rate and rhythm without murmur.    ABDOMEN: Soft, nontender, and nondistended.  No hepatosplenomegaly is noted.    : No painful voiding, no pelvic pain    EXTREMITIES: Without any cyanosis, clubbing, rash, lesions or edema.    NEUROLOGIC: Awake, alert, oriented, grossly intact    SKIN: Warm, dry, good turgor.      LABS:                        8.1    9.25  )-----------( 154      ( 05 Oct 2021 07:56 )             24.7     10-05    141  |  107  |  22<H>  ----------------------------<  121<H>  3.5   |  24  |  1.12    Ca    7.9<L>      05 Oct 2021 07:56  Mg     2.2     10-04    TPro  7.0  /  Alb  2.6<L>  /  TBili  0.4  /  DBili  x   /  AST  74<H>  /  ALT  21  /  AlkPhos  92  10-05    PT/INR - ( 04 Oct 2021 08:25 )   PT: 13.5 sec;   INR: 1.14 ratio                             MICROBIOLOGY: (if applicable)    RADIOLOGY & ADDITIONAL STUDIES:  EKG:   CXR:  ECHO:    IMPRESSION: 71y Female PAST MEDICAL & SURGICAL HISTORY:  Lumbar stenosis    H/O: HTN (hypertension)    Hypercholesterolemia    Bleeding ulcer  stomach ulcer 2011    Anxiety    Spinal stenosis in cervical region    Acute hepatitis C virus infection without hepatic coma  treated 2015- Resolved    Balance disorder    Coronary artery disease involving native coronary artery of native heart without angina pectoris    HTN (hypertension)    HLD (hyperlipidemia)    H/O colonoscopy    Bleeding ulcer  stomach surgery for bleeding ulcer    Fracture  ORIF Left wrist  1/17/13    History of lumbar surgery  2013    Chronic UTI  Pt reports presently on 2nd dose of antibiotics 7/5/18 10 days, Dr Virgil smith, pt going for m/eval 7/13/18.    H/O cardiac catheterization  2013, no intervention needed, treated medically     p/w           Impression; 70 Y/O Female presented to ED with  Hypoxia due to post obstructive pna with right lung mass with meds. Former smoker. Had hypoxia and hypotension at outpatient clinic. 09-23-21 Negative for Covid 19 PCR. Has an Acute hypoxic respiratory failure seconadry to Post obstructive Pneumonia and Has Right Hilar Lung Mass.   Pat is spiked  fever , normal wbc with neg cultures and completed 8 days of antibx . Fever may be related to mass or malignancy.  AMS probable due to combination of  hypercalcemia , sepsis      Pat is afebrile with sharp elevation of WBC , off antibx and afebrile . No diarrhea       Plan   -s/p  EBUS/ bronch 10/4   -right endobronchial mass, refer to bronch procedure note and pics in chart   -nuclear cardiac stress neg    -do not resume antibx for now   -f/u all post bronch labs   -out pat pulmo and onco f/u

## 2021-10-05 NOTE — PROGRESS NOTE ADULT - ASSESSMENT
72 y/o F hx of HTN and HLD BIBEMS for hypoxia and hypotension at outpatient clinic. Patient admitted w/ Sepsis secondary to RUL PNA. CT showed RUL PNA. Additionally, her UA was positive with UCx growing E coli (10-49K).  She is now s/p  azithromycin and Rocephin. Bcx no growth. Hospital course c/b finding on  CT  chest scan of R hilar lung mass and Mediastinal  adenopathy, CT A/P revealed liver lesions, probable liver metastases and CT head with multiple lytic lesions suspicious for metastases or multiple myeloma. She was found  additionally to be  hypercalcemic; s/p Aredia and calcitonin w/ calcium level now wnl. S/P EBUS found large mass obscuring 80% of right bronchus. Biopsies obtained and sent for pathology, plan for outpatient follow up with oncology and pulm. Continue supplemental 02, concentrator delivered to bedside. Transport home in AM

## 2021-10-05 NOTE — PROGRESS NOTE ADULT - PROBLEM SELECTOR PROBLEM 2
Lung mass
Lung mass
Community acquired pneumonia
Lung mass
Community acquired pneumonia
Lung mass
Community acquired pneumonia
Lung mass

## 2021-10-05 NOTE — PROGRESS NOTE ADULT - ASSESSMENT
_________________________________________________________________________________________  ========>>  M E D I C A L   A T T E N D I N G    F O L L O W  U P  N O T E  <<=========  -----------------------------------------------------------------------------------------------------    - Patient seen and examined by me  (covering today)   - In summary,  ELAINA JAMA is a 71y year old woman admitted with PNA  - Patient today overall doing ok, comfortable, eating OK. awaiting O2 set up to go home     ==================>> REVIEW OF SYSTEM <<=================    limited ROS, pt poor historian   GEN: no fever, no chills, no pain  RESP: no SOB at rest, no cough, no sputum  CVS: no chest pain, no palpitations  GI: no abdominal pain, no nausea  : no dysuria, no frequency  Neuro: no headache, no dizziness  Derm : no itching, no rash    ==================>> PHYSICAL EXAM <<=================    GEN: A&O X 1-2 , NAD , comfortable, pleasant, calm   HEENT: NCAT, PERRL, MMM, hearing intact, on O2 via NC   Neck: supple , no JVD appreciated  CVS: S1S2 , regular , limited exam as not taking deep breaths    PULM: CTA B/L,  no W/R/R appreciated  ABD.: soft. non tender, non distended  Extrem: intact pulses , no edema   PSYCH : normal mood,  not anxious                            ( Note Written / Date of service :  10-05-21 )    ==================>> MEDICATIONS <<====================    MEDICATIONS  (STANDING):  amLODIPine   Tablet 5 milliGRAM(s) Oral daily  folic acid 1 milliGRAM(s) Oral daily  influenza   Vaccine 0.5 milliLiter(s) IntraMuscular once  lisinopril 10 milliGRAM(s) Oral every 12 hours  LORazepam     Tablet 1 milliGRAM(s) Oral at bedtime  melatonin 1 milliGRAM(s) Oral at bedtime  metoprolol tartrate 100 milliGRAM(s) Oral two times a day  nitrofurantoin monohydrate/macrocrystals (MACROBID) 100 milliGRAM(s) Oral two times a day  nystatin Powder 1 Application(s) Topical every 8 hours  pantoprazole    Tablet 40 milliGRAM(s) Oral before breakfast  PARoxetine 40 milliGRAM(s) Oral daily  simvastatin 40 milliGRAM(s) Oral at bedtime  sodium chloride 0.9%. 1000 milliLiter(s) (75 mL/Hr) IV Continuous <Continuous>    MEDICATIONS  (PRN):  acetaminophen   Tablet .. 650 milliGRAM(s) Oral every 6 hours PRN Temp greater or equal to 38C (100.4F), Mild Pain (1 - 3)  ALBUTerol    90 MICROgram(s) HFA Inhaler 2 Puff(s) Inhalation every 6 hours PRN Shortness of Breath and/or Wheezing    ___________  Active diet:  Diet, Regular:   DASH/TLC Sodium & Cholesterol Restricted  ___________________    ==================>> VITAL SIGNS <<==================    T(C): 36.4 (10-05-21 @ 13:12), Max: 37.8 (10-04-21 @ 16:16)  HR: 74 (10-05-21 @ 13:12) (74 - 114)  BP: 110/58 (10-05-21 @ 13:12) (100/64 - 139/79)  BP(mean): 71 (10-04-21 @ 16:46)  RR: 17 (10-05-21 @ 13:12) (17 - 22)  SpO2: 97% (10-05-21 @ 13:12) (93% - 98%)     I&O's Summary    04 Oct 2021 07:01  -  05 Oct 2021 07:00  --------------------------------------------------------  IN: 1400 mL / OUT: 0 mL / NET: 1400 mL         ==================>> LAB AND IMAGING <<==================                        8.1    9.25  )-----------( 154      ( 05 Oct 2021 07:56 )             24.7        10-05    141  |  107  |  22<H>  ----------------------------<  121<H>  3.5   |  24  |  1.12    Ca    7.9<L>      05 Oct 2021 07:56  Mg     2.2     10-04    TPro  7.0  /  Alb  2.6<L>  /  TBili  0.4  /  DBili  x   /  AST  74<H>  /  ALT  21  /  AlkPhos  92  10-05    PT/INR - ( 04 Oct 2021 08:25 )   PT: 13.5 sec;   INR: 1.14 ratio          TSH:      1.17   (09-28-21)           HgA1C:   (09-21-21)          (09-21-21)      6.5    ___________________________________________________________________________________  ===============>>  A S S E S S M E N T   A N D   P L A N <<===============  ------------------------------------------------------------------------------------------    · Assessment	  72 y/o F hx of HTN and HLD BIBEMS for hypoxia and hypotension at outpatient clinic. Patient admitted w/ Sepsis secondary to RUL PNA. CT showed RUL PNA. Additionally, her UA was  positive with UCx growing E coli (10-49K).      Problem/Plan - 1:  ·  Problem: Acute respiratory failure with hypoxia.   ·  Plan: In the setting of lung mass and PNA;  - s/p antimicrobial therapy x 5 days   - SPO2 92-96 % on 3L ( recent spo2on RA was 88%; will require home oxygen therapy)   - last blood cx neg  - Pulmonary, Dr Lombardi following; s/p  EBUS w/ biopsy >> follow up results as OP   - home O2 being set up     Problem/Plan - 2:  ·  Problem: Lung mass.   ·  Plan: CEA 1.9.  : 7  - S/p EBUS w/ biopsy >> f/u path result   Dr. Lombardi, Pulmonary, following  Dr. Mathews, Heme/Onc, following  Palliative care following.    Problem/Plan - 3:  ·  Problem: Abdominal mass.   ·  Plan: IR advised liver lesions not large enough for biopsy. Likely metastatic lung ca; s/p EBUS w/ bx today  Pt to follow up as outpatient for PET scan per heme/onc recommendations  Dr. Mathews, Heme/Onc, following  Dr. Arnett, Surg-Onc, following.    Problem/Plan - 4:  ·  Problem: Malignancy.   ·  Plan: Heme/Onc following  -likely metastatic  Lung cancer  to bone/liver/peritoneum;  -s/p EBUS w/ biopsy as above   -outpatient PET scan    Problem/Plan - 5:  ·  Problem: Hypercalcemia.   ·  Plan: Likely secondary to metastatic disease  - s/p Aredia 9/27.   - Calcitonin 4 IU /kg q12 hrs x 1 day  - Calcium level 10.2 { corrected 11} today ; continue to monitor with possible additional Calcitonin x1 more day if Ca remains elevated.  - Heme/Onc following.    Problem/Plan - 6:  ·  Problem: Confused.   ·  Plan: / toxic metabolic enceph.   patient previously with periods of confusion in the setting of metastatic disease and hypercalcemia;   - s/p Aredia 9/27 and Calcitonin x 2 doses  - Calcium now wnl  -  Dr Jackson was consulted; s/p EEG 9/30 which revealed mild nonspecific diffuse or multifocal cerebral dysfunction. No epileptiform pattern or seizure seen.  - Patient currently at baseline.    Problem/Plan - 7:  ·  Problem: Acute UTI.   ·  Plan: +VRE;   - Continue macrobid for total 5 day course   -ID, Dr Lopez, following.    Problem/Plan - 8:  ·  Problem: Community acquired pneumonia.   ·  Plan: - S/p Antimicrobial therapy  - ID Dr Lopez following.    Problem/Plan - 9:  ·  Problem: BRENDA (acute kidney injury).   ·  Plan: -IVF x 12 hours  - Avoid nephrotoxic agents  - Continue to monitor BMP.    Problem/Plan - 10:  ·  Problem: QT prolongation.   ·  Plan; - Followed by EP  - Avoid QT prolonging medications  - will need periodic EKG monitoring  w/ future chemotherapy   - S/p stress today; wnl   - no further inpatient  EP intervention planned.    -GI/DVT Prophylaxis per protocol.    --------------------------------------------  Case discussed with ALLEN post   __________________________  ELIZABETH Hi D.O.  (covering today)   Pager: [phone]

## 2021-10-05 NOTE — PROGRESS NOTE ADULT - ASSESSMENT
Fever - improving  Pneumonia - completed treatment  UTI - VRE    Plan - Can DC Macrobid after 6PM dose today as she would have completed 5 day course       Fever - improving  Pneumonia - completed treatment  UTI - VRE    Plan - Can DC Macrobid after 6PM dose today as she would have completed 5 day course  Reconsult prn       Fever - improving  Pneumonia - completed treatment  UTI - VRE    Plan - Can DC Macrobid after 6PM dose today as she would have completed 5 day course  Reconsult prn    I agree with above

## 2021-10-05 NOTE — PROGRESS NOTE ADULT - PROBLEM SELECTOR PLAN 10
- Followed by EP  - Avoid QT prolonging medications  - will need periodic EKG monitoring  w/ future chemotherapy   - S/p stress today; wnl   - no further inpatient  EP intervention planned.
QT prolongation of ECG  seen by EP - Dr Carrasco:  appreciate input (no further work-up at this time)  f/b cardiology likely stress test when optimized (outpt)  monitor electrolytes
Controlled  Pt takes Metoprolol, Amlodipine and Benazepril at home  Continue home regimen bb and Norvasc with parameters  .continue therapeutic interchange Lisinopril with parameters  Monitor BP
Pt takes Metoprolol, Amlodipine and Benazepril at home  -Continue home regimen bb and Norvasc with parameters  -Continue therapeutic interchange Lisinopril with parameters and monitor BMP   - Continue to monitor BP
-oncology work up to r/o malignancy- IR bx peritoneal nodules  -EBUS with biopsy per pulm  -MRI brain pending
Pt takes Metoprolol, Amlodipine and Benazepril at home  -Continue home regimen bb and Norvasc with parameters  -Continue therapeutic interchange Lisinopril with parameters  - Continue to monitor BP
Elevated  Pt takes Metoprolol, Amlodipine and Benazepril at home  Continue home regimen bb and norvasc with parameters  Start therapeutic interchange Lisinopril with parameters  Monitor BP
- Followed by EP  - Avoid QT prolonging medications  - will need periodic EKG monitoring  w/ future chemotherapy   - S/p stress today; wnl   - no further inpatient  EP intervention planned.
plan as above

## 2021-10-05 NOTE — PROGRESS NOTE ADULT - SUBJECTIVE AND OBJECTIVE BOX
C A R D I O L O G Y  **********************************     DATE OF SERVICE: 10-05-21    Patient denies chest pain or shortness of breath.   Review of symptoms otherwise negative.    acetaminophen   Tablet .. 650 milliGRAM(s) Oral every 6 hours PRN  ALBUTerol    90 MICROgram(s) HFA Inhaler 2 Puff(s) Inhalation every 6 hours PRN  amLODIPine   Tablet 5 milliGRAM(s) Oral daily  folic acid 1 milliGRAM(s) Oral daily  influenza   Vaccine 0.5 milliLiter(s) IntraMuscular once  lisinopril 10 milliGRAM(s) Oral every 12 hours  LORazepam     Tablet 1 milliGRAM(s) Oral at bedtime  melatonin 1 milliGRAM(s) Oral at bedtime  metoprolol tartrate 100 milliGRAM(s) Oral two times a day  nitrofurantoin monohydrate/macrocrystals (MACROBID) 100 milliGRAM(s) Oral two times a day  nystatin Powder 1 Application(s) Topical every 8 hours  pantoprazole    Tablet 40 milliGRAM(s) Oral before breakfast  PARoxetine 40 milliGRAM(s) Oral daily  simvastatin 40 milliGRAM(s) Oral at bedtime  sodium chloride 0.9%. 1000 milliLiter(s) IV Continuous <Continuous>                            8.1    9.25  )-----------( 154      ( 05 Oct 2021 07:56 )             24.7       Hemoglobin: 8.1 g/dL (10-05 @ 07:56)  Hemoglobin: 8.5 g/dL (10-04 @ 08:25)  Hemoglobin: 9.1 g/dL (10-03 @ 07:00)  Hemoglobin: 9.4 g/dL (10-02 @ 08:48)  Hemoglobin: 9.2 g/dL (10-01 @ 07:37)      10-05    141  |  107  |  22<H>  ----------------------------<  121<H>  3.5   |  24  |  1.12    Ca    7.9<L>      05 Oct 2021 07:56  Mg     2.2     10-04    TPro  7.0  /  Alb  2.6<L>  /  TBili  0.4  /  DBili  x   /  AST  74<H>  /  ALT  21  /  AlkPhos  92  10-05    Creatinine Trend: 1.12<--, 1.34<--, 1.01<--, 0.78<--, 0.75<--, 0.96<--    COAGS:           T(C): 36.2 (10-05-21 @ 05:23), Max: 37.8 (10-04-21 @ 16:16)  HR: 98 (10-05-21 @ 05:23) (97 - 114)  BP: 128/66 (10-05-21 @ 05:23) (100/64 - 139/79)  RR: 18 (10-05-21 @ 05:23) (18 - 22)  SpO2: 96% (10-05-21 @ 05:23) (93% - 98%)  Wt(kg): --    I&O's Summary    04 Oct 2021 07:01  -  05 Oct 2021 07:00  --------------------------------------------------------  IN: 1400 mL / OUT: 0 mL / NET: 1400 mL        HEENT:  (-)icterus (-)pallor  CV: N S1 S2 1/6 KAREN (+)2 Pulses B/l  Resp:  Clear to ausculatation B/L, normal effort  GI: (+) BS Soft, NT, ND  Lymph:  (-)Edema, (-)obvious lymphadenopathy  Skin: Warm to touch, Normal turgor  Psych: Appropriate mood and affect      ASSESSMENT/PLAN: 	71y  Female HTN and HLD BIBEMS for hypoxia and hypotension at outpatient clinic found with hilar mass and post obstructive PNA Normal LV function    - Abnormal EKG, nonspecific findings however her QT is prolonged.  Avoid QT prolonging meds  - EP eval appreciated   -  stress test with no ischemia or infarct  - No need for further inpatient cardiac work up.  - S/P Broch, pulmonary f/u    Scooter Gutiérrez MD, [facility]  BEEPER [phone]

## 2021-10-05 NOTE — PROGRESS NOTE ADULT - PROBLEM SELECTOR PLAN 9
Resolved   likely prerenal   improved with IVF   - Avoid nephrotoxic agents  - Continue to monitor BMP

## 2021-10-05 NOTE — PROGRESS NOTE ADULT - SUBJECTIVE AND OBJECTIVE BOX
Patient is a 71y old  Female who presents with a chief complaint of Pneumonia (05 Oct 2021 11:30)      SUBJECTIVE / OVERNIGHT EVENTS:    ADDITIONAL REVIEW OF SYSTEMS:    MEDICATIONS  (STANDING):  amLODIPine   Tablet 5 milliGRAM(s) Oral daily  folic acid 1 milliGRAM(s) Oral daily  influenza   Vaccine 0.5 milliLiter(s) IntraMuscular once  lisinopril 10 milliGRAM(s) Oral every 12 hours  LORazepam     Tablet 1 milliGRAM(s) Oral at bedtime  melatonin 1 milliGRAM(s) Oral at bedtime  metoprolol tartrate 100 milliGRAM(s) Oral two times a day  nitrofurantoin monohydrate/macrocrystals (MACROBID) 100 milliGRAM(s) Oral two times a day  nystatin Powder 1 Application(s) Topical every 8 hours  pantoprazole    Tablet 40 milliGRAM(s) Oral before breakfast  PARoxetine 40 milliGRAM(s) Oral daily  simvastatin 40 milliGRAM(s) Oral at bedtime  sodium chloride 0.9%. 1000 milliLiter(s) (75 mL/Hr) IV Continuous <Continuous>    MEDICATIONS  (PRN):  acetaminophen   Tablet .. 650 milliGRAM(s) Oral every 6 hours PRN Temp greater or equal to 38C (100.4F), Mild Pain (1 - 3)  ALBUTerol    90 MICROgram(s) HFA Inhaler 2 Puff(s) Inhalation every 6 hours PRN Shortness of Breath and/or Wheezing      Vital Signs Last 24 Hrs  T(C): 36.2 (05 Oct 2021 05:23), Max: 37.8 (04 Oct 2021 16:16)  T(F): 97.2 (05 Oct 2021 05:23), Max: 100 (04 Oct 2021 16:16)  HR: 98 (05 Oct 2021 05:23) (97 - 114)  BP: 128/66 (05 Oct 2021 05:23) (100/64 - 139/79)  BP(mean): 71 (04 Oct 2021 16:46) (67 - 92)  RR: 18 (05 Oct 2021 05:23) (18 - 22)  SpO2: 96% (05 Oct 2021 05:23) (93% - 98%)      LABS:                        8.1    9.25  )-----------( 154      ( 05 Oct 2021 07:56 )             24.7     10-05    141  |  107  |  22<H>  ----------------------------<  121<H>  3.5   |  24  |  1.12    Ca    7.9<L>      05 Oct 2021 07:56  Mg     2.2     10-04    TPro  7.0  /  Alb  2.6<L>  /  TBili  0.4  /  DBili  x   /  AST  74<H>  /  ALT  21  /  AlkPhos  92  10-05    PT/INR - ( 04 Oct 2021 08:25 )   PT: 13.5 sec;   INR: 1.14 ratio                   COVID-19 PCR: NotDetec (03 Oct 2021 06:52)  COVID-19 PCR: NotDetec (23 Sep 2021 20:35)  COVID-19 PCR: NotDetec (20 S   Patient is a 71y old  Female who presents with a chief complaint of Pneumonia (05 Oct 2021 11:30)    SUBJECTIVE / OVERNIGHT EVENTS: pt anxious to be discharged, plan for today. No new complaints. Pt is A&O x 3      MEDICATIONS  (STANDING):  amLODIPine   Tablet 5 milliGRAM(s) Oral daily  folic acid 1 milliGRAM(s) Oral daily  influenza   Vaccine 0.5 milliLiter(s) IntraMuscular once  lisinopril 10 milliGRAM(s) Oral every 12 hours  LORazepam     Tablet 1 milliGRAM(s) Oral at bedtime  melatonin 1 milliGRAM(s) Oral at bedtime  metoprolol tartrate 100 milliGRAM(s) Oral two times a day  nitrofurantoin monohydrate/macrocrystals (MACROBID) 100 milliGRAM(s) Oral two times a day  nystatin Powder 1 Application(s) Topical every 8 hours  pantoprazole    Tablet 40 milliGRAM(s) Oral before breakfast  PARoxetine 40 milliGRAM(s) Oral daily  simvastatin 40 milliGRAM(s) Oral at bedtime  sodium chloride 0.9%. 1000 milliLiter(s) (75 mL/Hr) IV Continuous <Continuous>    MEDICATIONS  (PRN):  acetaminophen   Tablet .. 650 milliGRAM(s) Oral every 6 hours PRN Temp greater or equal to 38C (100.4F), Mild Pain (1 - 3)  ALBUTerol    90 MICROgram(s) HFA Inhaler 2 Puff(s) Inhalation every 6 hours PRN Shortness of Breath and/or Wheezing      Vital Signs Last 24 Hrs  T(C): 36.2 (05 Oct 2021 05:23), Max: 37.8 (04 Oct 2021 16:16)  T(F): 97.2 (05 Oct 2021 05:23), Max: 100 (04 Oct 2021 16:16)  HR: 98 (05 Oct 2021 05:23) (97 - 114)  BP: 128/66 (05 Oct 2021 05:23) (100/64 - 139/79)  BP(mean): 71 (04 Oct 2021 16:46) (67 - 92)  RR: 18 (05 Oct 2021 05:23) (18 - 22)  SpO2: 96% (05 Oct 2021 05:23) (93% - 98%)    GEN: NAD; A and O x 3  LUNGS: CTA B/L, NC O2 in place  HEART: S1 S2  ABDOMEN: soft, non-tender, non-distended, + BS  EXTREMITIES: no edema  NERVOUS SYSTEM:  Awake and alert; no focal neuro deficits      LABS:                        8.1    9.25  )-----------( 154      ( 05 Oct 2021 07:56 )             24.7     10-05    141  |  107  |  22<H>  ----------------------------<  121<H>  3.5   |  24  |  1.12    Ca    7.9<L>      05 Oct 2021 07:56  Mg     2.2     10-04    TPro  7.0  /  Alb  2.6<L>  /  TBili  0.4  /  DBili  x   /  AST  74<H>  /  ALT  21  /  AlkPhos  92  10-05    PT/INR - ( 04 Oct 2021 08:25 )   PT: 13.5 sec;   INR: 1.14 ratio            COVID-19 PCR: NotDetec (03 Oct 2021 06:52)  COVID-19 PCR: NotDetec (23 Sep 2021 20:35)  COVID-19 PCR: NotDetec (20 S

## 2021-10-06 ENCOUNTER — TRANSCRIPTION ENCOUNTER (OUTPATIENT)
Age: 71
End: 2021-10-06

## 2021-10-06 VITALS
HEART RATE: 76 BPM | DIASTOLIC BLOOD PRESSURE: 77 MMHG | OXYGEN SATURATION: 94 % | RESPIRATION RATE: 17 BRPM | SYSTOLIC BLOOD PRESSURE: 136 MMHG | TEMPERATURE: 99 F

## 2021-10-06 LAB
NIGHT BLUE STAIN TISS: SIGNIFICANT CHANGE UP
SPECIMEN SOURCE: SIGNIFICANT CHANGE UP

## 2021-10-06 PROCEDURE — 86803 HEPATITIS C AB TEST: CPT

## 2021-10-06 PROCEDURE — 82746 ASSAY OF FOLIC ACID SERUM: CPT

## 2021-10-06 PROCEDURE — 93017 CV STRESS TEST TRACING ONLY: CPT

## 2021-10-06 PROCEDURE — 71250 CT THORAX DX C-: CPT

## 2021-10-06 PROCEDURE — A9502: CPT

## 2021-10-06 PROCEDURE — 96375 TX/PRO/DX INJ NEW DRUG ADDON: CPT

## 2021-10-06 PROCEDURE — 70553 MRI BRAIN STEM W/O & W/DYE: CPT

## 2021-10-06 PROCEDURE — 83735 ASSAY OF MAGNESIUM: CPT

## 2021-10-06 PROCEDURE — 84155 ASSAY OF PROTEIN SERUM: CPT

## 2021-10-06 PROCEDURE — 80053 COMPREHEN METABOLIC PANEL: CPT

## 2021-10-06 PROCEDURE — 93005 ELECTROCARDIOGRAM TRACING: CPT

## 2021-10-06 PROCEDURE — 85610 PROTHROMBIN TIME: CPT

## 2021-10-06 PROCEDURE — 85027 COMPLETE CBC AUTOMATED: CPT

## 2021-10-06 PROCEDURE — 97162 PT EVAL MOD COMPLEX 30 MIN: CPT

## 2021-10-06 PROCEDURE — 76705 ECHO EXAM OF ABDOMEN: CPT

## 2021-10-06 PROCEDURE — 87015 SPECIMEN INFECT AGNT CONCNTJ: CPT

## 2021-10-06 PROCEDURE — 82570 ASSAY OF URINE CREATININE: CPT

## 2021-10-06 PROCEDURE — 96374 THER/PROPH/DIAG INJ IV PUSH: CPT

## 2021-10-06 PROCEDURE — 36415 COLL VENOUS BLD VENIPUNCTURE: CPT

## 2021-10-06 PROCEDURE — 87206 SMEAR FLUORESCENT/ACID STAI: CPT

## 2021-10-06 PROCEDURE — 87077 CULTURE AEROBIC IDENTIFY: CPT

## 2021-10-06 PROCEDURE — 88104 CYTOPATH FL NONGYN SMEARS: CPT

## 2021-10-06 PROCEDURE — 83615 LACTATE (LD) (LDH) ENZYME: CPT

## 2021-10-06 PROCEDURE — 87640 STAPH A DNA AMP PROBE: CPT

## 2021-10-06 PROCEDURE — 85025 COMPLETE CBC W/AUTO DIFF WBC: CPT

## 2021-10-06 PROCEDURE — 84300 ASSAY OF URINE SODIUM: CPT

## 2021-10-06 PROCEDURE — 87070 CULTURE OTHR SPECIMN AEROBIC: CPT

## 2021-10-06 PROCEDURE — 93306 TTE W/DOPPLER COMPLETE: CPT

## 2021-10-06 PROCEDURE — 70450 CT HEAD/BRAIN W/O DYE: CPT

## 2021-10-06 PROCEDURE — 81001 URINALYSIS AUTO W/SCOPE: CPT

## 2021-10-06 PROCEDURE — 88305 TISSUE EXAM BY PATHOLOGIST: CPT

## 2021-10-06 PROCEDURE — 95957 EEG DIGITAL ANALYSIS: CPT

## 2021-10-06 PROCEDURE — 78452 HT MUSCLE IMAGE SPECT MULT: CPT

## 2021-10-06 PROCEDURE — 86769 SARS-COV-2 COVID-19 ANTIBODY: CPT

## 2021-10-06 PROCEDURE — 86301 IMMUNOASSAY TUMOR CA 19-9: CPT

## 2021-10-06 PROCEDURE — 87186 SC STD MICRODIL/AGAR DIL: CPT

## 2021-10-06 PROCEDURE — 83605 ASSAY OF LACTIC ACID: CPT

## 2021-10-06 PROCEDURE — 83550 IRON BINDING TEST: CPT

## 2021-10-06 PROCEDURE — 82378 CARCINOEMBRYONIC ANTIGEN: CPT

## 2021-10-06 PROCEDURE — 94640 AIRWAY INHALATION TREATMENT: CPT

## 2021-10-06 PROCEDURE — 87641 MR-STAPH DNA AMP PROBE: CPT

## 2021-10-06 PROCEDURE — 87086 URINE CULTURE/COLONY COUNT: CPT

## 2021-10-06 PROCEDURE — 80048 BASIC METABOLIC PNL TOTAL CA: CPT

## 2021-10-06 PROCEDURE — 83540 ASSAY OF IRON: CPT

## 2021-10-06 PROCEDURE — 99285 EMERGENCY DEPT VISIT HI MDM: CPT

## 2021-10-06 PROCEDURE — 88112 CYTOPATH CELL ENHANCE TECH: CPT

## 2021-10-06 PROCEDURE — 74177 CT ABD & PELVIS W/CONTRAST: CPT

## 2021-10-06 PROCEDURE — 83036 HEMOGLOBIN GLYCOSYLATED A1C: CPT

## 2021-10-06 PROCEDURE — 86334 IMMUNOFIX E-PHORESIS SERUM: CPT

## 2021-10-06 PROCEDURE — 85045 AUTOMATED RETICULOCYTE COUNT: CPT

## 2021-10-06 PROCEDURE — 87521 HEPATITIS C PROBE&RVRS TRNSC: CPT

## 2021-10-06 PROCEDURE — 87040 BLOOD CULTURE FOR BACTERIA: CPT

## 2021-10-06 PROCEDURE — 84443 ASSAY THYROID STIM HORMONE: CPT

## 2021-10-06 PROCEDURE — 83880 ASSAY OF NATRIURETIC PEPTIDE: CPT

## 2021-10-06 PROCEDURE — 82962 GLUCOSE BLOOD TEST: CPT

## 2021-10-06 PROCEDURE — 85730 THROMBOPLASTIN TIME PARTIAL: CPT

## 2021-10-06 PROCEDURE — 87449 NOS EACH ORGANISM AG IA: CPT

## 2021-10-06 PROCEDURE — 76000 FLUOROSCOPY <1 HR PHYS/QHP: CPT

## 2021-10-06 PROCEDURE — 71045 X-RAY EXAM CHEST 1 VIEW: CPT

## 2021-10-06 PROCEDURE — 84100 ASSAY OF PHOSPHORUS: CPT

## 2021-10-06 PROCEDURE — 82330 ASSAY OF CALCIUM: CPT

## 2021-10-06 PROCEDURE — 84165 PROTEIN E-PHORESIS SERUM: CPT

## 2021-10-06 PROCEDURE — 87116 MYCOBACTERIA CULTURE: CPT

## 2021-10-06 PROCEDURE — 95819 EEG AWAKE AND ASLEEP: CPT

## 2021-10-06 PROCEDURE — 87635 SARS-COV-2 COVID-19 AMP PRB: CPT

## 2021-10-06 PROCEDURE — 74181 MRI ABDOMEN W/O CONTRAST: CPT

## 2021-10-06 PROCEDURE — 80061 LIPID PANEL: CPT

## 2021-10-06 PROCEDURE — 87102 FUNGUS ISOLATION CULTURE: CPT

## 2021-10-06 PROCEDURE — 82607 VITAMIN B-12: CPT

## 2021-10-06 RX ORDER — LISINOPRIL 2.5 MG/1
10 TABLET ORAL DAILY
Refills: 0 | Status: DISCONTINUED | OUTPATIENT
Start: 2021-10-06 | End: 2021-10-06

## 2021-10-06 RX ADMIN — Medication 100 MILLIGRAM(S): at 06:24

## 2021-10-06 RX ADMIN — NYSTATIN CREAM 1 APPLICATION(S): 100000 CREAM TOPICAL at 06:24

## 2021-10-06 RX ADMIN — PANTOPRAZOLE SODIUM 40 MILLIGRAM(S): 20 TABLET, DELAYED RELEASE ORAL at 06:24

## 2021-10-06 RX ADMIN — AMLODIPINE BESYLATE 5 MILLIGRAM(S): 2.5 TABLET ORAL at 06:24

## 2021-10-06 RX ADMIN — LISINOPRIL 10 MILLIGRAM(S): 2.5 TABLET ORAL at 06:24

## 2021-10-06 NOTE — PROGRESS NOTE ADULT - REASON FOR ADMISSION
Pneumonia
Pneumonia, UTI
Pneumonia

## 2021-10-06 NOTE — PROGRESS NOTE ADULT - SUBJECTIVE AND OBJECTIVE BOX
C A R D I O L O G Y  **********************************     DATE OF SERVICE: 10-06-21    Patient denies chest pain or shortness of breath.   Review of symptoms otherwise negative.    acetaminophen   Tablet .. 650 milliGRAM(s) Oral every 6 hours PRN  ALBUTerol    90 MICROgram(s) HFA Inhaler 2 Puff(s) Inhalation every 6 hours PRN  amLODIPine   Tablet 5 milliGRAM(s) Oral daily  folic acid 1 milliGRAM(s) Oral daily  influenza   Vaccine 0.5 milliLiter(s) IntraMuscular once  lisinopril 10 milliGRAM(s) Oral daily  LORazepam     Tablet 1 milliGRAM(s) Oral at bedtime  melatonin 1 milliGRAM(s) Oral at bedtime  metoprolol tartrate 100 milliGRAM(s) Oral two times a day  nystatin Powder 1 Application(s) Topical every 8 hours  pantoprazole    Tablet 40 milliGRAM(s) Oral before breakfast  PARoxetine 40 milliGRAM(s) Oral daily  simvastatin 40 milliGRAM(s) Oral at bedtime  sodium chloride 0.9%. 1000 milliLiter(s) IV Continuous <Continuous>                            8.1    9.25  )-----------( 154      ( 05 Oct 2021 07:56 )             24.7       Hemoglobin: 8.1 g/dL (10-05 @ 07:56)  Hemoglobin: 8.5 g/dL (10-04 @ 08:25)  Hemoglobin: 9.1 g/dL (10-03 @ 07:00)  Hemoglobin: 9.4 g/dL (10-02 @ 08:48)      10-05    141  |  107  |  22<H>  ----------------------------<  121<H>  3.5   |  24  |  1.12    Ca    7.9<L>      05 Oct 2021 07:56    TPro  7.0  /  Alb  2.6<L>  /  TBili  0.4  /  DBili  x   /  AST  74<H>  /  ALT  21  /  AlkPhos  92  10-05    Creatinine Trend: 1.12<--, 1.34<--, 1.01<--, 0.78<--, 0.75<--, 0.96<--    COAGS:           T(C): 37.1 (10-06-21 @ 05:15), Max: 37.1 (10-06-21 @ 05:15)  HR: 76 (10-06-21 @ 05:15) (69 - 85)  BP: 136/77 (10-06-21 @ 05:15) (105/77 - 137/79)  RR: 17 (10-06-21 @ 05:15) (17 - 18)  SpO2: 94% (10-06-21 @ 05:15) (94% - 97%)  Wt(kg): --    I&O's Summary      HEENT:  (-)icterus (-)pallor  CV: N S1 S2 1/6 KAREN (+)2 Pulses B/l  Resp:  Clear to ausculatation B/L, normal effort  GI: (+) BS Soft, NT, ND  Lymph:  (-)Edema, (-)obvious lymphadenopathy  Skin: Warm to touch, Normal turgor  Psych: Appropriate mood and affect      ASSESSMENT/PLAN: 	71y  Female HTN and HLD BIBEMS for hypoxia and hypotension at outpatient clinic found with hilar mass and post obstructive PNA Normal LV function    - Abnormal EKG, nonspecific findings however her QT is prolonged.  Avoid QT prolonging meds  - EP eval appreciated   -  stress test with no ischemia or infarct  - No need for further inpatient cardiac work up.  - D/C plan per med    Scooter Gutiérrez MD, [facility]  BEEPER [phone]

## 2021-10-06 NOTE — CHART NOTE - NSCHARTNOTEFT_GEN_A_CORE
Request from Dr Yepez to facilitate patient's discharge home. Medication reconciliation reviewed with Attg. Patient deemed medically cleared for discharge with follow up as advised.

## 2021-10-06 NOTE — CHART NOTE - NSCHARTNOTESELECT_GEN_ALL_CORE
call/Event Note
Event Note
Procedure Note/Event Note
phone consent/Event Note
post op check/Event Note
Discharge/Event Note
Event Note
family call/Event Note

## 2021-10-06 NOTE — PROGRESS NOTE ADULT - ASSESSMENT
89 y/o F hx of HTN and HLD BIBEMS for hypoxia and hypotension at outpatient clinic. Patient admitted w/ Sepsis secondary to RUL PNA. CT showed RUL PNA. Additionally, her UA was  positive with UCx growing E coli (10-49K).      Problem/Plan - 1:  ·  Problem: Acute respiratory failure with hypoxia.   ·  Plan: In the setting of lung mass and PNA;  - s/p antimicrobial therapy x 5 days   - SPO2 92-96 % on 3L ( recent spo2on RA was 88%; will require home oxygen therapy)   - last blood cx neg  - Pulmonary, Dr Lombardi following; s/p  EBUS w/ biopsy >> follow up results as OP   - home O2 being set up     Problem/Plan - 2:  ·  Problem: Lung mass.   ·  Plan: CEA 1.9.  : 7  - S/p EBUS w/ biopsy >> f/u path result   Dr. Lombardi, Pulmonary, following  Dr. Mathews, Heme/Onc, following  Palliative care following.    Problem/Plan - 3:  ·  Problem: Abdominal mass.   ·  Plan: IR advised liver lesions not large enough for biopsy. Likely metastatic lung ca; s/p EBUS w/ bx today  Pt to follow up as outpatient for PET scan per heme/onc recommendations  Dr. Mtahews, Heme/Onc, following  Dr. Arnett, Surg-Onc, following.    Problem/Plan - 4:  ·  Problem: Malignancy.   ·  Plan: Heme/Onc following  -likely metastatic  Lung cancer  to bone/liver/peritoneum;  -s/p EBUS w/ biopsy as above   -outpatient PET scan    Problem/Plan - 5:  ·  Problem: Hypercalcemia.   ·  Plan: Likely secondary to metastatic disease  - s/p Aredia 9/27.   - Calcitonin 4 IU /kg q12 hrs x 1 day  - Calcium level 10.2 { corrected 11} today ; continue to monitor with possible additional Calcitonin x1 more day if Ca remains elevated.  - Heme/Onc following.    Problem/Plan - 6:  ·  Problem: Confused.   ·  Plan: / toxic metabolic enceph.   patient previously with periods of confusion in the setting of metastatic disease and hypercalcemia;   - s/p Aredia 9/27 and Calcitonin x 2 doses  - Calcium now wnl  -  Dr Jackson was consulted; s/p EEG 9/30 which revealed mild nonspecific diffuse or multifocal cerebral dysfunction. No epileptiform pattern or seizure seen.  - Patient currently at baseline.    Problem/Plan - 7:  ·  Problem: Acute UTI.   ·  Plan: +VRE;   - Continue macrobid for total 5 day course   -ID, Dr Lopez, following.    Problem/Plan - 8:  ·  Problem: Community acquired pneumonia.   ·  Plan: - S/p Antimicrobial therapy  - ID Dr Lopez following.    Problem/Plan - 9:  ·  Problem: BRENDA (acute kidney injury).   ·  Plan: -IVF x 12 hours  - Avoid nephrotoxic agents  - Continue to monitor BMP.    Problem/Plan - 10:  ·  Problem: QT prolongation.   ·  Plan; - Followed by EP  - Avoid QT prolonging medications  - will need periodic EKG monitoring  w/ future chemotherapy   - S/p stress today; wnl   - no further inpatient  EP intervention planned.

## 2021-10-06 NOTE — PROGRESS NOTE ADULT - PROVIDER SPECIALTY LIST ADULT
Cardiology
Electrophysiology
Heme/Onc
Internal Medicine
Internal Medicine
Neurology
Pulmonology
Cardiology
Cardiology
Heme/Onc
Infectious Disease
Internal Medicine
Pulmonology
Pulmonology
Cardiology
Electrophysiology
Heme/Onc
Heme/Onc
Infectious Disease
Internal Medicine
Pulmonology
Heme/Onc
Infectious Disease
Internal Medicine

## 2021-10-06 NOTE — DISCHARGE NOTE NURSING/CASE MANAGEMENT/SOCIAL WORK - PATIENT PORTAL LINK FT
You can access the FollowMyHealth Patient Portal offered by [facility] by registering at the following website: [email or web address]. By joining [employer]’s FollowMyHealth portal, you will also be able to view your health information using other applications (apps) compatible with our system.

## 2021-10-07 LAB
CULTURE RESULTS: SIGNIFICANT CHANGE UP
SPECIMEN SOURCE: SIGNIFICANT CHANGE UP

## 2021-10-11 LAB — SURGICAL PATHOLOGY STUDY: SIGNIFICANT CHANGE UP

## 2021-10-12 ENCOUNTER — INPATIENT (INPATIENT)
Facility: HOSPITAL | Age: 71
LOS: 15 days | Discharge: NURSING, REHAB OR LONG-TERM CARE | DRG: 947 | End: 2021-10-28
Attending: INTERNAL MEDICINE | Admitting: INTERNAL MEDICINE
Payer: MEDICARE

## 2021-10-12 VITALS
TEMPERATURE: 98 F | OXYGEN SATURATION: 97 % | HEIGHT: 62 IN | DIASTOLIC BLOOD PRESSURE: 65 MMHG | HEART RATE: 64 BPM | SYSTOLIC BLOOD PRESSURE: 116 MMHG | RESPIRATION RATE: 17 BRPM

## 2021-10-12 DIAGNOSIS — Z29.9 ENCOUNTER FOR PROPHYLACTIC MEASURES, UNSPECIFIED: ICD-10-CM

## 2021-10-12 DIAGNOSIS — R53.1 WEAKNESS: ICD-10-CM

## 2021-10-12 DIAGNOSIS — F41.9 ANXIETY DISORDER, UNSPECIFIED: ICD-10-CM

## 2021-10-12 DIAGNOSIS — D64.9 ANEMIA, UNSPECIFIED: ICD-10-CM

## 2021-10-12 DIAGNOSIS — I10 ESSENTIAL (PRIMARY) HYPERTENSION: ICD-10-CM

## 2021-10-12 DIAGNOSIS — E78.5 HYPERLIPIDEMIA, UNSPECIFIED: ICD-10-CM

## 2021-10-12 DIAGNOSIS — Z98.890 OTHER SPECIFIED POSTPROCEDURAL STATES: Chronic | ICD-10-CM

## 2021-10-12 DIAGNOSIS — N39.0 URINARY TRACT INFECTION, SITE NOT SPECIFIED: Chronic | ICD-10-CM

## 2021-10-12 LAB
ALBUMIN SERPL ELPH-MCNC: 2.6 G/DL — LOW (ref 3.5–5)
ALP SERPL-CCNC: 120 U/L — SIGNIFICANT CHANGE UP (ref 40–120)
ALT FLD-CCNC: 26 U/L DA — SIGNIFICANT CHANGE UP (ref 10–60)
ANION GAP SERPL CALC-SCNC: 7 MMOL/L — SIGNIFICANT CHANGE UP (ref 5–17)
ANISOCYTOSIS BLD QL: SLIGHT — SIGNIFICANT CHANGE UP
AST SERPL-CCNC: 69 U/L — HIGH (ref 10–40)
BASOPHILS # BLD AUTO: 0 K/UL — SIGNIFICANT CHANGE UP (ref 0–0.2)
BASOPHILS NFR BLD AUTO: 0 % — SIGNIFICANT CHANGE UP (ref 0–2)
BILIRUB SERPL-MCNC: 0.4 MG/DL — SIGNIFICANT CHANGE UP (ref 0.2–1.2)
BLD GP AB SCN SERPL QL: SIGNIFICANT CHANGE UP
BUN SERPL-MCNC: 26 MG/DL — HIGH (ref 7–18)
CALCIUM SERPL-MCNC: 7.6 MG/DL — LOW (ref 8.4–10.5)
CHLORIDE SERPL-SCNC: 111 MMOL/L — HIGH (ref 96–108)
CO2 SERPL-SCNC: 25 MMOL/L — SIGNIFICANT CHANGE UP (ref 22–31)
CREAT SERPL-MCNC: 1.14 MG/DL — SIGNIFICANT CHANGE UP (ref 0.5–1.3)
EOSINOPHIL # BLD AUTO: 0 K/UL — SIGNIFICANT CHANGE UP (ref 0–0.5)
EOSINOPHIL NFR BLD AUTO: 0 % — SIGNIFICANT CHANGE UP (ref 0–6)
GLUCOSE SERPL-MCNC: 142 MG/DL — HIGH (ref 70–99)
HCT VFR BLD CALC: 24.3 % — LOW (ref 34.5–45)
HCT VFR BLD CALC: 25.9 % — LOW (ref 34.5–45)
HGB BLD-MCNC: 7.7 G/DL — LOW (ref 11.5–15.5)
HGB BLD-MCNC: 8.1 G/DL — LOW (ref 11.5–15.5)
LYMPHOCYTES # BLD AUTO: 1.63 K/UL — SIGNIFICANT CHANGE UP (ref 1–3.3)
LYMPHOCYTES # BLD AUTO: 18 % — SIGNIFICANT CHANGE UP (ref 13–44)
MANUAL SMEAR VERIFICATION: SIGNIFICANT CHANGE UP
MCHC RBC-ENTMCNC: 30.2 PG — SIGNIFICANT CHANGE UP (ref 27–34)
MCHC RBC-ENTMCNC: 30.3 PG — SIGNIFICANT CHANGE UP (ref 27–34)
MCHC RBC-ENTMCNC: 31.3 GM/DL — LOW (ref 32–36)
MCHC RBC-ENTMCNC: 31.7 GM/DL — LOW (ref 32–36)
MCV RBC AUTO: 95.3 FL — SIGNIFICANT CHANGE UP (ref 80–100)
MCV RBC AUTO: 97 FL — SIGNIFICANT CHANGE UP (ref 80–100)
MICROCYTES BLD QL: SLIGHT — SIGNIFICANT CHANGE UP
MONOCYTES # BLD AUTO: 0.82 K/UL — SIGNIFICANT CHANGE UP (ref 0–0.9)
MONOCYTES NFR BLD AUTO: 9 % — SIGNIFICANT CHANGE UP (ref 2–14)
NEUTROPHILS # BLD AUTO: 6.63 K/UL — SIGNIFICANT CHANGE UP (ref 1.8–7.4)
NEUTROPHILS NFR BLD AUTO: 72 % — SIGNIFICANT CHANGE UP (ref 43–77)
NEUTS BAND # BLD: 1 % — SIGNIFICANT CHANGE UP (ref 0–8)
NRBC # BLD: 1 /100 WBCS — HIGH (ref 0–0)
NRBC # BLD: 2 /100 — HIGH (ref 0–0)
PLAT MORPH BLD: NORMAL — SIGNIFICANT CHANGE UP
PLATELET # BLD AUTO: 141 K/UL — LOW (ref 150–400)
PLATELET # BLD AUTO: 149 K/UL — LOW (ref 150–400)
POLYCHROMASIA BLD QL SMEAR: SLIGHT — SIGNIFICANT CHANGE UP
POTASSIUM SERPL-MCNC: 3.7 MMOL/L — SIGNIFICANT CHANGE UP (ref 3.5–5.3)
POTASSIUM SERPL-SCNC: 3.7 MMOL/L — SIGNIFICANT CHANGE UP (ref 3.5–5.3)
PROT SERPL-MCNC: 6.8 G/DL — SIGNIFICANT CHANGE UP (ref 6–8.3)
RBC # BLD: 2.55 M/UL — LOW (ref 3.8–5.2)
RBC # BLD: 2.67 M/UL — LOW (ref 3.8–5.2)
RBC # FLD: 16.9 % — HIGH (ref 10.3–14.5)
RBC # FLD: 17.2 % — HIGH (ref 10.3–14.5)
RBC BLD AUTO: ABNORMAL
SARS-COV-2 RNA SPEC QL NAA+PROBE: SIGNIFICANT CHANGE UP
SODIUM SERPL-SCNC: 143 MMOL/L — SIGNIFICANT CHANGE UP (ref 135–145)
TROPONIN I SERPL-MCNC: <0.015 NG/ML — SIGNIFICANT CHANGE UP (ref 0–0.04)
WBC # BLD: 9.08 K/UL — SIGNIFICANT CHANGE UP (ref 3.8–10.5)
WBC # BLD: 9.16 K/UL — SIGNIFICANT CHANGE UP (ref 3.8–10.5)
WBC # FLD AUTO: 9.08 K/UL — SIGNIFICANT CHANGE UP (ref 3.8–10.5)
WBC # FLD AUTO: 9.16 K/UL — SIGNIFICANT CHANGE UP (ref 3.8–10.5)

## 2021-10-12 PROCEDURE — 99285 EMERGENCY DEPT VISIT HI MDM: CPT

## 2021-10-12 PROCEDURE — 93010 ELECTROCARDIOGRAM REPORT: CPT

## 2021-10-12 RX ORDER — SIMVASTATIN 20 MG/1
40 TABLET, FILM COATED ORAL AT BEDTIME
Refills: 0 | Status: DISCONTINUED | OUTPATIENT
Start: 2021-10-12 | End: 2021-10-28

## 2021-10-12 RX ORDER — ALBUTEROL 90 UG/1
2 AEROSOL, METERED ORAL EVERY 6 HOURS
Refills: 0 | Status: DISCONTINUED | OUTPATIENT
Start: 2021-10-12 | End: 2021-10-28

## 2021-10-12 RX ORDER — FOLIC ACID 0.8 MG
1 TABLET ORAL DAILY
Refills: 0 | Status: DISCONTINUED | OUTPATIENT
Start: 2021-10-12 | End: 2021-10-28

## 2021-10-12 RX ORDER — OLANZAPINE 15 MG/1
2.5 TABLET, FILM COATED ORAL ONCE
Refills: 0 | Status: COMPLETED | OUTPATIENT
Start: 2021-10-12 | End: 2021-10-12

## 2021-10-12 RX ORDER — SODIUM CHLORIDE 9 MG/ML
1000 INJECTION INTRAMUSCULAR; INTRAVENOUS; SUBCUTANEOUS ONCE
Refills: 0 | Status: COMPLETED | OUTPATIENT
Start: 2021-10-12 | End: 2021-10-12

## 2021-10-12 RX ORDER — LANOLIN ALCOHOL/MO/W.PET/CERES
5 CREAM (GRAM) TOPICAL ONCE
Refills: 0 | Status: DISCONTINUED | OUTPATIENT
Start: 2021-10-12 | End: 2021-10-12

## 2021-10-12 RX ADMIN — SODIUM CHLORIDE 1000 MILLILITER(S): 9 INJECTION INTRAMUSCULAR; INTRAVENOUS; SUBCUTANEOUS at 15:12

## 2021-10-12 RX ADMIN — OLANZAPINE 2.5 MILLIGRAM(S): 15 TABLET, FILM COATED ORAL at 23:06

## 2021-10-12 NOTE — H&P ADULT - CONVERSATION DETAILS
Patient was AAOx1 and did not have insight into her diagnosis when seen and examined by Intern. Intern spoke to  Marcos about patient's cancer diagnosis and their plan for further management. Pt. currently has a file open at [facility] where they had an appointment to discuss treatment options and they want to pursue every and all treatment that may prolong her life. This also includes life saving measures including intubation and resuscitation if indicated.

## 2021-10-12 NOTE — H&P ADULT - NSICDXPASTMEDICALHX_GEN_ALL_CORE_FT
PAST MEDICAL HISTORY:  Acute hepatitis C virus infection without hepatic coma treated 2015- Resolved    Anxiety     Balance disorder     Bleeding ulcer stomach ulcer 2011    Coronary artery disease involving native coronary artery of native heart without angina pectoris     HLD (hyperlipidemia)     HTN (hypertension)     Lumbar stenosis     Spinal stenosis in cervical region

## 2021-10-12 NOTE — ED ADULT TRIAGE NOTE - CHIEF COMPLAINT QUOTE
patient hx of lung mass and was recently discharged for PNA complaining of worsening shortness of breath and weakness

## 2021-10-12 NOTE — ED PROVIDER NOTE - CLINICAL SUMMARY MEDICAL DECISION MAKING FREE TEXT BOX
71 yr old female with hx of HTN, HLD, lung CA mets --> abd and bone, CAD, HEp C presents to ed c/o generalized fatigue and decrease appetite x couple days. pt was recently dc from in. no fever, no chills, no visual changes, no headache, no numbness or tingling, no sob, no cough, no cp, no palpitations, no leg swelling, no syncope, no abd pain, no n/v/d, no dysuria, no rashes, no acute blood loss    gen weakness possibly from anemia vs uti vs malignancy vs decrease po/FTT- labs, ekg, admit

## 2021-10-12 NOTE — H&P ADULT - NSHPPHYSICALEXAM_GEN_ALL_CORE
T(C): 36.4 (10-12-21 @ 15:48), Max: 36.5 (10-12-21 @ 12:12)  HR: 74 (10-12-21 @ 15:48) (64 - 74)  BP: 134/78 (10-12-21 @ 15:48) (116/65 - 134/78)  RR: 17 (10-12-21 @ 15:48) (17 - 17)  SpO2: 95% (10-12-21 @ 15:48) (95% - 97%)    GENERAL: Patient resting comfortably on 2L NC, NAD, appropriate, pleasant  EYES: sclera clear, no exudates  ENMT: oropharynx clear without erythema, no exudates, moist mucous membranes  NECK: supple, soft, no thyromegaly noted  LUNGS: good air entry bilaterally, clear to auscultation, symmetric breath sounds, no wheezing, rhonchi or rales appreciated  HEART: S1/S2, regular rate and rhythm, no murmurs noted, no lower extremity edema  GASTROINTESTINAL: abdomen is soft, nontender, nondistended, normoactive bowel sounds, no palpable masses  INTEGUMENT: good skin turgor, no lesions noted  MUSCULOSKELETAL: no clubbing or cyanosis, no obvious deformity  NEUROLOGIC: AAO x1, good muscle tone in 4 extremities, no obvious sensory deficits  PSYCHIATRIC: mood is good, affect is congruent, linear and logical thought process  HEME/LYMPH: no palpable supraclavicular nodules, no obvious ecchymosis or petechiae

## 2021-10-12 NOTE — H&P ADULT - PROBLEM SELECTOR PLAN 4
RISK                                                          Points  [] Previous VTE                                           3  [] Thrombophilia                                        2  [] Lower limb paralysis                              2   [] Current Cancer                                       2   [x] Immobilization > 24 hrs                        1  [] ICU/CCU stay > 24 hours                       1  [x] Age > 60                                                   1    Lovenox for DVT ppx c/w home meds

## 2021-10-12 NOTE — H&P ADULT - ASSESSMENT
71 YOF with recent admission for sepsis, readmitted for placement to [facility]. Physical exam unremarkable and labs largely unchanged from discharge on 10/6 71 YOF with PMH HTN, HLD, metastatic lung CA amd recent admission for sepsis, readmitted for weakness and placement to [facility]. Physical exam unremarkable and labs largely unchanged from discharge on 10/6 71 YOF with PMH HTN, HLD, recent dx of metastatic lung CA and admission for sepsis, readmitted for weakness and placement to [facility]. Patient is Full Code.

## 2021-10-12 NOTE — ED PROVIDER NOTE - OBJECTIVE STATEMENT
CC:  Braden Baptiste is here today for Hypertension and Lipids   patient notes he is doing ok. He denies any new problems or concerns at this time. He does note his BH has not been well lately  Medications: medications verified and updated  Refills needed today?  NO  denies Latex allergy or sensitivity  Patient would like communication of their results via:    Cell Phone:   Telephone Information:   Mobile [phone]     Okay to leave a message containing results? Yes  Tobacco history: verified  Health Maintenance   Topic Date Due   • Depression Screening  03/04/2022   • DTaP/Tdap/Td Vaccine (3 - Td) 06/05/2022   • Pneumococcal Vaccine 0-64 (2 of 2) 10/10/2042   • Hepatitis B Vaccine  Completed   • Influenza Vaccine  Completed   • COVID-19 Vaccine  Completed   • Meningococcal Vaccine  Aged Out   • HPV Vaccine  Aged Out      -       There are no preventive care reminders to display for this patient.  Patient is up to date, no discussion needed.  Pre-charting complete. Patient has no orders needing to be placed.     71 yr old female with hx of HTN, HLD, lung CA mets --> abd and bone, CAD, HEp C presents to ed c/o generalized fatigue and decrease appetite x couple days. pt was recently dc from in. no fever, no chills, no visual changes, no headache, no numbness or tingling, no sob, no cough, no cp, no palpitations, no leg swelling, no syncope, no abd pain, no n/v/d, no dysuria, no rashes, no acute blood loss

## 2021-10-12 NOTE — ED PROVIDER NOTE - PROGRESS NOTE DETAILS
Blank: no significant findings. pt hemodynamically stable. pt agree to go rehab and  ama agree to sent to rehab  admit to med for rehab

## 2021-10-12 NOTE — H&P ADULT - NSICDXPASTSURGICALHX_GEN_ALL_CORE_FT
PAST SURGICAL HISTORY:  Bleeding ulcer stomach surgery for bleeding ulcer    Chronic UTI Pt reports presently on 2nd dose of antibiotics 7/5/18 10 days, Dr Herman aware, pt going for m/eval 7/13/18.    Fracture ORIF Left wrist  1/17/13    H/O cardiac catheterization 2013, no intervention needed, treated medically    H/O colonoscopy     History of lumbar surgery 2013

## 2021-10-12 NOTE — CHART NOTE - NSCHARTNOTEFT_GEN_A_CORE
EVENT: Behavioral disturbances     HPI:  70 y/o F with PMHx of HTN and HLD and metastatic Lung CA, was discharged on 10/6 from [facility] after hospitalization for sepsis from PNA and UTI, comes in complaining of weakness since discharge. Patient states that she felt well on the day of discharge but then started feeling weak again the next day. Patient denies fever, chills, headache, dizziness, chest pain, palpitations, cough, SOB, n/v/d/c,  complaints, rashes or bleeding. Patient admitted for placement to [facility] which was refused on previous admission per chart review.   Per  Marcos, he did not understand the extent of her weakness and was not able to take care of her at home. She currently has a file open at [facility] about cancer treatment options and wants to pursue every and all treatment/life saving measures for his wife including intubation and resuscitation if indicated. Admitted for placement.     SUBJECTIVE: Pt seen and assessed at bedside in ED and then again upon arrival to floor, appears A&Ox1-2, yelling, cursing, and attempting to hit and scratch staff when approached. She is also refusing to take all PO meds. Unable to be reoriented.     OBJECTIVE:  Vital Signs Last 24 Hrs  T(C): 36.3 (12 Oct 2021 21:19), Max: 36.5 (12 Oct 2021 12:12)  T(F): 97.3 (12 Oct 2021 21:19), Max: 97.7 (12 Oct 2021 12:12)  HR: 80 (12 Oct 2021 21:19) (64 - 80)  BP: 159/75 (12 Oct 2021 21:19) (116/65 - 159/75)  BP(mean): --  RR: 18 (12 Oct 2021 21:19) (17 - 18)  SpO2: 95% (12 Oct 2021 21:19) (95% - 97%)    PHYSICAL EXAM:  Neuro: Awake but confused  Cardiovascular: + S1, S2, no murmurs, rubs, or bruits  Respiratory: clear to auscultation bilaterally with good air entry   GI: Abdomen soft, non-tender, bowel sounds present   : Non distended;   Skin: warm and dry; no rash      LABS:                        8.1    9.16  )-----------( 149      ( 12 Oct 2021 21:32 )             25.9   CARDIAC MARKERS ( 12 Oct 2021 13:12 )  <0.015 ng/mL / x     / x     / x     / x        10-12    143  |  111<H>  |  26<H>  ----------------------------<  142<H>  3.7   |  25  |  1.14    Ca    7.6<L>      12 Oct 2021 13:12    TPro  6.8  /  Alb  2.6<L>  /  TBili  0.4  /  DBili  x   /  AST  69<H>  /  ALT  26  /  AlkPhos  120  10-12        EKG:   IMAGING:    ASSESSMENT: Acute agitation likely due to sundowning     PLAN:     -Zyprexa 2.5 mg IM x 1 dose  -Frequent reorientation  -Continue current/supportive care    FOLLOW UP / RESULT:    -Monitor effectiveness of above intervention EVENT: Behavioral disturbances     HPI:  70 y/o F with PMHx of HTN and HLD and metastatic Lung CA, was discharged on 10/6 from [facility] after hospitalization for sepsis from PNA and UTI, comes in complaining of weakness since discharge. Patient states that she felt well on the day of discharge but then started feeling weak again the next day. Patient denies fever, chills, headache, dizziness, chest pain, palpitations, cough, SOB, n/v/d/c,  complaints, rashes or bleeding. Patient admitted for placement to [facility] which was refused on previous admission per chart review.   Per  Marcos, he did not understand the extent of her weakness and was not able to take care of her at home. She currently has a file open at [facility] about cancer treatment options and wants to pursue every and all treatment/life saving measures for his wife including intubation and resuscitation if indicated. Admitted for placement.     SUBJECTIVE: Pt seen and assessed at bedside in ED and then again upon arrival to floor, appears A&Ox1-2, yelling, cursing, and attempting to hit and scratch staff when approached. She is also refusing to take all PO meds. Unable to be reoriented.     OBJECTIVE:  Vital Signs Last 24 Hrs  T(C): 36.3 (12 Oct 2021 21:19), Max: 36.5 (12 Oct 2021 12:12)  T(F): 97.3 (12 Oct 2021 21:19), Max: 97.7 (12 Oct 2021 12:12)  HR: 80 (12 Oct 2021 21:19) (64 - 80)  BP: 159/75 (12 Oct 2021 21:19) (116/65 - 159/75)  BP(mean): --  RR: 18 (12 Oct 2021 21:19) (17 - 18)  SpO2: 95% (12 Oct 2021 21:19) (95% - 97%)    PHYSICAL EXAM:  Neuro: Awake but confused  Cardiovascular: + S1, S2, no murmurs, rubs, or bruits  Respiratory: clear to auscultation bilaterally with good air entry   GI: Abdomen soft, non-tender, bowel sounds present   : Non distended;   Skin: warm and dry; no rash      LABS:                        8.1    9.16  )-----------( 149      ( 12 Oct 2021 21:32 )             25.9   CARDIAC MARKERS ( 12 Oct 2021 13:12 )  <0.015 ng/mL / x     / x     / x     / x        10-12    143  |  111<H>  |  26<H>  ----------------------------<  142<H>  3.7   |  25  |  1.14    Ca    7.6<L>      12 Oct 2021 13:12    TPro  6.8  /  Alb  2.6<L>  /  TBili  0.4  /  DBili  x   /  AST  69<H>  /  ALT  26  /  AlkPhos  120  10-12        EKG:   IMAGING: EKG reviewed in chart, NSR HR 64    ASSESSMENT: Acute agitation likely due to sundowning     PLAN:     -Zyprexa 2.5 mg IM x 1 dose  -Frequent reorientation  -Continue current/supportive care    FOLLOW UP / RESULT:    -Monitor effectiveness of above intervention EVENT: Behavioral disturbances     HPI:  70 y/o F with PMHx of HTN and HLD and metastatic Lung CA, was discharged on 10/6 from [facility] after hospitalization for sepsis from PNA and UTI, comes in complaining of weakness since discharge. Patient states that she felt well on the day of discharge but then started feeling weak again the next day. Patient denies fever, chills, headache, dizziness, chest pain, palpitations, cough, SOB, n/v/d/c,  complaints, rashes or bleeding. Patient admitted for placement to [facility] which was refused on previous admission per chart review.   Per  Marcos, he did not understand the extent of her weakness and was not able to take care of her at home. She currently has a file open at [facility] about cancer treatment options and wants to pursue every and all treatment/life saving measures for his wife including intubation and resuscitation if indicated. Admitted for placement.     SUBJECTIVE: Pt seen and assessed at bedside in ED and then again upon arrival to floor, appears A&Ox1-2, yelling, cursing, and attempting to hit and scratch staff when approached. She is also refusing to take all PO meds. Unable to be reoriented.     OBJECTIVE:  Vital Signs Last 24 Hrs  T(C): 36.3 (12 Oct 2021 21:19), Max: 36.5 (12 Oct 2021 12:12)  T(F): 97.3 (12 Oct 2021 21:19), Max: 97.7 (12 Oct 2021 12:12)  HR: 80 (12 Oct 2021 21:19) (64 - 80)  BP: 159/75 (12 Oct 2021 21:19) (116/65 - 159/75)  BP(mean): --  RR: 18 (12 Oct 2021 21:19) (17 - 18)  SpO2: 95% (12 Oct 2021 21:19) (95% - 97%)    PHYSICAL EXAM:  Neuro: Awake but confused  Cardiovascular: + S1, S2, no murmurs, rubs, or bruits  Respiratory: clear to auscultation bilaterally with good air entry   GI: Abdomen soft, non-tender, bowel sounds present   : Non distended;   Skin: warm and dry; no rash      LABS:                        8.1    9.16  )-----------( 149      ( 12 Oct 2021 21:32 )             25.9   CARDIAC MARKERS ( 12 Oct 2021 13:12 )  <0.015 ng/mL / x     / x     / x     / x        10-12    143  |  111<H>  |  26<H>  ----------------------------<  142<H>  3.7   |  25  |  1.14    Ca    7.6<L>      12 Oct 2021 13:12    TPro  6.8  /  Alb  2.6<L>  /  TBili  0.4  /  DBili  x   /  AST  69<H>  /  ALT  26  /  AlkPhos  120  10-12        EKG:   IMAGING:    ASSESSMENT: Acute agitation likely due to sundowning     PLAN:     -Zyprexa 2.5 mg IM x 1 dose  -Frequent reorientation  -Continue current/supportive care    FOLLOW UP / RESULT:    -Monitor effectiveness of above intervention

## 2021-10-12 NOTE — H&P ADULT - NSICDXFAMILYHX_GEN_ALL_CORE_FT
Full note in am.  INR 2.3. Await ortho recs for surgery. FAMILY HISTORY:  No pertinent family history in first degree relatives

## 2021-10-12 NOTE — H&P ADULT - PROBLEM SELECTOR PLAN 1
Hgb 7.7  per last admission Hgb was around 8-9  no active signs of bleeding   previous iron studies wnl  -f/u FOBT  -f/u iron studies  -monitor H&H  -transfuse if Hgb<7.0 Hgb 7.7  per last admission Hgb was around 8-9  no active signs of bleeding   previous iron studies wnl  -f/u CBC stat   -f/u FOBT  -f/u iron studies  -monitor H&H  -transfuse if Hgb<7.0

## 2021-10-12 NOTE — H&P ADULT - PROBLEM SELECTOR PLAN 5
RISK                                                          Points  [] Previous VTE                                           3  [] Thrombophilia                                        2  [] Lower limb paralysis                              2   [] Current Cancer                                       2   [x] Immobilization > 24 hrs                        1  [] ICU/CCU stay > 24 hours                       1  [x] Age > 60                                                   1    Lovenox for DVT ppx RISK                                                          Points  [] Previous VTE                                           3  [] Thrombophilia                                        2  [] Lower limb paralysis                              2   [] Current Cancer                                       2   [x] Immobilization > 24 hrs                        1  [] ICU/CCU stay > 24 hours                       1  [x] Age > 60                                                   1    Hold Lovenox for DVT ppx until HGB stabilizes RISK                                                          Points  [] Previous VTE                                           3  [] Thrombophilia                                        2  [] Lower limb paralysis                              2   [] Current Cancer                                       2   [x] Immobilization > 24 hrs                        1  [] ICU/CCU stay > 24 hours                       1  [x] Age > 60                                                   1  SCDs for now  Hold Lovenox for DVT ppx until HGB stabilizes

## 2021-10-12 NOTE — H&P ADULT - HISTORY OF PRESENT ILLNESS
72 y/o F with PMHx of HTN and HLD and metastatic Lung CA, was discharged on 10/6 from [facility] after hospitalization for sepsis from PNA and UTI, comes in complaining of weakness since discharge. Patient states that she felt well on the day of discharge but then started feeling weak again the next day. Her  tried to take care of her for a few days but then was unable to. Patient denies fever, chills, headache, diziness, chest pain, palpitations, cough, SOB, n/v/d/c,  complaints, rashes or bleeding. Patient admitted for placement to [facility] which was refused on previous admission per chart review.    In ED:   s/p 1L bolus  EKG: NSR  72 y/o F with PMHx of HTN and HLD and metastatic Lung CA, was discharged on 10/6 from [facility] after hospitalization for sepsis from PNA and UTI, comes in complaining of weakness since discharge. Patient states that she felt well on the day of discharge but then started feeling weak again the next day. Her  tried to take care of her for a few days but then was unable to. Patient denies fever, chills, headache, diziness, chest pain, palpitations, cough, SOB, n/v/d/c,  complaints, rashes or bleeding. Patient admitted for placement to [facility] which was refused on previous admission per chart review.     In ED:   Temp 97.5F, HR 74, /78, RR 17, SpO2: 97% on 2L NC   EKG: NSR   s/p 1L bolus 70 y/o F with PMHx of HTN and HLD and metastatic Lung CA, was discharged on 10/6 from [facility] beds at the [facility] after hospitalization for sepsis from PNA and UTI, comes in complaining of weakness since discharge. Patient states that she felt well on the day of discharge but then started feeling weak again the next day. Patient denies fever, chills, headache, dizziness, chest pain, palpitations, cough, SOB, n/v/d/c,  complaints, rashes or bleeding. Patient admitted for placement to [facility] which was refused on previous admission per chart review.   Per  Marcos, he did not understand the extent of her weakness and was bot able to take care of her at home. She currently has a file open at [facility] about cancer treatment options and wants to pursue every and all treatment/life saving measures for his wife including intubation and resuscitation if indicated.    In ED:   Temp 97.5F, HR 74, /78, RR 17, SpO2: 97% on 2L NC   EKG: NSR   s/p 1L bolus 72 y/o F with PMHx of HTN and HLD and metastatic Lung CA, was discharged on 10/6 from [facility] after hospitalization for sepsis from PNA and UTI, comes in complaining of weakness since discharge. Patient states that she felt well on the day of discharge but then started feeling weak again the next day. Patient denies fever, chills, headache, dizziness, chest pain, palpitations, cough, SOB, n/v/d/c,  complaints, rashes or bleeding. Patient admitted for placement to [facility] which was refused on previous admission per chart review.   Per  Marcos, he did not understand the extent of her weakness and was not able to take care of her at home. She currently has a file open at [facility] about cancer treatment options and wants to pursue every and all treatment/life saving measures for his wife including intubation and resuscitation if indicated.    In ED:   Temp 97.5F, HR 74, /78, RR 17, SpO2: 97% on 2L NC   EKG: NSR   s/p 1L bolus 70 y/o F with PMHx of HTN, HLD, and metastatic Lung CA, was discharged on 10/6 from [facility] after hospitalization for sepsis from PNA and UTI, comes in complaining of weakness since discharge. Patient states that she felt well on the day of discharge but then started feeling weak again the next day. Patient denies fever, chills, headache, dizziness, chest pain, palpitations, cough, SOB, n/v/d/c,  complaints, rashes or bleeding. Patient admitted for placement to [facility] which was refused on previous admission per chart review.   Per  Marcos, he did not understand the extent of her weakness and was not able to take care of her at home. She currently has a file open at [facility] about cancer treatment options and wants to pursue every and all treatment/life saving measures for his wife including intubation and resuscitation if indicated.    In ED:   Temp 97.5F, HR 74, /78, RR 17, SpO2: 97% on 2L NC   EKG: NSR   s/p 1L bolus

## 2021-10-12 NOTE — H&P ADULT - ATTENDING COMMENTS
Patient seen and examined in ED earlier today. Patient's history, vitals, labs, imaging studies reviewed. Discussed with above resident, agree with note with edits and educated on the diagnosis and management of above medical conditions. Plan of care discussed with patient, and agrees, all questions answered.   Sravani Pappas MD  (Covering for Dr. JENNA Arias today)  10/12/2021

## 2021-10-13 DIAGNOSIS — Z02.9 ENCOUNTER FOR ADMINISTRATIVE EXAMINATIONS, UNSPECIFIED: ICD-10-CM

## 2021-10-13 DIAGNOSIS — C78.00 SECONDARY MALIGNANT NEOPLASM OF UNSPECIFIED LUNG: ICD-10-CM

## 2021-10-13 LAB
ANION GAP SERPL CALC-SCNC: 12 MMOL/L — SIGNIFICANT CHANGE UP (ref 5–17)
BUN SERPL-MCNC: 18 MG/DL — SIGNIFICANT CHANGE UP (ref 7–18)
CALCIUM SERPL-MCNC: 7.9 MG/DL — LOW (ref 8.4–10.5)
CHLORIDE SERPL-SCNC: 111 MMOL/L — HIGH (ref 96–108)
CO2 SERPL-SCNC: 23 MMOL/L — SIGNIFICANT CHANGE UP (ref 22–31)
COVID-19 SPIKE DOMAIN AB INTERP: POSITIVE
COVID-19 SPIKE DOMAIN ANTIBODY RESULT: 43.6 U/ML — HIGH
CREAT SERPL-MCNC: 0.93 MG/DL — SIGNIFICANT CHANGE UP (ref 0.5–1.3)
GLUCOSE SERPL-MCNC: 114 MG/DL — HIGH (ref 70–99)
HCT VFR BLD CALC: 23.9 % — LOW (ref 34.5–45)
HGB BLD-MCNC: 7.7 G/DL — LOW (ref 11.5–15.5)
MAGNESIUM SERPL-MCNC: 1.5 MG/DL — LOW (ref 1.6–2.6)
MCHC RBC-ENTMCNC: 30.2 PG — SIGNIFICANT CHANGE UP (ref 27–34)
MCHC RBC-ENTMCNC: 32.2 GM/DL — SIGNIFICANT CHANGE UP (ref 32–36)
MCV RBC AUTO: 93.7 FL — SIGNIFICANT CHANGE UP (ref 80–100)
NON-GYNECOLOGICAL CYTOLOGY STUDY: SIGNIFICANT CHANGE UP
NON-GYNECOLOGICAL CYTOLOGY STUDY: SIGNIFICANT CHANGE UP
NRBC # BLD: 1 /100 WBCS — HIGH (ref 0–0)
PHOSPHATE SERPL-MCNC: 2.1 MG/DL — LOW (ref 2.5–4.5)
PLATELET # BLD AUTO: 138 K/UL — LOW (ref 150–400)
POTASSIUM SERPL-MCNC: 3.8 MMOL/L — SIGNIFICANT CHANGE UP (ref 3.5–5.3)
POTASSIUM SERPL-SCNC: 3.8 MMOL/L — SIGNIFICANT CHANGE UP (ref 3.5–5.3)
RBC # BLD: 2.55 M/UL — LOW (ref 3.8–5.2)
RBC # FLD: 17 % — HIGH (ref 10.3–14.5)
SARS-COV-2 IGG+IGM SERPL QL IA: 43.6 U/ML — HIGH
SARS-COV-2 IGG+IGM SERPL QL IA: POSITIVE
SODIUM SERPL-SCNC: 146 MMOL/L — HIGH (ref 135–145)
WBC # BLD: 7.15 K/UL — SIGNIFICANT CHANGE UP (ref 3.8–10.5)
WBC # FLD AUTO: 7.15 K/UL — SIGNIFICANT CHANGE UP (ref 3.8–10.5)

## 2021-10-13 PROCEDURE — 99497 ADVNCD CARE PLAN 30 MIN: CPT

## 2021-10-13 RX ORDER — METOPROLOL TARTRATE 50 MG
25 TABLET ORAL
Refills: 0 | Status: DISCONTINUED | OUTPATIENT
Start: 2021-10-13 | End: 2021-10-17

## 2021-10-13 RX ORDER — ACETAMINOPHEN 500 MG
650 TABLET ORAL EVERY 6 HOURS
Refills: 0 | Status: DISCONTINUED | OUTPATIENT
Start: 2021-10-13 | End: 2021-10-28

## 2021-10-13 RX ORDER — SODIUM CHLORIDE 0.65 %
1 AEROSOL, SPRAY (ML) NASAL THREE TIMES A DAY
Refills: 0 | Status: COMPLETED | OUTPATIENT
Start: 2021-10-13 | End: 2021-10-16

## 2021-10-13 RX ORDER — ACETAMINOPHEN 500 MG
650 TABLET ORAL ONCE
Refills: 0 | Status: COMPLETED | OUTPATIENT
Start: 2021-10-13 | End: 2021-10-13

## 2021-10-13 RX ORDER — SODIUM,POTASSIUM PHOSPHATES 278-250MG
1 POWDER IN PACKET (EA) ORAL ONCE
Refills: 0 | Status: COMPLETED | OUTPATIENT
Start: 2021-10-13 | End: 2021-10-13

## 2021-10-13 RX ORDER — OLANZAPINE 15 MG/1
2.5 TABLET, FILM COATED ORAL AT BEDTIME
Refills: 0 | Status: DISCONTINUED | OUTPATIENT
Start: 2021-10-13 | End: 2021-10-28

## 2021-10-13 RX ORDER — MAGNESIUM SULFATE 500 MG/ML
1 VIAL (ML) INJECTION ONCE
Refills: 0 | Status: COMPLETED | OUTPATIENT
Start: 2021-10-13 | End: 2021-10-13

## 2021-10-13 RX ADMIN — Medication 100 GRAM(S): at 13:46

## 2021-10-13 RX ADMIN — Medication 1 MILLIGRAM(S): at 12:20

## 2021-10-13 RX ADMIN — Medication 1 TABLET(S): at 13:47

## 2021-10-13 RX ADMIN — Medication 650 MILLIGRAM(S): at 19:15

## 2021-10-13 RX ADMIN — Medication 650 MILLIGRAM(S): at 18:27

## 2021-10-13 RX ADMIN — Medication 650 MILLIGRAM(S): at 13:10

## 2021-10-13 RX ADMIN — Medication 650 MILLIGRAM(S): at 04:53

## 2021-10-13 RX ADMIN — Medication 650 MILLIGRAM(S): at 12:20

## 2021-10-13 RX ADMIN — Medication 30 MILLIGRAM(S): at 13:46

## 2021-10-13 NOTE — PHYSICAL THERAPY INITIAL EVALUATION ADULT - ADDITIONAL COMMENTS
patient was d/rubina from hospital on 10/06 with services. As per  prior to last hospitalization, patient was independent ambulatory inhouse without assistive device. Patient did use [employer]. But upon discharge after 10/06, it was difficult to manage at home.

## 2021-10-13 NOTE — PROGRESS NOTE ADULT - PROBLEM SELECTOR PLAN 7
Pt admitted for placement  PT recommends [facility]  Care management following for discharge planning   CM advised need oncology treatment plan before any facility will accept

## 2021-10-13 NOTE — PROGRESS NOTE ADULT - PROBLEM SELECTOR PLAN 1
Pt d/c'd home to pursue chemo tx on last admission, to weak to make appt  Heme/Onc now recommending inpatient IV chemo tx  Ngoc Díaz, Heme/Onc, following

## 2021-10-13 NOTE — CONSULT NOTE ADULT - ASSESSMENT
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 Mathews after d/c on 10/06, but was not seen  pt's  is stating he is setting up an appt with [facility]  p/w continued weakness and need for rehab as  unable to assist pt at home  Rec's:  -d/t aggressive nature of pt's cancer she needs to start inpatient chemotherapy   -will discuss with pt/, consent  -    Thank you for the referral. Will continue to monitor the patient.  Please call with any questions [phone]  Above reviewed with Attending Dr. Casper  [facility] Hem[facility]Onc  [address] [facility], [address], [place], [place]  [phone]   72 y/o F with PMHx of HTN, HLD, and metastatic Lung CA, was discharged on 10/6 from [facility] after hospitalization for sepsis from PNA and UTI, comes in complaining of weakness since discharge. Patient states that she felt well on the day of discharge but then started feeling weak again the next day. Patient denies fever, chills, headache, dizziness, chest pain, palpitations, cough, SOB, n/v/d/c,  complaints, rashes or bleeding. Patient admitted for placement to [facility] which was refused on previous admission per chart review.   Per  Marcos, he did not understand the extent of her weakness and was not able to take care of her at home. She currently has a file open at [facility] about cancer treatment options and wants to pursue every and all treatment/life saving measures for his wife including intubation and resuscitation if indicated.    #High-Grade Lung Neuroendocrine CA with likely mets to liverperitoneum/bone  Newly Dx on 10/04/21, Ki-67 98% (path report was not resulted on last d/c)  plan was to have pt f/u with Oncologist Dr. Mathews after d/c on 10/06, but was not seen  pt's  is stating he is setting up an appt with [facility]  p/w continued weakness and need for rehab as  unable to assist pt at home  Rec's:  -d/t aggressive nature of pt's cancer and progressive weakness that she needs to start the 1st cycle of inpatient chemotherapy with Cis/Etop  -the recommendation for chemo was discussed with pt, her  and the pt's brother at the request of her . I reviewed the diagnosis, prognosis, indication to start chemo, possible SE, pre-medications and neulasta and the plan. Both the pt's  and her brother Mason have agreed to move forward with chemo. We have discussed the need for inpt chemo with Dr. Meredith and Dr. Lindquist,, ALLEN Gutiérrez and the Pharmacy to order the chemotx. Pt;s  to sign chemo consent tmrw.  -we will order pre-medications once there is a confirmed Oncology Nurse and day to administer, but trying to arrange for Friday 10/15  -Cr/GFR reviewed  -chemo regimen will be Cisplatin IV on Day 1, Etoposide IV on Day 1-3 and Neulasta on Day 4    Thank you for the referral. Will continue to monitor the patient.  Please call with any questions [phone]  Above reviewed with Attending Dr. Casper    [facility]  [address] [facility], [address], [place], [place]  [phone]

## 2021-10-13 NOTE — PROGRESS NOTE ADULT - PROBLEM SELECTOR PLAN 3
Normotensive  Pt takes Amlodipine, Metoprolol and Benazepril at home  Home regimen held in the setting of BP WNL  Consider restarting Metoprolol when SBB > 140 x 2 reading  Monitor BP

## 2021-10-13 NOTE — PROGRESS NOTE ADULT - SUBJECTIVE AND OBJECTIVE BOX
HPI:  71 YOF, with metastatic lung CA was recently discharged home with HPT to pursue chemo options, admitted with weakness.  PT and CM consulted for placement.    OVERNIGHT EVENTS:  Rib pain overnight, Tylenol therapeutic.    REVIEW OF SYSTEMS:      CONSTITUTIONAL: No fever  EYES: no acute visual disturbances  NECK: No pain or stiffness  RESPIRATORY: No cough; No shortness of breath  CARDIOVASCULAR: No chest pain, no palpitations  GASTROINTESTINAL: No pain. No nausea, vomiting or diarrhea   NEUROLOGICAL: No headache or numbness, no tremors  MUSCULOSKELETAL: + rib pain, + weakness   GENITOURINARY: no dysuria, no frequency, no hesitancy  PSYCHIATRY: no depression, no anxiety  ALL OTHER  ROS negative        Vital Signs Last 24 Hrs  T(C): 37.1 (13 Oct 2021 13:44), Max: 37.3 (13 Oct 2021 04:51)  T(F): 98.7 (13 Oct 2021 13:44), Max: 99.2 (13 Oct 2021 04:51)  HR: 99 (13 Oct 2021 13:44) (73 - 99)  BP: 123/71 (13 Oct 2021 13:44) (123/71 - 165/82)  BP(mean): --  RR: 19 (13 Oct 2021 13:44) (18 - 20)  SpO2: 93% (13 Oct 2021 13:54) (89% - 95%)    ________________________________________________  PHYSICAL EXAM:    GENERAL: NAD  HEENT: Normocephalic; conjunctivae and sclerae clear;  NECK : supple, no JVD  CHEST/LUNG: Clear to auscultation; Nonlabored  HEART: S1 S2  regular  ABDOMEN: Soft, Nontender, Nondistended; Bowel sounds present  EXTREMITIES: no cyanosis; no LE edema; no calf tenderness  SKIN: Incontinence Associated Dermatitis to the Bilateral Gluteus, Perianal, Bilateral Ischium, and Groin areas  NERVOUS SYSTEM:  Alert; no new deficits    _________________________________________________  CURRENT MEDICATIONS:    MEDICATIONS  (STANDING):  folic acid 1 milliGRAM(s) Oral daily  PARoxetine 30 milliGRAM(s) Oral daily  simvastatin 40 milliGRAM(s) Oral at bedtime    MEDICATIONS  (PRN):  acetaminophen   Tablet .. 650 milliGRAM(s) Oral every 6 hours PRN Temp greater or equal to 38C (100.4F), Mild Pain (1 - 3), Moderate Pain (4 - 6)  ALBUTerol    90 MICROgram(s) HFA Inhaler 2 Puff(s) Inhalation every 6 hours PRN Shortness of Breath and/or Wheezing  OLANZapine 2.5 milliGRAM(s) Oral at bedtime PRN Agitation      __________________________________________________  LABS:                          7.7    7.15  )-----------( 138      ( 13 Oct 2021 09:02 )             23.9     10-13    146<H>  |  111<H>  |  18  ----------------------------<  114<H>  3.8   |  23  |  0.93    Ca    7.9<L>      13 Oct 2021 09:02  Phos  2.1     10-13  Mg     1.5     10-13    TPro  6.8  /  Alb  2.6<L>  /  TBili  0.4  /  DBili  x   /  AST  69<H>  /  ALT  26  /  AlkPhos  120  10-12        CAPILLARY BLOOD GLUCOSE          __________________________________________________  RADIOLOGY & ADDITIONAL TESTS:    Imaging Personally Reviewed:  YES    < from: Xray Chest 1 View-PORTABLE IMMEDIATE (Xray Chest 1 View-PORTABLE IMMEDIATE .) (10.04.21 @ 17:03) >  There is a small infiltrate at left base new since September 28.    There is persistent density likely atelectatic consolidation of the right lower lobe possibly with underlying mass which is similar to September 28.    Follow-up AP erect chest on September 28, 2021 at 2:08 PM. Patient had bronchoscopy and biopsy.    Heart magnified by technique.    On October 4. Small infiltrate off the left lower hilum which is no longer evident on this film.    Both studies show what appears to be atelectatic consolidation of the right lower lobe which could easily obscure a lesion.    No pneumothorax.    Cervical spine hardware again seen.    IMPRESSION: Persistent right lung findings as above.    < end of copied text >    Consultant(s) Notes Reviewed:   YES     Plan of care was discussed with patient and /or primary care giver; all questions and concerns were addressed and care was aligned with patient's wishes.    Plan discussed with attending and consulting physicians.

## 2021-10-13 NOTE — CONSULT NOTE ADULT - ATTENDING COMMENTS
I have examined the patient at bedside and reviewed patient's data and participated in the management of the patient along with Ngoc COSME as well as hemotology/med oncology faculty consisting of Dr. Omar Meredith, Dr. REGLA Etienne, Dr. CHILO Treviño, Dr. Aftab Plunkett, Dr. Lennie Mathews, Dr. Jasiel Trinh as well as myself during the daily heme/onc case review. I reviewed pertinent clinical information, PE,  labs as well as A/P as outline above.     Pt admitted due to deterioration of EGOG PS. Recommend first cycle of Cis/Etoposide to enable further chemotherapy as outpatient

## 2021-10-13 NOTE — PROGRESS NOTE ADULT - SUBJECTIVE AND OBJECTIVE BOX
Patient is a 71y old  Female who presents with a chief complaint of weakness (13 Oct 2021 16:38)    PATIENT IS SEEN AND EXAMINED IN MEDICAL FLOOR.    ALLERGIES:  No Known Allergies      VITALS:    Vital Signs Last 24 Hrs  T(C): 37.6 (13 Oct 2021 21:23), Max: 37.6 (13 Oct 2021 21:23)  T(F): 99.7 (13 Oct 2021 21:23), Max: 99.7 (13 Oct 2021 21:23)  HR: 100 (13 Oct 2021 21:23) (87 - 100)  BP: 163/75 (13 Oct 2021 21:23) (123/71 - 165/82)  BP(mean): --  RR: 18 (13 Oct 2021 21:23) (18 - 20)  SpO2: 99% (13 Oct 2021 21:23) (89% - 99%)    LABS:    CBC Full  -  ( 13 Oct 2021 09:02 )  WBC Count : 7.15 K/uL  RBC Count : 2.55 M/uL  Hemoglobin : 7.7 g/dL  Hematocrit : 23.9 %  Platelet Count - Automated : 138 K/uL  Mean Cell Volume : 93.7 fl  Mean Cell Hemoglobin : 30.2 pg  Mean Cell Hemoglobin Concentration : 32.2 gm/dL  Auto Neutrophil # : x  Auto Lymphocyte # : x  Auto Monocyte # : x  Auto Eosinophil # : x  Auto Basophil # : x  Auto Neutrophil % : x  Auto Lymphocyte % : x  Auto Monocyte % : x  Auto Eosinophil % : x  Auto Basophil % : x      10-13    146<H>  |  111<H>  |  18  ----------------------------<  114<H>  3.8   |  23  |  0.93    Ca    7.9<L>      13 Oct 2021 09:02  Phos  2.1     10-13  Mg     1.5     10-13    TPro  6.8  /  Alb  2.6<L>  /  TBili  0.4  /  DBili  x   /  AST  69<H>  /  ALT  26  /  AlkPhos  120  10-12    CAPILLARY BLOOD GLUCOSE        CARDIAC MARKERS ( 12 Oct 2021 13:12 )  <0.015 ng/mL / x     / x     / x     / x          LIVER FUNCTIONS - ( 12 Oct 2021 13:12 )  Alb: 2.6 g/dL / Pro: 6.8 g/dL / ALK PHOS: 120 U/L / ALT: 26 U/L DA / AST: 69 U/L / GGT: x           Creatinine Trend: 0.93<--, 1.14<--, 1.12<--, 1.34<--, 1.01<--, 0.78<--  I&O's Summary          BAL Bronchoalveolar Washings  10-05 @ 13:17   No growth at 1 week.  --    Moderate polymorphonuclear leukocytes per low power field  Rare Squamous epithelial cells per low power field  No organisms seen per oil power field      Clean Catch Clean Catch (Midstream)  09-28 @ 20:59   >100,000 CFU/ml Enterococcus faecium (vancomycin resistant)  --  Enterococcus faecium (vancomycin resistant)      .Blood Blood  09-28 @ 20:53   No Growth Final  --  --      Clean Catch Clean Catch (Midstream)  09-21 @ 14:05   10,000 - 49,000 CFU/mL Escherichia coli  Normal Urogenital bello present  --  Escherichia coli      .Blood Blood-Peripheral  09-20 @ 18:38   No Growth Final  --  --          MEDICATIONS:    MEDICATIONS  (STANDING):  folic acid 1 milliGRAM(s) Oral daily  PARoxetine 30 milliGRAM(s) Oral daily  simvastatin 40 milliGRAM(s) Oral at bedtime      MEDICATIONS  (PRN):  acetaminophen   Tablet .. 650 milliGRAM(s) Oral every 6 hours PRN Temp greater or equal to 38C (100.4F), Mild Pain (1 - 3), Moderate Pain (4 - 6)  ALBUTerol    90 MICROgram(s) HFA Inhaler 2 Puff(s) Inhalation every 6 hours PRN Shortness of Breath and/or Wheezing  OLANZapine 2.5 milliGRAM(s) Oral at bedtime PRN Agitation      REVIEW OF SYSTEMS:                           ALL ROS DONE [ X   ]    CONSTITUTIONAL:  LETHARGIC [   ], FEVER [   ], UNRESPONSIVE [   ]  CVS:  CP  [   ], SOB, [   ], PALPITATIONS [   ], DIZZYNESS [   ]  RS: COUGH [   ], SPUTUM [   ]  GI: ABDOMINAL PAIN [   ], NAUSEA [   ], VOMITINGS [   ], DIARRHEA [   ], CONSTIPATION [   ]  :  DYSURIA [   ], NOCTURIA [   ], INCREASED FREQUENCY [   ], DRIBLING [   ],  SKELETAL: PAINFUL JOINTS [   ], SWOLLEN JOINTS [   ], NECK ACHE [   ], LOW BACK ACHE [   ],  SKIN : ULCERS [   ], RASH [   ], ITCHING [   ]  CNS: HEAD ACHE [   ], DOUBLE VISION [   ], BLURRED VISION [   ], AMS / CONFUSION [   ], SEIZURES [   ], WEAKNESS [   ],TINGLING / NUMBNESS [   ]    PHYSICAL EXAMINATION:  GENERAL APPEARANCE: NO DISTRESS     HEENT:  NO PALLOR, NO  JVD,  NO   NODES, NECK SUPPLE    ;    SINUS CONGESTION +  CVS: S1 +, S2 +,   RS: AEEB,  OCCASIONAL  RALES +,   NO RONCHI  ABD: SOFT, NT, NO, BS +  EXT: NO PE  SKIN: WARM,   SKELETAL:  ROM ACCEPTABLE  CNS:  AAO X 2-3,   DEFICITS    RADIOLOGY :    RADIOLOGY REVIEWED    ASSESSMENT :     Weakness    Lumbar stenosis    H/O: HTN (hypertension)    Hypercholesterolemia    Bleeding ulcer    Anxiety    Heartburn symptom    Left shoulder pain    Spinal stenosis in cervical region    Acute hepatitis C virus infection without hepatic coma    Anxiety    Balance disorder    Coronary artery disease involving native coronary artery of native heart without angina pectoris    No pertinent past medical history    HTN (hypertension)    HLD (hyperlipidemia)    H/O colonoscopy    Bleeding ulcer    Fracture    History of lumbar surgery    Chronic UTI    H/O cardiac catheterization    No significant past surgical history        PLAN:  HPI:  70 y/o F with PMHx of HTN, HLD, and metastatic Lung CA, was discharged on 10/6 from [facility] after hospitalization for sepsis from PNA and UTI, comes in complaining of weakness since discharge. Patient states that she felt well on the day of discharge but then started feeling weak again the next day. Patient denies fever, chills, headache, dizziness, chest pain, palpitations, cough, SOB, n/v/d/c,  complaints, rashes or bleeding. Patient admitted for placement to [facility] which was refused on previous admission per chart review.   Per  Marcos, he did not understand the extent of her weakness and was not able to take care of her at home. She currently has a file open at [facility] about cancer treatment options and wants to pursue every and all treatment/life saving measures for his wife including intubation and resuscitation if indicated.    In ED:   Temp 97.5F, HR 74, /78, RR 17, SpO2: 97% on 2L NC   EKG: NSR   s/p 1L bolus (12 Oct 2021 17:49)    # METASTATIC HIGH GRADE LUNG NEUROENDOCRINE CA RECENTLY DX - ONCOLOGY CONSULT IN PROGRESS  - RECENTLY HOSPITALIZED FOR PNA, UTI  - PATIENT AND FAMILY AGREEABLE FOR INPATIENT CHEMOTHERAPY    # DEBILITY, UNSTEADY GAIT - F/U PT EVAL, CM TEAM CONSULTED TO AID IN [facility] PLACMENT  - PREVIOUSLY PATIENT AND FAMILY DECLINED PLACEMENT, CURRENTLY AGREEABLE THAT PATIENT WOULD BENEFIT FROM [facility]    # NORMOCYTIC ANEMIA - ? AOCD - TRANSFUSION THRESHOLD HGB < 7    # HTN - RESTARTED METOPROLOL AT LOWER DOSE, HOLD AMLODIPINE AND BENAZEPRIL    # HLD - ON STATIN    # ANXIETY - ON PAROXETINE    # GI AND DVT PPX [SCDS, IF HGB STABLE - THEN SWITCH TO CHEMOPPX]    ARCADIO MEDEIROS MD

## 2021-10-13 NOTE — ADVANCED PRACTICE NURSE CONSULT - ASSESSMENT
This is a 71yr old female patient admitted for Weakness, presenting with evidence of Incontinence Associated Dermatitis to the Bilateral Gluteus, Perianal, Bilateral Ischium, and Groin areas

## 2021-10-13 NOTE — CHART NOTE - NSCHARTNOTEFT_GEN_A_CORE
EVENT: Paged by RN, pt c/o right rib pain/discomfort     HPI:  72 y/o F with PMHx of HTN and HLD and metastatic Lung CA, was discharged on 10/6 from [facility] after hospitalization for sepsis from PNA and UTI, comes in complaining of weakness since discharge. Patient states that she felt well on the day of discharge but then started feeling weak again the next day. Patient denies fever, chills, headache, dizziness, chest pain, palpitations, cough, SOB, n/v/d/c,  complaints, rashes or bleeding. Patient admitted for placement to [facility] which was refused on previous admission per chart review. Per  Marcos, he did not understand the extent of her weakness and was not able to take care of her at home. She currently has a file open at [facility] about cancer treatment options and wants to pursue every and all treatment/life saving measures for his wife including intubation and resuscitation if indicated. Admitted for placement.     SUBJECTIVE: A&Ox2 this morning, c/o 5/10 right rib pain and is requesting medication for relief. VSS.     OBJECTIVE:  Vital Signs Last 24 Hrs  T(C): 36.6 (12 Oct 2021 22:02), Max: 36.6 (12 Oct 2021 22:02)  T(F): 97.9 (12 Oct 2021 22:02), Max: 97.9 (12 Oct 2021 22:02)  HR: 78 (12 Oct 2021 22:02) (64 - 80)  BP: 148/81 (12 Oct 2021 22:02) (116/65 - 159/75)  BP(mean): --  RR: 20 (12 Oct 2021 22:02) (17 - 20)  SpO2: 93% (12 Oct 2021 22:02) (93% - 97%)    PHYSICAL EXAM:  Neuro: Awake and alert x 2   Cardiovascular: + S1, S2, no murmurs, rubs, or bruits  Respiratory: clear to auscultation bilaterally with good air entry   GI: Abdomen soft, non-tender, bowel sounds present   : Non distended;   Skin: MASD to perineum/groin      LABS:                        8.1    9.16  )-----------( 149      ( 12 Oct 2021 21:32 )             25.9   CARDIAC MARKERS ( 12 Oct 2021 13:12 )  <0.015 ng/mL / x     / x     / x     / x        10-12    143  |  111<H>  |  26<H>  ----------------------------<  142<H>  3.7   |  25  |  1.14    Ca    7.6<L>      12 Oct 2021 13:12    TPro  6.8  /  Alb  2.6<L>  /  TBili  0.4  /  DBili  x   /  AST  69<H>  /  ALT  26  /  AlkPhos  120  10-12        EKG:   IMAGING:    ASSESSMENT: Right rib pain possibly due to costochondritis ?      PLAN:     FOLLOW UP / RESULT: EVENT: Paged by RN, pt c/o right rib pain/discomfort     HPI:  70 y/o F with PMHx of HTN and HLD and metastatic Lung CA, was discharged on 10/6 from [facility] after hospitalization for sepsis from PNA and UTI, comes in complaining of weakness since discharge. Patient states that she felt well on the day of discharge but then started feeling weak again the next day. Patient denies fever, chills, headache, dizziness, chest pain, palpitations, cough, SOB, n/v/d/c,  complaints, rashes or bleeding. Patient admitted for placement to [facility] which was refused on previous admission per chart review. Per  Marcos, he did not understand the extent of her weakness and was not able to take care of her at home. She currently has a file open at [facility] about cancer treatment options and wants to pursue every and all treatment/life saving measures for his wife including intubation and resuscitation if indicated. Admitted for placement.     SUBJECTIVE: Pt. seen and examined at beside, NAD. A&Ox2 this morning, c/o "slight" right rib/generalized pain and is requesting medication for relief. VSS.     OBJECTIVE:  Vital Signs Last 24 Hrs  T(C): 36.6 (12 Oct 2021 22:02), Max: 36.6 (12 Oct 2021 22:02)  T(F): 97.9 (12 Oct 2021 22:02), Max: 97.9 (12 Oct 2021 22:02)  HR: 78 (12 Oct 2021 22:02) (64 - 80)  BP: 148/81 (12 Oct 2021 22:02) (116/65 - 159/75)  BP(mean): --  RR: 20 (12 Oct 2021 22:02) (17 - 20)  SpO2: 93% (12 Oct 2021 22:02) (93% - 97%)    PHYSICAL EXAM:  Neuro: Awake and alert x 2  Cardiovascular: + S1, S2, no murmurs, rubs, or bruits  Respiratory: clear to auscultation bilaterally with good air entry   GI: Abdomen soft, non-tender, bowel sounds present   : Non distended;   Skin: MASD to perineum/groin      LABS:                        8.1    9.16  )-----------( 149      ( 12 Oct 2021 21:32 )             25.9   CARDIAC MARKERS ( 12 Oct 2021 13:12 )  <0.015 ng/mL / x     / x     / x     / x        10-12    143  |  111<H>  |  26<H>  ----------------------------<  142<H>  3.7   |  25  |  1.14    Ca    7.6<L>      12 Oct 2021 13:12    TPro  6.8  /  Alb  2.6<L>  /  TBili  0.4  /  DBili  x   /  AST  69<H>  /  ALT  26  /  AlkPhos  120  10-12        EKG:   IMAGING:    ASSESSMENT: Generalized pain/right rib pain possibly due to poor body alignment while in bed ?    PLAN:     -Tylenol 650 mg PO x 1 dose  -Continue current/supportive care    FOLLOW UP / RESULT:    -Monitor effectiveness of above intervention  -Reassess pain per hospital policy EVENT: Paged by RN, pt c/o right rib pain/discomfort     HPI:  70 y/o F with PMHx of HTN and HLD and metastatic Lung CA, [facility] from [facility] and UTI, comes in complaining of weakness since discharge. Patient states that she felt well on the day of discharge but then started feeling weak again the next day. Patient denies fever, chills, headache, dizziness, chest pain, palpitations, cough, SOB, n/v/d/c,  complaints, rashes or bleeding. Patient admitted for placement to [facility] which was refused on previous admission per chart review. Per  Marcos, he did not understand the extent of her weakness and was not able to take care of her at home. She currently has a file open at [facility] about cancer treatment options and wants to pursue every and all treatment/life saving measures for his wife including intubation and resuscitation if indicated. Admitted for placement.     SUBJECTIVE: Pt. seen and examined at beside, NAD. A&Ox2 this morning, c/o "slight" right rib/generalized pain and is requesting medication for relief. VSS. Denies chest pain or shortness of breath.     OBJECTIVE:  Vital Signs Last 24 Hrs  T(C): 36.6 (12 Oct 2021 22:02), Max: 36.6 (12 Oct 2021 22:02)  T(F): 97.9 (12 Oct 2021 22:02), Max: 97.9 (12 Oct 2021 22:02)  HR: 78 (12 Oct 2021 22:02) (64 - 80)  BP: 148/81 (12 Oct 2021 22:02) (116/65 - 159/75)  BP(mean): --  RR: 20 (12 Oct 2021 22:02) (17 - 20)  SpO2: 93% (12 Oct 2021 22:02) (93% - 97%)    PHYSICAL EXAM:  Neuro: Awake and alert x 2  Cardiovascular: + S1, S2, no murmurs, rubs, or bruits  Respiratory: clear to auscultation bilaterally with good air entry   GI: Abdomen soft, non-tender, bowel sounds present   : Non distended;   Skin: MASD to perineum/groin      LABS:                        8.1    9.16  )-----------( 149      ( 12 Oct 2021 21:32 )             25.9   CARDIAC MARKERS ( 12 Oct 2021 13:12 )  <0.015 ng/mL / x     / x     / x     / x        10-12    143  |  111<H>  |  26<H>  ----------------------------<  142<H>  3.7   |  25  |  1.14    Ca    7.6<L>      12 Oct 2021 13:12    TPro  6.8  /  Alb  2.6<L>  /  TBili  0.4  /  DBili  x   /  AST  69<H>  /  ALT  26  /  AlkPhos  120  10-12        EKG:   IMAGING:    ASSESSMENT: Generalized pain/right rib pain possibly due to poor body alignment while in bed ?    PLAN:     -Tylenol 650 mg PO x 1 dose  -Continue current/supportive care    FOLLOW UP / RESULT:    -Monitor effectiveness of above intervention  -Reassess pain per hospital policy

## 2021-10-13 NOTE — PHYSICAL THERAPY INITIAL EVALUATION ADULT - PERTINENT HX OF CURRENT PROBLEM, REHAB EVAL
PMHx of HTN, HLD, and metastatic Lung CA, was discharged on 10/6 from [facility] after hospitalization for sepsis from PNA and UTI, comes in complaining of weakness

## 2021-10-13 NOTE — CONSULT NOTE ADULT - SUBJECTIVE AND OBJECTIVE BOX
MEDICATIONS  (STANDING):  folic acid 1 milliGRAM(s) Oral daily  magnesium sulfate  IVPB 1 Gram(s) IV Intermittent once  PARoxetine 30 milliGRAM(s) Oral daily  potassium phosphate / sodium phosphate Tablet (K-PHOS No. 2) 1 Tablet(s) Oral once  simvastatin 40 milliGRAM(s) Oral at bedtime    MEDICATIONS  (PRN):  acetaminophen   Tablet .. 650 milliGRAM(s) Oral every 6 hours PRN Temp greater or equal to 38C (100.4F), Mild Pain (1 - 3), Moderate Pain (4 - 6)  ALBUTerol    90 MICROgram(s) HFA Inhaler 2 Puff(s) Inhalation every 6 hours PRN Shortness of Breath and/or Wheezing  OLANZapine 2.5 milliGRAM(s) Oral at bedtime PRN Agitation    CAPILLARY BLOOD GLUCOSE        I&O's Summary      PHYSICAL EXAM:  Vital Signs Last 24 Hrs  T(C): 37.3 (13 Oct 2021 04:51), Max: 37.3 (13 Oct 2021 04:51)  T(F): 99.2 (13 Oct 2021 04:51), Max: 99.2 (13 Oct 2021 04:51)  HR: 91 (13 Oct 2021 11:40) (64 - 91)  BP: 165/82 (13 Oct 2021 04:51) (116/65 - 165/82)  BP(mean): --  RR: 20 (13 Oct 2021 04:51) (17 - 20)  SpO2: 94% (13 Oct 2021 11:40) (93% - 97%)      LABS:                        7.7    7.15  )-----------( 138      ( 13 Oct 2021 09:02 )             23.9     10-13    146<H>  |  111<H>  |  18  ----------------------------<  114<H>  3.8   |  23  |  0.93    Ca    7.9<L>      13 Oct 2021 09:02  Phos  2.1     10-13  Mg     1.5     10-13    TPro  6.8  /  Alb  2.6<L>  /  TBili  0.4  /  DBili  x   /  AST  69<H>  /  ALT  26  /  AlkPhos  120  10-12      CARDIAC MARKERS ( 12 Oct 2021 13:12 )  <0.015 ng/mL / x     / x     / x     / x              COVID-19 PCR: NotDetec (12 Oct 2021 13:12)  COVID-19 PCR: NotDetec (03 Oct 2021 06:52)  COVID-19 PCR: NotDetec (23 Sep 2021 20:35)  COVID-19 PCR: NotDetec (20 Sep 2021 11:38)      RADIOLOGY & ADDITIONAL TESTS:  Imaging from Last 24 Hours:    Electrocardiogram/QTc Interval:    COORDINATION OF CARE:  Care Discussed with Consultants/Other Providers:   Reason for Admission: weakness  History of Present Illness:   72 y/o F with PMHx of HTN, HLD, and metastatic Lung CA, was discharged on 10/6 from [facility] after hospitalization for sepsis from PNA and UTI, comes in complaining of weakness since discharge. Patient states that she felt well on the day of discharge but then started feeling weak again the next day. Patient denies fever, chills, headache, dizziness, chest pain, palpitations, cough, SOB, n/v/d/c,  complaints, rashes or bleeding. Patient admitted for placement to [facility] which was refused on previous admission per chart review.   Per  Marcos, he did not understand the extent of her weakness and was not able to take care of her at home. She currently has a file open at [facility] about cancer treatment options and wants to pursue every and all treatment/life saving measures for his wife including intubation and resuscitation if indicated.    REVIEW OF SYSTEMS:    CONSTITUTIONAL: No fever, no loss of appetite. no chills, + weight loss   EYES: no acute visual disturbances  NECK: No pain or stiffness  RESPIRATORY: No cough; + shortness of breath  CARDIOVASCULAR: No chest pain, no palpitations  GASTROINTESTINAL: No pain. No nausea or vomiting; No diarrhea   NEUROLOGICAL: No headache or numbness, no tremors  MUSCULOSKELETAL: +LE weakness, No joint pain, no muscle pain  GENITOURINARY: no dysuria, no frequency, no hesitancy  PSYCHIATRY: no depression, no anxiety  ALL OTHER  ROS negative      Goals of Care:    GOALS OF CARE:  · Participants	Family  · Spouse	Marcos     Conversation Discussion:  · Conversation	Diagnosis; Treatment Options  · Conversation Details	Patient was AAOx1 and did not have insight into her diagnosis when seen and examined by Intern. Intern spoke to  Marcos about patient's cancer diagnosis and their plan for further management. Pt. currently has a file open at [facility] where they had an appointment to discuss treatment options and they want to pursue every and all treatment that may prolong her life. This also includes life saving measures including intubation and resuscitation if indicated.      Allergies and Intolerances:        Allergies:  	No Known Allergies:     Home Medications:   * Patient Currently Takes Medications as of 05-Oct-2021 11:34 documented in Structured Notes  · 	albuterol 90 mcg/inh inhalation aerosol: 2 puff(s) inhaled every 6 hours, As needed, Shortness of Breath and/or Wheezing  · 	acetaminophen 325 mg oral tablet: 2 tab(s) orally every 6 hours, As needed, Pain  · 	metoprolol tartrate 100 mg oral tablet: 1 tab(s) orally 2 times a day  · 	simvastatin 40 mg oral tablet: 1 tab(s) orally once a day (at bedtime)  · 	benazepril 10 mg oral tablet: 1 tab(s) orally once a day  · 	PARoxetine 40 mg oral tablet: 1 tab(s) orally once a day  · 	amLODIPine 5 mg oral tablet: 1 tab(s) orally once a day  · 	folic acid 1 mg oral tablet: 1 tab(s) orally once a day    .    Patient History:    Past Medical, Past Surgical, and Family History:  PAST MEDICAL HISTORY:  Acute hepatitis C virus infection without hepatic coma treated 2015- Resolved    Anxiety     Balance disorder     Bleeding ulcer stomach ulcer 2011    Coronary artery disease involving native coronary artery of native heart without angina pectoris     HLD (hyperlipidemia)     HTN (hypertension)     Lumbar stenosis     Spinal stenosis in cervical region.     PAST SURGICAL HISTORY:  Bleeding ulcer stomach surgery for bleeding ulcer    Chronic UTI Pt reports presently on 2nd dose of antibiotics 7/5/18 10 days, Dr Herman aware, pt going for m/eval 7/13/18.    Fracture ORIF Left wrist  1/17/13    H/O cardiac catheterization 2013, no intervention needed, treated medically    H/O colonoscopy     History of lumbar surgery 2013.     FAMILY HISTORY:  No pertinent family history in first degree relatives. No pertinent family history of: all of the above.     Social History:  Social History (marital status, living situation, occupation, tobacco use, alcohol and drug use, and sexual history): Lives at home with       MEDICATIONS  (STANDING):  folic acid 1 milliGRAM(s) Oral daily  magnesium sulfate  IVPB 1 Gram(s) IV Intermittent once  PARoxetine 30 milliGRAM(s) Oral daily  potassium phosphate / sodium phosphate Tablet (K-PHOS No. 2) 1 Tablet(s) Oral once  simvastatin 40 milliGRAM(s) Oral at bedtime    MEDICATIONS  (PRN):  acetaminophen   Tablet .. 650 milliGRAM(s) Oral every 6 hours PRN Temp greater or equal to 38C (100.4F), Mild Pain (1 - 3), Moderate Pain (4 - 6)  ALBUTerol    90 MICROgram(s) HFA Inhaler 2 Puff(s) Inhalation every 6 hours PRN Shortness of Breath and/or Wheezing  OLANZapine 2.5 milliGRAM(s) Oral at bedtime PRN Agitation      Vital Signs Last 24 Hrs  T(C): 37.3 (13 Oct 2021 04:51), Max: 37.3 (13 Oct 2021 04:51)  T(F): 99.2 (13 Oct 2021 04:51), Max: 99.2 (13 Oct 2021 04:51)  HR: 91 (13 Oct 2021 11:40) (64 - 91)  BP: 165/82 (13 Oct 2021 04:51) (116/65 - 165/82)  BP(mean): --  RR: 20 (13 Oct 2021 04:51) (17 - 20)  SpO2: 94% (13 Oct 2021 11:40) (93% - 97%)    GEN: NAD; A and O x 3, baseline has garbled speech  LUNGS: CTA B/L  HEART: S1 S2  ABDOMEN: soft, non-tender, non-distended, + BS  EXTREMITIES: no edema  NERVOUS SYSTEM:  Awake and alert; no focal neuro deficits      LABS:                        7.7    7.15  )-----------( 138      ( 13 Oct 2021 09:02 )             23.9     10-13    146<H>  |  111<H>  |  18  ----------------------------<  114<H>  3.8   |  23  |  0.93    Ca    7.9<L>      13 Oct 2021 09:02  Phos  2.1     10-13  Mg     1.5     10-13    TPro  6.8  /  Alb  2.6<L>  /  TBili  0.4  /  DBili  x   /  AST  69<H>  /  ALT  26  /  AlkPhos  120  10-12      CARDIAC MARKERS ( 12 Oct 2021 13:12 )  <0.015 ng/mL / x     / x     / x     / x              COVID-19 PCR: NotDetec (12 Oct 2021 13:12)  COVID-19 PCR: NotDetec (03 Oct 2021 06:52)  COVID-19 PCR: NotDetec (23 Sep 2021 20:35)  COVID-19 PCR: NotDetec (20 Sep 2021 11:38)      RADIOLOGY & ADDITIONAL TESTS:  < from: Xray Chest 1 View-PORTABLE IMMEDIATE (Xray Chest 1 View-PORTABLE IMMEDIATE .) (10.04.21 @ 17:03) >      INTERPRETATION:  AP chest on October 4, 2021 at 1:12 PM. Patient has a lung mass and pneumonia.    Heart magnified by technique. Cervical spine hardware again noted.    There is a small infiltrate at left base new since September 28.    There is persistent density likely atelectatic consolidation of the right lower lobe possibly with underlying mass which is similar to September 28.    Follow-up AP erect chest on September 28, 2021 at 2:08 PM. Patient had bronchoscopy and biopsy.    Heart magnified by technique.    On October 4. Small infiltrate off the left lower hilum which is no longer evident on this film.    Both studies show what appears to be atelectatic consolidation of the right lower lobe which could easily obscure a lesion.    No pneumothorax.    Cervical spine hardware again seen.    IMPRESSION: Persistent right lung findings as above.    < end of copied text >     Reason for Admission: weakness  History of Present Illness:   72 y/o F with PMHx of HTN, HLD, and metastatic Lung CA, was discharged on 10/6 from [facility] after hospitalization for sepsis from PNA and UTI, comes in complaining of weakness since discharge. Patient states that she felt well on the day of discharge but then started feeling weak again the next day. Patient denies fever, chills, headache, dizziness, chest pain, palpitations, cough, SOB, n/v/d/c,  complaints, rashes or bleeding. Patient admitted for placement to [facility] which was refused on previous admission per chart review.   Per  Marcos, he did not understand the extent of her weakness and was not able to take care of her at home. She currently has a file open at [facility] about cancer treatment options and wants to pursue every and all treatment/life saving measures for his wife including intubation and resuscitation if indicated.    REVIEW OF SYSTEMS:    CONSTITUTIONAL: No fever, no loss of appetite. no chills, + weight loss   EYES: no acute visual disturbances  NECK: No pain or stiffness  RESPIRATORY: No cough; + shortness of breath  CARDIOVASCULAR: No chest pain, no palpitations  GASTROINTESTINAL: No pain. No nausea or vomiting; No diarrhea   NEUROLOGICAL: No headache or numbness, no tremors  MUSCULOSKELETAL: +B/L LE weakness, No joint pain, no muscle pain  GENITOURINARY: no dysuria, no frequency, no hesitancy  PSYCHIATRY: no depression, no anxiety  ALL OTHER  ROS negative      Goals of Care:    GOALS OF CARE:  · Participants	Family  · Spouse	Marcos     Conversation Discussion:  · Conversation	Diagnosis; Treatment Options  · Conversation Details	Patient was AAOx1 and did not have insight into her diagnosis when seen and examined by Intern. Intern spoke to  Marcos about patient's cancer diagnosis and their plan for further management. Pt. currently has a file open at [facility] where they had an appointment to discuss treatment options and they want to pursue every and all treatment that may prolong her life. This also includes life saving measures including intubation and resuscitation if indicated.      Allergies and Intolerances:        Allergies:  	No Known Allergies:     Home Medications:   * Patient Currently Takes Medications as of 05-Oct-2021 11:34 documented in Structured Notes  · 	albuterol 90 mcg/inh inhalation aerosol: 2 puff(s) inhaled every 6 hours, As needed, Shortness of Breath and/or Wheezing  · 	acetaminophen 325 mg oral tablet: 2 tab(s) orally every 6 hours, As needed, Pain  · 	metoprolol tartrate 100 mg oral tablet: 1 tab(s) orally 2 times a day  · 	simvastatin 40 mg oral tablet: 1 tab(s) orally once a day (at bedtime)  · 	benazepril 10 mg oral tablet: 1 tab(s) orally once a day  · 	PARoxetine 40 mg oral tablet: 1 tab(s) orally once a day  · 	amLODIPine 5 mg oral tablet: 1 tab(s) orally once a day  · 	folic acid 1 mg oral tablet: 1 tab(s) orally once a day    .    Patient History:    Past Medical, Past Surgical, and Family History:  PAST MEDICAL HISTORY:  Acute hepatitis C virus infection without hepatic coma treated 2015- Resolved    Anxiety     Balance disorder     Bleeding ulcer stomach ulcer 2011    Coronary artery disease involving native coronary artery of native heart without angina pectoris     HLD (hyperlipidemia)     HTN (hypertension)     Lumbar stenosis     Spinal stenosis in cervical region.     PAST SURGICAL HISTORY:  Bleeding ulcer stomach surgery for bleeding ulcer    Chronic UTI Pt reports presently on 2nd dose of antibiotics 7/5/18 10 days, Dr Herman aware, pt going for m/eval 7/13/18.    Fracture ORIF Left wrist  1/17/13    H/O cardiac catheterization 2013, no intervention needed, treated medically    H/O colonoscopy     History of lumbar surgery 2013.     FAMILY HISTORY:  No pertinent family history in first degree relatives. No pertinent family history of: all of the above.     Social History:  Social History (marital status, living situation, occupation, tobacco use, alcohol and drug use, and sexual history): Lives at home with       MEDICATIONS  (STANDING):  folic acid 1 milliGRAM(s) Oral daily  magnesium sulfate  IVPB 1 Gram(s) IV Intermittent once  PARoxetine 30 milliGRAM(s) Oral daily  potassium phosphate / sodium phosphate Tablet (K-PHOS No. 2) 1 Tablet(s) Oral once  simvastatin 40 milliGRAM(s) Oral at bedtime    MEDICATIONS  (PRN):  acetaminophen   Tablet .. 650 milliGRAM(s) Oral every 6 hours PRN Temp greater or equal to 38C (100.4F), Mild Pain (1 - 3), Moderate Pain (4 - 6)  ALBUTerol    90 MICROgram(s) HFA Inhaler 2 Puff(s) Inhalation every 6 hours PRN Shortness of Breath and/or Wheezing  OLANZapine 2.5 milliGRAM(s) Oral at bedtime PRN Agitation      Vital Signs Last 24 Hrs  T(C): 37.3 (13 Oct 2021 04:51), Max: 37.3 (13 Oct 2021 04:51)  T(F): 99.2 (13 Oct 2021 04:51), Max: 99.2 (13 Oct 2021 04:51)  HR: 91 (13 Oct 2021 11:40) (64 - 91)  BP: 165/82 (13 Oct 2021 04:51) (116/65 - 165/82)  BP(mean): --  RR: 20 (13 Oct 2021 04:51) (17 - 20)  SpO2: 94% (13 Oct 2021 11:40) (93% - 97%)    GEN: NAD; A and O x 3, baseline has garbled speech  LUNGS: CTA B/L  HEART: S1 S2  ABDOMEN: soft, non-tender, non-distended, + BS  EXTREMITIES: no edema  NERVOUS SYSTEM:  Awake and alert; no focal neuro deficits      LABS:                        7.7    7.15  )-----------( 138      ( 13 Oct 2021 09:02 )             23.9     10-13    146<H>  |  111<H>  |  18  ----------------------------<  114<H>  3.8   |  23  |  0.93    Ca    7.9<L>      13 Oct 2021 09:02  Phos  2.1     10-13  Mg     1.5     10-13    TPro  6.8  /  Alb  2.6<L>  /  TBili  0.4  /  DBili  x   /  AST  69<H>  /  ALT  26  /  AlkPhos  120  10-12      CARDIAC MARKERS ( 12 Oct 2021 13:12 )  <0.015 ng/mL / x     / x     / x     / x              COVID-19 PCR: NotDetec (12 Oct 2021 13:12)  COVID-19 PCR: NotDetec (03 Oct 2021 06:52)  COVID-19 PCR: NotDetec (23 Sep 2021 20:35)  COVID-19 PCR: NotDetec (20 Sep 2021 11:38)      RADIOLOGY & ADDITIONAL TESTS:  < from: Xray Chest 1 View-PORTABLE IMMEDIATE (Xray Chest 1 View-PORTABLE IMMEDIATE .) (10.04.21 @ 17:03) >      INTERPRETATION:  AP chest on October 4, 2021 at 1:12 PM. Patient has a lung mass and pneumonia.    Heart magnified by technique. Cervical spine hardware again noted.    There is a small infiltrate at left base new since September 28.    There is persistent density likely atelectatic consolidation of the right lower lobe possibly with underlying mass which is similar to September 28.    Follow-up AP erect chest on September 28, 2021 at 2:08 PM. Patient had bronchoscopy and biopsy.    Heart magnified by technique.    On October 4. Small infiltrate off the left lower hilum which is no longer evident on this film.    Both studies show what appears to be atelectatic consolidation of the right lower lobe which could easily obscure a lesion.    No pneumothorax.    Cervical spine hardware again seen.    IMPRESSION: Persistent right lung findings as above.    < end of copied text >

## 2021-10-13 NOTE — ADVANCED PRACTICE NURSE CONSULT - RECOMMEDATIONS
-Clean all affected areas with normal saline and apply skin prep to the surrounding skin  -Frequent toileting  -Apply Zinc Oxide Moisture Barrier Cream to the Bilateral Gluteus, Perianal, Bilateral Ischium, and Groin areas b.i.d. PRN  -Elevate/float the patients heels using heel protectors and reposition the patient Q 2hrs using wedges or pillows normal...

## 2021-10-13 NOTE — PROGRESS NOTE ADULT - ASSESSMENT
71 YOF with PMH HTN, HLD, recent dx of metastatic lung CA and admission for sepsis, readmitted for weakness and placement to [facility]. Patient is Full Code.

## 2021-10-14 DIAGNOSIS — E87.6 HYPOKALEMIA: ICD-10-CM

## 2021-10-14 LAB
ANION GAP SERPL CALC-SCNC: 9 MMOL/L — SIGNIFICANT CHANGE UP (ref 5–17)
BUN SERPL-MCNC: 14 MG/DL — SIGNIFICANT CHANGE UP (ref 7–18)
CALCIUM SERPL-MCNC: 7.9 MG/DL — LOW (ref 8.4–10.5)
CHLORIDE SERPL-SCNC: 112 MMOL/L — HIGH (ref 96–108)
CO2 SERPL-SCNC: 24 MMOL/L — SIGNIFICANT CHANGE UP (ref 22–31)
CREAT SERPL-MCNC: 0.77 MG/DL — SIGNIFICANT CHANGE UP (ref 0.5–1.3)
GLUCOSE SERPL-MCNC: 117 MG/DL — HIGH (ref 70–99)
HCT VFR BLD CALC: 23.6 % — LOW (ref 34.5–45)
HGB BLD-MCNC: 7.6 G/DL — LOW (ref 11.5–15.5)
MAGNESIUM SERPL-MCNC: 1.6 MG/DL — SIGNIFICANT CHANGE UP (ref 1.6–2.6)
MCHC RBC-ENTMCNC: 30.8 PG — SIGNIFICANT CHANGE UP (ref 27–34)
MCHC RBC-ENTMCNC: 32.2 GM/DL — SIGNIFICANT CHANGE UP (ref 32–36)
MCV RBC AUTO: 95.5 FL — SIGNIFICANT CHANGE UP (ref 80–100)
NRBC # BLD: 1 /100 WBCS — HIGH (ref 0–0)
PHOSPHATE SERPL-MCNC: 2.4 MG/DL — LOW (ref 2.5–4.5)
PLATELET # BLD AUTO: 124 K/UL — LOW (ref 150–400)
POTASSIUM SERPL-MCNC: 3.3 MMOL/L — LOW (ref 3.5–5.3)
POTASSIUM SERPL-SCNC: 3.3 MMOL/L — LOW (ref 3.5–5.3)
RBC # BLD: 2.47 M/UL — LOW (ref 3.8–5.2)
RBC # FLD: 18 % — HIGH (ref 10.3–14.5)
SODIUM SERPL-SCNC: 145 MMOL/L — SIGNIFICANT CHANGE UP (ref 135–145)
WBC # BLD: 6.17 K/UL — SIGNIFICANT CHANGE UP (ref 3.8–10.5)
WBC # FLD AUTO: 6.17 K/UL — SIGNIFICANT CHANGE UP (ref 3.8–10.5)

## 2021-10-14 RX ORDER — APREPITANT 80 MG/1
80 CAPSULE ORAL ONCE
Refills: 0 | Status: DISCONTINUED | OUTPATIENT
Start: 2021-10-16 | End: 2021-10-18

## 2021-10-14 RX ORDER — ONDANSETRON 8 MG/1
16 TABLET, FILM COATED ORAL DAILY
Refills: 0 | Status: COMPLETED | OUTPATIENT
Start: 2021-10-15 | End: 2021-10-15

## 2021-10-14 RX ORDER — DEXAMETHASONE 0.5 MG/5ML
10 ELIXIR ORAL DAILY
Refills: 0 | Status: DISCONTINUED | OUTPATIENT
Start: 2021-10-18 | End: 2021-10-18

## 2021-10-14 RX ORDER — MANNITOL
12.5 POWDER (GRAM) MISCELLANEOUS ONCE
Refills: 0 | Status: COMPLETED | OUTPATIENT
Start: 2021-10-15 | End: 2021-10-15

## 2021-10-14 RX ORDER — SODIUM CHLORIDE 9 MG/ML
1000 INJECTION INTRAMUSCULAR; INTRAVENOUS; SUBCUTANEOUS ONCE
Refills: 0 | Status: COMPLETED | OUTPATIENT
Start: 2021-10-15 | End: 2021-10-15

## 2021-10-14 RX ORDER — APREPITANT 80 MG/1
80 CAPSULE ORAL ONCE
Refills: 0 | Status: DISCONTINUED | OUTPATIENT
Start: 2021-10-17 | End: 2021-10-18

## 2021-10-14 RX ORDER — CISPLATIN 1 MG/ML
133 INJECTION, SOLUTION INTRAVENOUS ONCE
Refills: 0 | Status: COMPLETED | OUTPATIENT
Start: 2021-10-15 | End: 2021-10-15

## 2021-10-14 RX ORDER — ONDANSETRON 8 MG/1
16 TABLET, FILM COATED ORAL DAILY
Refills: 0 | Status: DISCONTINUED | OUTPATIENT
Start: 2021-10-18 | End: 2021-10-18

## 2021-10-14 RX ORDER — FOSAPREPITANT DIMEGLUMINE 150 MG/5ML
150 INJECTION, POWDER, LYOPHILIZED, FOR SOLUTION INTRAVENOUS ONCE
Refills: 0 | Status: COMPLETED | OUTPATIENT
Start: 2021-10-15 | End: 2021-10-15

## 2021-10-14 RX ORDER — ETOPOSIDE 20 MG/ML
142 VIAL (ML) INTRAVENOUS ONCE
Refills: 0 | Status: DISCONTINUED | OUTPATIENT
Start: 2021-10-18 | End: 2021-10-18

## 2021-10-14 RX ORDER — DEXAMETHASONE 0.5 MG/5ML
10 ELIXIR ORAL DAILY
Refills: 0 | Status: COMPLETED | OUTPATIENT
Start: 2021-10-15 | End: 2021-10-15

## 2021-10-14 RX ORDER — POTASSIUM CHLORIDE 20 MEQ
20 PACKET (EA) ORAL ONCE
Refills: 0 | Status: COMPLETED | OUTPATIENT
Start: 2021-10-14 | End: 2021-10-14

## 2021-10-14 RX ORDER — ETOPOSIDE 20 MG/ML
142 VIAL (ML) INTRAVENOUS ONCE
Refills: 0 | Status: COMPLETED | OUTPATIENT
Start: 2021-10-15 | End: 2021-10-15

## 2021-10-14 RX ORDER — SODIUM,POTASSIUM PHOSPHATES 278-250MG
1 POWDER IN PACKET (EA) ORAL ONCE
Refills: 0 | Status: COMPLETED | OUTPATIENT
Start: 2021-10-14 | End: 2021-10-14

## 2021-10-14 RX ADMIN — Medication 1 SPRAY(S): at 21:09

## 2021-10-14 RX ADMIN — Medication 25 MILLIGRAM(S): at 10:52

## 2021-10-14 RX ADMIN — Medication 650 MILLIGRAM(S): at 13:50

## 2021-10-14 RX ADMIN — Medication 1 MILLIGRAM(S): at 13:11

## 2021-10-14 RX ADMIN — Medication 1 PACKET(S): at 13:11

## 2021-10-14 RX ADMIN — Medication 20 MILLIEQUIVALENT(S): at 13:10

## 2021-10-14 RX ADMIN — Medication 650 MILLIGRAM(S): at 19:32

## 2021-10-14 RX ADMIN — Medication 650 MILLIGRAM(S): at 05:58

## 2021-10-14 RX ADMIN — Medication 650 MILLIGRAM(S): at 13:11

## 2021-10-14 RX ADMIN — Medication 650 MILLIGRAM(S): at 20:14

## 2021-10-14 RX ADMIN — SIMVASTATIN 40 MILLIGRAM(S): 20 TABLET, FILM COATED ORAL at 21:09

## 2021-10-14 RX ADMIN — Medication 1 SPRAY(S): at 13:10

## 2021-10-14 RX ADMIN — Medication 30 MILLIGRAM(S): at 13:11

## 2021-10-14 RX ADMIN — Medication 1 SPRAY(S): at 05:56

## 2021-10-14 RX ADMIN — Medication 25 MILLIGRAM(S): at 21:09

## 2021-10-14 NOTE — PROGRESS NOTE ADULT - SUBJECTIVE AND OBJECTIVE BOX
Patient is a 71y old  Female who presents with a chief complaint of weakness (14 Oct 2021 12:19)    SUBJECTIVE / OVERNIGHT EVENTS:      MEDICATIONS  (STANDING):  folic acid 1 milliGRAM(s) Oral daily  metoprolol tartrate 25 milliGRAM(s) Oral two times a day  PARoxetine 30 milliGRAM(s) Oral daily  simvastatin 40 milliGRAM(s) Oral at bedtime  sodium chloride 0.65% Nasal 1 Spray(s) Both Nostrils three times a day    MEDICATIONS  (PRN):  acetaminophen   Tablet .. 650 milliGRAM(s) Oral every 6 hours PRN Temp greater or equal to 38C (100.4F), Mild Pain (1 - 3), Moderate Pain (4 - 6)  ALBUTerol    90 MICROgram(s) HFA Inhaler 2 Puff(s) Inhalation every 6 hours PRN Shortness of Breath and/or Wheezing  OLANZapine 2.5 milliGRAM(s) Oral at bedtime PRN Agitation    Vital Signs Last 24 Hrs  T(C): 36.8 (14 Oct 2021 09:57), Max: 37.6 (13 Oct 2021 21:23)  T(F): 98.3 (14 Oct 2021 09:57), Max: 99.7 (13 Oct 2021 21:23)  HR: 106 (14 Oct 2021 09:57) (99 - 106)  BP: 113/72 (14 Oct 2021 09:57) (100/63 - 163/75)  BP(mean): --  RR: 17 (14 Oct 2021 09:57) (17 - 19)  SpO2: 94% (14 Oct 2021 09:57) (89% - 99%)    LABS:                        7.6    6.17  )-----------( 124      ( 14 Oct 2021 07:49 )             23.6     10-14    145  |  112<H>  |  14  ----------------------------<  117<H>  3.3<L>   |  24  |  0.77    Ca    7.9<L>      14 Oct 2021 07:49  Phos  2.4     10-14  Mg     1.6     10-14          COVID-19 PCR: NotDetec (12 Oct 2021 13:12)  COVID-19 PCR: NotDetec (03 Oct 2021 06:52)  COVID-19 PCR: NotDetec (23 Sep 2021 20:35)  COVID-19 PCR: NotDetec (20 Sep 2021 11:38)      RADIOLOGY & ADDITIONAL TESTS:  Imaging from Last 24 Hours:    Electrocardiogram/QTc Interval:    COORDINATION OF CARE:  Care Discussed with Consultants/Other Providers:   Patient is a 71y old  Female who presents with a chief complaint of weakness (14 Oct 2021 12:19)    SUBJECTIVE / OVERNIGHT EVENTS: events noted. No new complaints, in good spirits and in agreement to start chemo.     MEDICATIONS  (STANDING):  folic acid 1 milliGRAM(s) Oral daily  metoprolol tartrate 25 milliGRAM(s) Oral two times a day  PARoxetine 30 milliGRAM(s) Oral daily  simvastatin 40 milliGRAM(s) Oral at bedtime  sodium chloride 0.65% Nasal 1 Spray(s) Both Nostrils three times a day    MEDICATIONS  (PRN):  acetaminophen   Tablet .. 650 milliGRAM(s) Oral every 6 hours PRN Temp greater or equal to 38C (100.4F), Mild Pain (1 - 3), Moderate Pain (4 - 6)  ALBUTerol    90 MICROgram(s) HFA Inhaler 2 Puff(s) Inhalation every 6 hours PRN Shortness of Breath and/or Wheezing  OLANZapine 2.5 milliGRAM(s) Oral at bedtime PRN Agitation    Vital Signs Last 24 Hrs  T(C): 36.8 (14 Oct 2021 09:57), Max: 37.6 (13 Oct 2021 21:23)  T(F): 98.3 (14 Oct 2021 09:57), Max: 99.7 (13 Oct 2021 21:23)  HR: 106 (14 Oct 2021 09:57) (99 - 106)  BP: 113/72 (14 Oct 2021 09:57) (100/63 - 163/75)  BP(mean): --  RR: 17 (14 Oct 2021 09:57) (17 - 19)  SpO2: 94% (14 Oct 2021 09:57) (89% - 99%)    GEN: NAD; A and O x 3, baseline has garbled speech  LUNGS: CTA B/L  HEART: S1 S2  ABDOMEN: soft, non-tender, non-distended, + BS  EXTREMITIES: no edema  NERVOUS SYSTEM:  Awake and alert; no focal neuro deficits    LABS:                        7.6    6.17  )-----------( 124      ( 14 Oct 2021 07:49 )             23.6     10-14    145  |  112<H>  |  14  ----------------------------<  117<H>  3.3<L>   |  24  |  0.77    Ca    7.9<L>      14 Oct 2021 07:49  Phos  2.4     10-14  Mg     1.6     10-14          COVID-19 PCR: NotDetec (12 Oct 2021 13:12)  COVID-19 PCR: NotDetec (03 Oct 2021 06:52)  COVID-19 PCR: NotDetec (23 Sep 2021 20:35)  COVID-19 PCR: NotDetec (20 Sep 2021 11:38)

## 2021-10-14 NOTE — PROGRESS NOTE ADULT - ASSESSMENT
complete note to follow     Assessment and Recommendation:   · Assessment	  72 y/o F with PMHx of HTN, HLD, and metastatic Lung CA, was discharged on 10/6 from [facility] the [facility] after hospitalization for sepsis from PNA and UTI, comes in complaining of weakness since discharge. Patient states that she felt well on the day of discharge but then started feeling weak again the next day. Patient denies fever, chills, headache, dizziness, chest pain, palpitations, cough, SOB, n/v/d/c,  complaints, rashes or bleeding. Patient admitted for placement to [facility] which was refused on previous admission per chart review.   Per  Marcos, he did not understand the extent of her weakness and was not able to take care of her at home. She currently has a file open at [facility] about cancer treatment options and wants to pursue every and all treatment/life saving measures for his wife including intubation and resuscitation if indicated.    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 Mathews after d/c on 10/06, but was not seen  pt's  is stating he is setting up an appt with [facility]  p/w continued weakness and need for rehab as  unable to assist pt at home  Rec's:  -d/t aggressive nature of pt's cancer and progressive weakness that she needs to start the 1st cycle of inpatient chemotherapy with Cis/Etop  -the recommendation for chemo was discussed with pt, her  and the pt's brother at the request of her . I reviewed the diagnosis, prognosis, indication to start chemo, possible SE, pre-medications and neulasta and the plan. Both the pt, pt's  and her brother Mason have agreed to move forward with chemo. We have discussed the need for inpt chemo with Dr. Meredith and Dr. Lindquist,, ALLEN Gutiérrez and the Pharmacy to order the chemotx. Pt is A&O x 3 and deemed capable of decision making, pt signed chemo consent and again reviewed chemo tx and poss SE with pt and  over the phone.  -pre-medications and chemo are ordered to start Friday 10/15  -Cr/GFR reviewed  -chemo regimen will be Cisplatin IV on Day 1, Etoposide IV on Day 1-3 and Neulasta on Day 4  cisplatin required pre and post hydration and mannitol    case discussed with Medicine Team  Thank you for the referral. Will continue to monitor the patient.  Please call with any questions [phone]  Above reviewed with Attending Dr. Casper    [facility]  [address] [facility], [address], [place], [place]  [phone]

## 2021-10-14 NOTE — PROGRESS NOTE ADULT - SUBJECTIVE AND OBJECTIVE BOX
Time of visit:    CHIEF COMPLAINT: Patient is a 71y old  Female who presents with a chief complaint of weakness (14 Oct 2021 13:54)      HPI:  72 y/o F with PMHx of HTN, HLD, and metastatic Lung CA, was discharged on 10/6 from [facility] after hospitalization for sepsis from PNA and UTI, comes in complaining of weakness since discharge. Patient states that she felt well on the day of discharge but then started feeling weak again the next day. Patient denies fever, chills, headache, dizziness, chest pain, palpitations, cough, SOB, n/v/d/c,  complaints, rashes or bleeding. Patient admitted for placement to [facility] which was refused on previous admission per chart review.   Per  Marcos, he did not understand the extent of her weakness and was not able to take care of her at home. She currently has a file open at [facility] about cancer treatment options and wants to pursue every and all treatment/life saving measures for his wife including intubation and resuscitation if indicated.    In ED:   Temp 97.5F, HR 74, /78, RR 17, SpO2: 97% on 2L NC   EKG: NSR   s/p 1L bolus (12 Oct 2021 17:49)   Patient seen and examined.     PAST MEDICAL & SURGICAL HISTORY:  Lumbar stenosis    Bleeding ulcer  stomach ulcer 2011    Anxiety    Spinal stenosis in cervical region    Acute hepatitis C virus infection without hepatic coma  treated 2015- Resolved    Balance disorder    Coronary artery disease involving native coronary artery of native heart without angina pectoris    HTN (hypertension)    HLD (hyperlipidemia)    H/O colonoscopy    Bleeding ulcer  stomach surgery for bleeding ulcer    Fracture  ORIF Left wrist  1/17/13    History of lumbar surgery  2013    Chronic UTI  Pt reports presently on 2nd dose of antibiotics 7/5/18 10 days, Dr Herman aware, pt going for m/eval 7/13/18.    H/O cardiac catheterization  2013, no intervention needed, treated medically        Allergies    No Known Allergies    Intolerances        MEDICATIONS  (STANDING):  folic acid 1 milliGRAM(s) Oral daily  metoprolol tartrate 25 milliGRAM(s) Oral two times a day  PARoxetine 30 milliGRAM(s) Oral daily  simvastatin 40 milliGRAM(s) Oral at bedtime  sodium chloride 0.65% Nasal 1 Spray(s) Both Nostrils three times a day      MEDICATIONS  (PRN):  acetaminophen   Tablet .. 650 milliGRAM(s) Oral every 6 hours PRN Temp greater or equal to 38C (100.4F), Mild Pain (1 - 3), Moderate Pain (4 - 6)  ALBUTerol    90 MICROgram(s) HFA Inhaler 2 Puff(s) Inhalation every 6 hours PRN Shortness of Breath and/or Wheezing  OLANZapine 2.5 milliGRAM(s) Oral at bedtime PRN Agitation   Medications up to date at time of exam.    Medications up to date at time of exam.    FAMILY HISTORY:  No pertinent family history in first degree relatives        SOCIAL HISTORY  Smoking History: Denies smoking exposure.   Living Condition: [   ] apartment, [   ] private house  Work History:   Travel History: denies recent travel  Illicit Substance Use: denies  Alcohol Use: denies    REVIEW OF SYSTEMS:    CONSTITUTIONAL:  No fevers, chills on exam.     HEENT:  No sore throat or runny nose.    CARDIOVASCULAR:  No facial grimace of chest discomfort. No palpitations.    RESPIRATORY:  No cough, no wheezing on exam.     GASTROINTESTINAL:  Denies abdominal pain, nausea, vomiting or diarrhea.    GENITOURINARY: Denies dysuria, frequency or urgency.    NEUROLOGIC:  No seizure, no tremors.     PSYCHIATRIC:  No emotional distress .       PHYSICAL EXAMINATION:    GENERAL: Alert and oriented 1-2 , forgetful. Poor historian. No acute distress.     Vital Signs Last 24 Hrs  T(C): 36.8 (14 Oct 2021 14:42), Max: 37.6 (13 Oct 2021 21:23)  T(F): 98.2 (14 Oct 2021 14:42), Max: 99.7 (13 Oct 2021 21:23)  HR: 93 (14 Oct 2021 14:42) (93 - 106)  BP: 116/69 (14 Oct 2021 14:42) (100/63 - 163/75)  BP(mean): --  RR: 18 (14 Oct 2021 14:42) (17 - 18)  SpO2: 97% (14 Oct 2021 14:42) (94% - 99%)   (if applicable)      HEENT: Head is normocephalic and atraumatic. No nasal tenderness. Extraocular muscles are intact. Mucous membranes are moist.     NECK: Supple, no palpable adenopathy.    LUNGS: Clear to auscultation bilaterally with no wheezing, rales, or rhonchi. No use of accessory muscle.     HEART: S1 S2 Regular rate and no click/ rub.     ABDOMEN: Soft, nontender, and nondistended.  No hepatosplenomegaly is noted. Active bowel sounds.     EXTREMITIES: Without any cyanosis, clubbing, rash, lesions or edema.    NEUROLOGIC: Awake, alert, oriented.     SKIN: Warm and moist. Non diaphoretic.       LABS:                        7.6    6.17  )-----------( 124      ( 14 Oct 2021 07:49 )             23.6     10-14    145  |  112<H>  |  14  ----------------------------<  117<H>  3.3<L>   |  24  |  0.77    Ca    7.9<L>      14 Oct 2021 07:49  Phos  2.4     10-14  Mg     1.6     10-14                          MICROBIOLOGY: (if applicable)    RADIOLOGY & ADDITIONAL STUDIES:  EKG:   CXR:  ECHO:    IMPRESSION: 71y Female PAST MEDICAL & SURGICAL HISTORY:  Lumbar stenosis    Bleeding ulcer  stomach ulcer 2011    Anxiety    Spinal stenosis in cervical region    Acute hepatitis C virus infection without hepatic coma  treated 2015- Resolved    Balance disorder    Coronary artery disease involving native coronary artery of native heart without angina pectoris    HTN (hypertension)    HLD (hyperlipidemia)    H/O colonoscopy    Bleeding ulcer  stomach surgery for bleeding ulcer    Fracture  ORIF Left wrist  1/17/13    History of lumbar surgery  2013    Chronic UTI  Pt reports presently on 2nd dose of antibiotics 7/5/18 10 days, Dr Herman aware, pt going for m/eval 7/13/18.    H/O cardiac catheterization  2013, no intervention needed, treated medically    Impression: 72 Y/O Female presented with weakness since discharge. Patient was discharged on 10-06-21 from [facility] after hospitalization for Sepsis from UTI and Pneumonia. Has Metastatic Lung Ca who had s/p EBUS / Bronchoscopy on 10-04-21. Right endobronchial  Mass , to start on Chemo on 10-15-21.  10-12-21 Negative for Covid 19 PCR.    Suggestion:  O2 saturation 98% with O2 supplement 2L NC. Continue Oxygen supplementation if needed 2L NC.  Oral hygiene care.  Oncology follow up for Chemo therapy.  Continue PRN Albuterol 2 puffs Q 6 Hours.        Time of visit:    CHIEF COMPLAINT: Patient is a 71y old  Female who presents with a chief complaint of weakness (14 Oct 2021 13:54)      HPI:  72 y/o F with PMHx of HTN, HLD, and metastatic Lung CA, was discharged on 10/6 from [facility] after hospitalization for sepsis from PNA and UTI, comes in complaining of weakness since discharge. Patient states that she felt well on the day of discharge but then started feeling weak again the next day. Patient denies fever, chills, headache, dizziness, chest pain, palpitations, cough, SOB, n/v/d/c,  complaints, rashes or bleeding. Patient admitted for placement to [facility] which was refused on previous admission per chart review.   Per  Marcos, he did not understand the extent of her weakness and was not able to take care of her at home. She currently has a file open at [facility] about cancer treatment options and wants to pursue every and all treatment/life saving measures for his wife including intubation and resuscitation if indicated.    In ED:   Temp 97.5F, HR 74, /78, RR 17, SpO2: 97% on 2L NC   EKG: NSR   s/p 1L bolus (12 Oct 2021 17:49)   Patient seen and examined.     PAST MEDICAL & SURGICAL HISTORY:  Lumbar stenosis    Bleeding ulcer  stomach ulcer 2011    Anxiety    Spinal stenosis in cervical region    Acute hepatitis C virus infection without hepatic coma  treated 2015- Resolved    Balance disorder    Coronary artery disease involving native coronary artery of native heart without angina pectoris    HTN (hypertension)    HLD (hyperlipidemia)    H/O colonoscopy    Bleeding ulcer  stomach surgery for bleeding ulcer    Fracture  ORIF Left wrist  1/17/13    History of lumbar surgery  2013    Chronic UTI  Pt reports presently on 2nd dose of antibiotics 7/5/18 10 days, Dr Herman aware, pt going for m/eval 7/13/18.    H/O cardiac catheterization  2013, no intervention needed, treated medically        Allergies    No Known Allergies    Intolerances        MEDICATIONS  (STANDING):  folic acid 1 milliGRAM(s) Oral daily  metoprolol tartrate 25 milliGRAM(s) Oral two times a day  PARoxetine 30 milliGRAM(s) Oral daily  simvastatin 40 milliGRAM(s) Oral at bedtime  sodium chloride 0.65% Nasal 1 Spray(s) Both Nostrils three times a day      MEDICATIONS  (PRN):  acetaminophen   Tablet .. 650 milliGRAM(s) Oral every 6 hours PRN Temp greater or equal to 38C (100.4F), Mild Pain (1 - 3), Moderate Pain (4 - 6)  ALBUTerol    90 MICROgram(s) HFA Inhaler 2 Puff(s) Inhalation every 6 hours PRN Shortness of Breath and/or Wheezing  OLANZapine 2.5 milliGRAM(s) Oral at bedtime PRN Agitation   Medications up to date at time of exam.    Medications up to date at time of exam.    FAMILY HISTORY:  No pertinent family history in first degree relatives        SOCIAL HISTORY  Smoking History: Denies smoking exposure.   Living Condition: [   ] apartment, [   ] private house  Work History:   Travel History: denies recent travel  Illicit Substance Use: denies  Alcohol Use: denies    REVIEW OF SYSTEMS:    CONSTITUTIONAL:  No fevers, chills on exam.     HEENT:  No sore throat or runny nose.    CARDIOVASCULAR:  No facial grimace of chest discomfort. No palpitations.    RESPIRATORY:  No cough, no wheezing on exam.     GASTROINTESTINAL:  Denies abdominal pain, nausea, vomiting or diarrhea.    GENITOURINARY: Denies dysuria, frequency or urgency.    NEUROLOGIC:  No seizure, no tremors.     PSYCHIATRIC:  No emotional distress .       PHYSICAL EXAMINATION:    GENERAL: Alert and oriented 1-2 , forgetful. Poor historian. No acute distress.     Vital Signs Last 24 Hrs  T(C): 36.8 (14 Oct 2021 14:42), Max: 37.6 (13 Oct 2021 21:23)  T(F): 98.2 (14 Oct 2021 14:42), Max: 99.7 (13 Oct 2021 21:23)  HR: 93 (14 Oct 2021 14:42) (93 - 106)  BP: 116/69 (14 Oct 2021 14:42) (100/63 - 163/75)  BP(mean): --  RR: 18 (14 Oct 2021 14:42) (17 - 18)  SpO2: 97% (14 Oct 2021 14:42) (94% - 99%)   (if applicable)      HEENT: Head is normocephalic and atraumatic. No nasal tenderness. Extraocular muscles are intact. Mucous membranes are moist.     NECK: Supple, no palpable adenopathy.    LUNGS: Clear to auscultation bilaterally with no wheezing, rales, or rhonchi. No use of accessory muscle.     HEART: S1 S2 Regular rate and no click/ rub.     ABDOMEN: Soft, nontender, and nondistended.  No hepatosplenomegaly is noted. Active bowel sounds.     EXTREMITIES: Without any cyanosis, clubbing, rash, lesions or edema.    NEUROLOGIC: Awake, alert, oriented.     SKIN: Warm and moist. Non diaphoretic.       LABS:                        7.6    6.17  )-----------( 124      ( 14 Oct 2021 07:49 )             23.6     10-14    145  |  112<H>  |  14  ----------------------------<  117<H>  3.3<L>   |  24  |  0.77    Ca    7.9<L>      14 Oct 2021 07:49  Phos  2.4     10-14  Mg     1.6     10-14                          MICROBIOLOGY: (if applicable)    RADIOLOGY & ADDITIONAL STUDIES:  EKG:   CXR:  ECHO:    IMPRESSION: 71y Female PAST MEDICAL & SURGICAL HISTORY:  Lumbar stenosis    Bleeding ulcer  stomach ulcer 2011    Anxiety    Spinal stenosis in cervical region    Acute hepatitis C virus infection without hepatic coma  treated 2015- Resolved    Balance disorder    Coronary artery disease involving native coronary artery of native heart without angina pectoris    HTN (hypertension)    HLD (hyperlipidemia)    H/O colonoscopy    Bleeding ulcer  stomach surgery for bleeding ulcer    Fracture  ORIF Left wrist  1/17/13    History of lumbar surgery  2013    Chronic UTI  Pt reports presently on 2nd dose of antibiotics 7/5/18 10 days, Dr Herman aware, pt going for m/eval 7/13/18.    H/O cardiac catheterization  2013, no intervention needed, treated medically    Impression: 70 Y/O Female presented with weakness since discharge. Patient was discharged on 10-06-21 from [facility] after hospitalization for Sepsis from UTI and Pneumonia. Has Metastatic Lung Ca who had s/p EBUS / Bronchoscopy on 10-04-21. Right endobronchial  Mass , to start on Chemo on 10-15-21.  10-12-21 Negative for Covid 19 PCR.    Suggestion:  O2 saturation 98% with O2 supplement 2L NC. Continue Oxygen supplementation if needed 2L NC.  Oral hygiene care.  Oncology follow up for Chemo therapy.  Continue PRN Albuterol 2 puffs Q 6 Hours.       Agree with above assessment and plan as transcribed.

## 2021-10-14 NOTE — PROGRESS NOTE ADULT - PROBLEM SELECTOR PLAN 8
Pt admitted for placement  PT recommends [facility]  Care management following for discharge planning   CM advised need oncology treatment plan before any facility will accept - plan initiated by heme / onc ( refer to note on 10/13)   pending 1st chemo tomorrow Pt admitted for placement  PT recommends [facility]  Care management following for discharge planning   CM advised need oncology treatment plan before any facility will accept - plan initiated by heme / onc ( refer to note on 10/13)   1st cycle of chemo will be completed in the hospital ( total D4) - 10/15- 10/18

## 2021-10-14 NOTE — PROGRESS NOTE ADULT - PROBLEM SELECTOR PLAN 1
Pt d/c'd home to pursue chemo tx on last admission, to weak to make appt  Heme/Onc now recommending inpatient IV chemo tx -  planning to start chemo 10/15   A Heme/Onc group is  following

## 2021-10-14 NOTE — PROGRESS NOTE ADULT - SUBJECTIVE AND OBJECTIVE BOX
NP Note discussed with  Primary Attending    Patient is a 71y old  Female who presents with a chief complaint of weakness (13 Oct 2021 23:26)      INTERVAL HPI/OVERNIGHT EVENTS: no new complaints    MEDICATIONS  (STANDING):  folic acid 1 milliGRAM(s) Oral daily  metoprolol tartrate 25 milliGRAM(s) Oral two times a day  PARoxetine 30 milliGRAM(s) Oral daily  potassium chloride    Tablet ER 20 milliEquivalent(s) Oral once  potassium phosphate / sodium phosphate Powder (PHOS-NaK) 1 Packet(s) Oral once  simvastatin 40 milliGRAM(s) Oral at bedtime  sodium chloride 0.65% Nasal 1 Spray(s) Both Nostrils three times a day    MEDICATIONS  (PRN):  acetaminophen   Tablet .. 650 milliGRAM(s) Oral every 6 hours PRN Temp greater or equal to 38C (100.4F), Mild Pain (1 - 3), Moderate Pain (4 - 6)  ALBUTerol    90 MICROgram(s) HFA Inhaler 2 Puff(s) Inhalation every 6 hours PRN Shortness of Breath and/or Wheezing  OLANZapine 2.5 milliGRAM(s) Oral at bedtime PRN Agitation      __________________________________________________  REVIEW OF SYSTEMS:    CONSTITUTIONAL: No fever,   EYES: no acute visual disturbances  NECK: No pain or stiffness  RESPIRATORY: No cough; No shortness of breath with supplemental oxygen   CARDIOVASCULAR: No chest pain, no palpitations  GASTROINTESTINAL: No pain. No nausea or vomiting; No diarrhea   NEUROLOGICAL: No headache or numbness, no tremors  MUSCULOSKELETAL: No joint pain, no muscle pain  GENITOURINARY: no dysuria, no frequency, no hesitancy  PSYCHIATRY: no depression , no anxiety  ALL OTHER  ROS negative        Vital Signs Last 24 Hrs  T(C): 36.8 (14 Oct 2021 09:57), Max: 37.6 (13 Oct 2021 21:23)  T(F): 98.3 (14 Oct 2021 09:57), Max: 99.7 (13 Oct 2021 21:23)  HR: 106 (14 Oct 2021 09:57) (99 - 106)  BP: 113/72 (14 Oct 2021 09:57) (100/63 - 163/75)  BP(mean): --  RR: 17 (14 Oct 2021 09:57) (17 - 19)  SpO2: 94% (14 Oct 2021 09:57) (89% - 99%)    ________________________________________________  PHYSICAL EXAM:  GENERAL: NAD  HEENT: Normocephalic;  conjunctivae and sclerae clear; moist mucous membranes;   NECK : supple  CHEST/LUNG: Clear to auscultation bilaterally with good air entry  + oxygen 2L via N-C   HEART: S1 S2  regular; no murmurs, gallops or rubs  ABDOMEN: Soft, Nontender, Nondistended; Bowel sounds present  EXTREMITIES: no cyanosis; no edema; no calf tenderness  SKIN: warm and dry; no rash  NERVOUS SYSTEM:  Awake and alert; Oriented  to place, person and time ; no new deficits    _________________________________________________  LABS:                        7.6    6.17  )-----------( 124      ( 14 Oct 2021 07:49 )             23.6     10-14    145  |  112<H>  |  14  ----------------------------<  117<H>  3.3<L>   |  24  |  0.77    Ca    7.9<L>      14 Oct 2021 07:49  Phos  2.4     10-14  Mg     1.6     10-14    TPro  6.8  /  Alb  2.6<L>  /  TBili  0.4  /  DBili  x   /  AST  69<H>  /  ALT  26  /  AlkPhos  120  10-12        CAPILLARY BLOOD GLUCOSE            RADIOLOGY & ADDITIONAL TESTS:    Imaging Personally Reviewed:  YES/NO    Consultant(s) Notes Reviewed:   YES/ No    Care Discussed with Consultants :     Plan of care was discussed with patient and /or primary care giver; all questions and concerns were addressed and care was aligned with patient's wishes.

## 2021-10-14 NOTE — PROGRESS NOTE ADULT - SUBJECTIVE AND OBJECTIVE BOX
Patient is a 71y old  Female who presents with a chief complaint of weakness (14 Oct 2021 13:33)    PATIENT IS SEEN AND EXAMINED IN MEDICAL FLOOR.  JESENIAT [    ]    ORIANA [   ]      GT [   ]    ALLERGIES:  No Known Allergies      Daily     Daily     VITALS:    Vital Signs Last 24 Hrs  T(C): 36.8 (14 Oct 2021 09:57), Max: 37.6 (13 Oct 2021 21:23)  T(F): 98.3 (14 Oct 2021 09:57), Max: 99.7 (13 Oct 2021 21:23)  HR: 106 (14 Oct 2021 09:57) (100 - 106)  BP: 113/72 (14 Oct 2021 09:57) (100/63 - 163/75)  BP(mean): --  RR: 17 (14 Oct 2021 09:57) (17 - 18)  SpO2: 94% (14 Oct 2021 09:57) (94% - 99%)    LABS:    CBC Full  -  ( 14 Oct 2021 07:49 )  WBC Count : 6.17 K/uL  RBC Count : 2.47 M/uL  Hemoglobin : 7.6 g/dL  Hematocrit : 23.6 %  Platelet Count - Automated : 124 K/uL  Mean Cell Volume : 95.5 fl  Mean Cell Hemoglobin : 30.8 pg  Mean Cell Hemoglobin Concentration : 32.2 gm/dL  Auto Neutrophil # : x  Auto Lymphocyte # : x  Auto Monocyte # : x  Auto Eosinophil # : x  Auto Basophil # : x  Auto Neutrophil % : x  Auto Lymphocyte % : x  Auto Monocyte % : x  Auto Eosinophil % : x  Auto Basophil % : x      10-14    145  |  112<H>  |  14  ----------------------------<  117<H>  3.3<L>   |  24  |  0.77    Ca    7.9<L>      14 Oct 2021 07:49  Phos  2.4     10-14  Mg     1.6     10-14      CAPILLARY BLOOD GLUCOSE              Creatinine Trend: 0.77<--, 0.93<--, 1.14<--, 1.12<--, 1.34<--, 1.01<--  I&O's Summary          BAL Bronchoalveolar Washings  10-05 @ 13:17   No growth at 1 week.  --    Moderate polymorphonuclear leukocytes per low power field  Rare Squamous epithelial cells per low power field  No organisms seen per oil power field      Clean Catch Clean Catch (Midstream)  09-28 @ 20:59   >100,000 CFU/ml Enterococcus faecium (vancomycin resistant)  --  Enterococcus faecium (vancomycin resistant)      .Blood Blood  09-28 @ 20:53   No Growth Final  --  --      Clean Catch Clean Catch (Midstream)  09-21 @ 14:05   10,000 - 49,000 CFU/mL Escherichia coli  Normal Urogenital bello present  --  Escherichia coli      .Blood Blood-Peripheral  09-20 @ 18:38   No Growth Final  --  --          MEDICATIONS:    MEDICATIONS  (STANDING):  folic acid 1 milliGRAM(s) Oral daily  metoprolol tartrate 25 milliGRAM(s) Oral two times a day  PARoxetine 30 milliGRAM(s) Oral daily  simvastatin 40 milliGRAM(s) Oral at bedtime  sodium chloride 0.65% Nasal 1 Spray(s) Both Nostrils three times a day      MEDICATIONS  (PRN):  acetaminophen   Tablet .. 650 milliGRAM(s) Oral every 6 hours PRN Temp greater or equal to 38C (100.4F), Mild Pain (1 - 3), Moderate Pain (4 - 6)  ALBUTerol    90 MICROgram(s) HFA Inhaler 2 Puff(s) Inhalation every 6 hours PRN Shortness of Breath and/or Wheezing  OLANZapine 2.5 milliGRAM(s) Oral at bedtime PRN Agitation      REVIEW OF SYSTEMS:                           ALL ROS DONE [ X   ]    CONSTITUTIONAL:  LETHARGIC [   ], FEVER [   ], UNRESPONSIVE [   ]  CVS:  CP  [   ], SOB, [   ], PALPITATIONS [   ], DIZZYNESS [   ]  RS: COUGH [   ], SPUTUM [   ]  GI: ABDOMINAL PAIN [   ], NAUSEA [   ], VOMITINGS [   ], DIARRHEA [   ], CONSTIPATION [   ]  :  DYSURIA [   ], NOCTURIA [   ], INCREASED FREQUENCY [   ], DRIBLING [   ],  SKELETAL: PAINFUL JOINTS [   ], SWOLLEN JOINTS [   ], NECK ACHE [   ], LOW BACK ACHE [   ],  SKIN : ULCERS [   ], RASH [   ], ITCHING [   ]  CNS: HEAD ACHE [   ], DOUBLE VISION [   ], BLURRED VISION [   ], AMS / CONFUSION [   ], SEIZURES [   ], WEAKNESS [   ],TINGLING / NUMBNESS [   ]      PHYSICAL EXAMINATION:  GENERAL APPEARANCE: NO DISTRESS     HEENT:  NO PALLOR, NO  JVD,  NO   NODES, NECK SUPPLE    ;    SINUS CONGESTION +  CVS: S1 +, S2 +,   RS: AEEB,  OCCASIONAL  RALES +,   NO RONCHI  ABD: SOFT, NT, NO, BS +  EXT: NO PE  SKIN: WARM,   SKELETAL:  ROM ACCEPTABLE  CNS:  AAO X 2-3,   DEFICITS    RADIOLOGY :    RADIOLOGY REVIEWED    ASSESSMENT :     Weakness    Lumbar stenosis    H/O: HTN (hypertension)    Hypercholesterolemia    Bleeding ulcer    Anxiety    Heartburn symptom    Left shoulder pain    Spinal stenosis in cervical region    Acute hepatitis C virus infection without hepatic coma    Anxiety    Balance disorder    Coronary artery disease involving native coronary artery of native heart without angina pectoris    No pertinent past medical history    HTN (hypertension)    HLD (hyperlipidemia)    H/O colonoscopy    Bleeding ulcer    Fracture    History of lumbar surgery    Chronic UTI    H/O cardiac catheterization    No significant past surgical history        PLAN:  HPI:  70 y/o F with PMHx of HTN, HLD, and metastatic Lung CA, was discharged on 10/6 from [facility] after hospitalization for sepsis from PNA and UTI, comes in complaining of weakness since discharge. Patient states that she felt well on the day of discharge but then started feeling weak again the next day. Patient denies fever, chills, headache, dizziness, chest pain, palpitations, cough, SOB, n/v/d/c,  complaints, rashes or bleeding. Patient admitted for placement to [facility] which was refused on previous admission per chart review.   Per  Marcos, he did not understand the extent of her weakness and was not able to take care of her at home. She currently has a file open at [facility] about cancer treatment options and wants to pursue every and all treatment/life saving measures for his wife including intubation and resuscitation if indicated.    In ED:   Temp 97.5F, HR 74, /78, RR 17, SpO2: 97% on 2L NC   EKG: NSR   s/p 1L bolus (12 Oct 2021 17:49)    # METASTATIC HIGH GRADE LUNG NEUROENDOCRINE CA RECENTLY DX - ONCOLOGY CONSULT IN PROGRESS  - RECENTLY HOSPITALIZED FOR PNA, UTI  - PATIENT AND FAMILY AGREEABLE FOR INPATIENT CHEMOTHERAPY    # DEBILITY, UNSTEADY GAIT - F/U PT EVAL, CM TEAM CONSULTED TO AID IN [facility] PLACMENT  - PREVIOUSLY PATIENT AND FAMILY DECLINED PLACEMENT, CURRENTLY AGREEABLE THAT PATIENT WOULD BENEFIT FROM [facility]    # NORMOCYTIC ANEMIA - ? AOCD - TRANSFUSION THRESHOLD HGB < 7    # HTN - RESTARTED METOPROLOL AT LOWER DOSE, HOLD AMLODIPINE AND BENAZEPRIL    # HLD - ON STATIN    # ANXIETY - ON PAROXETINE    # GI AND DVT PPX [SCDS, IF HGB STABLE - THEN SWITCH TO CHEMOPPX]    ARCADIO MEDEIROS MD   Patient is a 71y old  Female who presents with a chief complaint of weakness (14 Oct 2021 13:33)    PATIENT IS SEEN AND EXAMINED IN MEDICAL FLOOR.    ALLERGIES:  No Known Allergies    VITALS:    Vital Signs Last 24 Hrs  T(C): 36.8 (14 Oct 2021 09:57), Max: 37.6 (13 Oct 2021 21:23)  T(F): 98.3 (14 Oct 2021 09:57), Max: 99.7 (13 Oct 2021 21:23)  HR: 106 (14 Oct 2021 09:57) (100 - 106)  BP: 113/72 (14 Oct 2021 09:57) (100/63 - 163/75)  BP(mean): --  RR: 17 (14 Oct 2021 09:57) (17 - 18)  SpO2: 94% (14 Oct 2021 09:57) (94% - 99%)    LABS:    CBC Full  -  ( 14 Oct 2021 07:49 )  WBC Count : 6.17 K/uL  RBC Count : 2.47 M/uL  Hemoglobin : 7.6 g/dL  Hematocrit : 23.6 %  Platelet Count - Automated : 124 K/uL  Mean Cell Volume : 95.5 fl  Mean Cell Hemoglobin : 30.8 pg  Mean Cell Hemoglobin Concentration : 32.2 gm/dL  Auto Neutrophil # : x  Auto Lymphocyte # : x  Auto Monocyte # : x  Auto Eosinophil # : x  Auto Basophil # : x  Auto Neutrophil % : x  Auto Lymphocyte % : x  Auto Monocyte % : x  Auto Eosinophil % : x  Auto Basophil % : x      10-14    145  |  112<H>  |  14  ----------------------------<  117<H>  3.3<L>   |  24  |  0.77    Ca    7.9<L>      14 Oct 2021 07:49  Phos  2.4     10-14  Mg     1.6     10-14      CAPILLARY BLOOD GLUCOSE              Creatinine Trend: 0.77<--, 0.93<--, 1.14<--, 1.12<--, 1.34<--, 1.01<--  I&O's Summary          BAL Bronchoalveolar Washings  10-05 @ 13:17   No growth at 1 week.  --    Moderate polymorphonuclear leukocytes per low power field  Rare Squamous epithelial cells per low power field  No organisms seen per oil power field      Clean Catch Clean Catch (Midstream)  09-28 @ 20:59   >100,000 CFU/ml Enterococcus faecium (vancomycin resistant)  --  Enterococcus faecium (vancomycin resistant)      .Blood Blood  09-28 @ 20:53   No Growth Final  --  --      Clean Catch Clean Catch (Midstream)  09-21 @ 14:05   10,000 - 49,000 CFU/mL Escherichia coli  Normal Urogenital bello present  --  Escherichia coli      .Blood Blood-Peripheral  09-20 @ 18:38   No Growth Final  --  --          MEDICATIONS:    MEDICATIONS  (STANDING):  folic acid 1 milliGRAM(s) Oral daily  metoprolol tartrate 25 milliGRAM(s) Oral two times a day  PARoxetine 30 milliGRAM(s) Oral daily  simvastatin 40 milliGRAM(s) Oral at bedtime  sodium chloride 0.65% Nasal 1 Spray(s) Both Nostrils three times a day      MEDICATIONS  (PRN):  acetaminophen   Tablet .. 650 milliGRAM(s) Oral every 6 hours PRN Temp greater or equal to 38C (100.4F), Mild Pain (1 - 3), Moderate Pain (4 - 6)  ALBUTerol    90 MICROgram(s) HFA Inhaler 2 Puff(s) Inhalation every 6 hours PRN Shortness of Breath and/or Wheezing  OLANZapine 2.5 milliGRAM(s) Oral at bedtime PRN Agitation      REVIEW OF SYSTEMS:                           ALL ROS DONE [ X   ]    CONSTITUTIONAL:  LETHARGIC [   ], FEVER [   ], UNRESPONSIVE [   ]  CVS:  CP  [   ], SOB, [   ], PALPITATIONS [   ], DIZZYNESS [   ]  RS: COUGH [   ], SPUTUM [   ]  GI: ABDOMINAL PAIN [   ], NAUSEA [   ], VOMITINGS [   ], DIARRHEA [   ], CONSTIPATION [   ]  :  DYSURIA [   ], NOCTURIA [   ], INCREASED FREQUENCY [   ], DRIBLING [   ],  SKELETAL: PAINFUL JOINTS [   ], SWOLLEN JOINTS [   ], NECK ACHE [   ], LOW BACK ACHE [   ],  SKIN : ULCERS [   ], RASH [   ], ITCHING [   ]  CNS: HEAD ACHE [   ], DOUBLE VISION [   ], BLURRED VISION [   ], AMS / CONFUSION [   ], SEIZURES [   ], WEAKNESS [   ],TINGLING / NUMBNESS [   ]      PHYSICAL EXAMINATION:  GENERAL APPEARANCE: NO DISTRESS     HEENT:  NO PALLOR, NO  JVD,  NO   NODES, NECK SUPPLE    ;    SINUS CONGESTION +  CVS: S1 +, S2 +,   RS: AEEB,  OCCASIONAL  RALES +,   NO RONCHI  ABD: SOFT, NT, NO, BS +  EXT: NO PE  SKIN: WARM,   SKELETAL:  ROM ACCEPTABLE  CNS:  AAO X 2-3,   DEFICITS    RADIOLOGY :    RADIOLOGY REVIEWED    ASSESSMENT :     Weakness    Lumbar stenosis    H/O: HTN (hypertension)    Hypercholesterolemia    Bleeding ulcer    Anxiety    Heartburn symptom    Left shoulder pain    Spinal stenosis in cervical region    Acute hepatitis C virus infection without hepatic coma    Anxiety    Balance disorder    Coronary artery disease involving native coronary artery of native heart without angina pectoris    No pertinent past medical history    HTN (hypertension)    HLD (hyperlipidemia)    H/O colonoscopy    Bleeding ulcer    Fracture    History of lumbar surgery    Chronic UTI    H/O cardiac catheterization    No significant past surgical history        PLAN:  HPI:  72 y/o F with PMHx of HTN, HLD, and metastatic Lung CA, was discharged on 10/6 from [facility] after hospitalization for sepsis from PNA and UTI, comes in complaining of weakness since discharge. Patient states that she felt well on the day of discharge but then started feeling weak again the next day. Patient denies fever, chills, headache, dizziness, chest pain, palpitations, cough, SOB, n/v/d/c,  complaints, rashes or bleeding. Patient admitted for placement to [facility] which was refused on previous admission per chart review.   Per  Marcos, he did not understand the extent of her weakness and was not able to take care of her at home. She currently has a file open at [facility] about cancer treatment options and wants to pursue every and all treatment/life saving measures for his wife including intubation and resuscitation if indicated.    In ED:   Temp 97.5F, HR 74, /78, RR 17, SpO2: 97% on 2L NC   EKG: NSR   s/p 1L bolus (12 Oct 2021 17:49)    # METASTATIC HIGH GRADE LUNG NEUROENDOCRINE CA RECENTLY DX - ONCOLOGY CONSULT IN PROGRESS  - RECENTLY HOSPITALIZED FOR PNA, UTI  - PATIENT AND FAMILY AGREEABLE FOR INPATIENT CHEMOTHERAPY    # DEBILITY, UNSTEADY GAIT - F/U PT RULA RIZZO TEAM CONSULTED TO AID IN [facility] PLACMENT  - PREVIOUSLY PATIENT AND FAMILY DECLINED PLACEMENT, CURRENTLY AGREEABLE THAT PATIENT WOULD BENEFIT FROM [facility]    # NORMOCYTIC ANEMIA - ? AOCD - TRANSFUSION THRESHOLD HGB < 7    # HTN - RESTARTED METOPROLOL AT LOWER DOSE, HOLD AMLODIPINE AND BENAZEPRIL    # HLD - ON STATIN    # ANXIETY - ON PAROXETINE    # GI AND DVT PPX [SCDS, IF HGB STABLE - THEN SWITCH TO CHEMOPPX]    ARCADIO MEDEIROS MD   Patient is a 71y old  Female who presents with a chief complaint of weakness (14 Oct 2021 13:33)    PATIENT IS SEEN AND EXAMINED IN MEDICAL FLOOR.    ALLERGIES:  No Known Allergies    VITALS:    Vital Signs Last 24 Hrs  T(C): 36.8 (14 Oct 2021 09:57), Max: 37.6 (13 Oct 2021 21:23)  T(F): 98.3 (14 Oct 2021 09:57), Max: 99.7 (13 Oct 2021 21:23)  HR: 106 (14 Oct 2021 09:57) (100 - 106)  BP: 113/72 (14 Oct 2021 09:57) (100/63 - 163/75)  BP(mean): --  RR: 17 (14 Oct 2021 09:57) (17 - 18)  SpO2: 94% (14 Oct 2021 09:57) (94% - 99%)    LABS:    CBC Full  -  ( 14 Oct 2021 07:49 )  WBC Count : 6.17 K/uL  RBC Count : 2.47 M/uL  Hemoglobin : 7.6 g/dL  Hematocrit : 23.6 %  Platelet Count - Automated : 124 K/uL  Mean Cell Volume : 95.5 fl  Mean Cell Hemoglobin : 30.8 pg  Mean Cell Hemoglobin Concentration : 32.2 gm/dL  Auto Neutrophil # : x  Auto Lymphocyte # : x  Auto Monocyte # : x  Auto Eosinophil # : x  Auto Basophil # : x  Auto Neutrophil % : x  Auto Lymphocyte % : x  Auto Monocyte % : x  Auto Eosinophil % : x  Auto Basophil % : x      10-14    145  |  112<H>  |  14  ----------------------------<  117<H>  3.3<L>   |  24  |  0.77    Ca    7.9<L>      14 Oct 2021 07:49  Phos  2.4     10-14  Mg     1.6     10-14      CAPILLARY BLOOD GLUCOSE              Creatinine Trend: 0.77<--, 0.93<--, 1.14<--, 1.12<--, 1.34<--, 1.01<--  I&O's Summary          BAL Bronchoalveolar Washings  10-05 @ 13:17   No growth at 1 week.  --    Moderate polymorphonuclear leukocytes per low power field  Rare Squamous epithelial cells per low power field  No organisms seen per oil power field      Clean Catch Clean Catch (Midstream)  09-28 @ 20:59   >100,000 CFU/ml Enterococcus faecium (vancomycin resistant)  --  Enterococcus faecium (vancomycin resistant)      .Blood Blood  09-28 @ 20:53   No Growth Final  --  --      Clean Catch Clean Catch (Midstream)  09-21 @ 14:05   10,000 - 49,000 CFU/mL Escherichia coli  Normal Urogenital bello present  --  Escherichia coli      .Blood Blood-Peripheral  09-20 @ 18:38   No Growth Final  --  --          MEDICATIONS:    MEDICATIONS  (STANDING):  folic acid 1 milliGRAM(s) Oral daily  metoprolol tartrate 25 milliGRAM(s) Oral two times a day  PARoxetine 30 milliGRAM(s) Oral daily  simvastatin 40 milliGRAM(s) Oral at bedtime  sodium chloride 0.65% Nasal 1 Spray(s) Both Nostrils three times a day      MEDICATIONS  (PRN):  acetaminophen   Tablet .. 650 milliGRAM(s) Oral every 6 hours PRN Temp greater or equal to 38C (100.4F), Mild Pain (1 - 3), Moderate Pain (4 - 6)  ALBUTerol    90 MICROgram(s) HFA Inhaler 2 Puff(s) Inhalation every 6 hours PRN Shortness of Breath and/or Wheezing  OLANZapine 2.5 milliGRAM(s) Oral at bedtime PRN Agitation      REVIEW OF SYSTEMS:                           ALL ROS DONE [ X   ]    CONSTITUTIONAL:  LETHARGIC [   ], FEVER [   ], UNRESPONSIVE [   ]  CVS:  CP  [   ], SOB, [   ], PALPITATIONS [   ], DIZZYNESS [   ]  RS: COUGH [   ], SPUTUM [   ]  GI: ABDOMINAL PAIN [   ], NAUSEA [   ], VOMITINGS [   ], DIARRHEA [   ], CONSTIPATION [   ]  :  DYSURIA [   ], NOCTURIA [   ], INCREASED FREQUENCY [   ], DRIBLING [   ],  SKELETAL: PAINFUL JOINTS [   ], SWOLLEN JOINTS [   ], NECK ACHE [   ], LOW BACK ACHE [   ],  SKIN : ULCERS [   ], RASH [   ], ITCHING [   ]  CNS: HEAD ACHE [   ], DOUBLE VISION [   ], BLURRED VISION [   ], AMS / CONFUSION [   ], SEIZURES [   ], WEAKNESS [   ],TINGLING / NUMBNESS [   ]      PHYSICAL EXAMINATION:  GENERAL APPEARANCE: NO DISTRESS     HEENT:  NO PALLOR, NO  JVD,  NO   NODES, NECK SUPPLE    ;    SINUS CONGESTION +  CVS: S1 +, S2 +,   RS: AEEB,  OCCASIONAL  RALES +,   NO RONCHI  ABD: SOFT, NT, NO, BS +  EXT: NO PE  SKIN: WARM,   SKELETAL:  ROM ACCEPTABLE  CNS:  AAO X 2-3,   DEFICITS    RADIOLOGY :    RADIOLOGY REVIEWED    ASSESSMENT :     Weakness    Lumbar stenosis    H/O: HTN (hypertension)    Hypercholesterolemia    Bleeding ulcer    Anxiety    Heartburn symptom    Left shoulder pain    Spinal stenosis in cervical region    Acute hepatitis C virus infection without hepatic coma    Anxiety    Balance disorder    Coronary artery disease involving native coronary artery of native heart without angina pectoris    No pertinent past medical history    HTN (hypertension)    HLD (hyperlipidemia)    H/O colonoscopy    Bleeding ulcer    Fracture    History of lumbar surgery    Chronic UTI    H/O cardiac catheterization    No significant past surgical history        PLAN:  HPI:  72 y/o F with PMHx of HTN, HLD, and metastatic Lung CA, was discharged on 10/6 from [facility] after hospitalization for sepsis from PNA and UTI, comes in complaining of weakness since discharge. Patient states that she felt well on the day of discharge but then started feeling weak again the next day. Patient denies fever, chills, headache, dizziness, chest pain, palpitations, cough, SOB, n/v/d/c,  complaints, rashes or bleeding. Patient admitted for placement to [facility] which was refused on previous admission per chart review.   Per  Marcos, he did not understand the extent of her weakness and was not able to take care of her at home. She currently has a file open at [facility] about cancer treatment options and wants to pursue every and all treatment/life saving measures for his wife including intubation and resuscitation if indicated.    In ED:   Temp 97.5F, HR 74, /78, RR 17, SpO2: 97% on 2L NC   EKG: NSR   s/p 1L bolus (12 Oct 2021 17:49)    # METASTATIC HIGH GRADE LUNG NEUROENDOCRINE CA RECENTLY DX - ONCOLOGY CONSULT IN PROGRESS  - RECENTLY HOSPITALIZED FOR PNA, UTI  - PATIENT AND FAMILY AGREEABLE FOR INPATIENT CHEMOTHERAPY    # DEBILITY, UNSTEADY GAIT - F/U PT RULA [facility]ZO TEAM CONSULTED TO AID IN [facility] PLACMENT  - PREVIOUSLY PATIENT AND FAMILY DECLINED PLACEMENT, CURRENTLY AGREEABLE THAT PATIENT WOULD BENEFIT FROM [facility]    # HYPOKALEMIA - REPLETING WITH SUPPLEMENT    # NORMOCYTIC ANEMIA - ? AOCD - TRANSFUSION THRESHOLD HGB < 7    # HTN - RESTARTED METOPROLOL AT LOWER DOSE, HOLD AMLODIPINE AND BENAZEPRIL    # HLD - ON STATIN    # ANXIETY - ON PAROXETINE    # GI AND DVT PPX [SCDS, IF HGB STABLE - THEN SWITCH TO CHEMOPPX]    ARCADIO PATTON MD COVERING KATY MEDEIROS MD

## 2021-10-14 NOTE — PROGRESS NOTE ADULT - ASSESSMENT
71 YOF with PMH HTN, HLD, recent dx of metastatic lung CA which has not started on active chemo yet.  Brought by  due to worsening of weakness which contributed to delaying  on initiate chemo tx .  Dr thomas is outpt heme/onc, [facility] group is following and recommends to start 1st chemo as in- patient which lilely starting tomorrow 10/15.     Pt not in distress, saturating well on RA, continue PRN oxygen, PT recommends [facility].

## 2021-10-15 ENCOUNTER — TRANSCRIPTION ENCOUNTER (OUTPATIENT)
Age: 71
End: 2021-10-15

## 2021-10-15 LAB
ANION GAP SERPL CALC-SCNC: 7 MMOL/L — SIGNIFICANT CHANGE UP (ref 5–17)
BUN SERPL-MCNC: 15 MG/DL — SIGNIFICANT CHANGE UP (ref 7–18)
CALCIUM SERPL-MCNC: 8.5 MG/DL — SIGNIFICANT CHANGE UP (ref 8.4–10.5)
CHLORIDE SERPL-SCNC: 111 MMOL/L — HIGH (ref 96–108)
CO2 SERPL-SCNC: 25 MMOL/L — SIGNIFICANT CHANGE UP (ref 22–31)
CREAT SERPL-MCNC: 0.8 MG/DL — SIGNIFICANT CHANGE UP (ref 0.5–1.3)
GLUCOSE SERPL-MCNC: 197 MG/DL — HIGH (ref 70–99)
HCT VFR BLD CALC: 27.6 % — LOW (ref 34.5–45)
HGB BLD-MCNC: 8.7 G/DL — LOW (ref 11.5–15.5)
MCHC RBC-ENTMCNC: 30.3 PG — SIGNIFICANT CHANGE UP (ref 27–34)
MCHC RBC-ENTMCNC: 31.5 GM/DL — LOW (ref 32–36)
MCV RBC AUTO: 96.2 FL — SIGNIFICANT CHANGE UP (ref 80–100)
NRBC # BLD: 1 /100 WBCS — HIGH (ref 0–0)
PLATELET # BLD AUTO: 145 K/UL — LOW (ref 150–400)
POTASSIUM SERPL-MCNC: 3.8 MMOL/L — SIGNIFICANT CHANGE UP (ref 3.5–5.3)
POTASSIUM SERPL-SCNC: 3.8 MMOL/L — SIGNIFICANT CHANGE UP (ref 3.5–5.3)
RBC # BLD: 2.87 M/UL — LOW (ref 3.8–5.2)
RBC # FLD: 18.3 % — HIGH (ref 10.3–14.5)
SODIUM SERPL-SCNC: 143 MMOL/L — SIGNIFICANT CHANGE UP (ref 135–145)
WBC # BLD: 7.28 K/UL — SIGNIFICANT CHANGE UP (ref 3.8–10.5)
WBC # FLD AUTO: 7.28 K/UL — SIGNIFICANT CHANGE UP (ref 3.8–10.5)

## 2021-10-15 RX ADMIN — Medication 650 MILLIGRAM(S): at 17:52

## 2021-10-15 RX ADMIN — CISPLATIN 633 MILLIGRAM(S): 1 INJECTION, SOLUTION INTRAVENOUS at 12:04

## 2021-10-15 RX ADMIN — Medication 650 MILLIGRAM(S): at 10:11

## 2021-10-15 RX ADMIN — Medication 650 MILLIGRAM(S): at 10:32

## 2021-10-15 RX ADMIN — Medication 25 MILLIGRAM(S): at 05:57

## 2021-10-15 RX ADMIN — Medication 102 MILLIGRAM(S): at 06:10

## 2021-10-15 RX ADMIN — Medication 50 GRAM(S): at 12:05

## 2021-10-15 RX ADMIN — Medication 650 MILLIGRAM(S): at 13:33

## 2021-10-15 RX ADMIN — Medication 650 MILLIGRAM(S): at 14:53

## 2021-10-15 RX ADMIN — SIMVASTATIN 40 MILLIGRAM(S): 20 TABLET, FILM COATED ORAL at 22:04

## 2021-10-15 RX ADMIN — Medication 650 MILLIGRAM(S): at 10:31

## 2021-10-15 RX ADMIN — Medication 1 SPRAY(S): at 13:10

## 2021-10-15 RX ADMIN — FOSAPREPITANT DIMEGLUMINE 300 MILLIGRAM(S): 150 INJECTION, POWDER, LYOPHILIZED, FOR SOLUTION INTRAVENOUS at 11:09

## 2021-10-15 RX ADMIN — Medication 1 SPRAY(S): at 22:04

## 2021-10-15 RX ADMIN — Medication 1 SPRAY(S): at 05:57

## 2021-10-15 RX ADMIN — ONDANSETRON 116 MILLIGRAM(S): 8 TABLET, FILM COATED ORAL at 11:09

## 2021-10-15 RX ADMIN — Medication 25 MILLIGRAM(S): at 17:52

## 2021-10-15 RX ADMIN — Medication 514.2 MILLIGRAM(S): at 12:06

## 2021-10-15 RX ADMIN — Medication 1 MILLIGRAM(S): at 13:11

## 2021-10-15 RX ADMIN — SODIUM CHLORIDE 1000 MILLILITER(S): 9 INJECTION INTRAMUSCULAR; INTRAVENOUS; SUBCUTANEOUS at 11:08

## 2021-10-15 RX ADMIN — Medication 30 MILLIGRAM(S): at 13:11

## 2021-10-15 NOTE — PROGRESS NOTE ADULT - PROBLEM SELECTOR PLAN 1
Pt d/c'd home to pursue chemo tx on last admission, to weak to make appt  Heme/Onc now recommending inpatient IV chemo tx -  planning to start chemo 10/15   [facility] Heme/Onc group is  following Pt d/c'd home to pursue chemo tx on last admission, to weak to make appt  Heme/Onc now recommending inpatient IV chemo tx -  planning to start chemo 10/15   Pt will be transferred to [facility] ,  confirmed with chemo RN and nurse manager on the floor   [facility] Heme/Onc group is  following

## 2021-10-15 NOTE — PROGRESS NOTE ADULT - SUBJECTIVE AND OBJECTIVE BOX
NP Note discussed with  Primary Attending    Patient is a 71y old  Female who presents with a chief complaint of weakness (15 Oct 2021 08:12)      INTERVAL HPI/OVERNIGHT EVENTS: no new complaints    MEDICATIONS  (STANDING):  CISplatin IVPB (eMAR) 133 milliGRAM(s) IV Intermittent once  etoposide phosphate (ETOPOPHOS) IVPB (eMAR) 142 milliGRAM(s) IV Intermittent once  folic acid 1 milliGRAM(s) Oral daily  fosaprepitant IVPB 150 milliGRAM(s) IV Intermittent once  mannitol 25% IVPB 12.5 Gram(s) IV Intermittent once  metoprolol tartrate 25 milliGRAM(s) Oral two times a day  ondansetron  IVPB 16 milliGRAM(s) IV Intermittent daily  PARoxetine 30 milliGRAM(s) Oral daily  simvastatin 40 milliGRAM(s) Oral at bedtime  sodium chloride 0.65% Nasal 1 Spray(s) Both Nostrils three times a day  sodium chloride 0.9% Bolus 1000 milliLiter(s) IV Bolus once    MEDICATIONS  (PRN):  acetaminophen   Tablet .. 650 milliGRAM(s) Oral every 6 hours PRN Temp greater or equal to 38C (100.4F), Mild Pain (1 - 3), Moderate Pain (4 - 6)  ALBUTerol    90 MICROgram(s) HFA Inhaler 2 Puff(s) Inhalation every 6 hours PRN Shortness of Breath and/or Wheezing  OLANZapine 2.5 milliGRAM(s) Oral at bedtime PRN Agitation      __________________________________________________  REVIEW OF SYSTEMS:    CONSTITUTIONAL: No fever,   EYES: no acute visual disturbances  NECK: No pain or stiffness  RESPIRATORY: No cough; No shortness of breath with supplemental oxygen via N-c   CARDIOVASCULAR: No chest pain, no palpitations  GASTROINTESTINAL: No pain. No nausea or vomiting; No diarrhea   NEUROLOGICAL: No headache or numbness, no tremors  MUSCULOSKELETAL: No joint pain, no muscle pain  GENITOURINARY: no dysuria, no frequency, no hesitancy  PSYCHIATRY: no depression , no anxiety  ALL OTHER  ROS negative        Vital Signs Last 24 Hrs  T(C): 37.1 (15 Oct 2021 05:21), Max: 37.1 (14 Oct 2021 21:03)  T(F): 98.8 (15 Oct 2021 05:21), Max: 98.8 (15 Oct 2021 05:21)  HR: 89 (15 Oct 2021 05:21) (87 - 106)  BP: 127/76 (15 Oct 2021 05:21) (113/72 - 160/88)  BP(mean): --  RR: 18 (15 Oct 2021 05:21) (17 - 18)  SpO2: 96% (15 Oct 2021 05:21) (94% - 97%)    ________________________________________________  PHYSICAL EXAM:  GENERAL: NAD  HEENT: Normocephalic;  conjunctivae and sclerae clear; moist mucous membranes;   NECK : supple  CHEST/LUNG: Clear to auscultation bilaterally with good air entry   HEART: S1 S2  regular; no murmurs, gallops or rubs  ABDOMEN: Soft, Nontender, Nondistended; Bowel sounds present  EXTREMITIES: no cyanosis; no edema; no calf tenderness  SKIN: warm and dry; no rash  NERVOUS SYSTEM:  Awake and alert; Oriented  to place, person and time ; no new deficits    _________________________________________________  LABS:                        7.6    6.17  )-----------( 124      ( 14 Oct 2021 07:49 )             23.6     10-14    145  |  112<H>  |  14  ----------------------------<  117<H>  3.3<L>   |  24  |  0.77    Ca    7.9<L>      14 Oct 2021 07:49  Phos  2.4     10-14  Mg     1.6     10-14          CAPILLARY BLOOD GLUCOSE            RADIOLOGY & ADDITIONAL TESTS:  < from: Xray Chest 1 View-PORTABLE IMMEDIATE (Xray Chest 1 View-PORTABLE IMMEDIATE .) (10.04.21 @ 17:03) >    EXAM:  XR CHEST PORTABLE ROUTINE 1V                          EXAM:  XR CHEST PORTABLE IMMED 1V                            PROCEDURE DATE:  10/04/2021          INTERPRETATION:  AP chest on October 4, 2021 at 1:12 PM. Patient has a lung mass and pneumonia.    Heart magnified by technique. Cervical spine hardware again noted.    There is a small infiltrate at left base new since September 28.    There is persistent density likely atelectatic consolidation of the right lower lobe possibly with underlying mass which is similar to September 28.    Follow-up AP erect chest on September 28, 2021 at 2:08 PM. Patient had bronchoscopy and biopsy.    Heart magnified by technique.    On October 4. Small infiltrate off the left lower hilum which is no longer evident on this film.    Both studies show what appears to be atelectatic consolidation of the right lower lobe which could easily obscure a lesion.    No pneumothorax.    Cervical spine hardware again seen.    IMPRESSION: Persistent right lung findings as above.    --- End of Report ---            YAN CABRERA MD; Attending Radiologist  This document has been electronically signed. Oct  6 2021  5:19PM    < end of copied text >    Imaging Personally Reviewed:  YES    Consultant(s) Notes Reviewed:   YES    Care Discussed with Consultants : heme/onc  and pulm     Plan of care was discussed with patient and /or primary care giver; all questions and concerns were addressed and care was aligned with patient's wishes.

## 2021-10-15 NOTE — DISCHARGE NOTE PROVIDER - CARE PROVIDER_API CALL
Nathanael Taylor  FAMILY MEDICINE  [address] [place] [address]  Phone: [phone]  Fax: [phone]  Follow Up Time: 1 week    Lennie Mathews)  Hematology; Medical Oncology  [address]  [place] [address]  Phone: [phone]  Fax: [phone]  Follow Up Time: 1 week

## 2021-10-15 NOTE — PROGRESS NOTE ADULT - PROBLEM SELECTOR PLAN 8
Pt admitted for placement  PT recommends JANETTE  Care management following for discharge planning   CM advised need oncology treatment plan before any facility will accept - plan initiated by heme / onc ( refer to note on 10/13)   1st cycle of chemo will be completed in the hospital ( total D4) - 10/15- 10/18 Pt admitted for placement  PT recommends JANETTE : pt and family chose [facility]   Care management following for discharge planning   CM advised need oncology treatment plan before any facility will accept - plan initiated by heme / onc ( refer to note on 10/13)   1st cycle of chemo will be completed in the hospital ( total D4) - 10/15- 10/18

## 2021-10-15 NOTE — DISCHARGE NOTE PROVIDER - NSDCCPCAREPLAN_GEN_ALL_CORE_FT
PRINCIPAL DISCHARGE DIAGNOSIS  Diagnosis: Metastatic cancer to lung  Assessment and Plan of Treatment:       SECONDARY DISCHARGE DIAGNOSES  Diagnosis: Hypokalemia  Assessment and Plan of Treatment: resolved   this low potassium can have causes that are not due to underlying desease. can be due to inadequate dietary intake for your case  continue to have blood work to chekc potassium level and then get supplement is needed based on blood work   Weakness, fatigue, constipation are expected due to low potassium leve in your blood  If you have arrhythmia, chest pain please call your primary care provider right away      Diagnosis: Anemia  Assessment and Plan of Treatment:      PRINCIPAL DISCHARGE DIAGNOSIS  Diagnosis: High grade neuroendocrine carcinoma of lung  Assessment and Plan of Treatment: with mets to liver, peritoneum, bone, diagnosed 10/4/21.  Pt. presented with increased generalized weakness, followed by Hem/Onc, underwent 1st cycle of inpatient chemo with Cisplatin/etoposide 10/15/21, tolerated well however treatment 2 & 3 were cancelled due to worsening Hgn and renal function.  Pt. will be evaluated for further chemo treatments based on improved functional level, currently she is ECOG 3.  -Please follow up with Dr. Casper  -Please increase activity level as tolerated      SECONDARY DISCHARGE DIAGNOSES  Diagnosis: Anemia  Assessment and Plan of Treatment: due to metastatic disease and recent chemo tx.  Received blood transfusion 10/18.  No s&s of active bleeding.  H/H remains low but stable.    Diagnosis: Hypokalemia  Assessment and Plan of Treatment: resolved   this low potassium can have causes that are not due to underlying desease. can be due to inadequate dietary intake for your case  continue to have blood work to chekc potassium level and then get supplement is needed based on blood work   Weakness, fatigue, constipation are expected due to low potassium leve in your blood  If you have arrhythmia, chest pain please call your primary care provider right away       PRINCIPAL DISCHARGE DIAGNOSIS  Diagnosis: High grade neuroendocrine carcinoma of lung  Assessment and Plan of Treatment: with mets to liver, peritoneum, bone, diagnosed 10/4/21.  Pt. presented with increased generalized weakness, followed by Hem/Onc, underwent 1st cycle of inpatient chemo with Cisplatin/etoposide 10/15/21, tolerated well however treatment 2 & 3 were cancelled due to worsening Hgn and renal function.  Pt. will be evaluated for further chemo treatments based on improved functional level, currently she is ECOG 3.  -Please follow up with Dr. Casper  -Please increase activity level as tolerated      SECONDARY DISCHARGE DIAGNOSES  Diagnosis: Anemia  Assessment and Plan of Treatment: due to metastatic disease and recent chemo tx.  Received blood transfusion 10/18.  No s&s of active bleeding.  H/H remains low but stable.    Diagnosis: Hypokalemia  Assessment and Plan of Treatment: resolved   this low potassium can have causes that are not due to underlying desease. can be due to inadequate dietary intake for your case  continue to have blood work to chekc potassium level and then get supplement is needed based on blood work   Weakness, fatigue, constipation are expected due to low potassium leve in your blood  If you have arrhythmia, chest pain please call your primary care provider right away      Diagnosis: Acute UTI  Assessment and Plan of Treatment: You were found to have an UTI during this hospitalization.  A urinary tract infection (UTI) is caused by bacteria that get inside your urinary tract. Most bacteria that enter your urinary tract come out when you urinate. If the bacteria stay in your urinary tract, you may get an infection. Your urinary tract includes your kidneys, ureters, bladder, and urethra. Urine is made in your kidneys, and it flows from the ureters to the bladder. Urine leaves the bladder through the urethra. A UTI is more common in your lower urinary tract, which includes your bladder and urethra.  You were discharged to rehab with IV antibiotics.

## 2021-10-15 NOTE — PROGRESS NOTE ADULT - PROBLEM SELECTOR PLAN 3
likely due to poor P.O intake   replaced potassium for 3.3 today  monitor BMP in AM likely due to poor P.O intake   replaced potassium for 3.3 on 10/14 - resolved   monitor BMP in AM

## 2021-10-15 NOTE — PROGRESS NOTE ADULT - PROBLEM SELECTOR PLAN 2
Hgb 7.6 - unchanged   No overt s/s of bleeding  Transfuse Hgb<7.0  Follow up CBC in AM Hgb 8.7  trending up    No overt s/s of bleeding  Transfuse Hgb<7.0  Follow up CBC in AM

## 2021-10-15 NOTE — DISCHARGE NOTE PROVIDER - HOSPITAL COURSE
71 YOF with PMH HTN, HLD, recent dx of metastatic lung CA which has not started on active chemo yet.  Brought by  due to worsening of weakness which contributed to delaying  on initiate chemo tx .  Dr thomas is outpt heme/onc, [facility] is following and recommends to start 1st chemo as in- patient which started on 10/15 for total 4 day course   PT recommends JANETTE, pt and family chose [facility]   Care management following for discharge planning       incomplete 10/15        71 YOF with PMH HTN, HLD, recent dx of metastatic lung CA who has not started on active chemo yet.  Brought by  due to worsening of weakness contributed to delayed initiation of chemo tx .  Pt. followed by OP Hem/Onc Dr Mathews , A group.  Inpatient chemo recommended and pt. received her first dose 10/15 for total 4 days course.  Pt noted with tumor lysis syndrome after first chemo, received Rasburicase, phoslo. 2nd chemo on hold in setting BRENDA per Heme/Onc.  on IVF. Renal/abdominal sono  for BRENDA, transaminitis revealed kidneys with no hydronephrosis. Hepatomegaly, Cholelithiasis, Nonspecific gallbladder wall thickening ( already established from previous w/u in 9/21). Patient with stage IV Ca; Oncology following with plans for palliative chemotherapy  q 3 weeks with close monitoring of renal function. Anemia stable HBG remains above 7 and likely 2/2 to chemo.      PT recommends JANETTE, pt and family chose [facility] pending acceptance given patient refusing to participate in PT.     Care management following for discharge planning     71 YOF with PMH HTN, HLD, recent dx of metastatic lung CA who has not started on active chemo yet.  Brought by  due to worsening of weakness contributed to delayed initiation of chemo tx .  Pt. followed by OP Hem/Onc Dr Mathews , A group.  Inpatient chemo recommended and pt. received her first dose 10/15 for total 4 days course.  Pt noted with tumor lysis syndrome after first chemo, received Rasburicase, phoslo. 2nd chemo on hold in setting BRENDA per Heme/Onc.  on IVF. Renal/abdominal sono  for BRENDA, transaminitis revealed kidneys with no hydronephrosis. Hepatomegaly, Cholelithiasis, Nonspecific gallbladder wall thickening ( already established from previous w/u in 9/21). Patient with stage IV Ca; Oncology following with plans for palliative chemotherapy  q 3 weeks with close monitoring of renal function. Anemia stable HBG remains above 7 and likely 2/2 to chemo.      PT recommends JANETTE, pt and family chose [facility] pending acceptance given patient refusing to participate in PT.  Patient is now stable to be discharged to [facility] with PO antibiotics and Oncology follow up.         71 YOF with PMH HTN, HLD, recent dx of metastatic lung CA who has not started on active chemo yet.  Brought by  due to worsening of weakness contributed to delayed initiation of chemo tx .  Pt. followed by OP Hem/Onc Dr Mathews , A group.  Inpatient chemo recommended and pt. received her first dose 10/15 for total 4 days course.  Pt noted with tumor lysis syndrome after first chemo, received Rasburicase, phoslo. 2nd chemo on hold in setting BRENDA per Heme/Onc.  on IVF. Renal/abdominal sono  for BRENDA, transaminitis revealed kidneys with no hydronephrosis. Hepatomegaly, Cholelithiasis, Nonspecific gallbladder wall thickening ( already established from previous w/u in 9/21). Patient with stage IV Ca; Oncology following with plans for palliative chemotherapy  q 3 weeks with close monitoring of renal function. Anemia stable HBG remains above 7 and likely 2/2 to chemo.      PT recommends JANETTE, pt and family chose [facility] pending acceptance given patient refusing to participate in PT.  Patient is now stable to be discharged to [facility] with IV antibiotics and Oncology follow up.

## 2021-10-15 NOTE — DISCHARGE NOTE PROVIDER - PROVIDER TOKENS
PROVIDER:[TOKEN:[[id number]],FOLLOWUP:[1 week]],PROVIDER:[TOKEN:[[id number]],FOLLOWUP:[1 week]]

## 2021-10-15 NOTE — PROGRESS NOTE ADULT - SUBJECTIVE AND OBJECTIVE BOX
INTERVAL HPI: Patient seen and examined at bedside; Events noted; Patient c/o     PMH/PSH as above    FmHx/Sox Hx NC    ROS as above; pt is not able to provide detailed review of systems  General: Noncontributory;	Skin/Breast: NC;Ophthalmologic:NC; ENMT: NC; Respiratory and Thorax: NC; Cardiovascular: NC; 	  Gastrointestinal: NC; Genitourinary:NC; 	Musculoskeletal:NC; Neurological: NC; Psychiatric: NC; Hematology/Lymphatics: NC; Endocrine: NC; Allergic/Immunologic: NC    MEDICATIONS  (STANDING):  CISplatin IVPB (eMAR) 133 milliGRAM(s) IV Intermittent once  etoposide phosphate (ETOPOPHOS) IVPB (eMAR) 142 milliGRAM(s) IV Intermittent once  folic acid 1 milliGRAM(s) Oral daily  fosaprepitant IVPB 150 milliGRAM(s) IV Intermittent once  mannitol 25% IVPB 12.5 Gram(s) IV Intermittent once  metoprolol tartrate 25 milliGRAM(s) Oral two times a day  ondansetron  IVPB 16 milliGRAM(s) IV Intermittent daily  PARoxetine 30 milliGRAM(s) Oral daily  simvastatin 40 milliGRAM(s) Oral at bedtime  sodium chloride 0.65% Nasal 1 Spray(s) Both Nostrils three times a day  sodium chloride 0.9% Bolus 1000 milliLiter(s) IV Bolus once    MEDICATIONS  (PRN):  acetaminophen   Tablet .. 650 milliGRAM(s) Oral every 6 hours PRN Temp greater or equal to 38C (100.4F), Mild Pain (1 - 3), Moderate Pain (4 - 6)  ALBUTerol    90 MICROgram(s) HFA Inhaler 2 Puff(s) Inhalation every 6 hours PRN Shortness of Breath and/or Wheezing  OLANZapine 2.5 milliGRAM(s) Oral at bedtime PRN Agitation      Vital Signs Last 24 Hrs  T(C): 37.1 (15 Oct 2021 05:21), Max: 37.1 (14 Oct 2021 21:03)  T(F): 98.8 (15 Oct 2021 05:21), Max: 98.8 (15 Oct 2021 05:21)  HR: 89 (15 Oct 2021 05:21) (87 - 106)  BP: 127/76 (15 Oct 2021 05:21) (113/72 - 160/88)  BP(mean): --  RR: 18 (15 Oct 2021 05:21) (17 - 18)  SpO2: 96% (15 Oct 2021 05:21) (94% - 97%)  _________________  PHYSICAL EXAM:  ---------------------------  GEN: NAD; NC/AT; A and O x   LUNGS: no wheezing; decreased bilateral air entry; no use of accessory muscles for breathing  HEART: Nl S1 S2; no M   ABDOMEN: Soft, Nontender, non distended  EXTREMITIES: no cyanosis; no edema; warm and dry  NERVOUS SYSTEM:  Awake and alert; no focal neuro  deficits    _________________________________________________  LABS:                        7.6    6.17  )-----------( 124      ( 14 Oct 2021 07:49 )             23.6     10-14    145  |  112<H>  |  14  ----------------------------<  117<H>  3.3<L>   |  24  |  0.77    Ca    7.9<L>      14 Oct 2021 07:49  Phos  2.4     10-14  Mg     1.6     10-14        CAPILLARY BLOOD GLUCOSE                     INTERVAL HPI: Patient seen and examined at bedside; Events noted; Patient w/o a new complaint    PMH/PSH as above    FmHx/Sox Hx NC    ROS as above; pt is not able to provide detailed review of systems  General: Noncontributory;	Skin/Breast: NC;Ophthalmologic:NC; ENMT: NC; Respiratory and Thorax: NC; Cardiovascular: NC; 	  Gastrointestinal: NC; Genitourinary:NC; 	Musculoskeletal:NC; Neurological: NC; Psychiatric: NC; Hematology/Lymphatics: NC; Endocrine: NC; Allergic/Immunologic: NC    MEDICATIONS  (STANDING):  CISplatin IVPB (eMAR) 133 milliGRAM(s) IV Intermittent once  etoposide phosphate (ETOPOPHOS) IVPB (eMAR) 142 milliGRAM(s) IV Intermittent once  folic acid 1 milliGRAM(s) Oral daily  fosaprepitant IVPB 150 milliGRAM(s) IV Intermittent once  mannitol 25% IVPB 12.5 Gram(s) IV Intermittent once  metoprolol tartrate 25 milliGRAM(s) Oral two times a day  ondansetron  IVPB 16 milliGRAM(s) IV Intermittent daily  PARoxetine 30 milliGRAM(s) Oral daily  simvastatin 40 milliGRAM(s) Oral at bedtime  sodium chloride 0.65% Nasal 1 Spray(s) Both Nostrils three times a day  sodium chloride 0.9% Bolus 1000 milliLiter(s) IV Bolus once    MEDICATIONS  (PRN):  acetaminophen   Tablet .. 650 milliGRAM(s) Oral every 6 hours PRN Temp greater or equal to 38C (100.4F), Mild Pain (1 - 3), Moderate Pain (4 - 6)  ALBUTerol    90 MICROgram(s) HFA Inhaler 2 Puff(s) Inhalation every 6 hours PRN Shortness of Breath and/or Wheezing  OLANZapine 2.5 milliGRAM(s) Oral at bedtime PRN Agitation      Vital Signs Last 24 Hrs  T(C): 37.1 (15 Oct 2021 05:21), Max: 37.1 (14 Oct 2021 21:03)  T(F): 98.8 (15 Oct 2021 05:21), Max: 98.8 (15 Oct 2021 05:21)  HR: 89 (15 Oct 2021 05:21) (87 - 106)  BP: 127/76 (15 Oct 2021 05:21) (113/72 - 160/88)  BP(mean): --  RR: 18 (15 Oct 2021 05:21) (17 - 18)  SpO2: 96% (15 Oct 2021 05:21) (94% - 97%)  _________________  PHYSICAL EXAM:  ---------------------------  GEN: NAD; NC/AT; A and O x 3  LUNGS: no wheezing; decreased bilateral air entry; no use of accessory muscles for breathing  HEART: Nl S1 S2; no M   ABDOMEN: Soft, Nontender, non distended  EXTREMITIES: no cyanosis; no edema; warm and dry  NERVOUS SYSTEM:  Awake and alert; no focal neuro  deficits    _________________________________________________  LABS:                        7.6    6.17  )-----------( 124      ( 14 Oct 2021 07:49 )             23.6     10-14    145  |  112<H>  |  14  ----------------------------<  117<H>  3.3<L>   |  24  |  0.77    Ca    7.9<L>      14 Oct 2021 07:49  Phos  2.4     10-14  Mg     1.6     10-14        CAPILLARY BLOOD GLUCOSE

## 2021-10-15 NOTE — PROGRESS NOTE ADULT - ASSESSMENT
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  Newly Dx on 10/04/21, Ki-67 98% (path report was not resulted on last d/c)  -ECOG PS 2/3  -d/t aggressive nature of pt's cancer and progressive weakness that she needs to start the 1st cycle of inpatient chemotherapy with Cisplatin/etoposide starting today  -the recommendation for chemo was discussed with pt, her  and the pt's brother at the request of her . I reviewed the diagnosis, prognosis, indication to start chemo, possible SE, pre-medications and neulasta and the plan. Both the pt, pt's  and her brother Mason have agreed to move forward with chemo. We have discussed the need for inpt chemo with Dr. Meredith and Dr. Lindquist,, RN Humphrey and the Pharmacy to order the chemotx. Pt is A&O x 3 and deemed capable of decision making, pt signed chemo consent and again reviewed chemo tx and poss SE with pt and  over the phone.  -pre-medications and chemo are ordered to start today  -Cr/GFR reviewed  -chemo regimen will be Cisplatin IV on Day 1, Etoposide IV on Day 1 today then to resume day 2 on Monday and Neulasta 24 hours after post chemo completion  -maintain pre and post hydration and mannitol for Cisplatin    will continue to follow; call with questions [phone]    Colton Etienne MD

## 2021-10-15 NOTE — DISCHARGE NOTE PROVIDER - NSDCMRMEDTOKEN_GEN_ALL_CORE_FT
acetaminophen 325 mg oral tablet: 2 tab(s) orally every 6 hours, As needed, Pain  albuterol 90 mcg/inh inhalation aerosol: 2 puff(s) inhaled every 6 hours, As needed, Shortness of Breath and/or Wheezing  amLODIPine 5 mg oral tablet: 1 tab(s) orally once a day  benazepril 10 mg oral tablet: 1 tab(s) orally once a day  folic acid 1 mg oral tablet: 1 tab(s) orally once a day  metoprolol tartrate 100 mg oral tablet: 1 tab(s) orally 2 times a day  PARoxetine 40 mg oral tablet: 1 tab(s) orally once a day  simvastatin 40 mg oral tablet: 1 tab(s) orally once a day (at bedtime)   acetaminophen 325 mg oral tablet: 2 tab(s) orally every 6 hours, As needed, Pain  albuterol 90 mcg/inh inhalation aerosol: 2 puff(s) inhaled every 6 hours, As needed, Shortness of Breath and/or Wheezing  amLODIPine 5 mg oral tablet: 1 tab(s) orally once a day  benazepril 10 mg oral tablet: 1 tab(s) orally once a day  cefuroxime 250 mg oral tablet: 1 tab(s) orally 2 times a day MDD:500mg  folic acid 1 mg oral tablet: 1 tab(s) orally once a day  metoprolol tartrate 100 mg oral tablet: 1 tab(s) orally 2 times a day  OLANZapine 2.5 mg oral tablet: 1 tab(s) orally once a day (at bedtime), As needed, Agitation  oxycodone-acetaminophen 5 mg-325 mg oral tablet: 1 tab(s) orally every 6 hours, As needed, Severe Pain (7 - 10)  PARoxetine 40 mg oral tablet: 1 tab(s) orally once a day  simvastatin 40 mg oral tablet: 1 tab(s) orally once a day (at bedtime)   acetaminophen 325 mg oral tablet: 2 tab(s) orally every 6 hours, As needed, Pain  albuterol 90 mcg/inh inhalation aerosol: 2 puff(s) inhaled every 6 hours, As needed, Shortness of Breath and/or Wheezing  amLODIPine 5 mg oral tablet: 1 tab(s) orally once a day  benazepril 10 mg oral tablet: 1 tab(s) orally once a day  cefTRIAXone 1 g injection: 1 gram(s) intravenously once a day MDD:1gram  folic acid 1 mg oral tablet: 1 tab(s) orally once a day  metoprolol tartrate 100 mg oral tablet: 1 tab(s) orally 2 times a day  OLANZapine 2.5 mg oral tablet: 1 tab(s) orally once a day (at bedtime), As needed, Agitation  oxycodone-acetaminophen 5 mg-325 mg oral tablet: 1 tab(s) orally every 6 hours, As needed, Severe Pain (7 - 10)  PARoxetine 40 mg oral tablet: 1 tab(s) orally once a day  simvastatin 40 mg oral tablet: 1 tab(s) orally once a day (at bedtime)

## 2021-10-15 NOTE — PROGRESS NOTE ADULT - ASSESSMENT
71 YOF with PMH HTN, HLD, recent dx of metastatic lung CA which has not started on active chemo yet.  Brought by  due to worsening of weakness which contributed to delaying  on initiate chemo tx .  Dr thomas is outpt heme/onc, [facility] is following and recommends to start 1st chemo as in- patient which lilely starting today 10/15 for total 4 day course.     Pt not in distress, saturating well on RA, continue PRN oxygen, PT recommends JANETTE. Waiting for bed at 6N to start chemo

## 2021-10-15 NOTE — PROGRESS NOTE ADULT - SUBJECTIVE AND OBJECTIVE BOX
Time of Visit:  Patient seen and examined.     MEDICATIONS  (STANDING):  folic acid 1 milliGRAM(s) Oral daily  metoprolol tartrate 25 milliGRAM(s) Oral two times a day  PARoxetine 30 milliGRAM(s) Oral daily  simvastatin 40 milliGRAM(s) Oral at bedtime  sodium chloride 0.65% Nasal 1 Spray(s) Both Nostrils three times a day      MEDICATIONS  (PRN):  acetaminophen   Tablet .. 650 milliGRAM(s) Oral every 6 hours PRN Temp greater or equal to 38C (100.4F), Mild Pain (1 - 3), Moderate Pain (4 - 6)  ALBUTerol    90 MICROgram(s) HFA Inhaler 2 Puff(s) Inhalation every 6 hours PRN Shortness of Breath and/or Wheezing  OLANZapine 2.5 milliGRAM(s) Oral at bedtime PRN Agitation       Medications up to date at time of exam.      PHYSICAL EXAMINATION:  Patient has no new complaints.  GENERAL: The patient is a well-developed, well-nourished, in no apparent distress.     Vital Signs Last 24 Hrs  T(C): 36.6 (15 Oct 2021 17:31), Max: 37.3 (15 Oct 2021 12:01)  T(F): 97.9 (15 Oct 2021 17:31), Max: 99.2 (15 Oct 2021 12:01)  HR: 101 (15 Oct 2021 17:31) (81 - 101)  BP: 165/87 (15 Oct 2021 17:31) (127/76 - 165/87)  BP(mean): --  RR: 20 (15 Oct 2021 17:31) (18 - 20)  SpO2: 98% (15 Oct 2021 17:31) (95% - 99%)   (if applicable)    Chest Tube (if applicable)    HEENT: Head is normocephalic and atraumatic. Extraocular muscles are intact. Mucous membranes are moist.     NECK: Supple, no palpable adenopathy.    LUNGS: Clear to auscultation, no wheezing, rales, or rhonchi.    HEART: Regular rate and rhythm without murmur.    ABDOMEN: Soft, nontender, and nondistended.  No hepatosplenomegaly is noted.    : No painful voiding, no pelvic pain    EXTREMITIES: Without any cyanosis, clubbing, rash, lesions or edema.    NEUROLOGIC: Awake,     SKIN: Warm, dry, good turgor.      LABS:                        8.7    7.28  )-----------( 145      ( 15 Oct 2021 09:11 )             27.6     10-15    143  |  111<H>  |  15  ----------------------------<  197<H>  3.8   |  25  |  0.80    Ca    8.5      15 Oct 2021 09:11  Phos  2.4     10-14  Mg     1.6     10-14                          MICROBIOLOGY: (if applicable)    RADIOLOGY & ADDITIONAL STUDIES:  EKG:   CXR:  ECHO:    IMPRESSION: 71y Female PAST MEDICAL & SURGICAL HISTORY:  Lumbar stenosis    Bleeding ulcer  stomach ulcer 2011    Anxiety    Spinal stenosis in cervical region    Acute hepatitis C virus infection without hepatic coma  treated 2015- Resolved    Balance disorder    Coronary artery disease involving native coronary artery of native heart without angina pectoris    HTN (hypertension)    HLD (hyperlipidemia)    H/O colonoscopy    Bleeding ulcer  stomach surgery for bleeding ulcer    Fracture  ORIF Left wrist  1/17/13    History of lumbar surgery  2013    Chronic UTI  Pt reports presently on 2nd dose of antibiotics 7/5/18 10 days, Dr Herman aware, pt going for m/eval 7/13/18.    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 Patient states that she felt well on the day of discharge but then started feeling weak again the next day. Patient denies fever, chills, headache, dizziness, chest pain, palpitations, cough, SOB, n/v/d/c,  complaints, rashes or bleeding. Patient admitted for placement to [facility] which was refused on previous admission per chart review.   Per  Marcos, he did not understand the extent of her weakness and was not able to take care of her at home. She currently has a file open at [facility] about cancer treatment options and wants to pursue every and all treatment/life saving measures for his wife including intubation and resuscitation if indicated.  Pat started on chemo as per Onco    Sugg;  -continue meds   -O2 supp as needed   -dvt/gi prophy   -OOB to chair as tolerated   - could not f/u with appt due to pat weakness

## 2021-10-16 LAB
ALBUMIN SERPL ELPH-MCNC: 2.5 G/DL — LOW (ref 3.5–5)
ALP SERPL-CCNC: 176 U/L — HIGH (ref 40–120)
ALT FLD-CCNC: 90 U/L DA — HIGH (ref 10–60)
ANION GAP SERPL CALC-SCNC: 5 MMOL/L — SIGNIFICANT CHANGE UP (ref 5–17)
AST SERPL-CCNC: 158 U/L — HIGH (ref 10–40)
BILIRUB SERPL-MCNC: 0.3 MG/DL — SIGNIFICANT CHANGE UP (ref 0.2–1.2)
BUN SERPL-MCNC: 20 MG/DL — HIGH (ref 7–18)
CALCIUM SERPL-MCNC: 7.6 MG/DL — LOW (ref 8.4–10.5)
CHLORIDE SERPL-SCNC: 113 MMOL/L — HIGH (ref 96–108)
CO2 SERPL-SCNC: 25 MMOL/L — SIGNIFICANT CHANGE UP (ref 22–31)
CREAT SERPL-MCNC: 0.85 MG/DL — SIGNIFICANT CHANGE UP (ref 0.5–1.3)
GLUCOSE SERPL-MCNC: 151 MG/DL — HIGH (ref 70–99)
HCT VFR BLD CALC: 25 % — LOW (ref 34.5–45)
HGB BLD-MCNC: 7.8 G/DL — LOW (ref 11.5–15.5)
MAGNESIUM SERPL-MCNC: 1.4 MG/DL — LOW (ref 1.6–2.6)
MCHC RBC-ENTMCNC: 30.7 PG — SIGNIFICANT CHANGE UP (ref 27–34)
MCHC RBC-ENTMCNC: 31.2 GM/DL — LOW (ref 32–36)
MCV RBC AUTO: 98.4 FL — SIGNIFICANT CHANGE UP (ref 80–100)
NRBC # BLD: 0 /100 WBCS — SIGNIFICANT CHANGE UP (ref 0–0)
PHOSPHATE SERPL-MCNC: 4.7 MG/DL — HIGH (ref 2.5–4.5)
PLATELET # BLD AUTO: 140 K/UL — LOW (ref 150–400)
POTASSIUM SERPL-MCNC: 4 MMOL/L — SIGNIFICANT CHANGE UP (ref 3.5–5.3)
POTASSIUM SERPL-SCNC: 4 MMOL/L — SIGNIFICANT CHANGE UP (ref 3.5–5.3)
PROT SERPL-MCNC: 6.9 G/DL — SIGNIFICANT CHANGE UP (ref 6–8.3)
RBC # BLD: 2.54 M/UL — LOW (ref 3.8–5.2)
RBC # FLD: 18.5 % — HIGH (ref 10.3–14.5)
SODIUM SERPL-SCNC: 143 MMOL/L — SIGNIFICANT CHANGE UP (ref 135–145)
WBC # BLD: 6.39 K/UL — SIGNIFICANT CHANGE UP (ref 3.8–10.5)
WBC # FLD AUTO: 6.39 K/UL — SIGNIFICANT CHANGE UP (ref 3.8–10.5)

## 2021-10-16 RX ORDER — MAGNESIUM SULFATE 500 MG/ML
2 VIAL (ML) INJECTION ONCE
Refills: 0 | Status: COMPLETED | OUTPATIENT
Start: 2021-10-16 | End: 2021-10-16

## 2021-10-16 RX ADMIN — Medication 650 MILLIGRAM(S): at 08:07

## 2021-10-16 RX ADMIN — Medication 650 MILLIGRAM(S): at 06:05

## 2021-10-16 RX ADMIN — Medication 650 MILLIGRAM(S): at 18:03

## 2021-10-16 RX ADMIN — Medication 1 SPRAY(S): at 13:37

## 2021-10-16 RX ADMIN — Medication 50 GRAM(S): at 12:47

## 2021-10-16 RX ADMIN — Medication 650 MILLIGRAM(S): at 13:37

## 2021-10-16 RX ADMIN — Medication 30 MILLIGRAM(S): at 12:47

## 2021-10-16 RX ADMIN — Medication 25 MILLIGRAM(S): at 18:03

## 2021-10-16 RX ADMIN — Medication 650 MILLIGRAM(S): at 12:47

## 2021-10-16 RX ADMIN — Medication 650 MILLIGRAM(S): at 19:01

## 2021-10-16 RX ADMIN — Medication 1 MILLIGRAM(S): at 18:03

## 2021-10-16 RX ADMIN — Medication 25 MILLIGRAM(S): at 06:05

## 2021-10-16 RX ADMIN — SIMVASTATIN 40 MILLIGRAM(S): 20 TABLET, FILM COATED ORAL at 21:38

## 2021-10-16 RX ADMIN — Medication 1 SPRAY(S): at 21:38

## 2021-10-16 RX ADMIN — Medication 1 SPRAY(S): at 06:05

## 2021-10-16 NOTE — PROGRESS NOTE ADULT - ASSESSMENT
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 (diagnosed 10/04/21, Ki-67 98% path report was not resulted on last d/c)  -ECOG PS 2/3 and d/t aggressive nature of pt's cancer and progressive weakness that she needs to start the 1st cycle of inpatient chemotherapy with Cisplatin/etoposide starting 10/15 and tolerated day 1 well with supportive measures  -chemo regimen resume with   Etoposide IV on Monday/Tuesday and Neulasta 24 hours after post chemo completion  -maintain pre and post hydration and check daily CBC and CMP    Problem #2 Anemia - multifactorial; transfuse to keep hg > 7.5 g/dl    VTE prophylaxis; will continue to follow; call with questions [phone]    Colton Etienne MD

## 2021-10-16 NOTE — PROGRESS NOTE ADULT - SUBJECTIVE AND OBJECTIVE BOX
Patient is a 71y old  Female who presents with a chief complaint of weakness (16 Oct 2021 09:46)    PATIENT IS SEEN AND EXAMINED IN MEDICAL FLOOR.    NGT [    ]    ORIANA [   ]      GT [   ]    ALLERGIES:  No Known Allergies      Daily     Daily     VITALS:    Vital Signs Last 24 Hrs  T(C): 36.6 (16 Oct 2021 14:48), Max: 36.7 (15 Oct 2021 20:02)  T(F): 97.9 (16 Oct 2021 14:48), Max: 98.1 (15 Oct 2021 20:02)  HR: 80 (16 Oct 2021 14:48) (80 - 101)  BP: 115/93 (16 Oct 2021 14:48) (115/93 - 165/87)  BP(mean): 87 (16 Oct 2021 05:10) (87 - 87)  RR: 19 (16 Oct 2021 14:48) (10 - 20)  SpO2: 97% (16 Oct 2021 14:48) (95% - 98%)    LABS:    CBC Full  -  ( 16 Oct 2021 06:51 )  WBC Count : 6.39 K/uL  RBC Count : 2.54 M/uL  Hemoglobin : 7.8 g/dL  Hematocrit : 25.0 %  Platelet Count - Automated : 140 K/uL  Mean Cell Volume : 98.4 fl  Mean Cell Hemoglobin : 30.7 pg  Mean Cell Hemoglobin Concentration : 31.2 gm/dL  Auto Neutrophil # : x  Auto Lymphocyte # : x  Auto Monocyte # : x  Auto Eosinophil # : x  Auto Basophil # : x  Auto Neutrophil % : x  Auto Lymphocyte % : x  Auto Monocyte % : x  Auto Eosinophil % : x  Auto Basophil % : x      10-16    143  |  113<H>  |  20<H>  ----------------------------<  151<H>  4.0   |  25  |  0.85    Ca    7.6<L>      16 Oct 2021 06:51  Phos  4.7     10-16  Mg     1.4     10-16    TPro  6.9  /  Alb  2.5<L>  /  TBili  0.3  /  DBili  x   /  AST  158<H>  /  ALT  90<H>  /  AlkPhos  176<H>  10-16    CAPILLARY BLOOD GLUCOSE            LIVER FUNCTIONS - ( 16 Oct 2021 06:51 )  Alb: 2.5 g/dL / Pro: 6.9 g/dL / ALK PHOS: 176 U/L / ALT: 90 U/L DA / AST: 158 U/L / GGT: x           Creatinine Trend: 0.85<--, 0.80<--, 0.77<--, 0.93<--, 1.14<--, 1.12<--  I&O's Summary    15 Oct 2021 07:01  -  16 Oct 2021 07:00  --------------------------------------------------------  IN: 0 mL / OUT: 1 mL / NET: -1 mL            BAL Bronchoalveolar Washings  10-05 @ 13:17   No growth at 1 week.  --    Moderate polymorphonuclear leukocytes per low power field  Rare Squamous epithelial cells per low power field  No organisms seen per oil power field      Clean Catch Clean Catch (Midstream)  09-28 @ 20:59   >100,000 CFU/ml Enterococcus faecium (vancomycin resistant)  --  Enterococcus faecium (vancomycin resistant)      .Blood Blood  09-28 @ 20:53   No Growth Final  --  --      Clean Catch Clean Catch (Midstream)  09-21 @ 14:05   10,000 - 49,000 CFU/mL Escherichia coli  Normal Urogenital bello present  --  Escherichia coli      .Blood Blood-Peripheral  09-20 @ 18:38   No Growth Final  --  --          MEDICATIONS:    MEDICATIONS  (STANDING):  aprepitant 80 milliGRAM(s) Oral once  folic acid 1 milliGRAM(s) Oral daily  metoprolol tartrate 25 milliGRAM(s) Oral two times a day  PARoxetine 30 milliGRAM(s) Oral daily  simvastatin 40 milliGRAM(s) Oral at bedtime  sodium chloride 0.65% Nasal 1 Spray(s) Both Nostrils three times a day      MEDICATIONS  (PRN):  acetaminophen   Tablet .. 650 milliGRAM(s) Oral every 6 hours PRN Temp greater or equal to 38C (100.4F), Mild Pain (1 - 3), Moderate Pain (4 - 6)  ALBUTerol    90 MICROgram(s) HFA Inhaler 2 Puff(s) Inhalation every 6 hours PRN Shortness of Breath and/or Wheezing  OLANZapine 2.5 milliGRAM(s) Oral at bedtime PRN Agitation        REVIEW OF SYSTEMS:                           ALL ROS DONE [ X   ]      CONSTITUTIONAL:  LETHARGIC [   ], FEVER [   ], UNRESPONSIVE [   ]  CVS:  CP  [   ], SOB, [   ], PALPITATIONS [   ], DIZZYNESS [   ]  RS: COUGH [   ], SPUTUM [   ]  GI: ABDOMINAL PAIN [   ], NAUSEA [   ], VOMITINGS [   ], DIARRHEA [   ], CONSTIPATION [   ]  :  DYSURIA [   ], NOCTURIA [   ], INCREASED FREQUENCY [   ], DRIBLING [   ],  SKELETAL: PAINFUL JOINTS [   ], SWOLLEN JOINTS [   ], NECK ACHE [   ], LOW BACK ACHE [   ],  SKIN : ULCERS [   ], RASH [   ], ITCHING [   ]  CNS: HEAD ACHE [   ], DOUBLE VISION [   ], BLURRED VISION [   ], AMS / CONFUSION [   ], SEIZURES [   ], WEAKNESS [   ],TINGLING / NUMBNESS [   ]      PHYSICAL EXAMINATION:    GENERAL APPEARANCE: NO DISTRESS  HEENT:  NO PALLOR, NO  JVD,  NO   NODES, NECK SUPPLE  CVS: S1 +, S2 +,   RS: AEEB,  OCCASIONAL  RALES +,   NO RONCHI  ABD: SOFT, NT, NO, BS +  EXT: NO PE  SKIN: WARM,   SKELETAL:  ROM ACCEPTABLE  CNS:  AAO X    ,   DEFICITS        RADIOLOGY :          ASSESSMENT :     Weakness    Lumbar stenosis    H/O: HTN (hypertension)    Hypercholesterolemia    Bleeding ulcer    Anxiety    Heartburn symptom    Left shoulder pain    Spinal stenosis in cervical region    Acute hepatitis C virus infection without hepatic coma    Anxiety    Balance disorder    Coronary artery disease involving native coronary artery of native heart without angina pectoris    No pertinent past medical history    HTN (hypertension)    HLD (hyperlipidemia)    H/O colonoscopy    Bleeding ulcer    Fracture    History of lumbar surgery    Chronic UTI    H/O cardiac catheterization    No significant past surgical history        PLAN:  HPI:  70 y/o F with PMHx of HTN, HLD, and metastatic Lung CA, was discharged on 10/6 from [facility] after hospitalization for sepsis from PNA and UTI, comes in complaining of weakness since discharge. Patient states that she felt well on the day of discharge but then started feeling weak again the next day. Patient denies fever, chills, headache, dizziness, chest pain, palpitations, cough, SOB, n/v/d/c,  complaints, rashes or bleeding. Patient admitted for placement to [facility] which was refused on previous admission per chart review.   Per  Marcos, he did not understand the extent of her weakness and was not able to take care of her at home. She currently has a file open at [facility] about cancer treatment options and wants to pursue every and all treatment/life saving measures for his wife including intubation and resuscitation if indicated.    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 (diagnosed 10/04/21, Ki-67 98% path report was not resulted on last d/c)  -ECOG PS 2/3 and d/t aggressive nature of pt's cancer and progressive weakness that she needs to start the 1st cycle of inpatient chemotherapy with Cisplatin/etoposide starting 10/15 and tolerated day 1 well with supportive measures  -chemo regimen resume with   Etoposide IV on Monday/Tuesday and Neulasta 24 hours after post chemo completion  -maintain pre and post hydration and check daily CBC and CMP    Problem #2 Anemia - multifactorial; transfuse to keep hg > 7.5 g/dl    Problem/Plan - 1:  ·  Problem: Metastatic cancer to lung.   ·  Plan: Pt d/c'd home to pursue chemo tx on last admission, to weak to make appt  Heme/Onc now recommending inpatient IV chemo tx -  planning to start chemo 10/15   Pt will be transferred to [facility] ,  confirmed with chemo RN and nurse manager on the floor   [facility] Heme/Onc group is  following.    Problem/Plan - 2:  ·  Problem: Anemia.   ·  Plan: Hgb 8.7  trending up    No overt s/s of bleeding  Transfuse Hgb<7.0  Follow up CBC in AM.    Problem/Plan - 3:  ·  Problem: Hypokalemia.   ·  Plan: likely due to poor P.O intake   replaced potassium for 3.3 on 10/14 - resolved   monitor BMP in AM.    Problem/Plan - 4:  ·  Problem: HTN (hypertension).   ·  Plan: Normotensive  Pt takes Amlodipine, Metoprolol and Benazepril at home  Home regimen held in the setting of BP WNL  Consider restarting Metoprolol when SBB > 140 x 2 reading  Monitor BP.    Problem/Plan - 5:  ·  Problem: HLD (hyperlipidemia).   ·  Plan: Pt takes Simvastatin at home  Continue home regimen.    Problem/Plan - 6:  ·  Problem: Anxiety.   ·  Plan: Pt takes Paroxetine at home  Continue home regimen   Continue PRN Zyprexa QHS.    Problem/Plan - 7:  ·  Problem: Need for prophylactic measure.   ·  Plan: Continue with SCDs   Hold Lovenox for DVT ppx until HGB stabilizes.    Problem/Plan - 8:  ·  Problem: Discharge planning issues.   ·  Plan: Pt admitted for placement  PT recommends JANETTE : pt and family chose [facility]   Care management following for discharge planning   CM advised need oncology treatment plan before any facility will accept - plan initiated by heme / onc ( refer to note on 10/13)   1st cycle of chemo will be completed in the hospital ( total D4) - 10/15- 10/18.    DR. JUDY PATTON           Patient is a 71y old  Female who presents with a chief complaint of weakness (16 Oct 2021 09:46)    PATIENT IS SEEN AND EXAMINED IN MEDICAL FLOOR.    NGT [    ]    ORIANA [   ]      GT [   ]    ALLERGIES:  No Known Allergies      Daily     Daily     VITALS:    Vital Signs Last 24 Hrs  T(C): 36.6 (16 Oct 2021 14:48), Max: 36.7 (15 Oct 2021 20:02)  T(F): 97.9 (16 Oct 2021 14:48), Max: 98.1 (15 Oct 2021 20:02)  HR: 80 (16 Oct 2021 14:48) (80 - 101)  BP: 115/93 (16 Oct 2021 14:48) (115/93 - 165/87)  BP(mean): 87 (16 Oct 2021 05:10) (87 - 87)  RR: 19 (16 Oct 2021 14:48) (10 - 20)  SpO2: 97% (16 Oct 2021 14:48) (95% - 98%)    LABS:    CBC Full  -  ( 16 Oct 2021 06:51 )  WBC Count : 6.39 K/uL  RBC Count : 2.54 M/uL  Hemoglobin : 7.8 g/dL  Hematocrit : 25.0 %  Platelet Count - Automated : 140 K/uL  Mean Cell Volume : 98.4 fl  Mean Cell Hemoglobin : 30.7 pg  Mean Cell Hemoglobin Concentration : 31.2 gm/dL  Auto Neutrophil # : x  Auto Lymphocyte # : x  Auto Monocyte # : x  Auto Eosinophil # : x  Auto Basophil # : x  Auto Neutrophil % : x  Auto Lymphocyte % : x  Auto Monocyte % : x  Auto Eosinophil % : x  Auto Basophil % : x      10-16    143  |  113<H>  |  20<H>  ----------------------------<  151<H>  4.0   |  25  |  0.85    Ca    7.6<L>      16 Oct 2021 06:51  Phos  4.7     10-16  Mg     1.4     10-16    TPro  6.9  /  Alb  2.5<L>  /  TBili  0.3  /  DBili  x   /  AST  158<H>  /  ALT  90<H>  /  AlkPhos  176<H>  10-16    CAPILLARY BLOOD GLUCOSE            LIVER FUNCTIONS - ( 16 Oct 2021 06:51 )  Alb: 2.5 g/dL / Pro: 6.9 g/dL / ALK PHOS: 176 U/L / ALT: 90 U/L DA / AST: 158 U/L / GGT: x           Creatinine Trend: 0.85<--, 0.80<--, 0.77<--, 0.93<--, 1.14<--, 1.12<--  I&O's Summary    15 Oct 2021 07:01  -  16 Oct 2021 07:00  --------------------------------------------------------  IN: 0 mL / OUT: 1 mL / NET: -1 mL            BAL Bronchoalveolar Washings  10-05 @ 13:17   No growth at 1 week.  --    Moderate polymorphonuclear leukocytes per low power field  Rare Squamous epithelial cells per low power field  No organisms seen per oil power field      Clean Catch Clean Catch (Midstream)  09-28 @ 20:59   >100,000 CFU/ml Enterococcus faecium (vancomycin resistant)  --  Enterococcus faecium (vancomycin resistant)      .Blood Blood  09-28 @ 20:53   No Growth Final  --  --      Clean Catch Clean Catch (Midstream)  09-21 @ 14:05   10,000 - 49,000 CFU/mL Escherichia coli  Normal Urogenital bello present  --  Escherichia coli      .Blood Blood-Peripheral  09-20 @ 18:38   No Growth Final  --  --          MEDICATIONS:    MEDICATIONS  (STANDING):  aprepitant 80 milliGRAM(s) Oral once  folic acid 1 milliGRAM(s) Oral daily  metoprolol tartrate 25 milliGRAM(s) Oral two times a day  PARoxetine 30 milliGRAM(s) Oral daily  simvastatin 40 milliGRAM(s) Oral at bedtime  sodium chloride 0.65% Nasal 1 Spray(s) Both Nostrils three times a day      MEDICATIONS  (PRN):  acetaminophen   Tablet .. 650 milliGRAM(s) Oral every 6 hours PRN Temp greater or equal to 38C (100.4F), Mild Pain (1 - 3), Moderate Pain (4 - 6)  ALBUTerol    90 MICROgram(s) HFA Inhaler 2 Puff(s) Inhalation every 6 hours PRN Shortness of Breath and/or Wheezing  OLANZapine 2.5 milliGRAM(s) Oral at bedtime PRN Agitation        REVIEW OF SYSTEMS:                           ALL ROS DONE [ X   ]      CONSTITUTIONAL:  LETHARGIC [   ], FEVER [   ], UNRESPONSIVE [   ]  CVS:  CP  [   ], SOB, [   ], PALPITATIONS [   ], DIZZYNESS [   ]  RS: COUGH [   ], SPUTUM [   ]  GI: ABDOMINAL PAIN [   ], NAUSEA [   ], VOMITINGS [   ], DIARRHEA [   ], CONSTIPATION [   ]  :  DYSURIA [   ], NOCTURIA [   ], INCREASED FREQUENCY [   ], DRIBLING [   ],  SKELETAL: PAINFUL JOINTS [   ], SWOLLEN JOINTS [   ], NECK ACHE [   ], LOW BACK ACHE [   ],  SKIN : ULCERS [   ], RASH [   ], ITCHING [   ]  CNS: HEAD ACHE [   ], DOUBLE VISION [   ], BLURRED VISION [   ], AMS / CONFUSION [   ], SEIZURES [   ], WEAKNESS [   ],TINGLING / NUMBNESS [   ]      PHYSICAL EXAMINATION:    GENERAL APPEARANCE: NO DISTRESS  HEENT:  NO PALLOR, NO  JVD,  NO   NODES, NECK SUPPLE  CVS: S1 +, S2 +,   RS: AEEB,  OCCASIONAL  RALES +,   b/l RONCHI  ABD: SOFT, NT, NO, BS +  EXT: PE  ++  SKIN: WARM,   SKELETAL:  ROM ACCEPTABLE  CNS:  AAO X 2-3   ,   DEFICITS        RADIOLOGY :    < from: Xray Chest 1 View-PORTABLE IMMEDIATE (Xray Chest 1 View-PORTABLE IMMEDIATE .) (10.04.21 @ 17:03) >  INTERPRETATION:  AP chest on October 4, 2021 at 1:12 PM. Patient has a lung mass and pneumonia.    Heart magnified by technique. Cervical spine hardware again noted.    There is a small infiltrate at left base new since September 28.    There is persistent density likely atelectatic consolidation of the right lower lobe possibly with underlying mass which is similar to September 28.    Follow-up AP erect chest on September 28, 2021 at 2:08 PM. Patient had bronchoscopy and biopsy.    Heart magnified by technique.    On October 4. Small infiltrate off the left lower hilum which is no longer evident on this film.    Both studies show what appears to be atelectatic consolidation of the right lower lobe which could easily obscure a lesion.    No pneumothorax.    Cervical spine hardware again seen.    IMPRESSION: Persistent right lung findings as above.    < end of copied text >  < from: MR Abdomen No Cont (09.29.21 @ 17:53) >  IMPRESSION:  *  Bilobar hepatic metastases are confirmed.  *  Right upper quadrant peritoneal implants better seen on CT.  *  Diffuse metastatic disease throughout the bone marrow.    < end of copied text >          ASSESSMENT :     Weakness    Lumbar stenosis    H/O: HTN (hypertension)    Hypercholesterolemia    Bleeding ulcer    Anxiety    Heartburn symptom    Left shoulder pain    Spinal stenosis in cervical region    Acute hepatitis C virus infection without hepatic coma    Anxiety    Balance disorder    Coronary artery disease involving native coronary artery of native heart without angina pectoris      HTN (hypertension)    HLD (hyperlipidemia)    H/O colonoscopy    Bleeding ulcer    Fracture    History of lumbar surgery    Chronic UTI    H/O cardiac catheterization            PLAN:  HPI:  72 y/o F with PMHx of HTN, HLD, and metastatic Lung CA, was discharged on 10/6 from [facility] after hospitalization for sepsis from PNA and UTI, comes in complaining of weakness since discharge. Patient states that she felt well on the day of discharge but then started feeling weak again the next day. Patient denies fever, chills, headache, dizziness, chest pain, palpitations, cough, SOB, n/v/d/c,  complaints, rashes or bleeding. Patient admitted for placement to [facility] which was refused on previous admission per chart review.   Per  Marcos, he did not understand the extent of her weakness and was not able to take care of her at home. She currently has a file open at [facility] about cancer treatment options and wants to pursue every and all treatment/life saving measures for his wife including intubation and resuscitation if indicated.    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 (diagnosed 10/04/21, Ki-67 98% path report was not resulted on last d/c)  -ECOG PS 2/3 and d/t aggressive nature of pt's cancer and progressive weakness that she needs to start the 1st cycle of inpatient chemotherapy with Cisplatin/etoposide starting 10/15 and tolerated day 1 well with supportive measures  -chemo regimen resume with   Etoposide IV on Monday/Tuesday and Neulasta 24 hours after post chemo completion  -maintain pre and post hydration and check daily CBC and CMP    Problem #2 Anemia - multifactorial; transfuse to keep hg > 7.5 g/dl    -  COPD, EX SMOKER     Problem/Plan - 1:  ·  Problem: Metastatic cancer to lung.   ·  Plan: Pt d/c'd home to pursue chemo tx on last admission, to weak to make appt  Heme/Onc now recommending inpatient IV chemo tx -  planning to start chemo 10/15   Pt will be transferred to [facility] ,  confirmed with chemo RN and nurse manager on the floor   A Heme/Onc group is  following.    Problem/Plan - 2:  ·  Problem: Anemia.   ·  Plan: Hgb 8.7  trending up    No overt s/s of bleeding  Transfuse Hgb<7.0  Follow up CBC in AM.    Problem/Plan - 3:  ·  Problem: Hypokalemia.   ·  Plan: likely due to poor P.O intake   replaced potassium for 3.3 on 10/14 - resolved   monitor BMP in AM.    Problem/Plan - 4:  ·  Problem: HTN (hypertension).   ·  Plan: Normotensive  Pt takes Amlodipine, Metoprolol and Benazepril at home  Home regimen held in the setting of BP WNL  Consider restarting Metoprolol when SBB > 140 x 2 reading  Monitor BP.    Problem/Plan - 5:  ·  Problem: HLD (hyperlipidemia).   ·  Plan: Pt takes Simvastatin at home  Continue home regimen.    Problem/Plan - 6:  ·  Problem: Anxiety.   ·  Plan: Pt takes Paroxetine at home  Continue home regimen   Continue PRN Zyprexa QHS.    Problem/Plan - 7:  ·  Problem: Need for prophylactic measure.   ·  Plan: Continue with SCDs   Hold Lovenox for DVT ppx until HGB stabilizes.    Problem/Plan - 8:  ·  Problem: Discharge planning issues.   ·  Plan: Pt admitted for placement  PT recommends JANETTE : pt and family [facility]   Care management following for discharge planning   CM advised need oncology treatment plan before any facility will accept - plan initiated by heme / onc ( refer to note on 10/13)   1st cycle of chemo will be completed in the hospital ( total D4) - 10/15- 10/18.      DR. JUDY PATTON

## 2021-10-17 LAB
ALBUMIN SERPL ELPH-MCNC: 2.4 G/DL — LOW (ref 3.5–5)
ALP SERPL-CCNC: 146 U/L — HIGH (ref 40–120)
ALT FLD-CCNC: 60 U/L DA — SIGNIFICANT CHANGE UP (ref 10–60)
ANION GAP SERPL CALC-SCNC: 10 MMOL/L — SIGNIFICANT CHANGE UP (ref 5–17)
AST SERPL-CCNC: 111 U/L — HIGH (ref 10–40)
BILIRUB SERPL-MCNC: 0.3 MG/DL — SIGNIFICANT CHANGE UP (ref 0.2–1.2)
BUN SERPL-MCNC: 30 MG/DL — HIGH (ref 7–18)
CALCIUM SERPL-MCNC: 7.5 MG/DL — LOW (ref 8.4–10.5)
CHLORIDE SERPL-SCNC: 110 MMOL/L — HIGH (ref 96–108)
CO2 SERPL-SCNC: 21 MMOL/L — LOW (ref 22–31)
CREAT SERPL-MCNC: 1.32 MG/DL — HIGH (ref 0.5–1.3)
GLUCOSE SERPL-MCNC: 157 MG/DL — HIGH (ref 70–99)
HCT VFR BLD CALC: 23.3 % — LOW (ref 34.5–45)
HGB BLD-MCNC: 7.3 G/DL — LOW (ref 11.5–15.5)
MAGNESIUM SERPL-MCNC: 2.1 MG/DL — SIGNIFICANT CHANGE UP (ref 1.6–2.6)
MCHC RBC-ENTMCNC: 30.4 PG — SIGNIFICANT CHANGE UP (ref 27–34)
MCHC RBC-ENTMCNC: 31.3 GM/DL — LOW (ref 32–36)
MCV RBC AUTO: 97.1 FL — SIGNIFICANT CHANGE UP (ref 80–100)
NRBC # BLD: 0 /100 WBCS — SIGNIFICANT CHANGE UP (ref 0–0)
PHOSPHATE SERPL-MCNC: 8 MG/DL — HIGH (ref 2.5–4.5)
PLATELET # BLD AUTO: 112 K/UL — LOW (ref 150–400)
POTASSIUM SERPL-MCNC: 4.3 MMOL/L — SIGNIFICANT CHANGE UP (ref 3.5–5.3)
POTASSIUM SERPL-SCNC: 4.3 MMOL/L — SIGNIFICANT CHANGE UP (ref 3.5–5.3)
PROT SERPL-MCNC: 6.8 G/DL — SIGNIFICANT CHANGE UP (ref 6–8.3)
RBC # BLD: 2.4 M/UL — LOW (ref 3.8–5.2)
RBC # FLD: 18.2 % — HIGH (ref 10.3–14.5)
SODIUM SERPL-SCNC: 141 MMOL/L — SIGNIFICANT CHANGE UP (ref 135–145)
WBC # BLD: 4.59 K/UL — SIGNIFICANT CHANGE UP (ref 3.8–10.5)
WBC # FLD AUTO: 4.59 K/UL — SIGNIFICANT CHANGE UP (ref 3.8–10.5)

## 2021-10-17 RX ORDER — METOPROLOL TARTRATE 50 MG
25 TABLET ORAL
Refills: 0 | Status: DISCONTINUED | OUTPATIENT
Start: 2021-10-17 | End: 2021-10-28

## 2021-10-17 RX ADMIN — Medication 650 MILLIGRAM(S): at 20:14

## 2021-10-17 RX ADMIN — Medication 30 MILLIGRAM(S): at 12:09

## 2021-10-17 RX ADMIN — Medication 650 MILLIGRAM(S): at 06:43

## 2021-10-17 RX ADMIN — Medication 25 MILLIGRAM(S): at 06:44

## 2021-10-17 RX ADMIN — Medication 650 MILLIGRAM(S): at 00:34

## 2021-10-17 RX ADMIN — Medication 650 MILLIGRAM(S): at 12:10

## 2021-10-17 RX ADMIN — Medication 650 MILLIGRAM(S): at 13:39

## 2021-10-17 RX ADMIN — Medication 1 MILLIGRAM(S): at 12:09

## 2021-10-17 RX ADMIN — Medication 650 MILLIGRAM(S): at 19:44

## 2021-10-17 RX ADMIN — Medication 25 MILLIGRAM(S): at 17:27

## 2021-10-17 RX ADMIN — SIMVASTATIN 40 MILLIGRAM(S): 20 TABLET, FILM COATED ORAL at 21:21

## 2021-10-17 RX ADMIN — Medication 650 MILLIGRAM(S): at 07:13

## 2021-10-17 NOTE — PROGRESS NOTE ADULT - SUBJECTIVE AND OBJECTIVE BOX
Patient is a 71y old  Female who presents with a chief complaint of Stage IV cancer (17 Oct 2021 11:04)    PATIENT IS SEEN AND EXAMINED IN MEDICAL FLOOR.    NGT [    ]    GASTELUM [   ]      GT [   ]    ALLERGIES:  No Known Allergies      Daily     Daily     VITALS:    Vital Signs Last 24 Hrs  T(C): 36.6 (17 Oct 2021 13:40), Max: 36.6 (17 Oct 2021 13:40)  T(F): 97.8 (17 Oct 2021 13:40), Max: 97.8 (17 Oct 2021 13:40)  HR: 78 (17 Oct 2021 13:40) (68 - 82)  BP: 133/65 (17 Oct 2021 13:40) (105/45 - 139/63)  BP(mean): 60 (16 Oct 2021 20:35) (60 - 60)  RR: 18 (17 Oct 2021 13:40) (17 - 18)  SpO2: 99% (17 Oct 2021 13:40) (95% - 99%)    LABS:    CBC Full  -  ( 17 Oct 2021 07:08 )  WBC Count : 4.59 K/uL  RBC Count : 2.40 M/uL  Hemoglobin : 7.3 g/dL  Hematocrit : 23.3 %  Platelet Count - Automated : 112 K/uL  Mean Cell Volume : 97.1 fl  Mean Cell Hemoglobin : 30.4 pg  Mean Cell Hemoglobin Concentration : 31.3 gm/dL  Auto Neutrophil # : x  Auto Lymphocyte # : x  Auto Monocyte # : x  Auto Eosinophil # : x  Auto Basophil # : x  Auto Neutrophil % : x  Auto Lymphocyte % : x  Auto Monocyte % : x  Auto Eosinophil % : x  Auto Basophil % : x      10-17    141  |  110<H>  |  30<H>  ----------------------------<  157<H>  4.3   |  21<L>  |  1.32<H>    Ca    7.5<L>      17 Oct 2021 07:08  Phos  8.0     10-17  Mg     2.1     10-17    TPro  6.8  /  Alb  2.4<L>  /  TBili  0.3  /  DBili  x   /  AST  111<H>  /  ALT  60  /  AlkPhos  146<H>  10-17    CAPILLARY BLOOD GLUCOSE            LIVER FUNCTIONS - ( 17 Oct 2021 07:08 )  Alb: 2.4 g/dL / Pro: 6.8 g/dL / ALK PHOS: 146 U/L / ALT: 60 U/L DA / AST: 111 U/L / GGT: x           Creatinine Trend: 1.32<--, 0.85<--, 0.80<--, 0.77<--, 0.93<--, 1.14<--  I&O's Summary          BAL Bronchoalveolar Washings  10-05 @ 13:17   No growth at 1 week.  --    Moderate polymorphonuclear leukocytes per low power field  Rare Squamous epithelial cells per low power field  No organisms seen per oil power field      Clean Catch Clean Catch (Midstream)  09-28 @ 20:59   >100,000 CFU/ml Enterococcus faecium (vancomycin resistant)  --  Enterococcus faecium (vancomycin resistant)      .Blood Blood  09-28 @ 20:53   No Growth Final  --  --      Clean Catch Clean Catch (Midstream)  09-21 @ 14:05   10,000 - 49,000 CFU/mL Escherichia coli  Normal Urogenital bello present  --  Escherichia coli      .Blood Blood-Peripheral  09-20 @ 18:38   No Growth Final  --  --          MEDICATIONS:    MEDICATIONS  (STANDING):  aprepitant 80 milliGRAM(s) Oral once  aprepitant 80 milliGRAM(s) Oral once  folic acid 1 milliGRAM(s) Oral daily  metoprolol tartrate 25 milliGRAM(s) Oral two times a day  PARoxetine 30 milliGRAM(s) Oral daily  simvastatin 40 milliGRAM(s) Oral at bedtime      MEDICATIONS  (PRN):  acetaminophen   Tablet .. 650 milliGRAM(s) Oral every 6 hours PRN Temp greater or equal to 38C (100.4F), Mild Pain (1 - 3), Moderate Pain (4 - 6)  ALBUTerol    90 MICROgram(s) HFA Inhaler 2 Puff(s) Inhalation every 6 hours PRN Shortness of Breath and/or Wheezing  OLANZapine 2.5 milliGRAM(s) Oral at bedtime PRN Agitation        REVIEW OF SYSTEMS:                           ALL ROS DONE [ X   ]      CONSTITUTIONAL:  LETHARGIC [   ], FEVER [   ], UNRESPONSIVE [   ]  CVS:  CP  [   ], SOB, [   ], PALPITATIONS [   ], DIZZYNESS [   ]  RS: COUGH [   ], SPUTUM [   ]  GI: ABDOMINAL PAIN [   ], NAUSEA [   ], VOMITINGS [   ], DIARRHEA [   ], CONSTIPATION [   ]  :  DYSURIA [   ], NOCTURIA [   ], INCREASED FREQUENCY [   ], DRIBLING [   ],  SKELETAL: PAINFUL JOINTS [   ], SWOLLEN JOINTS [   ], NECK ACHE [   ], LOW BACK ACHE [   ],  SKIN : ULCERS [   ], RASH [   ], ITCHING [   ]  CNS: HEAD ACHE [   ], DOUBLE VISION [   ], BLURRED VISION [   ], AMS / CONFUSION [   ], SEIZURES [   ], WEAKNESS [   ],TINGLING / NUMBNESS [   ]          PHYSICAL EXAMINATION:    GENERAL APPEARANCE: NO DISTRESS  HEENT:  NO PALLOR, NO  JVD,  NO   NODES, NECK SUPPLE  CVS: S1 +, S2 +,   RS: AEEB,  OCCASIONAL  RALES +,   b/l RONCHI  ABD: SOFT, NT, NO, BS +  EXT: PE  ++  SKIN: WARM,   SKELETAL:  ROM ACCEPTABLE  CNS:  AAO X 2-3   ,   DEFICITS        RADIOLOGY :    < from: Xray Chest 1 View-PORTABLE IMMEDIATE (Xray Chest 1 View-PORTABLE IMMEDIATE .) (10.04.21 @ 17:03) >  INTERPRETATION:  AP chest on October 4, 2021 at 1:12 PM. Patient has a lung mass and pneumonia.    Heart magnified by technique. Cervical spine hardware again noted.    There is a small infiltrate at left base new since September 28.    There is persistent density likely atelectatic consolidation of the right lower lobe possibly with underlying mass which is similar to September 28.    Follow-up AP erect chest on September 28, 2021 at 2:08 PM. Patient had bronchoscopy and biopsy.    Heart magnified by technique.    On October 4. Small infiltrate off the left lower hilum which is no longer evident on this film.    Both studies show what appears to be atelectatic consolidation of the right lower lobe which could easily obscure a lesion.    No pneumothorax.    Cervical spine hardware again seen.    IMPRESSION: Persistent right lung findings as above.    < end of copied text >  < from: MR Abdomen No Cont (09.29.21 @ 17:53) >  IMPRESSION:  *  Bilobar hepatic metastases are confirmed.  *  Right upper quadrant peritoneal implants better seen on CT.  *  Diffuse metastatic disease throughout the bone marrow.    < end of copied text >          ASSESSMENT :     Weakness    Lumbar stenosis    H/O: HTN (hypertension)    Hypercholesterolemia    Bleeding ulcer    Anxiety    Heartburn symptom    Left shoulder pain    Spinal stenosis in cervical region    Acute hepatitis C virus infection without hepatic coma    Anxiety    Balance disorder    Coronary artery disease involving native coronary artery of native heart without angina pectoris      HTN (hypertension)    HLD (hyperlipidemia)    H/O colonoscopy    Bleeding ulcer    Fracture    History of lumbar surgery    Chronic UTI    H/O cardiac catheterization            PLAN:  HPI:  72 y/o F with PMHx of HTN, HLD, and metastatic Lung CA, was discharged on 10/6 from [facility] after hospitalization for sepsis from PNA and UTI, comes in complaining of weakness since discharge. Patient states that she felt well on the day of discharge but then started feeling weak again the next day. Patient denies fever, chills, headache, dizziness, chest pain, palpitations, cough, SOB, n/v/d/c,  complaints, rashes or bleeding. Patient admitted for placement to [facility] [place] which was refused on previous admission per chart review.   Per  Marcos, he did not understand the extent of her weakness and was not able to take care of her at home. She currently has a file open at [facility] about cancer treatment options and wants to pursue every and all treatment/life saving measures for his wife including intubation and resuscitation if indicated.    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 (diagnosed 10/04/21, Ki-67 98% path report was not resulted on last d/c)  -ECOG PS 2/3 and d/t aggressive nature of pt's cancer and progressive weakness that she needs to start the 1st cycle of inpatient chemotherapy with Cisplatin/etoposide starting 10/15 and tolerated day 1 well with supportive measures  -chemo regimen resume with   Etoposide IV on Monday/Tuesday and Neulasta 24 hours after post chemo completion  -maintain pre and post hydration and check daily CBC and CMP    Problem #2 Anemia - multifactorial; transfuse to keep hg > 7.5 g/dl    -  COPD, EX SMOKER     Problem/Plan - 1:  ·  Problem: Metastatic cancer to lung.   ·  Plan: Pt d/c'd home to pursue chemo tx on last admission, to weak to make appt  Heme/Onc now recommending inpatient IV chemo tx -  planning to start chemo 10/15   Pt will be transferred to [facility] ,  confirmed with chemo RN and nurse manager on the floor   [facility] Heme/Onc group is  following.    Problem/Plan - 2:  ·  Problem: Anemia.   ·  Plan: Hgb 8.7  trending up    No overt s/s of bleeding  Transfuse Hgb<7.0  Follow up CBC in AM.    Problem/Plan - 3:  ·  Problem: Hypokalemia.   ·  Plan: likely due to poor P.O intake   replaced potassium for 3.3 on 10/14 - resolved   monitor BMP in AM.    Problem/Plan - 4:  ·  Problem: HTN (hypertension).   ·  Plan: Normotensive  Pt takes Amlodipine, Metoprolol and Benazepril at home  Home regimen held in the setting of BP WNL  Consider restarting Metoprolol when SBB > 140 x 2 reading  Monitor BP.    Problem/Plan - 5:  ·  Problem: HLD (hyperlipidemia).   ·  Plan: Pt takes Simvastatin at home  Continue home regimen.    Problem/Plan - 6:  ·  Problem: Anxiety.   ·  Plan: Pt takes Paroxetine at home  Continue home regimen   Continue PRN Zyprexa QHS.    Problem/Plan - 7:  ·  Problem: Need for prophylactic measure.   ·  Plan: Continue with SCDs   Hold Lovenox for DVT ppx until HGB stabilizes.    Problem/Plan - 8:  ·  Problem: Discharge planning issues.   ·  Plan: Pt admitted for placement  PT recommends JANETTE : pt and family chose [facility]   Care management following for discharge planning   CM advised need oncology treatment plan before any facility will accept - plan initiated by heme / onc ( refer to note on 10/13)   1st cycle of chemo will be completed in the hospital ( total D4) - 10/15- 10/18.      DR. JUDY PATTON             Patient is a 71y old  Female who presents with a chief complaint of Stage IV cancer (17 Oct 2021 11:04)    PATIENT IS SEEN AND EXAMINED IN MEDICAL FLOOR.    NGT [    ]    GASTELUM [   ]      GT [   ]    ALLERGIES:  No Known Allergies      Daily     Daily     VITALS:    Vital Signs Last 24 Hrs  T(C): 36.6 (17 Oct 2021 13:40), Max: 36.6 (17 Oct 2021 13:40)  T(F): 97.8 (17 Oct 2021 13:40), Max: 97.8 (17 Oct 2021 13:40)  HR: 78 (17 Oct 2021 13:40) (68 - 82)  BP: 133/65 (17 Oct 2021 13:40) (105/45 - 139/63)  BP(mean): 60 (16 Oct 2021 20:35) (60 - 60)  RR: 18 (17 Oct 2021 13:40) (17 - 18)  SpO2: 99% (17 Oct 2021 13:40) (95% - 99%)    LABS:    CBC Full  -  ( 17 Oct 2021 07:08 )  WBC Count : 4.59 K/uL  RBC Count : 2.40 M/uL  Hemoglobin : 7.3 g/dL  Hematocrit : 23.3 %  Platelet Count - Automated : 112 K/uL  Mean Cell Volume : 97.1 fl  Mean Cell Hemoglobin : 30.4 pg  Mean Cell Hemoglobin Concentration : 31.3 gm/dL  Auto Neutrophil # : x  Auto Lymphocyte # : x  Auto Monocyte # : x  Auto Eosinophil # : x  Auto Basophil # : x  Auto Neutrophil % : x  Auto Lymphocyte % : x  Auto Monocyte % : x  Auto Eosinophil % : x  Auto Basophil % : x      10-17    141  |  110<H>  |  30<H>  ----------------------------<  157<H>  4.3   |  21<L>  |  1.32<H>    Ca    7.5<L>      17 Oct 2021 07:08  Phos  8.0     10-17  Mg     2.1     10-17    TPro  6.8  /  Alb  2.4<L>  /  TBili  0.3  /  DBili  x   /  AST  111<H>  /  ALT  60  /  AlkPhos  146<H>  10-17    CAPILLARY BLOOD GLUCOSE            LIVER FUNCTIONS - ( 17 Oct 2021 07:08 )  Alb: 2.4 g/dL / Pro: 6.8 g/dL / ALK PHOS: 146 U/L / ALT: 60 U/L DA / AST: 111 U/L / GGT: x           Creatinine Trend: 1.32<--, 0.85<--, 0.80<--, 0.77<--, 0.93<--, 1.14<--  I&O's Summary          BAL Bronchoalveolar Washings  10-05 @ 13:17   No growth at 1 week.  --    Moderate polymorphonuclear leukocytes per low power field  Rare Squamous epithelial cells per low power field  No organisms seen per oil power field      Clean Catch Clean Catch (Midstream)  09-28 @ 20:59   >100,000 CFU/ml Enterococcus faecium (vancomycin resistant)  --  Enterococcus faecium (vancomycin resistant)      .Blood Blood  09-28 @ 20:53   No Growth Final  --  --      Clean Catch Clean Catch (Midstream)  09-21 @ 14:05   10,000 - 49,000 CFU/mL Escherichia coli  Normal Urogenital bello present  --  Escherichia coli      .Blood Blood-Peripheral  09-20 @ 18:38   No Growth Final  --  --          MEDICATIONS:    MEDICATIONS  (STANDING):  aprepitant 80 milliGRAM(s) Oral once  aprepitant 80 milliGRAM(s) Oral once  folic acid 1 milliGRAM(s) Oral daily  metoprolol tartrate 25 milliGRAM(s) Oral two times a day  PARoxetine 30 milliGRAM(s) Oral daily  simvastatin 40 milliGRAM(s) Oral at bedtime      MEDICATIONS  (PRN):  acetaminophen   Tablet .. 650 milliGRAM(s) Oral every 6 hours PRN Temp greater or equal to 38C (100.4F), Mild Pain (1 - 3), Moderate Pain (4 - 6)  ALBUTerol    90 MICROgram(s) HFA Inhaler 2 Puff(s) Inhalation every 6 hours PRN Shortness of Breath and/or Wheezing  OLANZapine 2.5 milliGRAM(s) Oral at bedtime PRN Agitation        REVIEW OF SYSTEMS:                           ALL ROS DONE [ X   ]      CONSTITUTIONAL:  LETHARGIC [   ], FEVER [   ], UNRESPONSIVE [   ]  CVS:  CP  [   ], SOB, [   ], PALPITATIONS [   ], DIZZYNESS [   ]  RS: COUGH [   ], SPUTUM [   ]  GI: ABDOMINAL PAIN [   ], NAUSEA [   ], VOMITINGS [   ], DIARRHEA [   ], CONSTIPATION [   ]  :  DYSURIA [   ], NOCTURIA [   ], INCREASED FREQUENCY [   ], DRIBLING [   ],  SKELETAL: PAINFUL JOINTS [   ], SWOLLEN JOINTS [   ], NECK ACHE [   ], LOW BACK ACHE [   ],  SKIN : ULCERS [   ], RASH [   ], ITCHING [   ]  CNS: HEAD ACHE [   ], DOUBLE VISION [   ], BLURRED VISION [   ], AMS / CONFUSION [   ], SEIZURES [   ], WEAKNESS [   ],TINGLING / NUMBNESS [   ]          PHYSICAL EXAMINATION:    GENERAL APPEARANCE: NO DISTRESS  HEENT:  NO PALLOR, NO  JVD,  NO   NODES, NECK SUPPLE  CVS: S1 +, S2 +,   RS: AEEB,  OCCASIONAL  RALES +,   b/l RONCHI  ABD: SOFT, NT, NO, BS +  EXT: PE  ++  SKIN: WARM,   SKELETAL:  ROM ACCEPTABLE  CNS:  AAO X 2-3   ,   DEFICITS        RADIOLOGY :    < from: Xray Chest 1 View-PORTABLE IMMEDIATE (Xray Chest 1 View-PORTABLE IMMEDIATE .) (10.04.21 @ 17:03) >  INTERPRETATION:  AP chest on October 4, 2021 at 1:12 PM. Patient has a lung mass and pneumonia.    Heart magnified by technique. Cervical spine hardware again noted.    There is a small infiltrate at left base new since September 28.    There is persistent density likely atelectatic consolidation of the right lower lobe possibly with underlying mass which is similar to September 28.    Follow-up AP erect chest on September 28, 2021 at 2:08 PM. Patient had bronchoscopy and biopsy.    Heart magnified by technique.    On October 4. Small infiltrate off the left lower hilum which is no longer evident on this film.    Both studies show what appears to be atelectatic consolidation of the right lower lobe which could easily obscure a lesion.    No pneumothorax.    Cervical spine hardware again seen.    IMPRESSION: Persistent right lung findings as above.    < end of copied text >  < from: MR Abdomen No Cont (09.29.21 @ 17:53) >  IMPRESSION:  *  Bilobar hepatic metastases are confirmed.  *  Right upper quadrant peritoneal implants better seen on CT.  *  Diffuse metastatic disease throughout the bone marrow.    < end of copied text >          ASSESSMENT :     Weakness    Lumbar stenosis    H/O: HTN (hypertension)    Hypercholesterolemia    Bleeding ulcer    Anxiety    Heartburn symptom    Left shoulder pain    Spinal stenosis in cervical region    Acute hepatitis C virus infection without hepatic coma    Anxiety    Balance disorder    Coronary artery disease involving native coronary artery of native heart without angina pectoris      HTN (hypertension)    HLD (hyperlipidemia)    H/O colonoscopy    Bleeding ulcer    Fracture    History of lumbar surgery    Chronic UTI    H/O cardiac catheterization            PLAN:  HPI:  72 y/o F with PMHx of HTN, HLD, and metastatic Lung CA, was discharged on 10/6 from [facility] after hospitalization for sepsis from PNA and UTI, comes in complaining of weakness since discharge. Patient states that she felt well on the day of discharge but then started feeling weak again the next day. Patient denies fever, chills, headache, dizziness, chest pain, palpitations, cough, SOB, n/v/d/c,  complaints, rashes or bleeding. Patient admitted for placement to [facility] which was refused on previous admission per chart review.   Per  Marcos, he did not understand the extent of her weakness and was not able to take care of her at home. She currently has a file open at [facility] about cancer treatment options and wants to pursue every and all treatment/life saving measures for his wife including intubation and resuscitation if indicated.    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 (diagnosed 10/04/21, Ki-67 98% path report was not resulted on last d/c)  -ECOG PS 2/3 and d/t aggressive nature of pt's cancer and progressive weakness that she needs to start the 1st cycle of inpatient chemotherapy with Cisplatin/etoposide starting 10/15 and tolerated day 1 well with supportive measures  -chemo regimen resume with   Etoposide IV on Monday/Tuesday and Neulasta 24 hours after post chemo completion  -maintain pre and post hydration and check daily CBC and CMP    Problem #2 Anemia - multifactorial; transfuse to keep hg > 7.5 g/dl      -  COPD, EX SMOKER   - DC PLAN AFTER D/W ONCOLOGY & CHEMOTHERAPY AS AN OUT PATIENT     Problem/Plan - 1:  ·  Problem: Metastatic cancer to lung.   ·  Plan: Pt d/c'd home to pursue chemo tx on last admission, to weak to make appt  Heme/Onc now recommending inpatient IV chemo tx -  planning to start chemo 10/15   Pt will be transferred to [id number] ,  confirmed with chemo RN and nurse manager on the floor   [facility] Heme/Onc group is  following.    Problem/Plan - 2:  ·  Problem: Anemia. , NORMOCYTIC ANEMIA   ·  Plan: Hgb 8.7  trending up    No overt s/s of bleeding  Transfuse Hgb<7.0  Follow up CBC in AM.    Problem/Plan - 3:  ·  Problem: Hypokalemia.   ·  Plan: likely due to poor P.O intake   replaced potassium for 3.3 on 10/14 - resolved   monitor BMP in AM.    Problem/Plan - 4:  ·  Problem: HTN (hypertension).   ·  Plan: Normotensive  Pt takes Amlodipine, Metoprolol and Benazepril at home  Home regimen held in the setting of BP WNL  Consider restarting Metoprolol when SBB > 140 x 2 reading  Monitor BP.    Problem/Plan - 5:  ·  Problem: HLD (hyperlipidemia).   ·  Plan: Pt takes Simvastatin at home  Continue home regimen.    Problem/Plan - 6:  ·  Problem: Anxiety.   ·  Plan: Pt takes Paroxetine at home  Continue home regimen   Continue PRN Zyprexa QHS.    Problem/Plan - 7:  ·  Problem: Need for prophylactic measure.   ·  Plan: Continue with SCDs   Hold Lovenox for DVT ppx until HGB stabilizes.    Problem/Plan - 8:  ·  Problem: Discharge planning issues.   ·  Plan: Pt admitted for placement  PT recommends JANETTE : pt and family chose [facility]   Care management following for discharge planning   CM advised need oncology treatment plan before any facility will accept - plan initiated by heme / onc ( refer to note on 10/13)   1st cycle of chemo will be completed in the hospital ( total D4) - 10/15- 10/18.      DR. JUDY PATTON

## 2021-10-17 NOTE — PROGRESS NOTE ADULT - SUBJECTIVE AND OBJECTIVE BOX
INTERVAL HPI: Patient seen and examined at bedside; Events noted; Patient denies SOB, emesis, diarrhea or constipation.     MEDICATIONS  (STANDING):  aprepitant 80 milliGRAM(s) Oral once  aprepitant 80 milliGRAM(s) Oral once  folic acid 1 milliGRAM(s) Oral daily  metoprolol tartrate 25 milliGRAM(s) Oral two times a day  PARoxetine 30 milliGRAM(s) Oral daily  simvastatin 40 milliGRAM(s) Oral at bedtime    MEDICATIONS  (PRN):  acetaminophen   Tablet .. 650 milliGRAM(s) Oral every 6 hours PRN Temp greater or equal to 38C (100.4F), Mild Pain (1 - 3), Moderate Pain (4 - 6)  ALBUTerol    90 MICROgram(s) HFA Inhaler 2 Puff(s) Inhalation every 6 hours PRN Shortness of Breath and/or Wheezing  OLANZapine 2.5 milliGRAM(s) Oral at bedtime PRN Agitation      Vital Signs Last 24 Hrs  T(C): 36.3 (17 Oct 2021 05:05), Max: 36.6 (16 Oct 2021 14:48)  T(F): 97.3 (17 Oct 2021 05:05), Max: 97.9 (16 Oct 2021 14:48)  HR: 82 (17 Oct 2021 05:05) (68 - 82)  BP: 139/63 (17 Oct 2021 05:05) (105/45 - 139/63)  BP(mean): 60 (16 Oct 2021 20:35) (60 - 60)  RR: 17 (17 Oct 2021 05:05) (17 - 19)  SpO2: 95% (17 Oct 2021 05:05) (95% - 98%)  _________________  PHYSICAL EXAM:  ---------------------------  GEN: NAD; NC/AT; A and O x 3  LUNGS: no wheezing; decreased bilateral air entry; no use of accessory muscles for breathing  HEART: Nl S1 S2; no M   ABDOMEN: Soft, Nontender, non distended  EXTREMITIES: no cyanosis; no edema; warm and dry  NERVOUS SYSTEM:  Awake and alert; no focal neuro  deficits    _________________________________________________  LABS:                        7.3    4.59  )-----------( 112      ( 17 Oct 2021 07:08 )             23.3     10-17    141  |  110<H>  |  30<H>  ----------------------------<  157<H>  4.3   |  21<L>  |  1.32<H>    Ca    7.5<L>      17 Oct 2021 07:08  Phos  8.0     10-17  Mg     2.1     10-17    TPro  6.8  /  Alb  2.4<L>  /  TBili  0.3  /  DBili  x   /  AST  111<H>  /  ALT  60  /  AlkPhos  146<H>  10-17      CAPILLARY BLOOD GLUCOSE

## 2021-10-17 NOTE — PROGRESS NOTE ADULT - ASSESSMENT
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 (diagnosed 10/04/21, Ki-67 98%)  -ECOG PS 2/3 and d/t aggressive nature of pt's cancer and progressive weakness that she needs to start the 1st cycle of inpatient chemotherapy with Cisplatin/etoposide starting 10/15 and tolerated day 1 well with supportive measures  -chemo regimen resume with   Etoposide IV on Monday/Tuesday and Neulasta 24 hours after post chemo completion  -maintain pre and post hydration and check daily CBC and CMP    Problem #2 Anemia - multifactorial; transfuse to keep hg > 7.5 g/dl    VTE prophylaxis; will continue to follow; call with questions [phone]    Colton Etienne MD

## 2021-10-18 DIAGNOSIS — D61.818 OTHER PANCYTOPENIA: ICD-10-CM

## 2021-10-18 DIAGNOSIS — I25.10 ATHEROSCLEROTIC HEART DISEASE OF NATIVE CORONARY ARTERY WITHOUT ANGINA PECTORIS: ICD-10-CM

## 2021-10-18 DIAGNOSIS — N17.9 ACUTE KIDNEY FAILURE, UNSPECIFIED: ICD-10-CM

## 2021-10-18 DIAGNOSIS — R74.01 ELEVATION OF LEVELS OF LIVER TRANSAMINASE LEVELS: ICD-10-CM

## 2021-10-18 DIAGNOSIS — E88.3 TUMOR LYSIS SYNDROME: ICD-10-CM

## 2021-10-18 LAB
ALBUMIN SERPL ELPH-MCNC: 2.7 G/DL — LOW (ref 3.5–5)
ALP SERPL-CCNC: 138 U/L — HIGH (ref 40–120)
ALT FLD-CCNC: 47 U/L DA — SIGNIFICANT CHANGE UP (ref 10–60)
ANION GAP SERPL CALC-SCNC: 11 MMOL/L — SIGNIFICANT CHANGE UP (ref 5–17)
ANION GAP SERPL CALC-SCNC: 9 MMOL/L — SIGNIFICANT CHANGE UP (ref 5–17)
ANISOCYTOSIS BLD QL: SLIGHT — SIGNIFICANT CHANGE UP
APPEARANCE UR: ABNORMAL
AST SERPL-CCNC: 90 U/L — HIGH (ref 10–40)
BILIRUB SERPL-MCNC: 0.4 MG/DL — SIGNIFICANT CHANGE UP (ref 0.2–1.2)
BILIRUB UR-MCNC: NEGATIVE — SIGNIFICANT CHANGE UP
BLD GP AB SCN SERPL QL: SIGNIFICANT CHANGE UP
BUN SERPL-MCNC: 41 MG/DL — HIGH (ref 7–18)
BUN SERPL-MCNC: 58 MG/DL — HIGH (ref 7–18)
CALCIUM SERPL-MCNC: 7.5 MG/DL — LOW (ref 8.4–10.5)
CALCIUM SERPL-MCNC: 7.7 MG/DL — LOW (ref 8.4–10.5)
CHLORIDE SERPL-SCNC: 105 MMOL/L — SIGNIFICANT CHANGE UP (ref 96–108)
CHLORIDE SERPL-SCNC: 105 MMOL/L — SIGNIFICANT CHANGE UP (ref 96–108)
CO2 SERPL-SCNC: 23 MMOL/L — SIGNIFICANT CHANGE UP (ref 22–31)
CO2 SERPL-SCNC: 23 MMOL/L — SIGNIFICANT CHANGE UP (ref 22–31)
COLOR SPEC: YELLOW — SIGNIFICANT CHANGE UP
CREAT ?TM UR-MCNC: 30 MG/DL — SIGNIFICANT CHANGE UP
CREAT SERPL-MCNC: 1.61 MG/DL — HIGH (ref 0.5–1.3)
CREAT SERPL-MCNC: 1.97 MG/DL — HIGH (ref 0.5–1.3)
DIFF PNL FLD: ABNORMAL
GLUCOSE SERPL-MCNC: 135 MG/DL — HIGH (ref 70–99)
GLUCOSE SERPL-MCNC: 137 MG/DL — HIGH (ref 70–99)
GLUCOSE UR QL: NEGATIVE — SIGNIFICANT CHANGE UP
HCT VFR BLD CALC: 21.2 % — LOW (ref 34.5–45)
HGB BLD-MCNC: 6.9 G/DL — CRITICAL LOW (ref 11.5–15.5)
KETONES UR-MCNC: NEGATIVE — SIGNIFICANT CHANGE UP
LDH SERPL L TO P-CCNC: 1055 U/L — HIGH (ref 120–225)
LEUKOCYTE ESTERASE UR-ACNC: ABNORMAL
MAGNESIUM SERPL-MCNC: 1.9 MG/DL — SIGNIFICANT CHANGE UP (ref 1.6–2.6)
MAGNESIUM SERPL-MCNC: 2 MG/DL — SIGNIFICANT CHANGE UP (ref 1.6–2.6)
MANUAL SMEAR VERIFICATION: SIGNIFICANT CHANGE UP
MCHC RBC-ENTMCNC: 30.5 PG — SIGNIFICANT CHANGE UP (ref 27–34)
MCHC RBC-ENTMCNC: 32.5 GM/DL — SIGNIFICANT CHANGE UP (ref 32–36)
MCV RBC AUTO: 93.8 FL — SIGNIFICANT CHANGE UP (ref 80–100)
NITRITE UR-MCNC: NEGATIVE — SIGNIFICANT CHANGE UP
NRBC # BLD: 0 /100 WBCS — SIGNIFICANT CHANGE UP (ref 0–0)
PH UR: 7 — SIGNIFICANT CHANGE UP (ref 5–8)
PHOSPHATE SERPL-MCNC: 7.3 MG/DL — HIGH (ref 2.5–4.5)
PHOSPHATE SERPL-MCNC: 8.1 MG/DL — HIGH (ref 2.5–4.5)
PLAT MORPH BLD: NORMAL — SIGNIFICANT CHANGE UP
PLATELET # BLD AUTO: 77 K/UL — LOW (ref 150–400)
POIKILOCYTOSIS BLD QL AUTO: SLIGHT — SIGNIFICANT CHANGE UP
POTASSIUM SERPL-MCNC: 4.2 MMOL/L — SIGNIFICANT CHANGE UP (ref 3.5–5.3)
POTASSIUM SERPL-MCNC: 4.8 MMOL/L — SIGNIFICANT CHANGE UP (ref 3.5–5.3)
POTASSIUM SERPL-SCNC: 4.2 MMOL/L — SIGNIFICANT CHANGE UP (ref 3.5–5.3)
POTASSIUM SERPL-SCNC: 4.8 MMOL/L — SIGNIFICANT CHANGE UP (ref 3.5–5.3)
PROT SERPL-MCNC: 7.1 G/DL — SIGNIFICANT CHANGE UP (ref 6–8.3)
PROT UR-MCNC: 100
RBC # BLD: 2.26 M/UL — LOW (ref 3.8–5.2)
RBC # FLD: 17.2 % — HIGH (ref 10.3–14.5)
RBC BLD AUTO: ABNORMAL
SODIUM SERPL-SCNC: 137 MMOL/L — SIGNIFICANT CHANGE UP (ref 135–145)
SODIUM SERPL-SCNC: 139 MMOL/L — SIGNIFICANT CHANGE UP (ref 135–145)
SODIUM UR-SCNC: 82 MMOL/L — SIGNIFICANT CHANGE UP
SP GR SPEC: 1.01 — SIGNIFICANT CHANGE UP (ref 1.01–1.02)
URATE SERPL-MCNC: 16.7 MG/DL — HIGH (ref 2.5–7)
UROBILINOGEN FLD QL: NEGATIVE — SIGNIFICANT CHANGE UP
WBC # BLD: 3.61 K/UL — LOW (ref 3.8–10.5)
WBC # FLD AUTO: 3.61 K/UL — LOW (ref 3.8–10.5)

## 2021-10-18 RX ORDER — CALCIUM ACETATE 667 MG
1334 TABLET ORAL
Refills: 0 | Status: COMPLETED | OUTPATIENT
Start: 2021-10-18 | End: 2021-10-18

## 2021-10-18 RX ORDER — APREPITANT 80 MG/1
80 CAPSULE ORAL ONCE
Refills: 0 | Status: DISCONTINUED | OUTPATIENT
Start: 2021-10-18 | End: 2021-10-18

## 2021-10-18 RX ORDER — RASBURICASE 7.5 MG
3 KIT INTRAVENOUS ONCE
Refills: 0 | Status: COMPLETED | OUTPATIENT
Start: 2021-10-18 | End: 2021-10-18

## 2021-10-18 RX ORDER — PEGFILGRASTIM-CBQV 6 MG/.6ML
6 INJECTION, SOLUTION SUBCUTANEOUS ONCE
Refills: 0 | Status: COMPLETED | OUTPATIENT
Start: 2021-10-18 | End: 2021-10-18

## 2021-10-18 RX ORDER — SODIUM CHLORIDE 9 MG/ML
1000 INJECTION INTRAMUSCULAR; INTRAVENOUS; SUBCUTANEOUS
Refills: 0 | Status: DISCONTINUED | OUTPATIENT
Start: 2021-10-18 | End: 2021-10-19

## 2021-10-18 RX ORDER — SODIUM CHLORIDE 9 MG/ML
1000 INJECTION INTRAMUSCULAR; INTRAVENOUS; SUBCUTANEOUS
Refills: 0 | Status: DISCONTINUED | OUTPATIENT
Start: 2021-10-18 | End: 2021-10-18

## 2021-10-18 RX ADMIN — Medication 1 MILLIGRAM(S): at 11:25

## 2021-10-18 RX ADMIN — Medication 650 MILLIGRAM(S): at 14:22

## 2021-10-18 RX ADMIN — SIMVASTATIN 40 MILLIGRAM(S): 20 TABLET, FILM COATED ORAL at 22:33

## 2021-10-18 RX ADMIN — SODIUM CHLORIDE 100 MILLILITER(S): 9 INJECTION INTRAMUSCULAR; INTRAVENOUS; SUBCUTANEOUS at 14:51

## 2021-10-18 RX ADMIN — Medication 650 MILLIGRAM(S): at 07:26

## 2021-10-18 RX ADMIN — Medication 30 MILLIGRAM(S): at 11:25

## 2021-10-18 RX ADMIN — RASBURICASE 104 MILLIGRAM(S): KIT at 17:08

## 2021-10-18 RX ADMIN — Medication 1334 MILLIGRAM(S): at 09:15

## 2021-10-18 RX ADMIN — Medication 650 MILLIGRAM(S): at 06:56

## 2021-10-18 RX ADMIN — Medication 25 MILLIGRAM(S): at 17:08

## 2021-10-18 RX ADMIN — Medication 25 MILLIGRAM(S): at 06:56

## 2021-10-18 RX ADMIN — PEGFILGRASTIM-CBQV 6 MILLIGRAM(S): 6 INJECTION, SOLUTION SUBCUTANEOUS at 18:47

## 2021-10-18 RX ADMIN — Medication 102 MILLIGRAM(S): at 06:55

## 2021-10-18 RX ADMIN — Medication 650 MILLIGRAM(S): at 13:22

## 2021-10-18 RX ADMIN — Medication 1334 MILLIGRAM(S): at 17:08

## 2021-10-18 NOTE — DIETITIAN INITIAL EVALUATION ADULT. - PROBLEM SELECTOR PLAN 1
Hgb 7.7  per last admission Hgb was around 8-9  no active signs of bleeding   previous iron studies wnl  -f/u CBC stat   -f/u FOBT  -f/u iron studies  -monitor H&H  -transfuse if Hgb<7.0

## 2021-10-18 NOTE — CONSULT NOTE ADULT - ASSESSMENT
72 y/o F with PMHx of HTN, HLD, and metastatic Lung CA, [facility] for sepsis from PNA and UTI, comes in complaining of weakness since discharge.   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 (diagnosed 10/04/21, Ki-67 98%)  -ECOG PS 2/3 and d/t aggressive nature of pt's cancer and progressive weakness that she needs to start the 1st cycle of inpatient chemotherapy with Cisplatin/etoposide starting 10/15 and tolerated day 1 well with supportive      BRENDA  TLS can not be ruled out ( Hyperphosphatemia, Hypocalcemia, high Uric acid and BRENDA)  COntinue IVF ( NS at 100cc hr )  If worsens, may need RRT   Check UA , Urine lytes , Bladder scan   MOnitor BMP   AVoid further nephtoxics NSAIDS RCA      HTN  BP stable  MOnitor BP    Hyperphosphatemia  likely sec to TLS   COntinue binders   Low Po4 diet  MOnitor serum K  70 y/o F with PMHx of HTN, HLD, and metastatic Lung CA, was discharged on 10/6 from [facility] after hospitalization for sepsis from PNA and UTI, comes in complaining of weakness since discharge.   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 (diagnosed 10/04/21, Ki-67 98%)  -ECOG PS 2/3 and d/t aggressive nature of pt's cancer and progressive weakness that she needs to start the 1st cycle of inpatient chemotherapy with Cisplatin/etoposide starting 10/15 and tolerated day 1 well with supportive      BRENDA  TLS can not be ruled out ( Hyperphosphatemia, Hypocalcemia, high Uric acid and BRENDA)  COntinue IVF ( NS at 100cc hr )  If worsens, may need RRT   Check UA , Urine lytes , Bladder scan /Renal sonogram   MOnitor BMP   AVoid further nephtoxics NSAIDS RCA      HTN  BP stable  MOnitor BP    Hyperphosphatemia  likely sec to TLS   COntinue binders   Low Po4 diet  MOnitor serum K  70 y/o F with PMHx of HTN, HLD, and metastatic Lung CA, was discharged on 10/6 from [facility] after hospitalization for sepsis from PNA and UTI, comes in complaining of weakness since discharge.   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 (diagnosed 10/04/21, Ki-67 98%)  -ECOG PS 2/3 and d/t aggressive nature of pt's cancer and progressive weakness that she needs to start the 1st cycle of inpatient chemotherapy with Cisplatin/etoposide starting 10/15 and tolerated day 1 well with supportive      BRENDA  TLS can not be ruled out ( Hyperphosphatemia, Hypocalcemia, high Uric acid and BRENDA)  COntinue IVF ( NS at 100cc hr )  If worsens, may need RRT   Discussed with family , agreeable for RRT if needed  Check UA , Urine lytes , Bladder scan /Renal sonogram   MOnitor BMP   AVoid further nephtoxics NSAIDS RCA      HTN  BP stable  MOnitor BP    Hyperphosphatemia  likely sec to TLS   COntinue binders   Low Po4 diet  MOnitor serum K  72 y/o F with PMHx of HTN, HLD, and metastatic Lung CA, was discharged on 10/6 from [facility] after hospitalization for sepsis from PNA and UTI, comes in complaining of weakness since discharge.   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 TLS vs ATN from anemia vs Cisplatin  Likely TLS in view of  ( Hyperphosphatemia, Hypocalcemia, high Uric acid and BRENDA)  COntinue IVF ( NS at 100cc hr )  If worsens, may need RRT   Discussed with spouse  , agreeable for RRT if needed  Consent obtained for RRT , in chart   Check UA , Urine lytes , Bladder scan /Renal sonogram   MOnitor BMP   AVoid further nephtoxics NSAIDS RCA      HTN  BP stable  MOnitor BP    Hyperphosphatemia  likely sec to TLS   COntinue binders   Low Po4 diet  MOnitor serum K

## 2021-10-18 NOTE — DIETITIAN INITIAL EVALUATION ADULT. - FACTORS AFF FOOD INTAKE
weakness, stage IV lung cancer to mets, renal failure,/difficulty with food procurement/preparation/pain/other (specify) weakness, stage IV lung cancer to mets, renal failure, acute hep. C, CAD, anemia, anemia, spinal stenosis, discharge planning issues,/difficulty with food procurement/preparation/pain/other (specify)

## 2021-10-18 NOTE — PROGRESS NOTE ADULT - PROBLEM SELECTOR PLAN 3
due to chemotherapy and disease  Leukopenic/anemic/thrombocytopenic  Will monitor differential to monitor ANC  Transfuse 1 unit PRBC for Hgb 6.9  Monitor CBC with diff daily  Pan culture if develops  fever    Heme/Onc following

## 2021-10-18 NOTE — DIETITIAN INITIAL EVALUATION ADULT. - PERTINENT LABORATORY DATA
10-18 Na139 mmol/L Glu 135 mg/dL<H> K+ 4.2 mmol/L Cr  1.61 mg/dL<H> BUN 41 mg/dL<H>   10-18 Phos 8.1 mg/dL<H>   10-18 Alb 2.7 g/dL<L>     09-21 Chol 172 mg/dL LDL --    HDL 25 mg/dL<L> Trig 347 mg/dL<H>    09-21-21 @ 09:58 HgbA1C 6.5

## 2021-10-18 NOTE — PROGRESS NOTE ADULT - PROBLEM SELECTOR PLAN 2
Hyperphosphatemia: Phoslo 1334 twice daily. No concentrated phos in diet.   BRENDA: IVF x 24 hrs.   hyperuricemia 16.7  elevated LDH 1055  Continue IVF NS 100ml/hr  Elitek 3mg IV x 1  Repeat Uric Acid 4 hrs after elitek, then daily in am.   Monitor CMP, Magnesium, phosphorus , LDH daily in am.   F/u Renal consult with Dr. Inez Brown following

## 2021-10-18 NOTE — PROGRESS NOTE ADULT - PROBLEM SELECTOR PLAN 4
Serum Creat uptrended to 1.61  concerning for TLS  IVF  ml/hr  Elitek as above  Monitor BMP, uric acid  F/u renal US  Avoid nephrotoxic agents, NSAIDs  F/u Renal consult with Dr. Wiley

## 2021-10-18 NOTE — PROGRESS NOTE ADULT - SUBJECTIVE AND OBJECTIVE BOX
INTERVAL HPI: Patient seen and examined at bedside; Events noted; Patient w/o compalint but more lethargic     MEDICATIONS  (STANDING):  aprepitant 80 milliGRAM(s) Oral once  calcium acetate 1334 milliGRAM(s) Oral two times a day with meals  dexAMETHasone  IVPB 10 milliGRAM(s) IV Intermittent daily  etoposide phosphate (ETOPOPHOS) IVPB (eMAR) 142 milliGRAM(s) IV Intermittent once  folic acid 1 milliGRAM(s) Oral daily  metoprolol tartrate 25 milliGRAM(s) Oral two times a day  ondansetron  IVPB 16 milliGRAM(s) IV Intermittent daily  PARoxetine 30 milliGRAM(s) Oral daily  simvastatin 40 milliGRAM(s) Oral at bedtime  sodium chloride 0.9%. 1000 milliLiter(s) (100 mL/Hr) IV Continuous <Continuous>    MEDICATIONS  (PRN):  acetaminophen   Tablet .. 650 milliGRAM(s) Oral every 6 hours PRN Temp greater or equal to 38C (100.4F), Mild Pain (1 - 3), Moderate Pain (4 - 6)  ALBUTerol    90 MICROgram(s) HFA Inhaler 2 Puff(s) Inhalation every 6 hours PRN Shortness of Breath and/or Wheezing  OLANZapine 2.5 milliGRAM(s) Oral at bedtime PRN Agitation      Vital Signs Last 24 Hrs  T(C): 36.8 (18 Oct 2021 09:59), Max: 36.8 (17 Oct 2021 20:26)  T(F): 98.2 (18 Oct 2021 09:59), Max: 98.2 (17 Oct 2021 20:26)  HR: 77 (18 Oct 2021 09:59) (74 - 91)  BP: 128/55 (18 Oct 2021 09:59) (116/71 - 150/68)  BP(mean): --  RR: 16 (18 Oct 2021 09:59) (16 - 18)  SpO2: 95% (18 Oct 2021 09:59) (93% - 99%)  _________________  PHYSICAL EXAM:  ---------------------------  GEN: NAD; NC/AT; A and O x 3; easily arousable  LUNGS: no wheezing; decreased bilateral air entry; no use of accessory muscles for breathing  HEART: Nl S1 S2; no M   ABDOMEN: Soft, Nontender, non distended  EXTREMITIES: no cyanosis; no edema; warm and dry  NERVOUS SYSTEM:  Awake and alert; no focal neuro  deficits    _________________________________________________  LABS:                        6.9    3.61  )-----------( 77       ( 18 Oct 2021 06:46 )             21.2     10-18    139  |  105  |  41<H>  ----------------------------<  135<H>  4.2   |  23  |  1.61<H>    Ca    7.7<L>      18 Oct 2021 06:46  Phos  8.1     10-18  Mg     2.0     10-18    TPro  7.1  /  Alb  2.7<L>  /  TBili  0.4  /  DBili  x   /  AST  90<H>  /  ALT  47  /  AlkPhos  138<H>  10-18      CAPILLARY BLOOD GLUCOSE

## 2021-10-18 NOTE — PROGRESS NOTE ADULT - ASSESSMENT
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 (diagnosed 10/04/21, Ki-67 98%)  -ECOG PS 2/3 and d/t aggressive nature of pt's cancer and progressive weakness that she needs to start the 1st cycle of inpatient chemotherapy with Cisplatin/etoposide starting 10/15 and tolerated day 1 well with supportive measures  -GOC is palliative  -will delay chemo regimen planned for today due to acutely worse hg and renal function     Problem #2 Anemia - multifactorial, including recent chemotherapy; transfuse to keep hg > 7.5 g/dl    Problem #3 Renal failure - worsening despite IVF and po fluid  -hold further chemo  -increase IVF and obtian renal input as well as US of the kidnies  -avoid nephrotoxic agents    VTE prophylaxis; will continue to follow; call with questions [phone]    Colton Etienne MD 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 (diagnosed 10/04/21, Ki-67 98%)  -ECOG PS 2/3 and d/t aggressive nature of pt's cancer and progressive weakness that she needs to start the 1st cycle of inpatient chemotherapy with Cisplatin/etoposide starting 10/15 and tolerated day 1 well with supportive measures  -GOC is palliative  -will discontinue chemo regimen planned for today and tmrw due to acutely worse hg and renal function   -give neulasta today    Problem #2 Anemia - multifactorial, including recent chemotherapy; transfuse to keep hg > 7.5 g/dl    Problem #3 Renal failure - worsening despite IVF and po fluid  -hold further chemo  -increase IVF and obtian renal input as well as US of the kidnies  -avoid nephrotoxic agents    Problem# ? 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 (diagnosed 10/04/21, Ki-67 98%)  -ECOG PS 2/3 and d/t aggressive nature of pt's cancer and progressive weakness that she needs to start the 1st cycle of inpatient chemotherapy with Cisplatin/etoposide starting 10/15 and tolerated day 1 well with supportive measures  -GOC is palliative  -will discontinue chemo regimen planned for today and tmrw due to acutely worse hg and renal function   -give neulasta today    Problem #2 Anemia - multifactorial, including recent chemotherapy; transfuse to keep hg > 7.5 g/dl    Problem #3 Renal failure - worsening despite IVF and po fluid  -hold further chemo  -increase IVF and obtian renal input as well as US of the kidneys  -avoid nephrotoxic agents  -after review of labs c/w TLS, will start rasburicase    Problem# ? TLS  BRENDA, hyperuricemia, hyperphosphatemia  LDH 1050  Hypocalcemia since last admission d/t aredia given for hypercalcemia  give 3mg rasburicase  repeat uric acid 4 hours later and in am  continue IVF  await nephrology consult    VTE prophylaxis; will continue to follow; call with questions [phone]    Colton Etienne MD

## 2021-10-18 NOTE — PROGRESS NOTE ADULT - PROBLEM SELECTOR PLAN 9
DVT ppx: Hold pharmacologic DVT ppx in setting of worsening anemia and thrombocytopenia.   SCD's DVT ppx: Hold pharmacologic DVT ppx in setting of worsening anemia and thrombocytopenia.   SCD's  OOB

## 2021-10-18 NOTE — PROGRESS NOTE ADULT - PROBLEM SELECTOR PLAN 1
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 Hgb 6.9. Transfuse 1 unit PRBC today.   Monitor CBC daily and transfuse as needed.   10/18 Hold chemo today per HemeOnc.  Continue antiemetics, hydration  Continue bronchodilator   Supplemental O2   Supportive measures  Monitor oxygenation

## 2021-10-18 NOTE — PROGRESS NOTE ADULT - SUBJECTIVE AND OBJECTIVE BOX
NP Note discussed with  Primary Attending    Patient is a 71y old  Female who presents with a chief complaint of weakness (18 Oct 2021 12:30)    HPI    71 YOF with PMH HTN, HLD, recent dx of metastatic lung CA which has not started on active chemo yet.  Brought by  due to worsening of weakness which contributed to delaying  on initiate chemo tx .  Dr thomas is outpt heme/onc, [facility] is following and recommends to start 1st chemo as in- patient. Day 1 was 10/15. Day 2 today on hold in setting BRENDA per Heme/Onc.        INTERVAL HPI/OVERNIGHT EVENTS: Hgb 6.9. Pt seen and examined this morning . Pt     MEDICATIONS  (STANDING):  aprepitant 80 milliGRAM(s) Oral once  calcium acetate 1334 milliGRAM(s) Oral two times a day with meals  dexAMETHasone  IVPB 10 milliGRAM(s) IV Intermittent daily  etoposide phosphate (ETOPOPHOS) IVPB (eMAR) 142 milliGRAM(s) IV Intermittent once  folic acid 1 milliGRAM(s) Oral daily  metoprolol tartrate 25 milliGRAM(s) Oral two times a day  ondansetron  IVPB 16 milliGRAM(s) IV Intermittent daily  PARoxetine 30 milliGRAM(s) Oral daily  simvastatin 40 milliGRAM(s) Oral at bedtime  sodium chloride 0.9%. 1000 milliLiter(s) (100 mL/Hr) IV Continuous <Continuous>    MEDICATIONS  (PRN):  acetaminophen   Tablet .. 650 milliGRAM(s) Oral every 6 hours PRN Temp greater or equal to 38C (100.4F), Mild Pain (1 - 3), Moderate Pain (4 - 6)  ALBUTerol    90 MICROgram(s) HFA Inhaler 2 Puff(s) Inhalation every 6 hours PRN Shortness of Breath and/or Wheezing  OLANZapine 2.5 milliGRAM(s) Oral at bedtime PRN Agitation      __________________________________________________  REVIEW OF SYSTEMS:    CONSTITUTIONAL: No fever,   EYES: no acute visual disturbances  NECK: No pain or stiffness  RESPIRATORY: No cough; No shortness of breath  CARDIOVASCULAR: No chest pain, no palpitations  GASTROINTESTINAL: No pain. No nausea or vomiting; No diarrhea   NEUROLOGICAL: No headache or numbness, no tremors  MUSCULOSKELETAL: No joint pain, no muscle pain  GENITOURINARY: no dysuria, no frequency, no hesitancy  PSYCHIATRY: no depression , no anxiety  ALL OTHER  ROS negative        Vital Signs Last 24 Hrs  T(C): 36.9 (18 Oct 2021 11:27), Max: 36.9 (18 Oct 2021 11:27)  T(F): 98.5 (18 Oct 2021 11:27), Max: 98.5 (18 Oct 2021 11:27)  HR: 81 (18 Oct 2021 11:27) (74 - 91)  BP: 125/55 (18 Oct 2021 11:27) (116/71 - 150/68)  BP(mean): --  RR: 16 (18 Oct 2021 09:59) (16 - 18)  SpO2: 95% (18 Oct 2021 09:59) (93% - 95%)    ________________________________________________  PHYSICAL EXAM:  GENERAL: NAD  HEENT: Normocephalic;  conjunctivae and sclerae clear; moist mucous membranes;   NECK : supple  CHEST/LUNG: Clear to auscultation bilaterally with good air entry   HEART: S1 S2  regular; no murmurs, gallops or rubs  ABDOMEN: Soft, Nontender, Nondistended; Bowel sounds present  EXTREMITIES: no cyanosis; no edema; no calf tenderness  SKIN: warm and dry; no rash  NERVOUS SYSTEM:  Awake and alert; Oriented  to place, person and time ; no new deficits    _________________________________________________  LABS:                        6.9    3.61  )-----------( 77       ( 18 Oct 2021 06:46 )             21.2     10-18    139  |  105  |  41<H>  ----------------------------<  135<H>  4.2   |  23  |  1.61<H>    Ca    7.7<L>      18 Oct 2021 06:46  Phos  8.1     10-18  Mg     2.0     10-18    TPro  7.1  /  Alb  2.7<L>  /  TBili  0.4  /  DBili  x   /  AST  90<H>  /  ALT  47  /  AlkPhos  138<H>  10-18        CAPILLARY BLOOD GLUCOSE        COVID-19 PCR: NotDetec (12 Oct 2021 13:12)  COVID-19 PCR: NotDetec (03 Oct 2021 06:52)                    RADIOLOGY & ADDITIONAL TESTS:    Imaging Personally Reviewed:  YES/NO        Consultant(s) Notes Reviewed:   YES/ No    Care Discussed with Consultants :     Plan of care was discussed with patient and /or primary care giver; all questions and concerns were addressed and care was aligned with patient's wishes.     NP Note discussed with  Primary Attending    Patient is a 71y old  Female who presents with a chief complaint of weakness (18 Oct 2021 12:30)    HPI    71 YOF with PMH HTN, HLD, recent dx of metastatic lung CA which has not started on active chemo yet.  Brought by  due to worsening of weakness which contributed to delaying  on initiate chemo tx .  Dr thomas is outpt heme/onc, [facility] is following and recommends to start 1st chemo as in- patient. Day 1 was 10/15. Day 2 today on hold in setting BRENAD per Heme/Onc.        INTERVAL HPI/OVERNIGHT EVENTS: Hgb 6.9. Pt seen and examined this morning . Pt AAOx4, endorses she is waiting for her chemo. Pt denies headache, dizziness, SOB, cough, chest discomfort, N/V/D/abdominal discomfort, dysuria, dark stool, overt bleeding.     MEDICATIONS  (STANDING):  aprepitant 80 milliGRAM(s) Oral once  calcium acetate 1334 milliGRAM(s) Oral two times a day with meals  dexAMETHasone  IVPB 10 milliGRAM(s) IV Intermittent daily  etoposide phosphate (ETOPOPHOS) IVPB (eMAR) 142 milliGRAM(s) IV Intermittent once  folic acid 1 milliGRAM(s) Oral daily  metoprolol tartrate 25 milliGRAM(s) Oral two times a day  ondansetron  IVPB 16 milliGRAM(s) IV Intermittent daily  PARoxetine 30 milliGRAM(s) Oral daily  simvastatin 40 milliGRAM(s) Oral at bedtime  sodium chloride 0.9%. 1000 milliLiter(s) (100 mL/Hr) IV Continuous <Continuous>    MEDICATIONS  (PRN):  acetaminophen   Tablet .. 650 milliGRAM(s) Oral every 6 hours PRN Temp greater or equal to 38C (100.4F), Mild Pain (1 - 3), Moderate Pain (4 - 6)  ALBUTerol    90 MICROgram(s) HFA Inhaler 2 Puff(s) Inhalation every 6 hours PRN Shortness of Breath and/or Wheezing  OLANZapine 2.5 milliGRAM(s) Oral at bedtime PRN Agitation      __________________________________________________  REVIEW OF SYSTEMS:    CONSTITUTIONAL: No fever,   EYES: no acute visual disturbances  NECK: No pain or stiffness  RESPIRATORY: No cough; No shortness of breath  CARDIOVASCULAR: No chest pain, no palpitations  GASTROINTESTINAL: No pain. No nausea or vomiting; No diarrhea   NEUROLOGICAL: No headache or numbness, no tremors  MUSCULOSKELETAL: No joint pain, no muscle pain  GENITOURINARY: no dysuria, no frequency, no hesitancy  PSYCHIATRY: no depression , no anxiety  ALL OTHER  ROS negative        Vital Signs Last 24 Hrs  T(C): 36.9 (18 Oct 2021 11:27), Max: 36.9 (18 Oct 2021 11:27)  T(F): 98.5 (18 Oct 2021 11:27), Max: 98.5 (18 Oct 2021 11:27)  HR: 81 (18 Oct 2021 11:27) (74 - 91)  BP: 125/55 (18 Oct 2021 11:27) (116/71 - 150/68)  BP(mean): --  RR: 16 (18 Oct 2021 09:59) (16 - 18)  SpO2: 95% (18 Oct 2021 09:59) (93% - 95%)    ________________________________________________  PHYSICAL EXAM:  GENERAL: NAD  HEENT: Normocephalic;  conjunctivae and sclerae clear; moist mucous membranes;   NECK : supple  CHEST/LUNG: Clear upper lung fields, decreased at bases. No wheezing, no rales,    HEART: S1 S2  regular; no murmurs, gallops or rubs  ABDOMEN: Soft, Nontender, Nondistended; Bowel sounds present  EXTREMITIES: no cyanosis; no edema; no calf tenderness  SKIN: warm and dry; no rash  NERVOUS SYSTEM:  Awake and alert; Oriented  to place, person and time ; no new deficits    _________________________________________________  LABS:                        6.9    3.61  )-----------( 77       ( 18 Oct 2021 06:46 )             21.2     10-18    139  |  105  |  41<H>  ----------------------------<  135<H>  4.2   |  23  |  1.61<H>    Ca    7.7<L>      18 Oct 2021 06:46  Phos  8.1     10-18  Mg     2.0     10-18    TPro  7.1  /  Alb  2.7<L>  /  TBili  0.4  /  DBili  x   /  AST  90<H>  /  ALT  47  /  AlkPhos  138<H>  10-18        CAPILLARY BLOOD GLUCOSE        COVID-19 PCR: NotDetec (12 Oct 2021 13:12)  COVID-19 PCR: NotDetec (03 Oct 2021 06:52)          RADIOLOGY & ADDITIONAL TESTS:    < from: Xray Chest 1 View-PORTABLE IMMEDIATE (Xray Chest 1 View-PORTABLE IMMEDIATE .) (10.04.21 @ 17:03) >  IMPRESSION: Persistent right lung findings as above.    < end of copied text >      Consultant(s) Notes Reviewed:   YES    Care Discussed with Consultants :     Plan of care was discussed with patient and /or primary care giver; all questions and concerns were addressed and care was aligned with patient's wishes.

## 2021-10-18 NOTE — DIETITIAN INITIAL EVALUATION ADULT. - OTHER INFO
Patient from home lives with  & recently d/rubina from [facility] in September, 2021. Visited pt. alert but weak,  at bedside, per pt. stated has good po intake at home, recently had 1 session of Chemo. tx. & complains of "mild nausea" s/p treatment, denies vomiting or diarrhea, unsure of UBW & admits to losing 5 Lbs since admission? dosing wt. 170 Lbs on 10/12/21. Per pt. consuming 40-50% of meals & tolerating, obtained food preferences, updated kitchen/diet tech, willing to have nutrition supplement at one meal, encourage po intake, provided nutrition education & copy on "Oncology on Nutrition Tips for Well Being/Cooking Tips". Next inpatient chemo. tx. on 10/18/21, followed by Heme/Oncology team, seen Palliative Care team, awaiting placement, skin intact, no edema.

## 2021-10-18 NOTE — DIETITIAN INITIAL EVALUATION ADULT. - DIET TYPE
as medically feasible/DASH/TLC (sodium and cholesterol restricted diet)/consistent carbohydrate renal with evening snacks

## 2021-10-18 NOTE — PROGRESS NOTE ADULT - SUBJECTIVE AND OBJECTIVE BOX
Time of Visit:  Patient seen and examined.     MEDICATIONS  (STANDING):  aprepitant 80 milliGRAM(s) Oral once  calcium acetate 1334 milliGRAM(s) Oral two times a day with meals  dexAMETHasone  IVPB 10 milliGRAM(s) IV Intermittent daily  etoposide phosphate (ETOPOPHOS) IVPB (eMAR) 142 milliGRAM(s) IV Intermittent once  folic acid 1 milliGRAM(s) Oral daily  metoprolol tartrate 25 milliGRAM(s) Oral two times a day  ondansetron  IVPB 16 milliGRAM(s) IV Intermittent daily  PARoxetine 30 milliGRAM(s) Oral daily  simvastatin 40 milliGRAM(s) Oral at bedtime  sodium chloride 0.9%. 1000 milliLiter(s) (100 mL/Hr) IV Continuous <Continuous>      MEDICATIONS  (PRN):  acetaminophen   Tablet .. 650 milliGRAM(s) Oral every 6 hours PRN Temp greater or equal to 38C (100.4F), Mild Pain (1 - 3), Moderate Pain (4 - 6)  ALBUTerol    90 MICROgram(s) HFA Inhaler 2 Puff(s) Inhalation every 6 hours PRN Shortness of Breath and/or Wheezing  OLANZapine 2.5 milliGRAM(s) Oral at bedtime PRN Agitation       Medications up to date at time of exam.    ROS; No fever, chills, cough, congestion on exam.   PHYSICAL EXAMINATION:    Vital Signs Last 24 Hrs  T(C): 36.9 (18 Oct 2021 11:27), Max: 36.9 (18 Oct 2021 11:27)  T(F): 98.5 (18 Oct 2021 11:27), Max: 98.5 (18 Oct 2021 11:27)  HR: 81 (18 Oct 2021 11:27) (74 - 91)  BP: 125/55 (18 Oct 2021 11:27) (116/71 - 150/68)  BP(mean): --  RR: 16 (18 Oct 2021 09:59) (16 - 18)  SpO2: 95% (18 Oct 2021 09:59) (93% - 99%)   (if applicable)    General: Alert and oriented x2. Poor historian . Able to answer question at rest with no SOB. No acute distress.      HEENT: Head is normocephalic and atraumatic. No nasal tenderness. Extraocular muscles are intact. Mucous membranes are moist.     NECK: Supple, no palpable adenopathy.    LUNGS: Non labored. No wheezing. No use of accessory muscle.     HEART: S1 S2 Regular rate and no click/ rub.     ABDOMEN: Soft, nontender, and nondistended.  Active bowel sounds.     : No bladder distention and tenderness.     NEUROLOGIC: Awake, alert, oriented, forgetful .     SKIN: Warm and moist . Non diaphoretic.       LABS:                        6.9    3.61  )-----------( 77       ( 18 Oct 2021 06:46 )             21.2     10-18    139  |  105  |  41<H>  ----------------------------<  135<H>  4.2   |  23  |  1.61<H>    Ca    7.7<L>      18 Oct 2021 06:46  Phos  8.1     10-18  Mg     2.0     10-18    TPro  7.1  /  Alb  2.7<L>  /  TBili  0.4  /  DBili  x   /  AST  90<H>  /  ALT  47  /  AlkPhos  138<H>  10-18    MICROBIOLOGY: (if applicable)    RADIOLOGY & ADDITIONAL STUDIES:  EKG:   CXR:  ECHO:    IMPRESSION: 71y Female PAST MEDICAL & SURGICAL HISTORY:  Lumbar stenosis    Bleeding ulcer  stomach ulcer 2011    Anxiety    Spinal stenosis in cervical region    Acute hepatitis C virus infection without hepatic coma  treated 2015- Resolved    Balance disorder    Coronary artery disease involving native coronary artery of native heart without angina pectoris    HTN (hypertension)    HLD (hyperlipidemia)    H/O colonoscopy    Bleeding ulcer  stomach surgery for bleeding ulcer    Fracture  ORIF Left wrist  1/17/13    History of lumbar surgery  2013    Chronic UTI  Pt reports presently on 2nd dose of antibiotics 7/5/18 10 days, Dr Herman aware, pt going for m/eval 7/13/18.    H/O cardiac catheterization  2013, no intervention needed, treated medically     Impression: 72 Y/O Female presented with weakness since discharge. Patient was discharged on 10-06-21 from [facility] after hospitalization for Sepsis from UTI and Pneumonia. Has Metastatic Lung Ca who had s/p EBUS / Bronchoscopy on 10-04-21. Right endobronchial  Mass. Has Stage IV High-Grade Lung , Neuroendocrine CA with likely mets to liver/peritoneum/bone being followed by Oncology Team for inpatient Chemo .  10-12-21 Negative for Covid 19 PCR.    Suggestion:  O2 saturation 96% with O2 supplement 2L NC. Continue Oxygen supplementation  2L NC.  Oral hygiene care.  Oncology follow up for Chemo therapy.  Continue PRN Albuterol 90 mcg 2 puffs Q 6 Hours.      Time of Visit:  Patient seen and examined.     MEDICATIONS  (STANDING):  aprepitant 80 milliGRAM(s) Oral once  calcium acetate 1334 milliGRAM(s) Oral two times a day with meals  dexAMETHasone  IVPB 10 milliGRAM(s) IV Intermittent daily  etoposide phosphate (ETOPOPHOS) IVPB (eMAR) 142 milliGRAM(s) IV Intermittent once  folic acid 1 milliGRAM(s) Oral daily  metoprolol tartrate 25 milliGRAM(s) Oral two times a day  ondansetron  IVPB 16 milliGRAM(s) IV Intermittent daily  PARoxetine 30 milliGRAM(s) Oral daily  simvastatin 40 milliGRAM(s) Oral at bedtime  sodium chloride 0.9%. 1000 milliLiter(s) (100 mL/Hr) IV Continuous <Continuous>      MEDICATIONS  (PRN):  acetaminophen   Tablet .. 650 milliGRAM(s) Oral every 6 hours PRN Temp greater or equal to 38C (100.4F), Mild Pain (1 - 3), Moderate Pain (4 - 6)  ALBUTerol    90 MICROgram(s) HFA Inhaler 2 Puff(s) Inhalation every 6 hours PRN Shortness of Breath and/or Wheezing  OLANZapine 2.5 milliGRAM(s) Oral at bedtime PRN Agitation       Medications up to date at time of exam.    ROS; No fever, chills, cough, congestion on exam.   PHYSICAL EXAMINATION:    Vital Signs Last 24 Hrs  T(C): 36.9 (18 Oct 2021 11:27), Max: 36.9 (18 Oct 2021 11:27)  T(F): 98.5 (18 Oct 2021 11:27), Max: 98.5 (18 Oct 2021 11:27)  HR: 81 (18 Oct 2021 11:27) (74 - 91)  BP: 125/55 (18 Oct 2021 11:27) (116/71 - 150/68)  BP(mean): --  RR: 16 (18 Oct 2021 09:59) (16 - 18)  SpO2: 95% (18 Oct 2021 09:59) (93% - 99%)   (if applicable)    General: Alert and oriented x2. Poor historian . Able to answer question at rest with no SOB. No acute distress.      HEENT: Head is normocephalic and atraumatic. No nasal tenderness. Extraocular muscles are intact. Mucous membranes are moist.     NECK: Supple, no palpable adenopathy.    LUNGS: Non labored. No wheezing. No use of accessory muscle.     HEART: S1 S2 Regular rate and no click/ rub.     ABDOMEN: Soft, nontender, and nondistended.  Active bowel sounds.     : No bladder distention and tenderness.     NEUROLOGIC: Awake, alert, oriented, forgetful .     SKIN: Warm and moist . Non diaphoretic.       LABS:                        6.9    3.61  )-----------( 77       ( 18 Oct 2021 06:46 )             21.2     10-18    139  |  105  |  41<H>  ----------------------------<  135<H>  4.2   |  23  |  1.61<H>    Ca    7.7<L>      18 Oct 2021 06:46  Phos  8.1     10-18  Mg     2.0     10-18    TPro  7.1  /  Alb  2.7<L>  /  TBili  0.4  /  DBili  x   /  AST  90<H>  /  ALT  47  /  AlkPhos  138<H>  10-18    MICROBIOLOGY: (if applicable)    RADIOLOGY & ADDITIONAL STUDIES:  EKG:   CXR:  ECHO:    IMPRESSION: 71y Female PAST MEDICAL & SURGICAL HISTORY:  Lumbar stenosis    Bleeding ulcer  stomach ulcer 2011    Anxiety    Spinal stenosis in cervical region    Acute hepatitis C virus infection without hepatic coma  treated 2015- Resolved    Balance disorder    Coronary artery disease involving native coronary artery of native heart without angina pectoris    HTN (hypertension)    HLD (hyperlipidemia)    H/O colonoscopy    Bleeding ulcer  stomach surgery for bleeding ulcer    Fracture  ORIF Left wrist  1/17/13    History of lumbar surgery  2013    Chronic UTI  Pt reports presently on 2nd dose of antibiotics 7/5/18 10 days, Dr Herman aware, pt going for m/eval 7/13/18.    H/O cardiac catheterization  2013, no intervention needed, treated medically     Impression: 70 Y/O Female presented with weakness since discharge. Patient was discharged on 10-06-21 from [facility] after hospitalization for Sepsis from UTI and Pneumonia. Has Metastatic Lung Ca who had s/p EBUS / Bronchoscopy on 10-04-21. Right endobronchial  Mass. Has Stage IV High-Grade Lung , Neuroendocrine CA with likely mets to liver/peritoneum/bone being followed by Oncology Team for inpatient Chemo .  10-12-21 Negative for Covid 19 PCR.    Suggestion:  O2 saturation 96% with O2 supplement 2L NC. Continue Oxygen supplementation  2L NC.  Oral hygiene care.  Transfuse as needed per Heme  Oncology follow up for Chemo therapy.  Continue PRN Albuterol 90 mcg 2 puffs Q 6 Hours.

## 2021-10-18 NOTE — DIETITIAN INITIAL EVALUATION ADULT. - PERTINENT MEDS FT
MEDICATIONS  (STANDING):  aprepitant 80 milliGRAM(s) Oral once  calcium acetate 1334 milliGRAM(s) Oral two times a day with meals  dexAMETHasone  IVPB 10 milliGRAM(s) IV Intermittent daily  etoposide phosphate (ETOPOPHOS) IVPB (eMAR) 142 milliGRAM(s) IV Intermittent once  folic acid 1 milliGRAM(s) Oral daily  metoprolol tartrate 25 milliGRAM(s) Oral two times a day  ondansetron  IVPB 16 milliGRAM(s) IV Intermittent daily  PARoxetine 30 milliGRAM(s) Oral daily  simvastatin 40 milliGRAM(s) Oral at bedtime  sodium chloride 0.9%. 1000 milliLiter(s) (100 mL/Hr) IV Continuous <Continuous>    MEDICATIONS  (PRN):  acetaminophen   Tablet .. 650 milliGRAM(s) Oral every 6 hours PRN Temp greater or equal to 38C (100.4F), Mild Pain (1 - 3), Moderate Pain (4 - 6)  ALBUTerol    90 MICROgram(s) HFA Inhaler 2 Puff(s) Inhalation every 6 hours PRN Shortness of Breath and/or Wheezing  OLANZapine 2.5 milliGRAM(s) Oral at bedtime PRN Agitation

## 2021-10-18 NOTE — DIETITIAN INITIAL EVALUATION ADULT. - PROBLEM SELECTOR PLAN 5
RISK                                                          Points  [] Previous VTE                                           3  [] Thrombophilia                                        2  [] Lower limb paralysis                              2   [] Current Cancer                                       2   [x] Immobilization > 24 hrs                        1  [] ICU/CCU stay > 24 hours                       1  [x] Age > 60                                                   1  SCDs for now  Hold Lovenox for DVT ppx until HGB stabilizes

## 2021-10-18 NOTE — PROGRESS NOTE ADULT - PROBLEM SELECTOR PLAN 5
Alk phos elevated, downtrending  AST elevated, downtrending  ALT normalized  Monitor LFT's   F/u US abd

## 2021-10-18 NOTE — CONSULT NOTE ADULT - SUBJECTIVE AND OBJECTIVE BOX
[facility] [facility]OLOGY PRACTICE   MD JAD GONZALEZ MD RUORU WONG, PA    TEL:  OFFICE: [phone]  DR MCNEILL CELL: [phone]  BA BOWMAN CELL: [phone]  DR. RUBIN CELL: [phone]      FROM 5 PM- 7 AM PLEASE CALL ANSWERING SERVICE AT [phone]    -- INITIAL RENAL CONSULT NOTE --- Date Of service 10-18-21 @ 14:21  --------------------------------------------------------------------------------  HPI:  72 y/o F with PMHx of HTN, HLD, and metastatic Lung CA, was discharged on 10/6 from [facility] after hospitalization for sepsis from PNA and UTI, comes in complaining of weakness since discharge.   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 (diagnosed 10/04/21, Ki-67 98%)  -ECOG PS 2/3 and d/t aggressive nature of pt's cancer and progressive weakness that she needs to start the 1st cycle of inpatient chemotherapy with Cisplatin/etoposide starting 10/15 and tolerated day 1 well with supportive    In ED:   Temp 97.5F, HR 74, /78, RR 17, SpO2: 97% on 2L NC   EKG: NSR   s/p 1L bolus (12 Oct 2021 17:49)        PAST HISTORY  --------------------------------------------------------------------------------  PAST MEDICAL & SURGICAL HISTORY:  Lumbar stenosis    Bleeding ulcer  stomach ulcer 2011    Anxiety    Spinal stenosis in cervical region    Acute hepatitis C virus infection without hepatic coma  treated 2015- Resolved    Balance disorder    Coronary artery disease involving native coronary artery of native heart without angina pectoris    HTN (hypertension)    HLD (hyperlipidemia)    H/O colonoscopy    Bleeding ulcer  stomach surgery for bleeding ulcer    Fracture  ORIF Left wrist  1/17/13    History of lumbar surgery  2013    Chronic UTI  Pt reports presently on 2nd dose of antibiotics 7/5/18 10 days, Dr Virgil smith, pt going for m/eval 7/13/18.    H/O cardiac catheterization  2013, no intervention needed, treated medically      FAMILY HISTORY:  No pertinent family history in first degree relatives      PAST SOCIAL HISTORY:    ALLERGIES & MEDICATIONS  --------------------------------------------------------------------------------  Allergies    No Known Allergies    Intolerances      Standing Inpatient Medications  aprepitant 80 milliGRAM(s) Oral once  calcium acetate 1334 milliGRAM(s) Oral two times a day with meals  dexAMETHasone  IVPB 10 milliGRAM(s) IV Intermittent daily  etoposide phosphate (ETOPOPHOS) IVPB (eMAR) 142 milliGRAM(s) IV Intermittent once  folic acid 1 milliGRAM(s) Oral daily  metoprolol tartrate 25 milliGRAM(s) Oral two times a day  ondansetron  IVPB 16 milliGRAM(s) IV Intermittent daily  PARoxetine 30 milliGRAM(s) Oral daily  rasburicase IVPB 3 milliGRAM(s) IV Intermittent once  simvastatin 40 milliGRAM(s) Oral at bedtime  sodium chloride 0.9%. 1000 milliLiter(s) IV Continuous <Continuous>    PRN Inpatient Medications  acetaminophen   Tablet .. 650 milliGRAM(s) Oral every 6 hours PRN  ALBUTerol    90 MICROgram(s) HFA Inhaler 2 Puff(s) Inhalation every 6 hours PRN  OLANZapine 2.5 milliGRAM(s) Oral at bedtime PRN      REVIEW OF SYSTEMS  --------------------------------------------------------------------------------  Gen: No fevers/chills  Skin: No rashes  Head/Eyes/Ears: Normal hearing,  Normal vision   Respiratory: No dyspnea, cough  CV: No chest pain  GI: No abdominal pain, diarrhea, constipation, nausea, vomiting  : No dysuria, hematuria  MSK: No  edema  Heme: No easy bruising or bleeding  Psych: No significant depression    All other systems were reviewed and are negative, except as noted.    VITALS/PHYSICAL EXAM  --------------------------------------------------------------------------------  T(C): 36.3 (10-18-21 @ 13:17), Max: 36.9 (10-18-21 @ 11:27)  HR: 88 (10-18-21 @ 13:17) (74 - 91)  BP: 125/69 (10-18-21 @ 13:17) (116/71 - 150/68)  RR: 17 (10-18-21 @ 13:17) (16 - 18)  SpO2: 98% (10-18-21 @ 13:17) (93% - 98%)  Wt(kg): --        Physical Exam:  	Gen: NAD  	HEENT: MMM  	Pulm: CTA B/L  	CV: S1S2  	Abd: Soft, +BS   	Ext: No LE edema B/L  	Neuro: Awake,   	Skin: Warm and dry  	Vascular access: No HD catheter           : arti  LABS/STUDIES  --------------------------------------------------------------------------------              6.9    3.61  >-----------<  77       [10-18-21 @ 06:46]              21.2     139  |  105  |  41  ----------------------------<  135      [10-18-21 @ 06:46]  4.2   |  23  |  1.61        Ca     7.7     [10-18-21 @ 06:46]      Mg     2.0     [10-18-21 @ 06:46]      Phos  8.1     [10-18-21 @ 06:46]    TPro  7.1  /  Alb  2.7  /  TBili  0.4  /  DBili  x   /  AST  90  /  ALT  47  /  AlkPhos  138  [10-18-21 @ 06:46]        Uric acid 16.7      [10-18-21 @ 11:40]  LDH 1055      [10-18-21 @ 11:40]    Creatinine Trend:  SCr 1.61 [10-18 @ 06:46]  SCr 1.32 [10-17 @ 07:08]  SCr 0.85 [10-16 @ 06:51]  SCr 0.80 [10-15 @ 09:11]  SCr 0.77 [10-14 @ 07:49]    Urinalysis - [09-28-21 @ 16:32]      Color Yellow / Appearance Slightly Turbid / SG 1.020 / pH 5.0      Gluc Negative / Ketone Negative  / Bili Negative / Urobili Negative       Blood Trace / Protein 30 / Leuk Est Negative / Nitrite Negative      RBC 2-5 / WBC 3-5 / Hyaline 0-2 / Gran  / Sq Epi  / Non Sq Epi Few / Bacteria Few      Iron 56, TIBC 262, %sat 21      [09-22-21 @ 07:54]  HbA1c 6.2      [07-10-18 @ 13:53]  TSH 1.17      [09-28-21 @ 05:58]  Lipid: chol 172, , HDL 25, LDL --      [09-21-21 @ 07:37]    HCV 11.82, Reactive      [09-21-21 @ 10:07]    Immunofixation Serum:    One Weak  IgG Kappa Band and One Weak  IgG Lambda Band Identified    Reference Range: None Detected      [09-29-21 @ 01:43]  SPEP Interpretation: Two Weak Gamma-Migrating Paraproteins Identified      [09-29-21 @ 01:43]   [facility]   MD JAD GONZALEZ MD RUORU WONG, PA    TEL:  OFFICE: [phone]  DR MCNEILL CELL: [phone]  BA BOWMAN CELL: [phone]  DR. RUBIN CELL: [phone]      FROM 5 PM- 7 AM PLEASE CALL ANSWERING SERVICE AT [phone]    -- INITIAL RENAL CONSULT NOTE --- Date Of service 10-18-21 @ 14:21  --------------------------------------------------------------------------------  HPI:  70 y/o F with PMHx of HTN, HLD, and metastatic Lung CA, was discharged on 10/6 from [facility] after hospitalization for sepsis from PNA and UTI, comes in complaining of weakness since discharge.   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 (diagnosed 10/04/21, Ki-67 98%)  -ECOG PS 2/3 and d/t aggressive nature of pt's cancer and progressive weakness that she needs to start the 1st cycle of inpatient chemotherapy with Cisplatin/etoposide starting 10/15 and tolerated day 1 well with supportive    In ED:   Temp 97.5F, HR 74, /78, RR 17, SpO2: 97% on 2L NC   EKG: NSR   s/p 1L bolus (12 Oct 2021 17:49)        PAST HISTORY  --------------------------------------------------------------------------------  PAST MEDICAL & SURGICAL HISTORY:  Lumbar stenosis    Bleeding ulcer  stomach ulcer 2011    Anxiety    Spinal stenosis in cervical region    Acute hepatitis C virus infection without hepatic coma  treated 2015- Resolved    Balance disorder    Coronary artery disease involving native coronary artery of native heart without angina pectoris    HTN (hypertension)    HLD (hyperlipidemia)    H/O colonoscopy    Bleeding ulcer  stomach surgery for bleeding ulcer    Fracture  ORIF Left wrist  1/17/13    History of lumbar surgery  2013    Chronic UTI  Pt reports presently on 2nd dose of antibiotics 7/5/18 10 days, Dr Virgil smith, pt going for m/eval 7/13/18.    H/O cardiac catheterization  2013, no intervention needed, treated medically      FAMILY HISTORY:  No pertinent family history in first degree relatives      PAST SOCIAL HISTORY:    ALLERGIES & MEDICATIONS  --------------------------------------------------------------------------------  Allergies    No Known Allergies    Intolerances      Standing Inpatient Medications  aprepitant 80 milliGRAM(s) Oral once  calcium acetate 1334 milliGRAM(s) Oral two times a day with meals  dexAMETHasone  IVPB 10 milliGRAM(s) IV Intermittent daily  etoposide phosphate (ETOPOPHOS) IVPB (eMAR) 142 milliGRAM(s) IV Intermittent once  folic acid 1 milliGRAM(s) Oral daily  metoprolol tartrate 25 milliGRAM(s) Oral two times a day  ondansetron  IVPB 16 milliGRAM(s) IV Intermittent daily  PARoxetine 30 milliGRAM(s) Oral daily  rasburicase IVPB 3 milliGRAM(s) IV Intermittent once  simvastatin 40 milliGRAM(s) Oral at bedtime  sodium chloride 0.9%. 1000 milliLiter(s) IV Continuous <Continuous>    PRN Inpatient Medications  acetaminophen   Tablet .. 650 milliGRAM(s) Oral every 6 hours PRN  ALBUTerol    90 MICROgram(s) HFA Inhaler 2 Puff(s) Inhalation every 6 hours PRN  OLANZapine 2.5 milliGRAM(s) Oral at bedtime PRN      REVIEW OF SYSTEMS  --------------------------------------------------------------------------------  Gen: No fevers/chills  Skin: No rashes  Head/Eyes/Ears: Normal hearing,  Normal vision   Respiratory: No dyspnea, cough  CV: No chest pain  GI: No abdominal pain,   : No dysuria, hematuria  MSK: No  edema  Heme: No easy bruising or bleeding  Psych: No significant depression    All other systems were reviewed and are negative, except as noted.    VITALS/PHYSICAL EXAM  --------------------------------------------------------------------------------  T(C): 36.3 (10-18-21 @ 13:17), Max: 36.9 (10-18-21 @ 11:27)  HR: 88 (10-18-21 @ 13:17) (74 - 91)  BP: 125/69 (10-18-21 @ 13:17) (116/71 - 150/68)  RR: 17 (10-18-21 @ 13:17) (16 - 18)  SpO2: 98% (10-18-21 @ 13:17) (93% - 98%)  Wt(kg): --        Physical Exam:  	Gen: NAD  	HEENT: MMM  	Pulm: CTA B/L  	CV: S1S2  	Abd: Soft, +BS   	Ext: No LE edema B/L  	Neuro: Awake,   	Skin: Warm and dry  	Vascular access: No HD catheter           : arti  LABS/STUDIES  --------------------------------------------------------------------------------              6.9    3.61  >-----------<  77       [10-18-21 @ 06:46]              21.2     139  |  105  |  41  ----------------------------<  135      [10-18-21 @ 06:46]  4.2   |  23  |  1.61        Ca     7.7     [10-18-21 @ 06:46]      Mg     2.0     [10-18-21 @ 06:46]      Phos  8.1     [10-18-21 @ 06:46]    TPro  7.1  /  Alb  2.7  /  TBili  0.4  /  DBili  x   /  AST  90  /  ALT  47  /  AlkPhos  138  [10-18-21 @ 06:46]        Uric acid 16.7      [10-18-21 @ 11:40]  LDH 1055      [10-18-21 @ 11:40]    Creatinine Trend:  SCr 1.61 [10-18 @ 06:46]  SCr 1.32 [10-17 @ 07:08]  SCr 0.85 [10-16 @ 06:51]  SCr 0.80 [10-15 @ 09:11]  SCr 0.77 [10-14 @ 07:49]    Urinalysis - [09-28-21 @ 16:32]      Color Yellow / Appearance Slightly Turbid / SG 1.020 / pH 5.0      Gluc Negative / Ketone Negative  / Bili Negative / Urobili Negative       Blood Trace / Protein 30 / Leuk Est Negative / Nitrite Negative      RBC 2-5 / WBC 3-5 / Hyaline 0-2 / Gran  / Sq Epi  / Non Sq Epi Few / Bacteria Few      Iron 56, TIBC 262, %sat 21      [09-22-21 @ 07:54]  HbA1c 6.2      [07-10-18 @ 13:53]  TSH 1.17      [09-28-21 @ 05:58]  Lipid: chol 172, , HDL 25, LDL --      [09-21-21 @ 07:37]    HCV 11.82, Reactive      [09-21-21 @ 10:07]    Immunofixation Serum:    One Weak  IgG Kappa Band and One Weak  IgG Lambda Band Identified    Reference Range: None Detected      [09-29-21 @ 01:43]  SPEP Interpretation: Two Weak Gamma-Migrating Paraproteins Identified      [09-29-21 @ 01:43]   [facility] [place] [facility]ROLOGY PRACTICE   MD JAD GONZALEZ MD RUORU WONG, PA    TEL:  OFFICE: [phone]  DR MCNEILL CELL: [phone]  BA BOWMAN CELL: [phone]  DR. RUBIN CELL: [phone]      FROM 5 PM- 7 AM PLEASE CALL ANSWERING SERVICE AT [phone]    -- INITIAL RENAL CONSULT NOTE --- Date Of service 10-18-21 @ 14:21  --------------------------------------------------------------------------------  HPI:  70 y/o F with PMHx of HTN, HLD, and metastatic Lung CA, was discharged on 10/6 from [facility] after hospitalization for sepsis from PNA and UTI, comes in complaining of weakness since discharge.   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 (diagnosed 10/04/21, Ki-67 98%)  -ECOG PS 2/3 and d/t aggressive nature of pt's cancer and progressive weakness that she needs to start the 1st cycle of inpatient chemotherapy with Cisplatin/etoposide starting 10/15 and tolerated day 1 well with supportive    In ED:   Temp 97.5F, HR 74, /78, RR 17, SpO2: 97% on 2L NC   EKG: NSR   s/p 1L bolus (12 Oct 2021 17:49)        PAST HISTORY  --------------------------------------------------------------------------------  PAST MEDICAL & SURGICAL HISTORY:  Lumbar stenosis    Bleeding ulcer  stomach ulcer 2011    Anxiety    Spinal stenosis in cervical region    Acute hepatitis C virus infection without hepatic coma  treated 2015- Resolved    Balance disorder    Coronary artery disease involving native coronary artery of native heart without angina pectoris    HTN (hypertension)    HLD (hyperlipidemia)    H/O colonoscopy    Bleeding ulcer  stomach surgery for bleeding ulcer    Fracture  ORIF Left wrist  1/17/13    History of lumbar surgery  2013    Chronic UTI  Pt reports presently on 2nd dose of antibiotics 7/5/18 10 days, Dr Virgil smith, pt going for m/eval 7/13/18.    H/O cardiac catheterization  2013, no intervention needed, treated medically      FAMILY HISTORY:  No pertinent family history in first degree relatives      PAST SOCIAL HISTORY:    ALLERGIES & MEDICATIONS  --------------------------------------------------------------------------------  Allergies    No Known Allergies    Intolerances      Standing Inpatient Medications  aprepitant 80 milliGRAM(s) Oral once  calcium acetate 1334 milliGRAM(s) Oral two times a day with meals  dexAMETHasone  IVPB 10 milliGRAM(s) IV Intermittent daily  etoposide phosphate (ETOPOPHOS) IVPB (eMAR) 142 milliGRAM(s) IV Intermittent once  folic acid 1 milliGRAM(s) Oral daily  metoprolol tartrate 25 milliGRAM(s) Oral two times a day  ondansetron  IVPB 16 milliGRAM(s) IV Intermittent daily  PARoxetine 30 milliGRAM(s) Oral daily  rasburicase IVPB 3 milliGRAM(s) IV Intermittent once  simvastatin 40 milliGRAM(s) Oral at bedtime  sodium chloride 0.9%. 1000 milliLiter(s) IV Continuous <Continuous>    PRN Inpatient Medications  acetaminophen   Tablet .. 650 milliGRAM(s) Oral every 6 hours PRN  ALBUTerol    90 MICROgram(s) HFA Inhaler 2 Puff(s) Inhalation every 6 hours PRN  OLANZapine 2.5 milliGRAM(s) Oral at bedtime PRN      REVIEW OF SYSTEMS  --------------------------------------------------------------------------------  Gen: No fevers/chills  Skin: No rashes  Head/Eyes/Ears: Normal hearing,  Normal vision   Respiratory: No dyspnea, cough  CV: No chest pain  GI: No abdominal pain,   : No dysuria, hematuria  MSK: No  edema  Heme: No easy bruising or bleeding  Psych: No significant depression    All other systems were reviewed and are negative, except as noted.    VITALS/PHYSICAL EXAM  --------------------------------------------------------------------------------  T(C): 36.3 (10-18-21 @ 13:17), Max: 36.9 (10-18-21 @ 11:27)  HR: 88 (10-18-21 @ 13:17) (74 - 91)  BP: 125/69 (10-18-21 @ 13:17) (116/71 - 150/68)  RR: 17 (10-18-21 @ 13:17) (16 - 18)  SpO2: 98% (10-18-21 @ 13:17) (93% - 98%)  Wt(kg): --        Physical Exam:  	Gen: NAD  	HEENT: MMM  	Pulm: CTA B/L  	CV: S1S2  	Abd: Soft, +BS   	Ext: No LE edema B/L  	Neuro: Awake,   	Skin: Warm and dry  	Vascular access: No HD catheter           : no  chi  LABS/STUDIES  --------------------------------------------------------------------------------              6.9    3.61  >-----------<  77       [10-18-21 @ 06:46]              21.2     139  |  105  |  41  ----------------------------<  135      [10-18-21 @ 06:46]  4.2   |  23  |  1.61        Ca     7.7     [10-18-21 @ 06:46]      Mg     2.0     [10-18-21 @ 06:46]      Phos  8.1     [10-18-21 @ 06:46]    TPro  7.1  /  Alb  2.7  /  TBili  0.4  /  DBili  x   /  AST  90  /  ALT  47  /  AlkPhos  138  [10-18-21 @ 06:46]        Uric acid 16.7      [10-18-21 @ 11:40]  LDH 1055      [10-18-21 @ 11:40]    Creatinine Trend:  SCr 1.61 [10-18 @ 06:46]  SCr 1.32 [10-17 @ 07:08]  SCr 0.85 [10-16 @ 06:51]  SCr 0.80 [10-15 @ 09:11]  SCr 0.77 [10-14 @ 07:49]    Urinalysis - [09-28-21 @ 16:32]      Color Yellow / Appearance Slightly Turbid / SG 1.020 / pH 5.0      Gluc Negative / Ketone Negative  / Bili Negative / Urobili Negative       Blood Trace / Protein 30 / Leuk Est Negative / Nitrite Negative      RBC 2-5 / WBC 3-5 / Hyaline 0-2 / Gran  / Sq Epi  / Non Sq Epi Few / Bacteria Few      Iron 56, TIBC 262, %sat 21      [09-22-21 @ 07:54]  HbA1c 6.2      [07-10-18 @ 13:53]  TSH 1.17      [09-28-21 @ 05:58]  Lipid: chol 172, , HDL 25, LDL --      [09-21-21 @ 07:37]    HCV 11.82, Reactive      [09-21-21 @ 10:07]    Immunofixation Serum:    One Weak  IgG Kappa Band and One Weak  IgG Lambda Band Identified    Reference Range: None Detected      [09-29-21 @ 01:43]  SPEP Interpretation: Two Weak Gamma-Migrating Paraproteins Identified      [09-29-21 @ 01:43]

## 2021-10-18 NOTE — PROGRESS NOTE ADULT - PROBLEM SELECTOR PLAN 10
PT recommends JANETTE : pt and family chose [facility]   Care management following for discharge planning   CM advised need oncology treatment plan before any facility will accept   F/u US abd to eval kidneys/hepatic in setting of BRENDA/transaminitis  Monitor TLS labs  F/u Renal consult    Case discussed with attending who agrees with plan of care.

## 2021-10-19 LAB
ALBUMIN SERPL ELPH-MCNC: 2.4 G/DL — LOW (ref 3.5–5)
ALP SERPL-CCNC: 118 U/L — SIGNIFICANT CHANGE UP (ref 40–120)
ALT FLD-CCNC: 34 U/L DA — SIGNIFICANT CHANGE UP (ref 10–60)
ANION GAP SERPL CALC-SCNC: 10 MMOL/L — SIGNIFICANT CHANGE UP (ref 5–17)
ANISOCYTOSIS BLD QL: SLIGHT — SIGNIFICANT CHANGE UP
AST SERPL-CCNC: 46 U/L — HIGH (ref 10–40)
BASOPHILS # BLD AUTO: 0 K/UL — SIGNIFICANT CHANGE UP (ref 0–0.2)
BASOPHILS NFR BLD AUTO: 0 % — SIGNIFICANT CHANGE UP (ref 0–2)
BILIRUB DIRECT SERPL-MCNC: 0.1 MG/DL — SIGNIFICANT CHANGE UP (ref 0–0.2)
BILIRUB INDIRECT FLD-MCNC: 0.4 MG/DL — SIGNIFICANT CHANGE UP (ref 0.2–1)
BILIRUB SERPL-MCNC: 0.5 MG/DL — SIGNIFICANT CHANGE UP (ref 0.2–1.2)
BUN SERPL-MCNC: 60 MG/DL — HIGH (ref 7–18)
CALCIUM SERPL-MCNC: 7.7 MG/DL — LOW (ref 8.4–10.5)
CHLORIDE SERPL-SCNC: 105 MMOL/L — SIGNIFICANT CHANGE UP (ref 96–108)
CO2 SERPL-SCNC: 24 MMOL/L — SIGNIFICANT CHANGE UP (ref 22–31)
CREAT SERPL-MCNC: 2.08 MG/DL — HIGH (ref 0.5–1.3)
EOSINOPHIL # BLD AUTO: 0 K/UL — SIGNIFICANT CHANGE UP (ref 0–0.5)
EOSINOPHIL NFR BLD AUTO: 0 % — SIGNIFICANT CHANGE UP (ref 0–6)
GLUCOSE SERPL-MCNC: 126 MG/DL — HIGH (ref 70–99)
HCT VFR BLD CALC: 22.6 % — LOW (ref 34.5–45)
HGB BLD-MCNC: 7.5 G/DL — LOW (ref 11.5–15.5)
LDH SERPL L TO P-CCNC: 807 U/L — HIGH (ref 120–225)
LDH SERPL L TO P-CCNC: 918 U/L — HIGH (ref 120–225)
LYMPHOCYTES # BLD AUTO: 0.48 K/UL — LOW (ref 1–3.3)
LYMPHOCYTES # BLD AUTO: 6 % — LOW (ref 13–44)
MANUAL SMEAR VERIFICATION: SIGNIFICANT CHANGE UP
MCHC RBC-ENTMCNC: 29.5 PG — SIGNIFICANT CHANGE UP (ref 27–34)
MCHC RBC-ENTMCNC: 33.2 GM/DL — SIGNIFICANT CHANGE UP (ref 32–36)
MCV RBC AUTO: 89 FL — SIGNIFICANT CHANGE UP (ref 80–100)
MICROCYTES BLD QL: SLIGHT — SIGNIFICANT CHANGE UP
MONOCYTES # BLD AUTO: 0 K/UL — SIGNIFICANT CHANGE UP (ref 0–0.9)
MONOCYTES NFR BLD AUTO: 0 % — LOW (ref 2–14)
NEUTROPHILS # BLD AUTO: 7.46 K/UL — HIGH (ref 1.8–7.4)
NEUTROPHILS NFR BLD AUTO: 91 % — HIGH (ref 43–77)
NEUTS BAND # BLD: 3 % — SIGNIFICANT CHANGE UP (ref 0–8)
NRBC # BLD: 0 /100 — SIGNIFICANT CHANGE UP (ref 0–0)
OVALOCYTES BLD QL SMEAR: SLIGHT — SIGNIFICANT CHANGE UP
PLAT MORPH BLD: NORMAL — SIGNIFICANT CHANGE UP
PLATELET # BLD AUTO: 74 K/UL — LOW (ref 150–400)
POIKILOCYTOSIS BLD QL AUTO: SLIGHT — SIGNIFICANT CHANGE UP
POTASSIUM SERPL-MCNC: 4.3 MMOL/L — SIGNIFICANT CHANGE UP (ref 3.5–5.3)
POTASSIUM SERPL-SCNC: 4.3 MMOL/L — SIGNIFICANT CHANGE UP (ref 3.5–5.3)
PROT SERPL-MCNC: 6.5 G/DL — SIGNIFICANT CHANGE UP (ref 6–8.3)
RBC # BLD: 2.54 M/UL — LOW (ref 3.8–5.2)
RBC # FLD: 19.1 % — HIGH (ref 10.3–14.5)
RBC BLD AUTO: ABNORMAL
SARS-COV-2 RNA SPEC QL NAA+PROBE: SIGNIFICANT CHANGE UP
SODIUM SERPL-SCNC: 139 MMOL/L — SIGNIFICANT CHANGE UP (ref 135–145)
URATE SERPL-MCNC: 6.1 MG/DL — SIGNIFICANT CHANGE UP (ref 2.5–7)
URATE SERPL-MCNC: 6.1 MG/DL — SIGNIFICANT CHANGE UP (ref 2.5–7)
WBC # BLD: 7.94 K/UL — SIGNIFICANT CHANGE UP (ref 3.8–10.5)
WBC # FLD AUTO: 7.94 K/UL — SIGNIFICANT CHANGE UP (ref 3.8–10.5)

## 2021-10-19 RX ORDER — TRAMADOL HYDROCHLORIDE 50 MG/1
25 TABLET ORAL ONCE
Refills: 0 | Status: DISCONTINUED | OUTPATIENT
Start: 2021-10-19 | End: 2021-10-19

## 2021-10-19 RX ORDER — SODIUM CHLORIDE 9 MG/ML
1000 INJECTION INTRAMUSCULAR; INTRAVENOUS; SUBCUTANEOUS
Refills: 0 | Status: DISCONTINUED | OUTPATIENT
Start: 2021-10-19 | End: 2021-10-20

## 2021-10-19 RX ORDER — ACETAMINOPHEN 500 MG
650 TABLET ORAL ONCE
Refills: 0 | Status: COMPLETED | OUTPATIENT
Start: 2021-10-19 | End: 2021-10-19

## 2021-10-19 RX ADMIN — SIMVASTATIN 40 MILLIGRAM(S): 20 TABLET, FILM COATED ORAL at 21:32

## 2021-10-19 RX ADMIN — Medication 25 MILLIGRAM(S): at 05:34

## 2021-10-19 RX ADMIN — Medication 25 MILLIGRAM(S): at 17:22

## 2021-10-19 RX ADMIN — SODIUM CHLORIDE 100 MILLILITER(S): 9 INJECTION INTRAMUSCULAR; INTRAVENOUS; SUBCUTANEOUS at 12:22

## 2021-10-19 RX ADMIN — Medication 650 MILLIGRAM(S): at 21:32

## 2021-10-19 RX ADMIN — TRAMADOL HYDROCHLORIDE 25 MILLIGRAM(S): 50 TABLET ORAL at 16:46

## 2021-10-19 RX ADMIN — Medication 30 MILLIGRAM(S): at 11:22

## 2021-10-19 RX ADMIN — Medication 650 MILLIGRAM(S): at 22:25

## 2021-10-19 RX ADMIN — TRAMADOL HYDROCHLORIDE 25 MILLIGRAM(S): 50 TABLET ORAL at 17:46

## 2021-10-19 RX ADMIN — Medication 650 MILLIGRAM(S): at 13:23

## 2021-10-19 RX ADMIN — Medication 1 MILLIGRAM(S): at 11:22

## 2021-10-19 RX ADMIN — Medication 650 MILLIGRAM(S): at 14:23

## 2021-10-19 NOTE — PROGRESS NOTE ADULT - PROBLEM SELECTOR PLAN 1
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 Hgb 6.9. Transfuse 1 unit PRBC 10/18.   Monitor CBC daily and transfuse as needed.   10/18 Hold chemo per HemeOnc.  Continue antiemetics, hydration  Continue bronchodilator   Supplemental O2   Supportive measures  Monitor oxygenation  Heme/onc QMA group  oupt heme-dr. Miranda

## 2021-10-19 NOTE — PROGRESS NOTE ADULT - PROBLEM SELECTOR PLAN 4
Serum Creat uptrended to 1.61  concerning for TLS  IVF  ml/hr  Elitek as above  Monitor BMP, uric acid  F/u renal US  Avoid nephrotoxic agents, NSAIDs  F/u Renal consult with Dr. Wiley Serum Creat uptrended to 1.61  concerning for TLS  IVF  ml/hr  Elitek as above  Monitor BMP, uric acid  F/u renal US-rescheduled tomorrow  Avoid nephrotoxic agents, NSAIDs  F/u Renal consult with Dr. Wiley

## 2021-10-19 NOTE — PROGRESS NOTE ADULT - PROBLEM SELECTOR PLAN 9
DVT ppx: Hold pharmacologic DVT ppx in setting of worsening anemia and thrombocytopenia.   SCD's  OOB

## 2021-10-19 NOTE — PROGRESS NOTE ADULT - SUBJECTIVE AND OBJECTIVE BOX
NP Note discussed with  Primary Attending    INTERVAL HPI/OVERNIGHT EVENTS: no acute overnight event. pt feels okay but states headache since this morning.     MEDICATIONS  (STANDING):  acetaminophen   Tablet .. 650 milliGRAM(s) Oral once  folic acid 1 milliGRAM(s) Oral daily  metoprolol tartrate 25 milliGRAM(s) Oral two times a day  PARoxetine 30 milliGRAM(s) Oral daily  simvastatin 40 milliGRAM(s) Oral at bedtime  sodium chloride 0.9%. 1000 milliLiter(s) (100 mL/Hr) IV Continuous <Continuous>    MEDICATIONS  (PRN):  acetaminophen   Tablet .. 650 milliGRAM(s) Oral every 6 hours PRN Temp greater or equal to 38C (100.4F), Mild Pain (1 - 3), Moderate Pain (4 - 6)  ALBUTerol    90 MICROgram(s) HFA Inhaler 2 Puff(s) Inhalation every 6 hours PRN Shortness of Breath and/or Wheezing  OLANZapine 2.5 milliGRAM(s) Oral at bedtime PRN Agitation      __________________________________________________  REVIEW OF SYSTEMS:    CONSTITUTIONAL: No fever,   EYES: no acute visual disturbances  NECK: No pain or stiffness  RESPIRATORY: No cough; No shortness of breath  CARDIOVASCULAR: No chest pain, no palpitations  GASTROINTESTINAL: No pain. No nausea or vomiting; No diarrhea   NEUROLOGICAL: + headache no  numbness, no tremors  MUSCULOSKELETAL: No joint pain, no muscle pain  GENITOURINARY: no dysuria, no frequency, no hesitancy  PSYCHIATRY: no depression , no anxiety  ALL OTHER  ROS negative        Vital Signs Last 24 Hrs  T(C): 36.8 (19 Oct 2021 05:09), Max: 36.9 (18 Oct 2021 11:27)  T(F): 98.3 (19 Oct 2021 05:09), Max: 98.5 (18 Oct 2021 11:27)  HR: 90 (19 Oct 2021 05:09) (77 - 90)  BP: 171/75 (19 Oct 2021 05:09) (123/62 - 171/75)  BP(mean): --  RR: 17 (19 Oct 2021 05:09) (16 - 18)  SpO2: 98% (19 Oct 2021 05:09) (95% - 98%)    ________________________________________________  PHYSICAL EXAM:  GENERAL: NAD, conversant  HEENT: Normocephalic;  conjunctivae and sclerae clear; moist mucous membranes;   NECK : supple  CHEST/LUNG: Clear to auscultation bilaterally with good air entry   HEART: S1 S2  regular; no murmurs, gallops or rubs  ABDOMEN: Soft, Nontender, Nondistended; Bowel sounds present  EXTREMITIES: no cyanosis; no edema; no calf tenderness  SKIN: warm and dry; no rash  NERVOUS SYSTEM:  Awake and alert; Oriented  to place, person and time ; no new deficits    _________________________________________________  LABS:                        7.5    x     )-----------( 74       ( 19 Oct 2021 07:49 )             22.6     10-18    137  |  105  |  58<H>  ----------------------------<  137<H>  4.8   |  23  |  1.97<H>    Ca    7.5<L>      18 Oct 2021 22:16  Phos  7.3     10-18  Mg     1.9     10-18    TPro  6.5  /  Alb  2.4<L>  /  TBili  0.5  /  DBili  0.1  /  AST  46<H>  /  ALT  34  /  AlkPhos  118  10-19      Urinalysis Basic - ( 18 Oct 2021 16:11 )    Color: Yellow / Appearance: very cloudy / S.010 / pH: x  Gluc: x / Ketone: Negative  / Bili: Negative / Urobili: Negative   Blood: x / Protein: 100 / Nitrite: Negative   Leuk Esterase: Moderate / RBC: 0-2 /HPF / WBC 6-10 /HPF   Sq Epi: x / Non Sq Epi: Few /HPF / Bacteria: TNTC /HPF      CAPILLARY BLOOD GLUCOSE            RADIOLOGY & ADDITIONAL TESTS:    Imaging  Reviewed:  YES    Consultant(s) Notes Reviewed:   YES      Plan of care was discussed with patient and /or primary care giver; all questions and concerns were addressed

## 2021-10-19 NOTE — PROGRESS NOTE ADULT - PROBLEM SELECTOR PLAN 2
Hyperphosphatemia: Phoslo 1334 twice daily. No concentrated phos in diet.   BRENDA: IVF x 24 hrs.   hyperuricemia 16.7---> 6.1 (10/19)  elevated LDH 1055  Continue IVF NS 100ml/hr  Elitek 3mg IV x 1  Repeat Uric Acid 4 hrs after elitek, then daily in am.   Monitor CMP, Magnesium, phosphorus , LDH daily in am.   F/u Renal consult with Dr. Inez Brown following

## 2021-10-19 NOTE — PROGRESS NOTE ADULT - ASSESSMENT
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 (diagnosed 10/04/21, Ki-67 98%)  -ECOG PS 2/3 and d/t aggressive nature of pt's cancer and progressive weakness that she needs to start the 1st cycle of inpatient chemotherapy with Cisplatin/etoposide given 10/15 and tolerated day 1 well with supportive measures  -GOC is palliative  -cancelled day 2 & 3 chemo due to acutely worse hg and renal function   -neulasta given 10/18    Problem #2 Anemia - multifactorial, including recent chemotherapy; transfuse to keep hg > 7.5 g/dl  stable   s/p PRBC transfusion 10/18    Problem #3 Renal failure - worsening despite IVF and po fluid  -chemo day 2 & 3 cancelled  -continue IVF  -Renal US pending  -avoid nephrotoxic agents  -after review of labs c/w TLS, rasburicase 3 mg given 10/18  -appreciate nephrology input, chi placed to monitor urine output    Problem# ? TLS  BRENDA, hyperuricemia, hyperphosphatemia  LDH 1050  Hypocalcemia since last admission d/t aredia given for hypercalcemia  rasburicase 3mg given 10/18  repeat uric acid now wnl at 6.1  continue IVF  appreciate nephrology consult    Problem# H/A  tylenol did not resolve  mgmt as per Medicine Team  avoid nephrotoxic meds    Thank you for the referral. Will continue to monitor the patient.  Please call with any questions [phone]  Above reviewed with Attending Dr. Lowell PEGUERO/[facility]  [address] [facility], [address], [place], [place]  [phone]

## 2021-10-19 NOTE — PROGRESS NOTE ADULT - PROBLEM SELECTOR PLAN 3
2019 11:10 due to chemotherapy and disease  Leukopenic/anemic/thrombocytopenic  Will monitor differential to monitor ANC  Transfuse 1 unit PRBC for Hgb 6.9 10/18  Monitor CBC with diff daily  Pan culture if develops  fever    Heme/Onc [facility] following

## 2021-10-19 NOTE — PROGRESS NOTE ADULT - PROBLEM SELECTOR PLAN 7
Number Of Freeze-Thaw Cycles: 2 freeze-thaw cycles Render Post-Care Instructions In Note?: no Post-Care Instructions: I reviewed with the patient in detail post-care instructions. Patient is to wear sunprotection, and avoid picking at any of the treated lesions. Pt may apply Vaseline to crusted or scabbing areas. Pared With?: 15 blade Consent: The patient's verbal consent was obtained including but not limited to risks of crusting, scabbing, blistering, scarring, darker or lighter pigmentary change, recurrence, incomplete removal and infection. Medical Necessity Clause: This procedure was medically necessary because the lesions that were treated were: itchy and irritated Detail Level: Simple Medical Necessity Information: It is in your best interest to select a reason for this procedure from the list below. All of these items fulfill various CMS LCD requirements except the new and changing color options. Continue BB, statin

## 2021-10-19 NOTE — PROGRESS NOTE ADULT - SUBJECTIVE AND OBJECTIVE BOX
Patient is a 71y old  Female who presents with a chief complaint of weakness (19 Oct 2021 09:16)      SUBJECTIVE / OVERNIGHT EVENTS:    ADDITIONAL REVIEW OF SYSTEMS:    MEDICATIONS  (STANDING):  acetaminophen   Tablet .. 650 milliGRAM(s) Oral once  folic acid 1 milliGRAM(s) Oral daily  metoprolol tartrate 25 milliGRAM(s) Oral two times a day  PARoxetine 30 milliGRAM(s) Oral daily  simvastatin 40 milliGRAM(s) Oral at bedtime  sodium chloride 0.9%. 1000 milliLiter(s) (100 mL/Hr) IV Continuous <Continuous>    MEDICATIONS  (PRN):  acetaminophen   Tablet .. 650 milliGRAM(s) Oral every 6 hours PRN Temp greater or equal to 38C (100.4F), Mild Pain (1 - 3), Moderate Pain (4 - 6)  ALBUTerol    90 MICROgram(s) HFA Inhaler 2 Puff(s) Inhalation every 6 hours PRN Shortness of Breath and/or Wheezing  OLANZapine 2.5 milliGRAM(s) Oral at bedtime PRN Agitation    CAPILLARY BLOOD GLUCOSE        I&O's Summary    18 Oct 2021 07:01  -  19 Oct 2021 07:00  --------------------------------------------------------  IN: 0 mL / OUT: 300 mL / NET: -300 mL        PHYSICAL EXAM:  Vital Signs Last 24 Hrs  T(C): 36.8 (19 Oct 2021 05:09), Max: 36.8 (18 Oct 2021 19:25)  T(F): 98.3 (19 Oct 2021 05:09), Max: 98.3 (19 Oct 2021 05:09)  HR: 90 (19 Oct 2021 05:09) (84 - 90)  BP: 171/75 (19 Oct 2021 05:09) (123/62 - 171/75)  BP(mean): --  RR: 17 (19 Oct 2021 05:09) (17 - 18)  SpO2: 98% (19 Oct 2021 05:09) (97% - 98%)      LABS:                        7.5    7.94  )-----------( 74       ( 19 Oct 2021 07:49 )             22.6     10    139  |  105  |  60<H>  ----------------------------<  126<H>  4.3   |  24  |  2.08<H>    Ca    7.7<L>      19 Oct 2021 07:50  Phos  7.3     10-  Mg     1.9     10-    TPro  6.5  /  Alb  2.4<L>  /  TBili  0.5  /  DBili  0.1  /  AST  46<H>  /  ALT  34  /  AlkPhos  118  10          Urinalysis Basic - ( 18 Oct 2021 16:11 )    Color: Yellow / Appearance: very cloudy / S.010 / pH: x  Gluc: x / Ketone: Negative  / Bili: Negative / Urobili: Negative   Blood: x / Protein: 100 / Nitrite: Negative   Leuk Esterase: Moderate / RBC: 0-2 /HPF / WBC 6-10 /HPF   Sq Epi: x / Non Sq Epi: Few /HPF / Bacteria: TNTC /HPF        COVID-19 PCR: NotDetec (19 Oct 2021 06:21)  COVID-19 PCR: NotDetec (12 Oct 2021 13:12)  COVID-19 PCR: NotDetec (03 Oct 2021 06:52)  COVID-19 PCR: NotDetec (23 Sep 2021 20:35)  COVID-19 PCR: NotDetec (20 Sep 2021 11:38)      RADIOLOGY & ADDITIONAL TESTS:  Imaging from Last 24 Hours:    Electrocardiogram/QTc Interval:    COORDINATION OF CARE:  Care Discussed with Consultants/Other Providers:   Patient is a 71y old  Female who presents with a chief complaint of weakness (19 Oct 2021 09:16)    SUBJECTIVE / OVERNIGHT EVENTS: events noted. Pt c/p H/A in frontal region. Otherwise does feel stronger.     MEDICATIONS  (STANDING):  acetaminophen   Tablet .. 650 milliGRAM(s) Oral once  folic acid 1 milliGRAM(s) Oral daily  metoprolol tartrate 25 milliGRAM(s) Oral two times a day  PARoxetine 30 milliGRAM(s) Oral daily  simvastatin 40 milliGRAM(s) Oral at bedtime  sodium chloride 0.9%. 1000 milliLiter(s) (100 mL/Hr) IV Continuous <Continuous>    MEDICATIONS  (PRN):  acetaminophen   Tablet .. 650 milliGRAM(s) Oral every 6 hours PRN Temp greater or equal to 38C (100.4F), Mild Pain (1 - 3), Moderate Pain (4 - 6)  ALBUTerol    90 MICROgram(s) HFA Inhaler 2 Puff(s) Inhalation every 6 hours PRN Shortness of Breath and/or Wheezing  OLANZapine 2.5 milliGRAM(s) Oral at bedtime PRN Agitation      Vital Signs Last 24 Hrs  T(C): 36.8 (19 Oct 2021 05:09), Max: 36.8 (18 Oct 2021 19:25)  T(F): 98.3 (19 Oct 2021 05:09), Max: 98.3 (19 Oct 2021 05:09)  HR: 90 (19 Oct 2021 05:09) (84 - 90)  BP: 171/75 (19 Oct 2021 05:09) (123/62 - 171/75)  BP(mean): --  RR: 17 (19 Oct 2021 05:09) (17 - 18)  SpO2: 98% (19 Oct 2021 05:09) (97% - 98%)    GEN: NAD; A and O x 3  LUNGS: CTA B/L  HEART: S1 S2  ABDOMEN: soft, non-tender, non-distended, + BS  EXTREMITIES: no edema  NERVOUS SYSTEM:  Awake and alert; no focal neuro deficits    LABS:                        7.5    7.94  )-----------( 74       ( 19 Oct 2021 07:49 )             22.6     10-19    139  |  105  |  60<H>  ----------------------------<  126<H>  4.3   |  24  |  2.08<H>    Ca    7.7<L>      19 Oct 2021 07:50  Phos  7.3     10  Mg     1.9     10-18    TPro  6.5  /  Alb  2.4<L>  /  TBili  0.5  /  DBili  0.1  /  AST  46<H>  /  ALT  34  /  AlkPhos  118  10-19      Urinalysis Basic - ( 18 Oct 2021 16:11 )    Color: Yellow / Appearance: very cloudy / S.010 / pH: x  Gluc: x / Ketone: Negative  / Bili: Negative / Urobili: Negative   Blood: x / Protein: 100 / Nitrite: Negative   Leuk Esterase: Moderate / RBC: 0-2 /HPF / WBC 6-10 /HPF   Sq Epi: x / Non Sq Epi: Few /HPF / Bacteria: TNTC /HPF        COVID-19 PCR: NotDetec (19 Oct 2021 06:21)  COVID-19 PCR: NotDetec (12 Oct 2021 13:12)  COVID-19 PCR: NotDetec (03 Oct 2021 06:52)  COVID-19 PCR: NotDetec (23 Sep 2021 20:35)  COVID-19 PCR: NotDetec (20 Sep 2021 11:38)

## 2021-10-19 NOTE — PROGRESS NOTE ADULT - ASSESSMENT
72 y/o F with PMHx of HTN, HLD, and metastatic Lung CA, was discharged on 10/6 from [facility] after hospitalization for sepsis from PNA and UTI, comes in complaining of weakness since discharge.   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 TLS vs ATN from anemia vs Cisplatin  Likely TLS in view of  ( Hyperphosphatemia, Hypocalcemia, high Uric acid and BRENDA)  COntinue IVF ( NS at 100cc hr )  If worsens, may need RRT   Discussed with spouse  , agreeable for RRT if needed  Consent obtained for RRT , in chart   Urine lytes inconclusive  Insesrt chi- need accurate monitoring of urine outpt   Check Renal sonogram   MOnitor BMP   MOnitor TLS labs   AVoid further nephtoxics NSAIDS RCA      HTN  BP stable  MOnitor BP    Hyperphosphatemia  likely sec to TLS   COntinue binders   Low Po4 diet  MOnitor serum K

## 2021-10-19 NOTE — PROGRESS NOTE ADULT - ASSESSMENT
71 YOF with PMH HTN, HLD, recent dx of metastatic lung CA which has not started on active chemo yet.  Brought by  due to worsening of weakness which contributed to delaying  on initiate chemo tx .  Dr thomas is outpt heme/onc, [facility] is following and recommends to start 1st chemo as in- patient. Day 1 was 10/15. pt is noted tumor lysis syndrome after first chemo, received Rasburicase, phoslo. 2nd chemo on hold in setting BRENDA per Heme/Onc.  on IVF. Renal/abdominal sono scheduled today for BRENDA, transaminitis but rescheduled tomorrow by department.   Pt complains of headache after chemo therapy. improving with oral medication.

## 2021-10-20 DIAGNOSIS — D69.6 THROMBOCYTOPENIA, UNSPECIFIED: ICD-10-CM

## 2021-10-20 DIAGNOSIS — D64.81 ANEMIA DUE TO ANTINEOPLASTIC CHEMOTHERAPY: ICD-10-CM

## 2021-10-20 DIAGNOSIS — D72.829 ELEVATED WHITE BLOOD CELL COUNT, UNSPECIFIED: ICD-10-CM

## 2021-10-20 LAB
ALBUMIN SERPL ELPH-MCNC: 2.5 G/DL — LOW (ref 3.5–5)
ALP SERPL-CCNC: 127 U/L — HIGH (ref 40–120)
ALT FLD-CCNC: 32 U/L DA — SIGNIFICANT CHANGE UP (ref 10–60)
ANION GAP SERPL CALC-SCNC: 10 MMOL/L — SIGNIFICANT CHANGE UP (ref 5–17)
ANISOCYTOSIS BLD QL: SLIGHT — SIGNIFICANT CHANGE UP
AST SERPL-CCNC: 35 U/L — SIGNIFICANT CHANGE UP (ref 10–40)
BASOPHILS # BLD AUTO: 0 K/UL — SIGNIFICANT CHANGE UP (ref 0–0.2)
BASOPHILS NFR BLD AUTO: 0 % — SIGNIFICANT CHANGE UP (ref 0–2)
BILIRUB DIRECT SERPL-MCNC: 0.1 MG/DL — SIGNIFICANT CHANGE UP (ref 0–0.2)
BILIRUB SERPL-MCNC: 0.7 MG/DL — SIGNIFICANT CHANGE UP (ref 0.2–1.2)
BUN SERPL-MCNC: 63 MG/DL — HIGH (ref 7–18)
CALCIUM SERPL-MCNC: 7.8 MG/DL — LOW (ref 8.4–10.5)
CHLORIDE SERPL-SCNC: 104 MMOL/L — SIGNIFICANT CHANGE UP (ref 96–108)
CO2 SERPL-SCNC: 23 MMOL/L — SIGNIFICANT CHANGE UP (ref 22–31)
CREAT SERPL-MCNC: 2.32 MG/DL — HIGH (ref 0.5–1.3)
DACRYOCYTES BLD QL SMEAR: SLIGHT — SIGNIFICANT CHANGE UP
ELLIPTOCYTES BLD QL SMEAR: SLIGHT — SIGNIFICANT CHANGE UP
EOSINOPHIL # BLD AUTO: 0.27 K/UL — SIGNIFICANT CHANGE UP (ref 0–0.5)
EOSINOPHIL NFR BLD AUTO: 2 % — SIGNIFICANT CHANGE UP (ref 0–6)
GLUCOSE SERPL-MCNC: 105 MG/DL — HIGH (ref 70–99)
HCT VFR BLD CALC: 24.7 % — LOW (ref 34.5–45)
HGB BLD-MCNC: 8.1 G/DL — LOW (ref 11.5–15.5)
LDH SERPL L TO P-CCNC: 695 U/L — HIGH (ref 120–225)
LYMPHOCYTES # BLD AUTO: 1.92 K/UL — SIGNIFICANT CHANGE UP (ref 1–3.3)
LYMPHOCYTES # BLD AUTO: 14 % — SIGNIFICANT CHANGE UP (ref 13–44)
MAGNESIUM SERPL-MCNC: 1.8 MG/DL — SIGNIFICANT CHANGE UP (ref 1.6–2.6)
MANUAL SMEAR VERIFICATION: SIGNIFICANT CHANGE UP
MCHC RBC-ENTMCNC: 29.7 PG — SIGNIFICANT CHANGE UP (ref 27–34)
MCHC RBC-ENTMCNC: 32.8 GM/DL — SIGNIFICANT CHANGE UP (ref 32–36)
MCV RBC AUTO: 90.5 FL — SIGNIFICANT CHANGE UP (ref 80–100)
METAMYELOCYTES # FLD: 1 % — HIGH (ref 0–0)
MONOCYTES # BLD AUTO: 0.55 K/UL — SIGNIFICANT CHANGE UP (ref 0–0.9)
MONOCYTES NFR BLD AUTO: 4 % — SIGNIFICANT CHANGE UP (ref 2–14)
NEUTROPHILS # BLD AUTO: 10.83 K/UL — HIGH (ref 1.8–7.4)
NEUTROPHILS NFR BLD AUTO: 76 % — SIGNIFICANT CHANGE UP (ref 43–77)
NEUTS BAND # BLD: 3 % — SIGNIFICANT CHANGE UP (ref 0–8)
NRBC # BLD: 0 /100 — SIGNIFICANT CHANGE UP (ref 0–0)
PHOSPHATE SERPL-MCNC: 5.2 MG/DL — HIGH (ref 2.5–4.5)
PLAT MORPH BLD: NORMAL — SIGNIFICANT CHANGE UP
PLATELET # BLD AUTO: 86 K/UL — LOW (ref 150–400)
POIKILOCYTOSIS BLD QL AUTO: SLIGHT — SIGNIFICANT CHANGE UP
POTASSIUM SERPL-MCNC: 4.5 MMOL/L — SIGNIFICANT CHANGE UP (ref 3.5–5.3)
POTASSIUM SERPL-SCNC: 4.5 MMOL/L — SIGNIFICANT CHANGE UP (ref 3.5–5.3)
PROT SERPL-MCNC: 6.9 G/DL — SIGNIFICANT CHANGE UP (ref 6–8.3)
RBC # BLD: 2.73 M/UL — LOW (ref 3.8–5.2)
RBC # FLD: 18 % — HIGH (ref 10.3–14.5)
RBC BLD AUTO: ABNORMAL
SODIUM SERPL-SCNC: 137 MMOL/L — SIGNIFICANT CHANGE UP (ref 135–145)
URATE SERPL-MCNC: 1 MG/DL — LOW (ref 2.5–7)
URATE SERPL-MCNC: 1 MG/DL — LOW (ref 2.5–7)
WBC # BLD: 13.71 K/UL — HIGH (ref 3.8–10.5)
WBC # FLD AUTO: 13.71 K/UL — HIGH (ref 3.8–10.5)

## 2021-10-20 PROCEDURE — 76700 US EXAM ABDOM COMPLETE: CPT | Mod: 26

## 2021-10-20 RX ORDER — SODIUM CHLORIDE 9 MG/ML
75 INJECTION INTRAMUSCULAR; INTRAVENOUS; SUBCUTANEOUS
Refills: 0 | Status: DISCONTINUED | OUTPATIENT
Start: 2021-10-20 | End: 2021-10-20

## 2021-10-20 RX ORDER — SODIUM CHLORIDE 9 MG/ML
100 INJECTION INTRAMUSCULAR; INTRAVENOUS; SUBCUTANEOUS
Refills: 0 | Status: DISCONTINUED | OUTPATIENT
Start: 2021-10-20 | End: 2021-10-25

## 2021-10-20 RX ADMIN — Medication 1 MILLIGRAM(S): at 13:06

## 2021-10-20 RX ADMIN — Medication 25 MILLIGRAM(S): at 05:48

## 2021-10-20 RX ADMIN — Medication 25 MILLIGRAM(S): at 17:23

## 2021-10-20 RX ADMIN — Medication 30 MILLIGRAM(S): at 13:06

## 2021-10-20 RX ADMIN — SIMVASTATIN 40 MILLIGRAM(S): 20 TABLET, FILM COATED ORAL at 21:39

## 2021-10-20 RX ADMIN — SODIUM CHLORIDE 100 MILLILITER(S): 9 INJECTION INTRAMUSCULAR; INTRAVENOUS; SUBCUTANEOUS at 11:00

## 2021-10-20 RX ADMIN — Medication 650 MILLIGRAM(S): at 13:06

## 2021-10-20 RX ADMIN — Medication 650 MILLIGRAM(S): at 13:40

## 2021-10-20 NOTE — PROGRESS NOTE ADULT - SUBJECTIVE AND OBJECTIVE BOX
WILEY ELAINA  71y  Patient is a 71y old  Female who presents with a chief complaint of weakness (20 Oct 2021 11:52)    HPI:  This is a patient Metastatic Lung Ca on Chemo. S/P IV Cisplatin 3 days ago, now with BRENDA.  She is not Oliguric and feels well.    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 75 milliLiter(s) (100 mL/Hr) IV Continuous <Continuous>    MEDICATIONS  (PRN):  acetaminophen   Tablet .. 650 milliGRAM(s) Oral every 6 hours PRN Temp greater or equal to 38C (100.4F), Mild Pain (1 - 3), Moderate Pain (4 - 6)  ALBUTerol    90 MICROgram(s) HFA Inhaler 2 Puff(s) Inhalation every 6 hours PRN Shortness of Breath and/or Wheezing  OLANZapine 2.5 milliGRAM(s) Oral at bedtime PRN Agitation    Vital Signs Last 24 Hrs  T(C): 36.6 (20 Oct 2021 14:00), Max: 36.9 (20 Oct 2021 05:05)  T(F): 97.9 (20 Oct 2021 14:00), Max: 98.4 (20 Oct 2021 05:05)  HR: 76 (20 Oct 2021 14:00) (76 - 84)  BP: 138/58 (20 Oct 2021 14:00) (121/62 - 145/70)  BP(mean): 77 (20 Oct 2021 14:00) (72 - 77)  RR: 17 (20 Oct 2021 14:00) (16 - 18)  SpO2: 97% (20 Oct 2021 14:00) (94% - 98%)  Daily     Daily Weight in k.8 (20 Oct 2021 05:05)    PHYSICAL EXAM:  Constitutional:  She appears comfortable and not distressed. Not diaphoretic.    Neck:  The thyroid is normal. Trachea is midline.     Respiratory: The lungs are clear to auscultation. No dullness and expansion is normal.    Cardiovascular: S1 and S2 are normal. No mummurs, rubs or gallops are present.    Gastrointestinal: The abdomen is soft. No tenderness is present. No masses are present. Bowel sounds are normal.    Genitourinary: The bladder is not distended. No CVA tenderness is present.    Extremities: No edema is noted. No deformities are present.    Neurological: Cognition is normal. Tone, power and sensation are normal. Gait is steady.    Skin: No lesions are seen  or palpated.    Lymph Nodes: No lymphadenopathy is present.                            8.1    13.71 )-----------( 86       ( 20 Oct 2021 06:14 )             24.7     10-20    137  |  104  |  63<H>  ----------------------------<  105<H>  4.5   |  23  |  2.32<H>    Ca    7.8<L>      20 Oct 2021 06:14  Phos  5.2     10-20  Mg     1.8     10-20    TPro  6.9  /  Alb  2.5<L>  /  TBili  0.7  /  DBili  0.1  /  AST  35  /  ALT  32  /  AlkPhos  127<H>  10-20    Urinalysis Basic - ( 18 Oct 2021 16:11 )    Color: Yellow / Appearance: very cloudy / S.010 / pH: x  Gluc: x / Ketone: Negative  / Bili: Negative / Urobili: Negative   Blood: x / Protein: 100 / Nitrite: Negative   Leuk Esterase: Moderate / RBC: 0-2 /HPF / WBC 6-10 /HPF   Sq Epi: x / Non Sq Epi: Few /HPF / Bacteria: TNTC /HPF

## 2021-10-20 NOTE — PROGRESS NOTE ADULT - PROBLEM SELECTOR PLAN 5
in the setting of Ca w/ likely liver mets;   Abdominal US as above;   Alk phos somewhat  elevated @ 127  AST/ ALT downtrended to normal level 35/32  Continue to monitor LFTs

## 2021-10-20 NOTE — PROGRESS NOTE ADULT - PROBLEM SELECTOR PLAN 3
in the setting of recent pegfilgrastrim  therapy  - Continue to monitor for downtrending  - Pan culture if fevers   - Oncology following

## 2021-10-20 NOTE — PROGRESS NOTE ADULT - ASSESSMENT
71 YOF with PMH HTN, HLD, recent dx of metastatic lung CA which has not started on active chemo yet.  Brought by  due to worsening of weakness which contributed to delaying  on initiate chemo tx .  Dr thomas is outpt heme/onc, [facility] is following and recommends to start 1st chemo as in- patient. Day 1 was 10/15. pt is noted tumor lysis syndrome after first chemo, received Rasburicase, phoslo. 2nd chemo on hold in setting BRENDA per Heme/Onc.  on IVF. Renal/abdominal sono  for BRENDA, transaminitis revealed kidneys with no hydronephrosis. Hepatomegaly, Cholelithiasis, Nonspecific gallbladder wall thickening ( already established from previous w/u in 9/21). Patient with stage IV Ca; Oncology following with plans for palliative chemotherapy  q 3 weeks with close monitoring of renal function. PT in to see however patient is not participating; will follow up tomorrow.

## 2021-10-20 NOTE — PROGRESS NOTE ADULT - PROBLEM SELECTOR PLAN 9
DVT ppx: Hold pharmacologic DVT ppx in setting of worsening anemia and thrombocytopenia; c/w SCDs encourage   OOB

## 2021-10-20 NOTE — PROGRESS NOTE ADULT - PROBLEM SELECTOR PLAN 2
-Hyperphosphatemia: --->8.1 Phoslo 1334 twice daily---> 5.2  No concentrated phos in diet added.   -hyperuricemia 16.7 ; s/p Elitek 3mg IV x 1---> 6.---> 1; monitor Serum Uric acid daily  -elevated LDH 1055--->695  - IVF x 24 hrs then reevaluate   Monitor CMP, Magnesium, phosphorus , LDH, Uric acid daily in am.   Renal following   HemeOnc following

## 2021-10-20 NOTE — PROGRESS NOTE ADULT - PROBLEM SELECTOR PLAN 4
Serum Creat uptrended to 1.61 in the setting of TLS   - Chemotherapy day 2 and 3 held   - Cr now 2.3  - US; kidneys with no hydronephrosis   - Continue NS @ 100cc/hr   Avoid nephrotoxic agents, NSAIDs  Renal following; possible need for RRT  discussed and spouse is agreeable  for RRT if needed. IVF continued for now.   - Continue to monitor BMP

## 2021-10-20 NOTE — PROGRESS NOTE ADULT - SUBJECTIVE AND OBJECTIVE BOX
Time of Visit:  Patient seen and examined.     MEDICATIONS  (STANDING):  folic acid 1 milliGRAM(s) Oral daily  metoprolol tartrate 25 milliGRAM(s) Oral two times a day  PARoxetine 30 milliGRAM(s) Oral daily  simvastatin 40 milliGRAM(s) Oral at bedtime  sodium chloride 0.9%. 100 milliLiter(s) (100 mL/Hr) IV Continuous <Continuous>      MEDICATIONS  (PRN):  acetaminophen   Tablet .. 650 milliGRAM(s) Oral every 6 hours PRN Temp greater or equal to 38C (100.4F), Mild Pain (1 - 3), Moderate Pain (4 - 6)  ALBUTerol    90 MICROgram(s) HFA Inhaler 2 Puff(s) Inhalation every 6 hours PRN Shortness of Breath and/or Wheezing  OLANZapine 2.5 milliGRAM(s) Oral at bedtime PRN Agitation       Medications up to date at time of exam.      PHYSICAL EXAMINATION:  Patient has no new complaints.  GENERAL: The patient is a well-developed, well-nourished, in no apparent distress.     Vital Signs Last 24 Hrs  T(C): 36.7 (20 Oct 2021 19:43), Max: 36.9 (20 Oct 2021 05:05)  T(F): 98 (20 Oct 2021 19:43), Max: 98.4 (20 Oct 2021 05:05)  HR: 79 (20 Oct 2021 19:43) (76 - 84)  BP: 119/51 (20 Oct 2021 19:43) (119/51 - 145/70)  BP(mean): 77 (20 Oct 2021 14:00) (77 - 77)  RR: 17 (20 Oct 2021 19:43) (16 - 17)  SpO2: 97% (20 Oct 2021 19:43) (94% - 97%)   (if applicable)    Chest Tube (if applicable)    HEENT: Head is normocephalic and atraumatic. Extraocular muscles are intact. Mucous membranes are moist.     NECK: Supple, no palpable adenopathy.    LUNGS: Clear to auscultation, no wheezing, rales, or rhonchi.    HEART: Regular rate and rhythm without murmur.    ABDOMEN: Soft, nontender, and nondistended.  No hepatosplenomegaly is noted.    : No painful voiding, no pelvic pain    EXTREMITIES: Without any cyanosis, clubbing, rash, lesions or edema.    NEUROLOGIC: Awake, alert, oriented, grossly intact    SKIN: Warm, dry, good turgor.      LABS:                        8.1    13.71 )-----------( 86       ( 20 Oct 2021 06:14 )             24.7     10-20    137  |  104  |  63<H>  ----------------------------<  105<H>  4.5   |  23  |  2.32<H>    Ca    7.8<L>      20 Oct 2021 06:14  Phos  5.2     10-20  Mg     1.8     10-20    TPro  6.9  /  Alb  2.5<L>  /  TBili  0.7  /  DBili  0.1  /  AST  35  /  ALT  32  /  AlkPhos  127<H>  10-20                        MICROBIOLOGY: (if applicable)    RADIOLOGY & ADDITIONAL STUDIES:  EKG:   CXR:  ECHO:    IMPRESSION: 71y Female PAST MEDICAL & SURGICAL HISTORY:  Lumbar stenosis    Bleeding ulcer  stomach ulcer 2011    Anxiety    Spinal stenosis in cervical region    Acute hepatitis C virus infection without hepatic coma  treated 2015- Resolved    Balance disorder    Coronary artery disease involving native coronary artery of native heart without angina pectoris    HTN (hypertension)    HLD (hyperlipidemia)    Coronary artery disease involving native coronary artery of native heart without angina pectoris    H/O colonoscopy    Bleeding ulcer  stomach surgery for bleeding ulcer    Fracture  ORIF Left wrist  1/17/13    History of lumbar surgery  2013    Chronic UTI  Pt reports presently on 2nd dose of antibiotics 7/5/18 10 days, Dr Herman aware, pt going for m/eval 7/13/18.    H/O cardiac catheterization  2013, no intervention needed, treated medically     p/w       Impression: 72 Y/O Female presented with weakness since discharge. Patient was discharged on 10-06-21 from [facility] after hospitalization for Sepsis from UTI and Pneumonia. Has Metastatic Lung Ca who had s/p EBUS / Bronchoscopy on 10-04-21. Right endobronchial  Mass. Has Stage IV High-Grade Lung , Neuroendocrine CA with likely mets to liver/peritoneum/bone being followed by Oncology Team for inpatient Chemo .  10-12-21 Negative for Covid 19 PCR.    Suggestion:  - O2 supp as neeeded  -first chemo 10/15.. now held due to abnormal labs and weakness  -s/p PRBC transfusion 10/18  -Oncology follow up for Chemo therapy.  -Continue PRN Albuterol 90 mcg 2 puffs Q 6 Hours.     -continue meds

## 2021-10-20 NOTE — PROGRESS NOTE ADULT - PROBLEM SELECTOR PLAN 7
in the setting of Ca w/ chemotherapy initiation  - Continue to monitor   - Bleeding precautions  - Oncology following

## 2021-10-20 NOTE — PROGRESS NOTE ADULT - PROBLEM SELECTOR PLAN 6
Multifactorial etiology, including recent chemotherapy requiring 1 unit PRBC this admission   - Continue to monitor H/H  - Monitor for signs of overt bleed  -Oncology following

## 2021-10-20 NOTE — PROGRESS NOTE ADULT - SUBJECTIVE AND OBJECTIVE BOX
Patient is a 71y old  Female who presents with a chief complaint of weakness (19 Oct 2021 15:04)      SUBJECTIVE / OVERNIGHT EVENTS:        MEDICATIONS  (STANDING):  folic acid 1 milliGRAM(s) Oral daily  metoprolol tartrate 25 milliGRAM(s) Oral two times a day  PARoxetine 30 milliGRAM(s) Oral daily  simvastatin 40 milliGRAM(s) Oral at bedtime  sodium chloride 0.9%. 75 milliLiter(s) (100 mL/Hr) IV Continuous <Continuous>    MEDICATIONS  (PRN):  acetaminophen   Tablet .. 650 milliGRAM(s) Oral every 6 hours PRN Temp greater or equal to 38C (100.4F), Mild Pain (1 - 3), Moderate Pain (4 - 6)  ALBUTerol    90 MICROgram(s) HFA Inhaler 2 Puff(s) Inhalation every 6 hours PRN Shortness of Breath and/or Wheezing  OLANZapine 2.5 milliGRAM(s) Oral at bedtime PRN Agitation    CAPILLARY BLOOD GLUCOSE        I&O's Summary    19 Oct 2021 07:01  -  20 Oct 2021 07:00  --------------------------------------------------------  IN: 0 mL / OUT: 2100 mL / NET: -2100 mL        PHYSICAL EXAM:  Vital Signs Last 24 Hrs  T(C): 36.9 (20 Oct 2021 05:05), Max: 36.9 (20 Oct 2021 05:05)  T(F): 98.4 (20 Oct 2021 05:05), Max: 98.4 (20 Oct 2021 05:05)  HR: 84 (20 Oct 2021 05:05) (77 - 84)  BP: 145/70 (20 Oct 2021 05:05) (121/62 - 145/70)  BP(mean): 72 (19 Oct 2021 14:51) (72 - 72)  RR: 16 (20 Oct 2021 05:05) (16 - 18)  SpO2: 94% (20 Oct 2021 05:05) (94% - 98%)      LABS:                        8.1    13.71 )-----------( 86       ( 20 Oct 2021 06:14 )             24.7     10-    137  |  104  |  63<H>  ----------------------------<  105<H>  4.5   |  23  |  2.32<H>    Ca    7.8<L>      20 Oct 2021 06:14  Phos  5.2     10-  Mg     1.8     10-    TPro  6.9  /  Alb  2.5<L>  /  TBili  0.7  /  DBili  0.1  /  AST  35  /  ALT  32  /  AlkPhos  127<H>  10-20          Urinalysis Basic - ( 18 Oct 2021 16:11 )    Color: Yellow / Appearance: very cloudy / S.010 / pH: x  Gluc: x / Ketone: Negative  / Bili: Negative / Urobili: Negative   Blood: x / Protein: 100 / Nitrite: Negative   Leuk Esterase: Moderate / RBC: 0-2 /HPF / WBC 6-10 /HPF   Sq Epi: x / Non Sq Epi: Few /HPF / Bacteria: TNTC /HPF        COVID-19 PCR: NotDetec (19 Oct 2021 06:21)  COVID-19 PCR: NotDetec (12 Oct 2021 13:12)  COVID-19 PCR: NotDetec (03 Oct 2021 06:52)  COVID-19 PCR: NotDetec (23 Sep 2021 20:35)  COVID-19 PCR: NotDetec (20 Sep 2021 11:38)      RADIOLOGY & ADDITIONAL TESTS:  Imaging from Last 24 Hours:    Electrocardiogram/QTc Interval:    COORDINATION OF CARE:  Care Discussed with Consultants/Other Providers:   Patient is a 71y old  Female who presents with a chief complaint of weakness (19 Oct 2021 15:04)      SUBJECTIVE / OVERNIGHT EVENTS: events noted. Pt c/o H/A, asking for tylenol which helped her yesterday. Otherwise eating lunch and feels "good"    MEDICATIONS  (STANDING):  folic acid 1 milliGRAM(s) Oral daily  metoprolol tartrate 25 milliGRAM(s) Oral two times a day  PARoxetine 30 milliGRAM(s) Oral daily  simvastatin 40 milliGRAM(s) Oral at bedtime  sodium chloride 0.9%. 75 milliLiter(s) (100 mL/Hr) IV Continuous <Continuous>    MEDICATIONS  (PRN):  acetaminophen   Tablet .. 650 milliGRAM(s) Oral every 6 hours PRN Temp greater or equal to 38C (100.4F), Mild Pain (1 - 3), Moderate Pain (4 - 6)  ALBUTerol    90 MICROgram(s) HFA Inhaler 2 Puff(s) Inhalation every 6 hours PRN Shortness of Breath and/or Wheezing  OLANZapine 2.5 milliGRAM(s) Oral at bedtime PRN Agitation      I&O's Summary    19 Oct 2021 07:01  -  20 Oct 2021 07:00  --------------------------------------------------------  IN: 0 mL / OUT: 2100 mL / NET: -2100 mL      Vital Signs Last 24 Hrs  T(C): 36.9 (20 Oct 2021 05:05), Max: 36.9 (20 Oct 2021 05:05)  T(F): 98.4 (20 Oct 2021 05:05), Max: 98.4 (20 Oct 2021 05:05)  HR: 84 (20 Oct 2021 05:05) (77 - 84)  BP: 145/70 (20 Oct 2021 05:05) (121/62 - 145/70)  BP(mean): 72 (19 Oct 2021 14:51) (72 - 72)  RR: 16 (20 Oct 2021 05:05) (16 - 18)  SpO2: 94% (20 Oct 2021 05:05) (94% - 98%)    GEN: NAD; A and O x 3  LUNGS: CTA B/L  HEART: S1 S2  ABDOMEN: soft, non-tender, non-distended, + BS  EXTREMITIES: no edema  NERVOUS SYSTEM:  Awake and alert; no focal neuro deficits    LABS:                        8.1    13.71 )-----------( 86       ( 20 Oct 2021 06:14 )             24.7     10-20    137  |  104  |  63<H>  ----------------------------<  105<H>  4.5   |  23  |  2.32<H>    Ca    7.8<L>      20 Oct 2021 06:14  Phos  5.2     10-  Mg     1.8     10-    TPro  6.9  /  Alb  2.5<L>  /  TBili  0.7  /  DBili  0.1  /  AST  35  /  ALT  32  /  AlkPhos  127<H>  10-20          Urinalysis Basic - ( 18 Oct 2021 16:11 )    Color: Yellow / Appearance: very cloudy / S.010 / pH: x  Gluc: x / Ketone: Negative  / Bili: Negative / Urobili: Negative   Blood: x / Protein: 100 / Nitrite: Negative   Leuk Esterase: Moderate / RBC: 0-2 /HPF / WBC 6-10 /HPF   Sq Epi: x / Non Sq Epi: Few /HPF / Bacteria: TNTC /HPF        COVID-19 PCR: NotDetec (19 Oct 2021 06:21)  COVID-19 PCR: NotDetec (12 Oct 2021 13:12)  COVID-19 PCR: NotDetec (03 Oct 2021 06:52)  COVID-19 PCR: NotDetec (23 Sep 2021 20:35)  COVID-19 PCR: NotDetec (20 Sep 2021 11:38)      RADIOLOGY & ADDITIONAL TESTS:  < from: US Abdomen Complete (US Abdomen Complete .) (10.20.21 @ 11:03) >    EXAM:  US ABDOMEN COMPLETE                            PROCEDURE DATE:  10/20/2021          INTERPRETATION:  CLINICAL INFORMATION: Acute kidney injury, elevated liver function tests    COMPARISON: MR dated 2021    TECHNIQUE: Sonography of the abdomen.    FINDINGS:    Liver: Multiple lesions. Enlarged (20 cm). Patent main portal vein with normal flow direction.  Bile ducts: Normal caliber. Common bile duct measures 7 mm.  Gallbladder: Cholelithiasis. Mild wall thickening (4 mm). No focal tenderness.  Pancreas: Visualized portions are within normal limits.  Spleen: 11.7 cm. Within normal limits.  Right kidney: 10.5 cm. No hydronephrosis.  Left kidney: 10.8 cm.  No hydronephrosis.  Ascites: None.  Aorta and IVC: Visualized portions are within normal limits.    IMPRESSION:  Hepatic metastases.  Hepatomegaly.  Cholelithiasis.  Nonspecific gallbladder wall thickening.    < end of copied text >

## 2021-10-20 NOTE — PROGRESS NOTE ADULT - ASSESSMENT
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 (diagnosed 10/04/21, Ki-67 98%)  -ECOG PS 2/3 and d/t aggressive nature of pt's cancer and progressive weakness that she needs to start the 1st cycle of inpatient chemotherapy with Cisplatin/etoposide given 10/15 and tolerated day 1 well with supportive measures  -GOC is palliative  -cancelled day 2 & 3 chemo due to acutely worse hg and renal function, next cycle due in 3-4 weeks  -neulasta given 10/18  -PT eval  -rehab planning    Problem #2 Anemia - multifactorial, including recent chemotherapy; transfuse to keep hg > 7.5 g/dl  stable   s/p PRBC transfusion 10/18    Problem #3 Renal failure - worsening despite IVF and po fluid  -chemo day 2 & 3 cancelled  -continue IVF  -Abd US: no hydro, showed Hepatic metastases.Hepatomegaly.Cholelithiasis.Nonspecific gallbladder wall thickening.  -avoid nephrotoxic agents  -after review of labs c/w TLS, rasburicase 3 mg given 10/18  -appreciate nephrology input, monitoring urine output    Problem# ? TLS  BRENDA, hyperuricemia, hyperphosphatemia  LDH 1050-->695 improving  Hypocalcemia since last admission d/t aredia given for hypercalcemia  rasburicase 3mg given 10/18  repeat uric acid now wnl at 6.1-->1.0  dec phos  continue IVF  appreciate nephrology consult, no evidence of tubulo-toxicity from cis    #Leukocytosis  d/t neulasta    Problem# H/A  mgmt as per Medicine Team  avoid nephrotoxic meds    case d/w Medicine Team  Thank you for the referral. Will continue to monitor the patient.  Please call with any questions [phone]  Above reviewed with Attending Dr. Lowell PEGUERO/[facility]On[facility]  [address] [facility], [address], [place], [place]  [phone]

## 2021-10-20 NOTE — PROGRESS NOTE ADULT - ASSESSMENT
72 y/o F with PMHx of HTN, HLD, and metastatic Lung CA, was discharged on 10/6 from [facility] after hospitalization for sepsis from PNA and UTI, comes in complaining of weakness since discharge.   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  No evidence of Tubulo-toxicity from Cisplatin.     - Continue IVF ( NS at 100cc hr )  - Discussed with spouse  , agreeable for RRT if needed  and Consent obtained for RRT , in chart   - Monitor Urine Output  - MOnitor BMP, Ca, PO4 and Uric Acid.  - AVoid further nephtoxics NSAIDS RCA      Hyperphosphatemia  likely sec to TLS   COntinue binders   Low Po4 diet  MOnitor serum K

## 2021-10-20 NOTE — PROGRESS NOTE ADULT - PROBLEM SELECTOR PLAN 1
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 Hgb 6.9. Transfuse 1 unit PRBC 10/18.   Monitor CBC daily and transfuse as needed.   per Oncology goal of treatment is palliative;  q 3 weeks with close monitoring of renal function.  Per last GOC discussion 10/12 patient is  full code; Palliative care to consult for more extensive discussion with Oncology and family   Continue currently supportive measures  Continue to monitor and supplemental O2; currently 94-98% on 2 L via NC   Heme/onc [facility] (Dr. Miranda's) group following.

## 2021-10-20 NOTE — PROGRESS NOTE ADULT - SUBJECTIVE AND OBJECTIVE BOX
NP Note discussed with  Primary Attending;  Dr Yepez    Patient is a 71y old  Female who presents with a chief complaint of weakness (20 Oct 2021 14:27)      INTERVAL HPI/OVERNIGHT EVENTS:  Patient seen and examined earlier in am; no new complaints    MEDICATIONS  (STANDING):  folic acid 1 milliGRAM(s) Oral daily  metoprolol tartrate 25 milliGRAM(s) Oral two times a day  PARoxetine 30 milliGRAM(s) Oral daily  simvastatin 40 milliGRAM(s) Oral at bedtime  sodium chloride 0.9%. 75 milliLiter(s) (100 mL/Hr) IV Continuous <Continuous>    MEDICATIONS  (PRN):  acetaminophen   Tablet .. 650 milliGRAM(s) Oral every 6 hours PRN Temp greater or equal to 38C (100.4F), Mild Pain (1 - 3), Moderate Pain (4 - 6)  ALBUTerol    90 MICROgram(s) HFA Inhaler 2 Puff(s) Inhalation every 6 hours PRN Shortness of Breath and/or Wheezing  OLANZapine 2.5 milliGRAM(s) Oral at bedtime PRN Agitation      __________________________________________________  REVIEW OF SYSTEMS:    CONSTITUTIONAL: No fever,   EYES: no acute visual disturbances  NECK: No pain or stiffness  RESPIRATORY: No cough; No shortness of breath  CARDIOVASCULAR: No chest pain, no palpitations  GASTROINTESTINAL: No pain. No nausea or vomiting; No diarrhea   NEUROLOGICAL: No headache or numbness, no tremors  MUSCULOSKELETAL: No joint pain, no muscle pain  GENITOURINARY: no dysuria, no frequency, no hematuria   ALL OTHER  ROS negative        Vital Signs Last 24 Hrs  T(C): 36.6 (20 Oct 2021 14:00), Max: 36.9 (20 Oct 2021 05:05)  T(F): 97.9 (20 Oct 2021 14:00), Max: 98.4 (20 Oct 2021 05:05)  HR: 76 (20 Oct 2021 14:00) (76 - 84)  BP: 138/58 (20 Oct 2021 14:00) (121/62 - 145/70)  BP(mean): 77 (20 Oct 2021 14:00) (77 - 77)  RR: 17 (20 Oct 2021 14:00) (16 - 18)  SpO2: 97% (20 Oct 2021 14:00) (94% - 98%)    ________________________________________________  PHYSICAL EXAM:  GENERAL: NAD  HEENT: Normocephalic; atraumatic  CHEST/LUNG: Breathing nonlabored; clear to auscultation bilaterally  HEART: +S1 +S2  regular  ABDOMEN: Softly distended, nontender; Bowel sounds present  EXTREMITIES: +2 bilateral radial pulses. no edema.   SKIN: warm and dry; no rash  NERVOUS SYSTEM:  Awake and alert; Oriented  to place, person and time ; no new deficits    _________________________________________________  LABS:                        8.1    13.71 )-----------( 86       ( 20 Oct 2021 06:14 )             24.7     10-20    137  |  104  |  63<H>  ----------------------------<  105<H>  4.5   |  23  |  2.32<H>    Ca    7.8<L>      20 Oct 2021 06:14  Phos  5.2     10-20  Mg     1.8     10-20    TPro  6.9  /  Alb  2.5<L>  /  TBili  0.7  /  DBili  0.1  /  AST  35  /  ALT  32  /  AlkPhos  127<H>  10-20        CAPILLARY BLOOD GLUCOSE          < from: US Abdomen Complete (US Abdomen Complete .) (10.20.21 @ 11:03) >    EXAM:  US ABDOMEN COMPLETE                            PROCEDURE DATE:  10/20/2021          INTERPRETATION:  CLINICAL INFORMATION: Acute kidney injury, elevated liver function tests    COMPARISON: MR dated 9/29/2021    TECHNIQUE: Sonography of the abdomen.    FINDINGS:    Liver: Multiple lesions. Enlarged (20 cm). Patent main portal vein with normal flow direction.  Bile ducts: Normal caliber. Common bile duct measures 7 mm.  Gallbladder: Cholelithiasis. Mild wall thickening (4 mm). No focal tenderness.  Pancreas: Visualized portions are within normal limits.  Spleen: 11.7 cm. Within normal limits.  Right kidney: 10.5 cm. No hydronephrosis.  Left kidney: 10.8 cm.  No hydronephrosis.  Ascites: None.  Aorta and IVC: Visualized portions are within normal limits.    IMPRESSION:  Hepatic metastases.  Hepatomegaly.  Cholelithiasis.  Nonspecific gallbladder wall thickening.        --- End of Report ---            LILIANA BURDICK MD; Attending Radiologist  This document has been electronically signed. Oct 20 2021  1:48PM

## 2021-10-20 NOTE — PROGRESS NOTE ADULT - PROBLEM SELECTOR PLAN 10
PT recommends JANETTE : pt and family chose [facility]   Care management following for discharge planning   Palliative consult to be called in am  Monitor TLS labs

## 2021-10-21 LAB
ALBUMIN SERPL ELPH-MCNC: 2.4 G/DL — LOW (ref 3.5–5)
ALP SERPL-CCNC: 133 U/L — HIGH (ref 40–120)
ALT FLD-CCNC: 25 U/L DA — SIGNIFICANT CHANGE UP (ref 10–60)
ANION GAP SERPL CALC-SCNC: 10 MMOL/L — SIGNIFICANT CHANGE UP (ref 5–17)
AST SERPL-CCNC: 18 U/L — SIGNIFICANT CHANGE UP (ref 10–40)
BASOPHILS # BLD AUTO: 0.13 K/UL — SIGNIFICANT CHANGE UP (ref 0–0.2)
BASOPHILS NFR BLD AUTO: 0.8 % — SIGNIFICANT CHANGE UP (ref 0–2)
BILIRUB DIRECT SERPL-MCNC: 0.2 MG/DL — SIGNIFICANT CHANGE UP (ref 0–0.2)
BILIRUB SERPL-MCNC: 0.6 MG/DL — SIGNIFICANT CHANGE UP (ref 0.2–1.2)
BUN SERPL-MCNC: 66 MG/DL — HIGH (ref 7–18)
CALCIUM SERPL-MCNC: 7.2 MG/DL — LOW (ref 8.4–10.5)
CHLORIDE SERPL-SCNC: 105 MMOL/L — SIGNIFICANT CHANGE UP (ref 96–108)
CO2 SERPL-SCNC: 23 MMOL/L — SIGNIFICANT CHANGE UP (ref 22–31)
CREAT SERPL-MCNC: 2.6 MG/DL — HIGH (ref 0.5–1.3)
EOSINOPHIL # BLD AUTO: 0.13 K/UL — SIGNIFICANT CHANGE UP (ref 0–0.5)
EOSINOPHIL NFR BLD AUTO: 0.8 % — SIGNIFICANT CHANGE UP (ref 0–6)
GLUCOSE SERPL-MCNC: 118 MG/DL — HIGH (ref 70–99)
HCT VFR BLD CALC: 21.5 % — LOW (ref 34.5–45)
HCT VFR BLD CALC: 22.8 % — LOW (ref 34.5–45)
HGB BLD-MCNC: 7.2 G/DL — LOW (ref 11.5–15.5)
HGB BLD-MCNC: 7.7 G/DL — LOW (ref 11.5–15.5)
IMM GRANULOCYTES NFR BLD AUTO: 3.5 % — HIGH (ref 0–1.5)
LDH SERPL L TO P-CCNC: 523 U/L — HIGH (ref 120–225)
LYMPHOCYTES # BLD AUTO: 1.25 K/UL — SIGNIFICANT CHANGE UP (ref 1–3.3)
LYMPHOCYTES # BLD AUTO: 7.3 % — LOW (ref 13–44)
MCHC RBC-ENTMCNC: 29.6 PG — SIGNIFICANT CHANGE UP (ref 27–34)
MCHC RBC-ENTMCNC: 29.8 PG — SIGNIFICANT CHANGE UP (ref 27–34)
MCHC RBC-ENTMCNC: 33.5 GM/DL — SIGNIFICANT CHANGE UP (ref 32–36)
MCHC RBC-ENTMCNC: 33.8 GM/DL — SIGNIFICANT CHANGE UP (ref 32–36)
MCV RBC AUTO: 88.4 FL — SIGNIFICANT CHANGE UP (ref 80–100)
MCV RBC AUTO: 88.5 FL — SIGNIFICANT CHANGE UP (ref 80–100)
MONOCYTES # BLD AUTO: 0.54 K/UL — SIGNIFICANT CHANGE UP (ref 0–0.9)
MONOCYTES NFR BLD AUTO: 3.2 % — SIGNIFICANT CHANGE UP (ref 2–14)
NEUTROPHILS # BLD AUTO: 14.36 K/UL — HIGH (ref 1.8–7.4)
NEUTROPHILS NFR BLD AUTO: 84.4 % — HIGH (ref 43–77)
NRBC # BLD: 0 /100 WBCS — SIGNIFICANT CHANGE UP (ref 0–0)
NRBC # BLD: 0 /100 WBCS — SIGNIFICANT CHANGE UP (ref 0–0)
PHOSPHATE SERPL-MCNC: 4.6 MG/DL — HIGH (ref 2.5–4.5)
PLATELET # BLD AUTO: 102 K/UL — LOW (ref 150–400)
PLATELET # BLD AUTO: 111 K/UL — LOW (ref 150–400)
POTASSIUM SERPL-MCNC: 3.8 MMOL/L — SIGNIFICANT CHANGE UP (ref 3.5–5.3)
POTASSIUM SERPL-SCNC: 3.8 MMOL/L — SIGNIFICANT CHANGE UP (ref 3.5–5.3)
PROT SERPL-MCNC: 6.6 G/DL — SIGNIFICANT CHANGE UP (ref 6–8.3)
RBC # BLD: 2.43 M/UL — LOW (ref 3.8–5.2)
RBC # BLD: 2.58 M/UL — LOW (ref 3.8–5.2)
RBC # FLD: 17.4 % — HIGH (ref 10.3–14.5)
RBC # FLD: 17.5 % — HIGH (ref 10.3–14.5)
SODIUM SERPL-SCNC: 138 MMOL/L — SIGNIFICANT CHANGE UP (ref 135–145)
URATE SERPL-MCNC: 1.1 MG/DL — LOW (ref 2.5–7)
WBC # BLD: 17.01 K/UL — HIGH (ref 3.8–10.5)
WBC # BLD: 18.28 K/UL — HIGH (ref 3.8–10.5)
WBC # FLD AUTO: 17.01 K/UL — HIGH (ref 3.8–10.5)
WBC # FLD AUTO: 18.28 K/UL — HIGH (ref 3.8–10.5)

## 2021-10-21 RX ADMIN — Medication 650 MILLIGRAM(S): at 09:01

## 2021-10-21 RX ADMIN — SIMVASTATIN 40 MILLIGRAM(S): 20 TABLET, FILM COATED ORAL at 21:23

## 2021-10-21 RX ADMIN — Medication 25 MILLIGRAM(S): at 06:17

## 2021-10-21 RX ADMIN — Medication 25 MILLIGRAM(S): at 17:08

## 2021-10-21 RX ADMIN — Medication 30 MILLIGRAM(S): at 14:51

## 2021-10-21 RX ADMIN — Medication 650 MILLIGRAM(S): at 10:00

## 2021-10-21 RX ADMIN — Medication 1 MILLIGRAM(S): at 14:51

## 2021-10-21 NOTE — PROGRESS NOTE ADULT - ASSESSMENT
71 YOF with PMH HTN, HLD, recent dx of metastatic lung CA which has not started on active chemo yet.  Brought by  due to worsening of weakness which contributed to delaying  on initiate chemo tx .  Dr thomas is outpt heme/onc, [facility] group is following and recommends to start 1st chemo as in- patient. Day 1 was 10/15. pt is noted tumor lysis syndrome after first chemo, received Rasburicase, phoslo. 2nd chemo on hold in setting BRENDA per Heme/Onc.  on IVF. Renal/abdominal sono  for BRENDA, transaminitis revealed kidneys with no hydronephrosis. Hepatomegaly, Cholelithiasis, Nonspecific gallbladder wall thickening ( already established from previous w/u in 9/21). Patient with stage IV Ca; Oncology following with plans for palliative chemotherapy  q 3 weeks with close monitoring of renal function. PT in to see however patient is not participating; will follow up tomorrow.       71 YOF with PMH HTN, HLD, recent dx of metastatic lung CA which has not started on active chemo yet.  Brought by  due to worsening of weakness which contributed to delaying  on initiate chemo tx .  Dr thomas is outpt heme/onc, [facility] group is following and recommends to start 1st chemo as in- patient. Day 1 was 10/15. pt is noted tumor lysis syndrome after first chemo, received Rasburicase, phoslo. 2nd chemo on hold in setting BRENDA per Heme/Onc.  on IVF. Renal/abdominal sono  for BRENDA, transaminitis revealed kidneys with no hydronephrosis. Hepatomegaly, Cholelithiasis, Nonspecific gallbladder wall thickening ( already established from previous w/u in 9/21). Patient with stage IV Ca; Oncology following with plans for palliative chemotherapy  q 3 weeks with close monitoring of renal function. HBG noted 7.2 this AM, will repeat this afternoon to trend and transfuse if <7 Plan for discharge to [facility], pending acceptance given plan for IV Chemo

## 2021-10-21 NOTE — PROGRESS NOTE ADULT - PROBLEM SELECTOR PLAN 1
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 Hgb 6.9. Transfuse 1 unit PRBC 10/18.   Monitor CBC daily and transfuse as needed.   per Oncology goal of treatment is palliative;  q 3 weeks with close monitoring of renal function.  Per last GOC discussion 10/12 patient is  full code; Palliative care to consult for more extensive discussion with Oncology and family   Continue currently supportive measures  Continue to monitor and supplemental O2; currently 94-98% on 2 L via NC   Heme/onc [facility] (Dr. Miranda's) group following. 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 Hgb 6.9. Transfuse 1 unit PRBC 10/18.   Monitor CBC daily and transfuse as needed.   per Oncology goal of treatment is palliative;  q 3 weeks with close monitoring of renal function.  Per last GOC discussion 10/12 patient is  full code; Palliative follows and patient's decision has not changed  Continue to monitor and supplemental O2; currently 94-98% on 2 L via NC   Heme/onc [facility] (Dr. Miranda's) group following.

## 2021-10-21 NOTE — PROGRESS NOTE ADULT - ASSESSMENT
72 y/o F with PMHx of HTN, HLD, and metastatic Lung CA, was discharged on 10/6 from [facility] after hospitalization for sepsis from PNA and UTI, comes in complaining of weakness since discharge.   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 TLS vs ATN from anemia vs Cisplatin  TLS in view of  ( Hyperphosphatemia, Hypocalcemia, high Uric acid and BRENDA)  FENa >1  COntinue IVF-change NS 60cc/hr  Renal function still worsening  If worsens, may need RRT   Discussed with spouse  , agreeable for RRT if needed  Consent obtained for RRT , in chart   Urine lytes inconclusive  US- no hydronephrosis  Monitor I/O   MOnitor renal function  MOnitor TLS labs Ca, PO4, Uric acid  AVoid further nephtoxics NSAIDS RCA      HTN  Acceptable  MOnitor BP    Hyperphosphatemia  likely sec to TLS   Improving   Low Po4 diet

## 2021-10-21 NOTE — PROGRESS NOTE ADULT - PROBLEM SELECTOR PLAN 2
-Hyperphosphatemia: --->8.1 Phoslo 1334 twice daily---> 5.2  No concentrated phos in diet added.   -hyperuricemia 16.7 ; s/p Elitek 3mg IV x 1---> 6.---> 1; monitor Serum Uric acid daily  -elevated LDH 1055--->695  - IVF x 24 hrs then reevaluate   Monitor CMP, Magnesium, phosphorus , LDH, Uric acid daily in am.   Renal following   HemeOnc following -Hyperphosphatemia: downtrending   Phoslo 1334 twice daily  No concentrated phos in diet added.   -hyperuricemia 16.7 ; s/p Elitek 3mg IV x 1---> 6.---> 1; monitor Serum Uric acid daily  -elevated LDH 1055--->695  - IVF x 24 hrs then reevaluate   Monitor CMP, Magnesium, phosphorus , LDH, Uric acid daily in am.   Renal following   HemeOnc following

## 2021-10-21 NOTE — PROGRESS NOTE ADULT - PROBLEM SELECTOR PLAN 10
PT recommends JANETTE : pt and family chose [facility]   Care management following for discharge planning   Palliative consult to be called in am  Monitor TLS labs PT recommends JANETTE : pt and family chose [facility]   Care management following for discharge planning   Palliative follows  Monitor TLS labs

## 2021-10-21 NOTE — PROGRESS NOTE ADULT - ASSESSMENT
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 Next cycle due in 3 weeks. Encouraged OOB to chair with assistance  -upon discharge pt to f/u with Dr. Casper     Problem #2 Anemia - multifactorial, including recent chemotherapy; transfuse to keep hg > 7.5 g/dl  stable   s/p PRBC transfusion 10/18    Problem #3 Renal failure - worsening despite IVF and po fluid, Cr now 2.6  -chemo day 2 & 3 cancelled  -continue IVF  -Abd US: no hydro, showed Hepatic metastases.Hepatomegaly.Cholelithiasis.Nonspecific gallbladder wall thickening.  -avoid nephrotoxic agents  -after review of labs c/w TLS, rasburicase 3 mg given 10/18  -appreciate nephrology input, monitoring urine output    Problem# ? TLS  BRENDA, hyperuricemia, hyperphosphatemia  LDH 1050-->695-->523 improving  Hypocalcemia since last admission d/t aredia given for hypercalcemia  rasburicase 3mg given 10/18  repeat uric acid now wnl at 6.1-->1.0  dec phos  continue IVF  appreciate nephrology consult, no evidence of tubulo-toxicity from cis    #Leukocytosis  d/t neulasta    Problem# H/A  mgmt as per Medicine Team  avoid nephrotoxic meds  tylenol resolves pain    case d/w Medicine Team  Thank you for the referral. Will continue to monitor the patient.  Please call with any questions [phone]  Above reviewed with Attending Dr. Lowell PEGUERO/[facility]  [address] [facility], [address], [place], [place]  [phone]

## 2021-10-21 NOTE — PROGRESS NOTE ADULT - PROBLEM SELECTOR PLAN 4
Serum Creat uptrended to 1.61 in the setting of TLS   - Chemotherapy day 2 and 3 held   - Cr now 2.3  - US; kidneys with no hydronephrosis   - Continue NS @ 100cc/hr   Avoid nephrotoxic agents, NSAIDs  Renal following; possible need for RRT  discussed and spouse is agreeable  for RRT if needed. IVF continued for now.   - Continue to monitor BMP in the setting of TLS   - Chemotherapy day 2 and 3 held   - Cr now 2.6  - US; kidneys with no hydronephrosis   - Continue NS @ 100cc/hr   Avoid nephrotoxic agents, NSAIDs  Renal following;   - Continue to monitor BMP

## 2021-10-21 NOTE — PROGRESS NOTE ADULT - SUBJECTIVE AND OBJECTIVE BOX
Dr. Wiley  Office [phone]  Cell [phone]  Mir[facility]ME  Cell [phone]    RENAL PROGRESS NOTE: DATE OF SERVICE 10-21-21 @ 11:30    Patient is a 71y old  Female who presents with a chief complaint of weakness (21 Oct 2021 11:22)      Patient seen and examined at bedside. No chest pain/sob    VITALS:  T(F): 97.6 (10-21-21 @ 05:05), Max: 98 (10-20-21 @ 19:43)  HR: 74 (10-21-21 @ 05:05)  BP: 149/93 (10-21-21 @ 05:05)  RR: 16 (10-21-21 @ 05:05)  SpO2: 95% (10-21-21 @ 05:05)  Wt(kg): --    10-20 @ 07:01  -  10-21 @ 07:00  --------------------------------------------------------  IN: 0 mL / OUT: 1500 mL / NET: -1500 mL          PHYSICAL EXAM:  Constitutional: NAD  Neck: No JVD  Respiratory: CTAB, no wheezes, rales or rhonchi  Cardiovascular: S1, S2, RRR  Gastrointestinal: BS+, soft, NT/ND  Extremities: No peripheral edema    Hospital Medications:   MEDICATIONS  (STANDING):  folic acid 1 milliGRAM(s) Oral daily  metoprolol tartrate 25 milliGRAM(s) Oral two times a day  PARoxetine 30 milliGRAM(s) Oral daily  simvastatin 40 milliGRAM(s) Oral at bedtime  sodium chloride 0.9%. 100 milliLiter(s) (100 mL/Hr) IV Continuous <Continuous>      LABS:  10-21    138  |  105  |  66<H>  ----------------------------<  118<H>  3.8   |  23  |  2.60<H>    Ca    7.2<L>      21 Oct 2021 06:47  Phos  4.6     10-21  Mg     1.8     10-20    TPro  6.6  /  Alb  2.4<L>  /  TBili  0.6  /  DBili  0.2  /  AST  18  /  ALT  25  /  AlkPhos  133<H>  10-21    Creatinine Trend: 2.60 <--, 2.32 <--, 2.08 <--, 1.97 <--, 1.61 <--, 1.32 <--, 0.85 <--, 0.80 <--    Albumin, Serum: 2.4 g/dL (10-21 @ 06:47)  Phosphorus Level, Serum: 4.6 mg/dL (10-21 @ 06:47)                              7.2    17.01 )-----------( 111      ( 21 Oct 2021 06:47 )             21.5     Urine Studies:  Urinalysis - [10-18-21 @ 16:11]      Color Yellow / Appearance very cloudy / SG 1.010 / pH 7.0      Gluc Negative / Ketone Negative  / Bili Negative / Urobili Negative       Blood Trace / Protein 100 / Leuk Est Moderate / Nitrite Negative      RBC 0-2 / WBC 6-10 / Hyaline  / Gran  / Sq Epi  / Non Sq Epi Few / Bacteria TNTC    Urine Creatinine 30      [10-18-21 @ 16:11]  Urine Sodium 82      [10-18-21 @ 16:11]    Iron 56, TIBC 262, %sat 21      [09-22-21 @ 07:54]  HbA1c 6.2      [07-10-18 @ 13:53]  TSH 1.17      [09-28-21 @ 05:58]  Lipid: chol 172, , HDL 25, LDL --      [09-21-21 @ 07:37]      Immunofixation Serum:    One Weak  IgG Kappa Band and One Weak  IgG Lambda Band Identified    Reference Range: None Detected      [09-29-21 @ 01:43]  SPEP Interpretation: Two Weak Gamma-Migrating Paraproteins Identified      [09-29-21 @ 01:43]    RADIOLOGY & ADDITIONAL STUDIES:

## 2021-10-21 NOTE — PROGRESS NOTE ADULT - SUBJECTIVE AND OBJECTIVE BOX
Time of Visit:  Patient seen and examined.     MEDICATIONS  (STANDING):  folic acid 1 milliGRAM(s) Oral daily  metoprolol tartrate 25 milliGRAM(s) Oral two times a day  PARoxetine 30 milliGRAM(s) Oral daily  simvastatin 40 milliGRAM(s) Oral at bedtime  sodium chloride 0.9%. 100 milliLiter(s) (100 mL/Hr) IV Continuous <Continuous>      MEDICATIONS  (PRN):  acetaminophen   Tablet .. 650 milliGRAM(s) Oral every 6 hours PRN Temp greater or equal to 38C (100.4F), Mild Pain (1 - 3), Moderate Pain (4 - 6)  ALBUTerol    90 MICROgram(s) HFA Inhaler 2 Puff(s) Inhalation every 6 hours PRN Shortness of Breath and/or Wheezing  OLANZapine 2.5 milliGRAM(s) Oral at bedtime PRN Agitation       Medications up to date at time of exam.    ROS; No fever, chills, cough, congestion on exam.   PHYSICAL EXAMINATION:    Vital Signs Last 24 Hrs  T(C): 36.4 (21 Oct 2021 05:05), Max: 36.7 (20 Oct 2021 19:43)  T(F): 97.6 (21 Oct 2021 05:05), Max: 98 (20 Oct 2021 19:43)  HR: 74 (21 Oct 2021 05:05) (74 - 79)  BP: 149/93 (21 Oct 2021 05:05) (119/51 - 149/93)  BP(mean): 77 (20 Oct 2021 14:00) (77 - 77)  RR: 16 (21 Oct 2021 05:05) (16 - 17)  SpO2: 95% (21 Oct 2021 05:05) (95% - 97%)   (if applicable)    General: Alert and oriented x2. Poor historian . Able to answer question at rest with no SOB. No acute distress.      HEENT: Head is normocephalic and atraumatic. No nasal tenderness. Extraocular muscles are intact. Mucous membranes are moist.     NECK: Supple, no palpable adenopathy.    LUNGS: Non labored. Clear to auscultation bilaterally with no rales, rhonchi, wheezing. No use of accessory muscle.     HEART: S1 S2 Regular rate and no click/ rub.     ABDOMEN: Soft, nontender, and nondistended.  Active bowel sounds.     : No bladder distention and tenderness.     NEUROLOGIC: Awake, alert, oriented, forgetful .     SKIN: Warm and moist . Non diaphoretic.       LABS:                        7.7    18.28 )-----------( 102      ( 21 Oct 2021 13:02 )             22.8     10-21    138  |  105  |  66<H>  ----------------------------<  118<H>  3.8   |  23  |  2.60<H>    Ca    7.2<L>      21 Oct 2021 06:47  Phos  4.6     10-21  Mg     1.8     10-20    TPro  6.6  /  Alb  2.4<L>  /  TBili  0.6  /  DBili  0.2  /  AST  18  /  ALT  25  /  AlkPhos  133<H>  10-21                        MICROBIOLOGY: (if applicable)    RADIOLOGY & ADDITIONAL STUDIES:  EKG:   CXR:  ECHO:    IMPRESSION: 71y Female PAST MEDICAL & SURGICAL HISTORY:  Lumbar stenosis    Bleeding ulcer  stomach ulcer 2011    Anxiety    Spinal stenosis in cervical region    Acute hepatitis C virus infection without hepatic coma  treated 2015- Resolved    Balance disorder    Coronary artery disease involving native coronary artery of native heart without angina pectoris    HTN (hypertension)    HLD (hyperlipidemia)    Coronary artery disease involving native coronary artery of native heart without angina pectoris    H/O colonoscopy    Bleeding ulcer  stomach surgery for bleeding ulcer    Fracture  ORIF Left wrist  1/17/13    History of lumbar surgery  2013    Chronic UTI  Pt reports presently on 2nd dose of antibiotics 7/5/18 10 days, Dr Herman aware, pt going for m/eval 7/13/18.    H/O cardiac catheterization  2013, no intervention needed, treated medically     Impression: 72 Y/O Female presented with weakness since discharge. Patient was discharged on 10-06-21 from [facility] after hospitalization for Sepsis from UTI and Pneumonia. Has Metastatic Lung Ca who had s/p EBUS / Bronchoscopy on 10-04-21. Right endobronchial  Mass. Has Stage IV High-Grade Lung , Neuroendocrine CA with likely mets to liver/peritoneum/bone being followed by Oncology Team for inpatient Chemo .  10-19-21, 10-12-21 Negative for Covid 19 PCR.    Suggestion:  O2 saturation 94%  room air. So far saturating good room air today. Can Oxygen supplementation  2L NC if needed .  Pulmonary Oral hygiene care.  Transfuse as needed per Heme  Oncology follow up for Chemo therapy.  Continue PRN Albuterol 90 mcg 2 puffs Q 6 Hours.      Time of Visit:  Patient seen and examined.     MEDICATIONS  (STANDING):  folic acid 1 milliGRAM(s) Oral daily  metoprolol tartrate 25 milliGRAM(s) Oral two times a day  PARoxetine 30 milliGRAM(s) Oral daily  simvastatin 40 milliGRAM(s) Oral at bedtime  sodium chloride 0.9%. 100 milliLiter(s) (100 mL/Hr) IV Continuous <Continuous>      MEDICATIONS  (PRN):  acetaminophen   Tablet .. 650 milliGRAM(s) Oral every 6 hours PRN Temp greater or equal to 38C (100.4F), Mild Pain (1 - 3), Moderate Pain (4 - 6)  ALBUTerol    90 MICROgram(s) HFA Inhaler 2 Puff(s) Inhalation every 6 hours PRN Shortness of Breath and/or Wheezing  OLANZapine 2.5 milliGRAM(s) Oral at bedtime PRN Agitation       Medications up to date at time of exam.    ROS; No fever, chills, cough, congestion on exam.   PHYSICAL EXAMINATION:    Vital Signs Last 24 Hrs  T(C): 36.4 (21 Oct 2021 05:05), Max: 36.7 (20 Oct 2021 19:43)  T(F): 97.6 (21 Oct 2021 05:05), Max: 98 (20 Oct 2021 19:43)  HR: 74 (21 Oct 2021 05:05) (74 - 79)  BP: 149/93 (21 Oct 2021 05:05) (119/51 - 149/93)  BP(mean): 77 (20 Oct 2021 14:00) (77 - 77)  RR: 16 (21 Oct 2021 05:05) (16 - 17)  SpO2: 95% (21 Oct 2021 05:05) (95% - 97%)   (if applicable)    General: Alert and oriented x2. Poor historian . Able to answer question at rest with no SOB. No acute distress.      HEENT: Head is normocephalic and atraumatic. No nasal tenderness. Extraocular muscles are intact. Mucous membranes are moist.     NECK: Supple, no palpable adenopathy.    LUNGS: Non labored. Clear to auscultation bilaterally with no rales, rhonchi, wheezing. No use of accessory muscle.     HEART: S1 S2 Regular rate and no click/ rub.     ABDOMEN: Soft, nontender, and nondistended.  Active bowel sounds.     : No bladder distention and tenderness.     NEUROLOGIC: Awake, alert, oriented, forgetful .     SKIN: Warm and moist . Non diaphoretic.       LABS:                        7.7    18.28 )-----------( 102      ( 21 Oct 2021 13:02 )             22.8     10-21    138  |  105  |  66<H>  ----------------------------<  118<H>  3.8   |  23  |  2.60<H>    Ca    7.2<L>      21 Oct 2021 06:47  Phos  4.6     10-21  Mg     1.8     10-20    TPro  6.6  /  Alb  2.4<L>  /  TBili  0.6  /  DBili  0.2  /  AST  18  /  ALT  25  /  AlkPhos  133<H>  10-21                        MICROBIOLOGY: (if applicable)    RADIOLOGY & ADDITIONAL STUDIES:  EKG:   CXR:  ECHO:    IMPRESSION: 71y Female PAST MEDICAL & SURGICAL HISTORY:  Lumbar stenosis    Bleeding ulcer  stomach ulcer 2011    Anxiety    Spinal stenosis in cervical region    Acute hepatitis C virus infection without hepatic coma  treated 2015- Resolved    Balance disorder    Coronary artery disease involving native coronary artery of native heart without angina pectoris    HTN (hypertension)    HLD (hyperlipidemia)    Coronary artery disease involving native coronary artery of native heart without angina pectoris    H/O colonoscopy    Bleeding ulcer  stomach surgery for bleeding ulcer    Fracture  ORIF Left wrist  1/17/13    History of lumbar surgery  2013    Chronic UTI  Pt reports presently on 2nd dose of antibiotics 7/5/18 10 days, Dr Herman aware, pt going for m/eval 7/13/18.    H/O cardiac catheterization  2013, no intervention needed, treated medically     Impression: 70 Y/O Female presented with weakness since discharge. Patient was discharged on 10-06-21 from [facility] after hospitalization for Sepsis from UTI and Pneumonia. Has Metastatic Lung Ca who had s/p EBUS / Bronchoscopy on 10-04-21. Right endobronchial  Mass. Has Stage IV High-Grade Lung , Neuroendocrine CA with likely mets to liver/peritoneum/bone being followed by Oncology Team for inpatient Chemo .  10-19-21, 10-12-21 Negative for Covid 19 PCR.    Suggestion:  O2 saturation 94%  room air. So far saturating good room air today. Can Oxygen supplementation  2L NC if needed .  Pulmonary Oral hygiene care.  Transfuse as needed per Heme  Oncology follow up for Chemo therapy.  Continue PRN Albuterol 90 mcg 2 puffs Q 6 Hours.         Agree with above assessment and plan as transcribed.

## 2021-10-21 NOTE — PROGRESS NOTE ADULT - SUBJECTIVE AND OBJECTIVE BOX
Patient is a 71y old  Female who presents with a chief complaint of weakness (21 Oct 2021 11:50)      SUBJECTIVE / OVERNIGHT EVENTS:    ADDITIONAL REVIEW OF SYSTEMS:    MEDICATIONS  (STANDING):  folic acid 1 milliGRAM(s) Oral daily  metoprolol tartrate 25 milliGRAM(s) Oral two times a day  PARoxetine 30 milliGRAM(s) Oral daily  simvastatin 40 milliGRAM(s) Oral at bedtime  sodium chloride 0.9%. 100 milliLiter(s) (100 mL/Hr) IV Continuous <Continuous>    MEDICATIONS  (PRN):  acetaminophen   Tablet .. 650 milliGRAM(s) Oral every 6 hours PRN Temp greater or equal to 38C (100.4F), Mild Pain (1 - 3), Moderate Pain (4 - 6)  ALBUTerol    90 MICROgram(s) HFA Inhaler 2 Puff(s) Inhalation every 6 hours PRN Shortness of Breath and/or Wheezing  OLANZapine 2.5 milliGRAM(s) Oral at bedtime PRN Agitation      Vital Signs Last 24 Hrs  T(C): 36.4 (21 Oct 2021 05:05), Max: 36.7 (20 Oct 2021 19:43)  T(F): 97.6 (21 Oct 2021 05:05), Max: 98 (20 Oct 2021 19:43)  HR: 74 (21 Oct 2021 05:05) (74 - 79)  BP: 149/93 (21 Oct 2021 05:05) (119/51 - 149/93)  BP(mean): 77 (20 Oct 2021 14:00) (77 - 77)  RR: 16 (21 Oct 2021 05:05) (16 - 17)  SpO2: 95% (21 Oct 2021 05:05) (95% - 97%)      LABS:                        7.7    18.28 )-----------( 102      ( 21 Oct 2021 13:02 )             22.8     10-21    138  |  105  |  66<H>  ----------------------------<  118<H>  3.8   |  23  |  2.60<H>    Ca    7.2<L>      21 Oct 2021 06:47  Phos  4.6     10-21  Mg     1.8     10-20    TPro  6.6  /  Alb  2.4<L>  /  TBili  0.6  /  DBili  0.2  /  AST  18  /  ALT  25  /  AlkPhos  133<H>  10-21        COVID-19 PCR: NotDetec (19 Oct 2021 06:21)  COVID-19 PCR: NotDetec (12 Oct 2021 13:12)  COVID-19 PCR: NotDetec (03 Oct 2021 06:52)  COVID-19 PCR: NotDetec (23 Sep 2021 20:35)  COVID-19 PCR: NotDetec (20 Sep 2021 11:38)       Patient is a 71y old  Female who presents with a chief complaint of weakness (21 Oct 2021 11:50)    SUBJECTIVE / OVERNIGHT EVENTS: events noted. No new complaints. Again c/o H/A; however, denies dizziness, change in vision, pt eating, talking, oriented      MEDICATIONS  (STANDING):  folic acid 1 milliGRAM(s) Oral daily  metoprolol tartrate 25 milliGRAM(s) Oral two times a day  PARoxetine 30 milliGRAM(s) Oral daily  simvastatin 40 milliGRAM(s) Oral at bedtime  sodium chloride 0.9%. 100 milliLiter(s) (100 mL/Hr) IV Continuous <Continuous>    MEDICATIONS  (PRN):  acetaminophen   Tablet .. 650 milliGRAM(s) Oral every 6 hours PRN Temp greater or equal to 38C (100.4F), Mild Pain (1 - 3), Moderate Pain (4 - 6)  ALBUTerol    90 MICROgram(s) HFA Inhaler 2 Puff(s) Inhalation every 6 hours PRN Shortness of Breath and/or Wheezing  OLANZapine 2.5 milliGRAM(s) Oral at bedtime PRN Agitation      Vital Signs Last 24 Hrs  T(C): 36.4 (21 Oct 2021 05:05), Max: 36.7 (20 Oct 2021 19:43)  T(F): 97.6 (21 Oct 2021 05:05), Max: 98 (20 Oct 2021 19:43)  HR: 74 (21 Oct 2021 05:05) (74 - 79)  BP: 149/93 (21 Oct 2021 05:05) (119/51 - 149/93)  BP(mean): 77 (20 Oct 2021 14:00) (77 - 77)  RR: 16 (21 Oct 2021 05:05) (16 - 17)  SpO2: 95% (21 Oct 2021 05:05) (95% - 97%)      GEN: NAD; A and O x 3  LUNGS: CTA B/L  HEART: S1 S2  ABDOMEN: soft, non-tender, non-distended, + BS  EXTREMITIES: no edema  NERVOUS SYSTEM:  Awake and alert; no focal neuro deficits      LABS:                        7.7    18.28 )-----------( 102      ( 21 Oct 2021 13:02 )             22.8     10-21    138  |  105  |  66<H>  ----------------------------<  118<H>  3.8   |  23  |  2.60<H>    Ca    7.2<L>      21 Oct 2021 06:47  Phos  4.6     10-21  Mg     1.8     10-20    TPro  6.6  /  Alb  2.4<L>  /  TBili  0.6  /  DBili  0.2  /  AST  18  /  ALT  25  /  AlkPhos  133<H>  10-21        COVID-19 PCR: NotDetec (19 Oct 2021 06:21)  COVID-19 PCR: NotDetec (12 Oct 2021 13:12)  COVID-19 PCR: NotDetec (03 Oct 2021 06:52)  COVID-19 PCR: NotDetec (23 Sep 2021 20:35)  COVID-19 PCR: NotDetec (20 Sep 2021 11:38)

## 2021-10-21 NOTE — PROGRESS NOTE ADULT - SUBJECTIVE AND OBJECTIVE BOX
-*-* INCOMPLETE  NP Note discussed with  Primary Attending    INTERVAL HPI/OVERNIGHT EVENTS: Offers no complaints     MEDICATIONS  (STANDING):  folic acid 1 milliGRAM(s) Oral daily  metoprolol tartrate 25 milliGRAM(s) Oral two times a day  PARoxetine 30 milliGRAM(s) Oral daily  simvastatin 40 milliGRAM(s) Oral at bedtime  sodium chloride 0.9%. 100 milliLiter(s) (100 mL/Hr) IV Continuous <Continuous>    MEDICATIONS  (PRN):  acetaminophen   Tablet .. 650 milliGRAM(s) Oral every 6 hours PRN Temp greater or equal to 38C (100.4F), Mild Pain (1 - 3), Moderate Pain (4 - 6)  ALBUTerol    90 MICROgram(s) HFA Inhaler 2 Puff(s) Inhalation every 6 hours PRN Shortness of Breath and/or Wheezing  OLANZapine 2.5 milliGRAM(s) Oral at bedtime PRN Agitation      __________________________________________________  REVIEW OF SYSTEMS:    CONSTITUTIONAL: No fever,   EYES: no acute visual disturbances  NECK: No pain or stiffness  RESPIRATORY: No cough; No shortness of breath  CARDIOVASCULAR: No chest pain, no palpitations  GASTROINTESTINAL: No pain. No nausea or vomiting; No diarrhea   NEUROLOGICAL: No headache or numbness, no tremors  MUSCULOSKELETAL: No joint pain, no muscle pain  GENITOURINARY: no dysuria, no frequency, no hesitancy  PSYCHIATRY: no depression , no anxiety  ALL OTHER  ROS negative        Vital Signs Last 24 Hrs  T(C): 36.4 (21 Oct 2021 05:05), Max: 36.7 (20 Oct 2021 19:43)  T(F): 97.6 (21 Oct 2021 05:05), Max: 98 (20 Oct 2021 19:43)  HR: 74 (21 Oct 2021 05:05) (74 - 79)  BP: 149/93 (21 Oct 2021 05:05) (119/51 - 149/93)  BP(mean): 77 (20 Oct 2021 14:00) (77 - 77)  RR: 16 (21 Oct 2021 05:05) (16 - 17)  SpO2: 95% (21 Oct 2021 05:05) (95% - 97%)    ________________________________________________  PHYSICAL EXAM:  GENERAL: NAD  HEENT: Normocephalic; atraumatic  CHEST/LUNG: Breathing nonlabored breathing with ease on N/C -- diminished B/S   HEART: +S1 +S2  regular  ABDOMEN: Softly distended, nontender; Bowel sounds present  EXTREMITIES: +2 bilateral radial pulses. no edema.   SKIN: warm and dry; no rash  NERVOUS SYSTEM:  Awake and alert; Oriented  to place, person and time  _________________________________________________  LABS:                        7.7    18.28 )-----------( 102      ( 21 Oct 2021 13:02 )             22.8     10-21    138  |  105  |  66<H>  ----------------------------<  118<H>  3.8   |  23  |  2.60<H>    Ca    7.2<L>      21 Oct 2021 06:47  Phos  4.6     10-21  Mg     1.8     10-20    TPro  6.6  /  Alb  2.4<L>  /  TBili  0.6  /  DBili  0.2  /  AST  18  /  ALT  25  /  AlkPhos  133<H>  10-21        CAPILLARY BLOOD GLUCOSE            RADIOLOGY & ADDITIONAL TESTS: < from: US Abdomen Complete (US Abdomen Complete .) (10.20.21 @ 11:03) >  IMPRESSION:  Hepatic metastases.  Hepatomegaly.  Cholelithiasis.  Nonspecific gallbladder wall thickening.        --- End of Report ---    < end of copied text >      Imaging Personally Reviewed:  YES    Consultant(s) Notes Reviewed:   YES    Plan of care was discussed with patient and /or primary care giver; all questions and concerns were addressed and care was aligned with patient's wishes.

## 2021-10-22 LAB
ALBUMIN SERPL ELPH-MCNC: 2.7 G/DL — LOW (ref 3.5–5)
ALP SERPL-CCNC: 155 U/L — HIGH (ref 40–120)
ALT FLD-CCNC: 23 U/L DA — SIGNIFICANT CHANGE UP (ref 10–60)
ANION GAP SERPL CALC-SCNC: 9 MMOL/L — SIGNIFICANT CHANGE UP (ref 5–17)
AST SERPL-CCNC: 18 U/L — SIGNIFICANT CHANGE UP (ref 10–40)
BASOPHILS # BLD AUTO: 0 K/UL — SIGNIFICANT CHANGE UP (ref 0–0.2)
BASOPHILS NFR BLD AUTO: 0 % — SIGNIFICANT CHANGE UP (ref 0–2)
BILIRUB DIRECT SERPL-MCNC: 0.1 MG/DL — SIGNIFICANT CHANGE UP (ref 0–0.2)
BILIRUB INDIRECT FLD-MCNC: 0.4 MG/DL — SIGNIFICANT CHANGE UP (ref 0.2–1)
BILIRUB SERPL-MCNC: 0.5 MG/DL — SIGNIFICANT CHANGE UP (ref 0.2–1.2)
BUN SERPL-MCNC: 69 MG/DL — HIGH (ref 7–18)
CALCIUM SERPL-MCNC: 7.4 MG/DL — LOW (ref 8.4–10.5)
CHLORIDE SERPL-SCNC: 104 MMOL/L — SIGNIFICANT CHANGE UP (ref 96–108)
CO2 SERPL-SCNC: 25 MMOL/L — SIGNIFICANT CHANGE UP (ref 22–31)
CREAT SERPL-MCNC: 2.59 MG/DL — HIGH (ref 0.5–1.3)
EOSINOPHIL # BLD AUTO: 0 K/UL — SIGNIFICANT CHANGE UP (ref 0–0.5)
EOSINOPHIL NFR BLD AUTO: 0 % — SIGNIFICANT CHANGE UP (ref 0–6)
GLUCOSE SERPL-MCNC: 93 MG/DL — SIGNIFICANT CHANGE UP (ref 70–99)
HCT VFR BLD CALC: 21.8 % — LOW (ref 34.5–45)
HGB BLD-MCNC: 7.4 G/DL — LOW (ref 11.5–15.5)
LDH SERPL L TO P-CCNC: 498 U/L — HIGH (ref 120–225)
LYMPHOCYTES # BLD AUTO: 0.71 K/UL — LOW (ref 1–3.3)
LYMPHOCYTES # BLD AUTO: 3 % — LOW (ref 13–44)
MANUAL SMEAR VERIFICATION: SIGNIFICANT CHANGE UP
MCHC RBC-ENTMCNC: 30 PG — SIGNIFICANT CHANGE UP (ref 27–34)
MCHC RBC-ENTMCNC: 33.9 GM/DL — SIGNIFICANT CHANGE UP (ref 32–36)
MCV RBC AUTO: 88.3 FL — SIGNIFICANT CHANGE UP (ref 80–100)
MONOCYTES # BLD AUTO: 0.94 K/UL — HIGH (ref 0–0.9)
MONOCYTES NFR BLD AUTO: 4 % — SIGNIFICANT CHANGE UP (ref 2–14)
NEUTROPHILS # BLD AUTO: 21.89 K/UL — HIGH (ref 1.8–7.4)
NEUTROPHILS NFR BLD AUTO: 90 % — HIGH (ref 43–77)
NEUTS BAND # BLD: 3 % — SIGNIFICANT CHANGE UP (ref 0–8)
NRBC # BLD: 0 /100 — SIGNIFICANT CHANGE UP (ref 0–0)
PLAT MORPH BLD: NORMAL — SIGNIFICANT CHANGE UP
PLATELET # BLD AUTO: 113 K/UL — LOW (ref 150–400)
POTASSIUM SERPL-MCNC: 3.8 MMOL/L — SIGNIFICANT CHANGE UP (ref 3.5–5.3)
POTASSIUM SERPL-SCNC: 3.8 MMOL/L — SIGNIFICANT CHANGE UP (ref 3.5–5.3)
PROT SERPL-MCNC: 7 G/DL — SIGNIFICANT CHANGE UP (ref 6–8.3)
RBC # BLD: 2.47 M/UL — LOW (ref 3.8–5.2)
RBC # FLD: 16.7 % — HIGH (ref 10.3–14.5)
RBC BLD AUTO: NORMAL — SIGNIFICANT CHANGE UP
SODIUM SERPL-SCNC: 138 MMOL/L — SIGNIFICANT CHANGE UP (ref 135–145)
URATE SERPL-MCNC: 2.4 MG/DL — LOW (ref 2.5–7)
WBC # BLD: 23.54 K/UL — HIGH (ref 3.8–10.5)
WBC # FLD AUTO: 23.54 K/UL — HIGH (ref 3.8–10.5)

## 2021-10-22 RX ORDER — SODIUM CHLORIDE 9 MG/ML
1000 INJECTION INTRAMUSCULAR; INTRAVENOUS; SUBCUTANEOUS
Refills: 0 | Status: DISCONTINUED | OUTPATIENT
Start: 2021-10-22 | End: 2021-10-25

## 2021-10-22 RX ADMIN — Medication 1 MILLIGRAM(S): at 12:39

## 2021-10-22 RX ADMIN — SODIUM CHLORIDE 75 MILLILITER(S): 9 INJECTION INTRAMUSCULAR; INTRAVENOUS; SUBCUTANEOUS at 12:39

## 2021-10-22 RX ADMIN — Medication 25 MILLIGRAM(S): at 17:07

## 2021-10-22 RX ADMIN — Medication 30 MILLIGRAM(S): at 12:39

## 2021-10-22 RX ADMIN — Medication 650 MILLIGRAM(S): at 13:39

## 2021-10-22 RX ADMIN — SIMVASTATIN 40 MILLIGRAM(S): 20 TABLET, FILM COATED ORAL at 21:34

## 2021-10-22 RX ADMIN — Medication 650 MILLIGRAM(S): at 12:39

## 2021-10-22 NOTE — PROGRESS NOTE ADULT - PROBLEM SELECTOR PLAN 2
-Hyperphosphatemia: downtrending   Phoslo 1334 twice daily  No concentrated phos in diet added.   -hyperuricemia 16.7 ; s/p Elitek 3mg IV x 1---> 6.---> 1; monitor Serum Uric acid daily  -elevated LDH 1055--->695  - IVF x 24 hrs then reevaluate   Monitor CMP, Magnesium, phosphorus , LDH, Uric acid daily in am.   Renal following   HemeOnc following -Hyperphosphatemia: downtrending   No concentrated phos in diet added.   -hyperuricemia   consider allopurinol renally dosed  monitor Serum Uric acid daily  -elevated LDH 1055---> 498  - IVF x 24 hrs then reevaluate   Monitor CMP, Magnesium, phosphorus , LDH, Uric acid daily in am.   Renal following   HemeOnc following

## 2021-10-22 NOTE — PROGRESS NOTE ADULT - PROBLEM SELECTOR PLAN 10
PT recommends JANETTE : pt and family chose [facility]   Care management following for discharge planning   Palliative follows  Monitor TLS labs PT recommends JANETTE : pt and family chose [facility]   currently refusing to participate in PT  Care management following for discharge planning   Palliative follows  Monitor TLS labs daily

## 2021-10-22 NOTE — PROGRESS NOTE ADULT - SUBJECTIVE AND OBJECTIVE BOX
-*-*INCOMPLETE  NP Note discussed with  Primary Attending    INTERVAL HPI/OVERNIGHT EVENTS: no new complaints    MEDICATIONS  (STANDING):  folic acid 1 milliGRAM(s) Oral daily  metoprolol tartrate 25 milliGRAM(s) Oral two times a day  PARoxetine 30 milliGRAM(s) Oral daily  simvastatin 40 milliGRAM(s) Oral at bedtime  sodium chloride 0.9%. 100 milliLiter(s) (100 mL/Hr) IV Continuous <Continuous>  sodium chloride 0.9%. 1000 milliLiter(s) (75 mL/Hr) IV Continuous <Continuous>    MEDICATIONS  (PRN):  acetaminophen   Tablet .. 650 milliGRAM(s) Oral every 6 hours PRN Temp greater or equal to 38C (100.4F), Mild Pain (1 - 3), Moderate Pain (4 - 6)  ALBUTerol    90 MICROgram(s) HFA Inhaler 2 Puff(s) Inhalation every 6 hours PRN Shortness of Breath and/or Wheezing  OLANZapine 2.5 milliGRAM(s) Oral at bedtime PRN Agitation      __________________________________________________  REVIEW OF SYSTEMS:    CONSTITUTIONAL: No fever,   EYES: no acute visual disturbances  NECK: No pain or stiffness  RESPIRATORY: No cough; No shortness of breath  CARDIOVASCULAR: No chest pain, no palpitations  GASTROINTESTINAL: No pain. No nausea or vomiting; No diarrhea   NEUROLOGICAL: No headache or numbness, no tremors  MUSCULOSKELETAL: No joint pain, no muscle pain  GENITOURINARY: no dysuria, no frequency, no hesitancy  PSYCHIATRY: no depression , no anxiety  ALL OTHER  ROS negative        Vital Signs Last 24 Hrs  T(C): 36.8 (22 Oct 2021 12:40), Max: 36.9 (21 Oct 2021 14:38)  T(F): 98.2 (22 Oct 2021 12:40), Max: 98.5 (21 Oct 2021 14:38)  HR: 87 (22 Oct 2021 12:40) (68 - 87)  BP: 129/87 (22 Oct 2021 12:40) (129/87 - 152/69)  BP(mean): 83 (22 Oct 2021 05:05) (83 - 91)  RR: 17 (22 Oct 2021 12:40) (17 - 18)  SpO2: 96% (22 Oct 2021 12:40) (94% - 99%)    ________________________________________________  PHYSICAL EXAM:  GENERAL: NAD  HEENT: Normocephalic;  conjunctivae and sclerae clear; moist mucous membranes;   NECK : supple  CHEST/LUNG: Clear to auscultation bilaterally with good air entry   HEART: S1 S2  regular; no murmurs, gallops or rubs  ABDOMEN: Soft, Nontender, Nondistended; Bowel sounds present  EXTREMITIES: no cyanosis; no edema; no calf tenderness  SKIN: warm and dry; no rash  NERVOUS SYSTEM:  Awake and alert; Oriented  to place, person and time ; no new deficits    _________________________________________________  LABS:                        7.4    23.54 )-----------( 113      ( 22 Oct 2021 07:41 )             21.8     10-22    138  |  104  |  69<H>  ----------------------------<  93  3.8   |  25  |  2.59<H>    Ca    7.4<L>      22 Oct 2021 07:41  Phos  4.6     10-21    TPro  7.0  /  Alb  2.7<L>  /  TBili  0.5  /  DBili  0.1  /  AST  18  /  ALT  23  /  AlkPhos  155<H>  10-22        CAPILLARY BLOOD GLUCOSE            RADIOLOGY & ADDITIONAL TESTS:    Imaging Personally Reviewed:  YES/NO    Consultant(s) Notes Reviewed:   YES/ No    Care Discussed with Consultants :     Plan of care was discussed with patient and /or primary care giver; all questions and concerns were addressed and care was aligned with patient's wishes.     NP Note discussed with  Primary Attending    INTERVAL HPI/OVERNIGHT EVENTS: "I dont want to do physical therapy"       MEDICATIONS  (STANDING):  folic acid 1 milliGRAM(s) Oral daily  metoprolol tartrate 25 milliGRAM(s) Oral two times a day  PARoxetine 30 milliGRAM(s) Oral daily  simvastatin 40 milliGRAM(s) Oral at bedtime  sodium chloride 0.9%. 100 milliLiter(s) (100 mL/Hr) IV Continuous <Continuous>  sodium chloride 0.9%. 1000 milliLiter(s) (75 mL/Hr) IV Continuous <Continuous>    MEDICATIONS  (PRN):  acetaminophen   Tablet .. 650 milliGRAM(s) Oral every 6 hours PRN Temp greater or equal to 38C (100.4F), Mild Pain (1 - 3), Moderate Pain (4 - 6)  ALBUTerol    90 MICROgram(s) HFA Inhaler 2 Puff(s) Inhalation every 6 hours PRN Shortness of Breath and/or Wheezing  OLANZapine 2.5 milliGRAM(s) Oral at bedtime PRN Agitation      __________________________________________________  REVIEW OF SYSTEMS:    CONSTITUTIONAL: fatigue,   EYES: no acute visual disturbances  NECK: No pain or stiffness  RESPIRATORY: No cough; No shortness of breath  CARDIOVASCULAR: No chest pain, no palpitations  GASTROINTESTINAL: No pain. No nausea or vomiting; No diarrhea   NEUROLOGICAL: No headache or numbness, no tremors  MUSCULOSKELETAL: No joint pain, no muscle pain  GENITOURINARY: no dysuria, no frequency, no hesitancy  PSYCHIATRY: no depression , no anxiety  ALL OTHER  ROS negative        Vital Signs Last 24 Hrs  T(C): 36.8 (22 Oct 2021 12:40), Max: 36.9 (21 Oct 2021 14:38)  T(F): 98.2 (22 Oct 2021 12:40), Max: 98.5 (21 Oct 2021 14:38)  HR: 87 (22 Oct 2021 12:40) (68 - 87)  BP: 129/87 (22 Oct 2021 12:40) (129/87 - 152/69)  BP(mean): 83 (22 Oct 2021 05:05) (83 - 91)  RR: 17 (22 Oct 2021 12:40) (17 - 18)  SpO2: 96% (22 Oct 2021 12:40) (94% - 99%)    ________________________________________________  PHYSICAL EXAM:  GENERAL: NAD  HEENT: Normocephalic; atraumatic  CHEST/LUNG: Breathing nonlabored breathing with ease on N/C -- diminished B/S   HEART: +S1 +S2  regular  ABDOMEN: Softly distended, nontender; Bowel sounds present  EXTREMITIES: +2 bilateral radial pulses. no edema.   SKIN: warm and dry; no rash  NERVOUS SYSTEM:  Awake and alert; Oriented  to place, person and time    _________________________________________________  LABS:                        7.4    23.54 )-----------( 113      ( 22 Oct 2021 07:41 )             21.8     10-22    138  |  104  |  69<H>  ----------------------------<  93  3.8   |  25  |  2.59<H>    Ca    7.4<L>      22 Oct 2021 07:41  Phos  4.6     10-21    TPro  7.0  /  Alb  2.7<L>  /  TBili  0.5  /  DBili  0.1  /  AST  18  /  ALT  23  /  AlkPhos  155<H>  10-22        CAPILLARY BLOOD GLUCOSE            RADIOLOGY & ADDITIONAL TESTS:     < from: US Abdomen Complete (US Abdomen Complete .) (10.20.21 @ 11:03) >    IMPRESSION:  Hepatic metastases.  Hepatomegaly.  Cholelithiasis.  Nonspecific gallbladder wall thickening.    < end of copied text >    Imaging Personally Reviewed:  YES    Consultant(s) Notes Reviewed:   YES    Plan of care was discussed with patient and /or primary care giver; all questions and concerns were addressed and care was aligned with patient's wishes.

## 2021-10-22 NOTE — PROGRESS NOTE ADULT - PROBLEM SELECTOR PLAN 1
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 Hgb 6.9. Transfuse 1 unit PRBC 10/18.   Monitor CBC daily and transfuse as needed.   per Oncology goal of treatment is palliative;  q 3 weeks with close monitoring of renal function.  Per last GOC discussion 10/12 patient is  full code; Palliative follows and patient's decision has not changed  Continue to monitor and supplemental O2; currently 94-98% on 2 L via NC   Heme/onc [facility] (Dr. Miranda's) group following. 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 Hgb 6.9. S/P 1 unit PRBC 10/18.   Monitor CBC daily and transfuse as needed.   per Oncology goal of treatment is palliative;  q 3 weeks with close monitoring of renal function.  Per last GOC discussion patient to remain full code; Palliative follows and patient's decision has not changed  Continue to monitor and supplemental O2; currently 94-98% on 2 L via NC   Heme/onc [facility] (Dr. Miranda's) group following.

## 2021-10-22 NOTE — PROGRESS NOTE ADULT - SUBJECTIVE AND OBJECTIVE BOX
Patient seen this evening. Mentions she has an appetite, but does not like the food. Events noted.      Vital Signs Last 24 Hrs  T(C): 36.8 (22 Oct 2021 12:40), Max: 36.9 (21 Oct 2021 20:19)  T(F): 98.2 (22 Oct 2021 12:40), Max: 98.4 (21 Oct 2021 20:19)  HR: 86 (22 Oct 2021 17:06) (68 - 87)  BP: 132/77 (22 Oct 2021 17:06) (129/87 - 149/59)  BP(mean): 83 (22 Oct 2021 05:05) (83 - 83)  RR: 17 (22 Oct 2021 12:40) (17 - 18)  SpO2: 96% (22 Oct 2021 12:40) (94% - 98%)     Gen: NAD   CVS: s1s2   Resp: ctabl   GI: soft     CBC Full  -  ( 22 Oct 2021 07:41 )  WBC Count : 23.54 K/uL  RBC Count : 2.47 M/uL  Hemoglobin : 7.4 g/dL  Hematocrit : 21.8 %  Platelet Count - Automated : 113 K/uL  Mean Cell Volume : 88.3 fl  Mean Cell Hemoglobin : 30.0 pg  Mean Cell Hemoglobin Concentration : 33.9 gm/dL  Auto Neutrophil # : 21.89 K/uL  Auto Lymphocyte # : 0.71 K/uL  Auto Monocyte # : 0.94 K/uL  Auto Eosinophil # : 0.00 K/uL  Auto Basophil # : 0.00 K/uL  Auto Neutrophil % : 90.0 %  Auto Lymphocyte % : 3.0 %  Auto Monocyte % : 4.0 %  Auto Eosinophil % : 0.0 %  Auto Basophil % : 0.0 %

## 2021-10-22 NOTE — PROGRESS NOTE ADULT - PROBLEM SELECTOR PLAN 4
in the setting of TLS   - Chemotherapy day 2 and 3 held   - Cr now 2.6  - US; kidneys with no hydronephrosis   - Continue NS @ 100cc/hr   Avoid nephrotoxic agents, NSAIDs  Renal following;   - Continue to monitor BMP in the setting of TLS   - Chemotherapy day 2 and 3 held   - Cr now 2.5  - US; kidneys with no hydronephrosis   - Continue NS @ 75cc/hr   Avoid nephrotoxic agents, NSAIDs  Renal following;   - Continue to monitor BMP

## 2021-10-22 NOTE — PROGRESS NOTE ADULT - PROBLEM SELECTOR PLAN 6
Multifactorial etiology, including recent chemotherapy requiring 1 unit PRBC this admission   - Continue to monitor H/H  - Monitor for signs of overt bleed  -Oncology following Multifactorial etiology, including recent chemotherapy requiring 1 unit PRBC this admission   - Continue to trend H/H  - Monitor for signs of overt bleed  -Oncology following

## 2021-10-22 NOTE — PROGRESS NOTE ADULT - ASSESSMENT
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 Next cycle due in 3 weeks. Encouraged OOB to chair with assistance  -upon discharge pt to f/u with Dr. Casper     # Anemia - multifactorial, including recent chemotherapy; transfuse to keep hg > 7.5 g/dl  stable   s/p PRBC transfusion 10/18    # Renal failure - worsening despite IVF and po fluid, Cr now 2.6  -chemo day 2 & 3 cancelled  -continue IVF  -Abd US: no hydro, showed Hepatic metastases. Hepatomegaly. Cholelithiasis. Nonspecific gallbladder wall thickening.  -avoid nephrotoxic agents  -after review of labs c/w TLS, rasburicase 3 mg given 10/18  -appreciate nephrology input, monitoring urine output     # ? TLS  BRENDA, hyperuricemia, hyperphosphatemia  LDH 1050-->695-->523 improving  Hypocalcemia since last admission d/t aredia given for hypercalcemia  rasburicase 3mg given 10/18  repeat uric acid now wnl at 6.1-->1.0  dec phos  continue IVF  appreciate nephrology consult, no evidence of tubulo-toxicity from cis    #Leukocytosis  d/t neulasta    case d/w Medicine Team   we will follow

## 2021-10-22 NOTE — PROGRESS NOTE ADULT - PROBLEM SELECTOR PLAN 5
in the setting of Ca w/ likely liver mets;   Abdominal US as above;   Alk phos somewhat  elevated @ 127  AST/ ALT downtrended to normal level 35/32  Continue to monitor LFTs in the setting of Ca w/ likely liver mets;   Abdominal US as above;   Alk phos mildly elevated @ 155  AST/ ALT WNL  Continue to monitor LFTs  avoid hepatotoxins

## 2021-10-22 NOTE — PROGRESS NOTE ADULT - ASSESSMENT
72 y/o F with PMHx of HTN, HLD, and metastatic Lung CA, was discharged on 10/6 from [facility] after hospitalization for sepsis from PNA and UTI, comes in complaining of weakness since discharge.   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 TLS vs ATN from anemia vs Cisplatin  TLS in view of  ( Hyperphosphatemia, Hypocalcemia, high Uric acid and BRENDA)  FENa >1  COntinue IVF-change NS 60cc/hr  Renal function slightly better  If worsens further, may need RRT   Discussed with spouse  , agreeable for RRT if needed  Consent obtained for RRT , in chart   Urine lytes inconclusive  US- no hydronephrosis  Monitor I/O   MOnitor renal function  MOnitor TLS labs Ca, PO4, Uric acid  AVoid further nephtoxics NSAIDS RCA      HTN  Optimal  Monitor BP    Hyperphosphatemia  likely sec to TLS   Improving   Low Po4 diet      Hypocalcemia  Likely sec. to low albumin  Corrected calcium 8.0  Monitor BRENDA

## 2021-10-22 NOTE — PROGRESS NOTE ADULT - PROBLEM SELECTOR PLAN 7
in the setting of Ca w/ chemotherapy initiation  - Continue to monitor   - Bleeding precautions  - Oncology following in the setting of Ca w/ chemotherapy initiation  impring  - Continue to monitor   - Bleeding precautions  - Oncology following

## 2021-10-22 NOTE — PROGRESS NOTE ADULT - PROBLEM SELECTOR PLAN 8
BP within acceptable limits  -Continue Metoprolol 25mg BID Controlled  's   -Continue Metoprolol 25mg BID with parameters

## 2021-10-22 NOTE — PROGRESS NOTE ADULT - SUBJECTIVE AND OBJECTIVE BOX
Time of Visit:  Patient seen and examined.     MEDICATIONS  (STANDING):  folic acid 1 milliGRAM(s) Oral daily  metoprolol tartrate 25 milliGRAM(s) Oral two times a day  PARoxetine 30 milliGRAM(s) Oral daily  simvastatin 40 milliGRAM(s) Oral at bedtime  sodium chloride 0.9%. 100 milliLiter(s) (100 mL/Hr) IV Continuous <Continuous>  sodium chloride 0.9%. 1000 milliLiter(s) (75 mL/Hr) IV Continuous <Continuous>      MEDICATIONS  (PRN):  acetaminophen   Tablet .. 650 milliGRAM(s) Oral every 6 hours PRN Temp greater or equal to 38C (100.4F), Mild Pain (1 - 3), Moderate Pain (4 - 6)  ALBUTerol    90 MICROgram(s) HFA Inhaler 2 Puff(s) Inhalation every 6 hours PRN Shortness of Breath and/or Wheezing  OLANZapine 2.5 milliGRAM(s) Oral at bedtime PRN Agitation       Medications up to date at time of exam.    ROS; No fever, chills, cough, congestion on exam.   PHYSICAL EXAMINATION:    Vital Signs Last 24 Hrs  T(C): 36.8 (22 Oct 2021 12:40), Max: 36.9 (21 Oct 2021 14:38)  T(F): 98.2 (22 Oct 2021 12:40), Max: 98.5 (21 Oct 2021 14:38)  HR: 87 (22 Oct 2021 12:40) (68 - 87)  BP: 129/87 (22 Oct 2021 12:40) (129/87 - 152/69)  BP(mean): 83 (22 Oct 2021 05:05) (83 - 91)  RR: 17 (22 Oct 2021 12:40) (17 - 18)  SpO2: 96% (22 Oct 2021 12:40) (94% - 99%)   (if applicable)    General: Alert and oriented x2. Poor historian . Able to answer question at rest with no SOB. No acute distress.      HEENT: Head is normocephalic and atraumatic. No nasal tenderness. Extraocular muscles are intact. Moist mucosa .     NECK: Supple, no palpable adenopathy.    LUNGS: Clear to auscultation bilaterally with no rales, rhonchi, wheezing. No use of accessory muscle.     HEART: S1 S2 Regular rate and no click/ rub.     ABDOMEN: Soft, nontender, and nondistended.  Active bowel sounds.     : No bladder distention and tenderness.     NEUROLOGIC: Awake, alert, oriented, forgetful .     SKIN: Warm and moist . Non diaphoretic.       LABS:                        7.4    23.54 )-----------( 113      ( 22 Oct 2021 07:41 )             21.8     10-22    138  |  104  |  69<H>  ----------------------------<  93  3.8   |  25  |  2.59<H>    Ca    7.4<L>      22 Oct 2021 07:41  Phos  4.6     10-21    TPro  7.0  /  Alb  2.7<L>  /  TBili  0.5  /  DBili  0.1  /  AST  18  /  ALT  23  /  AlkPhos  155<H>  10-22    MICROBIOLOGY: (if applicable)    RADIOLOGY & ADDITIONAL STUDIES:  EKG:   CXR:  ECHO:    IMPRESSION: 71y Female PAST MEDICAL & SURGICAL HISTORY:  Lumbar stenosis    Bleeding ulcer  stomach ulcer 2011    Anxiety    Spinal stenosis in cervical region    Acute hepatitis C virus infection without hepatic coma  treated 2015- Resolved    Balance disorder    Coronary artery disease involving native coronary artery of native heart without angina pectoris    HTN (hypertension)    HLD (hyperlipidemia)    Coronary artery disease involving native coronary artery of native heart without angina pectoris    H/O colonoscopy    Bleeding ulcer  stomach surgery for bleeding ulcer    Fracture  ORIF Left wrist  1/17/13    History of lumbar surgery  2013    Chronic UTI  Pt reports presently on 2nd dose of antibiotics 7/5/18 10 days, Dr Herman aware, pt going for m/eval 7/13/18.    H/O cardiac catheterization  2013, no intervention needed, treated medically     Impression: 72 Y/O Female presented with weakness since discharge. Patient was discharged on 10-06-21 from [facility] after hospitalization for Sepsis from UTI and Pneumonia. Has Metastatic Lung Ca who had s/p EBUS / Bronchoscopy on 10-04-21. Right endobronchial  Mass. Has Stage IV High-Grade Lung , Neuroendocrine CA with likely mets to liver/peritoneum/bone being followed by Oncology Team for inpatient Chemo .  10-19-21, 10-12-21 Negative for Covid 19 PCR.    Suggestion:  O2 saturation 96%  room air. So far saturating good room air today. Can Oxygen supplementation  2L NC if needed .  Pulmonary Oral hygiene care.  Transfuse as needed per Heme  Oncology follow up for Chemo therapy.  Continue PRN Albuterol 90 mcg 2 puffs Q 6 Hours.

## 2021-10-22 NOTE — PROGRESS NOTE ADULT - PROBLEM SELECTOR PLAN 9
DVT ppx: Hold pharmacologic DVT ppx in setting of worsening anemia and thrombocytopenia; c/w SCDs encourage   OOB DVT ppx: Hold pharmacologic DVT ppx in setting of worsening anemia and thrombocytopenia; c/w SCDs   OOB

## 2021-10-22 NOTE — PROGRESS NOTE ADULT - SUBJECTIVE AND OBJECTIVE BOX
[facility]   MD JAD GONZALEZ MD RUORU WONG, PA    TEL:  OFFICE: [phone]  DR MCNEILL CELL: [phone]  NNEKA ALEJO CELL: [phone]  DR. RUBIN CELL: [phone]    RENAL FOLLOW UP NOTE-- Date of Service ;; 10-22-21 @ 10:25  --------------------------------------------------------------------------------  HPI:  Pt seen and examined at bedside  Denies SOB, chest pain.         PAST HISTORY  --------------------------------------------------------------------------------  No significant changes to PMH, PSH, FHx, SHx, unless otherwise noted    ALLERGIES & MEDICATIONS  --------------------------------------------------------------------------------  Allergies    No Known Allergies    Intolerances      Standing Inpatient Medications  folic acid 1 milliGRAM(s) Oral daily  metoprolol tartrate 25 milliGRAM(s) Oral two times a day  PARoxetine 30 milliGRAM(s) Oral daily  simvastatin 40 milliGRAM(s) Oral at bedtime  sodium chloride 0.9%. 100 milliLiter(s) IV Continuous <Continuous>  sodium chloride 0.9%. 1000 milliLiter(s) IV Continuous <Continuous>    PRN Inpatient Medications  acetaminophen   Tablet .. 650 milliGRAM(s) Oral every 6 hours PRN  ALBUTerol    90 MICROgram(s) HFA Inhaler 2 Puff(s) Inhalation every 6 hours PRN  OLANZapine 2.5 milliGRAM(s) Oral at bedtime PRN      REVIEW OF SYSTEMS  --------------------------------------------------------------------------------  General: no fever  CVS: no chest pain  RESP: no sob, no cough  ABD: no abdominal pain  : no dysuria,  MSK: no edema     VITALS/PHYSICAL EXAM  --------------------------------------------------------------------------------  T(C): 36.7 (10-22-21 @ 05:05), Max: 36.9 (10-21-21 @ 14:38)  HR: 74 (10-22-21 @ 05:05) (68 - 77)  BP: 149/59 (10-22-21 @ 05:05) (138/62 - 152/69)  RR: 17 (10-22-21 @ 05:05) (17 - 18)  SpO2: 96% (10-22-21 @ 05:05) (95% - 99%)  Wt(kg): --        10-21-21 @ 07:01  -  10-22-21 @ 07:00  --------------------------------------------------------  IN: 0 mL / OUT: 1300 mL / NET: -1300 mL      Physical Exam:  	Gen: NAD  	HEENT: MMM  	Pulm: CTA B/L  	CV: S1S2  	Abd: Soft, +BS  	Ext: No LE edema B/L                      Neuro: Awake , alert  	Skin: Warm and Dry      y  LABS/STUDIES  --------------------------------------------------------------------------------              7.4    23.54 >-----------<  113      [10-22-21 @ 07:41]              21.8     138  |  104  |  69  ----------------------------<  93      [10-22-21 @ 07:41]  3.8   |  25  |  2.59        Ca     7.4     [10-22-21 @ 07:41]      Phos  4.6     [10-21-21 @ 06:47]    TPro  7.0  /  Alb  2.7  /  TBili  0.5  /  DBili  0.1  /  AST  18  /  ALT  23  /  AlkPhos  155  [10-22-21 @ 07:41]        Uric acid 2.4      [10-22-21 @ 07:41]        [10-22-21 @ 07:41]    Creatinine Trend:  SCr 2.59 [10-22 @ 07:41]  SCr 2.60 [10-21 @ 06:47]  SCr 2.32 [10-20 @ 06:14]  SCr 2.08 [10-19 @ 07:50]  SCr 1.97 [10-18 @ 22:16]    Urinalysis - [10-18-21 @ 16:11]      Color Yellow / Appearance very cloudy / SG 1.010 / pH 7.0      Gluc Negative / Ketone Negative  / Bili Negative / Urobili Negative       Blood Trace / Protein 100 / Leuk Est Moderate / Nitrite Negative      RBC 0-2 / WBC 6-10 / Hyaline  / Gran  / Sq Epi  / Non Sq Epi Few / Bacteria TNTC    Urine Creatinine 30      [10-18-21 @ 16:11]  Urine Sodium 82      [10-18-21 @ 16:11]    Iron 56, TIBC 262, %sat 21      [09-22-21 @ 07:54]  HbA1c 6.2      [07-10-18 @ 13:53]  TSH 1.17      [09-28-21 @ 05:58]  Lipid: chol 172, , HDL 25, LDL --      [09-21-21 @ 07:37]      Immunofixation Serum:    One Weak  IgG Kappa Band and One Weak  IgG Lambda Band Identified    Reference Range: None Detected      [09-29-21 @ 01:43]  SPEP Interpretation: Two Weak Gamma-Migrating Paraproteins Identified      [09-29-21 @ 01:43]

## 2021-10-22 NOTE — PROGRESS NOTE ADULT - ASSESSMENT
71 YOF with PMH HTN, HLD, recent dx of metastatic lung CA which has not started on active chemo yet.  Brought by  due to worsening of weakness which contributed to delaying  on initiate chemo tx .  Dr thomas is outpt heme/onc, [facility] is following and recommends to start 1st chemo as in- patient. Day 1 was 10/15. pt is noted tumor lysis syndrome after first chemo, received Rasburicase, phoslo. 2nd chemo on hold in setting BRENDA per Heme/Onc.  on IVF. Renal/abdominal sono  for BRENDA, transaminitis revealed kidneys with no hydronephrosis. Hepatomegaly, Cholelithiasis, Nonspecific gallbladder wall thickening ( already established from previous w/u in 9/21). Patient with stage IV Ca; Oncology following with plans for palliative chemotherapy  q 3 weeks with close monitoring of renal function. HBG noted 7.2 this AM, will repeat this afternoon to trend and transfuse if <7 Plan for discharge to [facility], pending acceptance given plan for IV Chemo      71 YOF with PMH HTN, HLD, recent dx of metastatic lung CA which has not started on active chemo yet.  Brought by  due to worsening of weakness which contributed to delaying  on initiate chemo tx .  Dr thomas is outpt heme/onc, [facility] is following and recommends to start 1st chemo as in- patient. Day 1 was 10/15. pt is noted tumor lysis syndrome after first chemo, received Rasburicase, phoslo. 2nd chemo on hold in setting BRENDA per Heme/Onc.  on IVF. Renal/abdominal sono  for BRENDA, transaminitis revealed kidneys with no hydronephrosis. Hepatomegaly, Cholelithiasis, Nonspecific gallbladder wall thickening ( already established from previous w/u in 9/21). Patient with stage IV Ca; Oncology following with plans for palliative chemotherapy  q 3 weeks with close monitoring of renal function. Anemia stable HBG remains above 7 and likely 2/2 to chemo.  transfuse if <7 Plan for discharge to [facility], pending acceptance given patient refusing to participate in PT. May likely need LTC placement instead, NCM to follow

## 2021-10-23 LAB
ALBUMIN SERPL ELPH-MCNC: 2.8 G/DL — LOW (ref 3.5–5)
ALP SERPL-CCNC: 157 U/L — HIGH (ref 40–120)
ALT FLD-CCNC: 23 U/L DA — SIGNIFICANT CHANGE UP (ref 10–60)
ANION GAP SERPL CALC-SCNC: 8 MMOL/L — SIGNIFICANT CHANGE UP (ref 5–17)
AST SERPL-CCNC: 20 U/L — SIGNIFICANT CHANGE UP (ref 10–40)
BASOPHILS # BLD AUTO: 0.06 K/UL — SIGNIFICANT CHANGE UP (ref 0–0.2)
BASOPHILS NFR BLD AUTO: 0.4 % — SIGNIFICANT CHANGE UP (ref 0–2)
BILIRUB DIRECT SERPL-MCNC: 0.1 MG/DL — SIGNIFICANT CHANGE UP (ref 0–0.2)
BILIRUB INDIRECT FLD-MCNC: 0.4 MG/DL — SIGNIFICANT CHANGE UP (ref 0.2–1)
BILIRUB SERPL-MCNC: 0.5 MG/DL — SIGNIFICANT CHANGE UP (ref 0.2–1.2)
BUN SERPL-MCNC: 65 MG/DL — HIGH (ref 7–18)
CALCIUM SERPL-MCNC: 7.6 MG/DL — LOW (ref 8.4–10.5)
CHLORIDE SERPL-SCNC: 105 MMOL/L — SIGNIFICANT CHANGE UP (ref 96–108)
CO2 SERPL-SCNC: 25 MMOL/L — SIGNIFICANT CHANGE UP (ref 22–31)
CREAT SERPL-MCNC: 2.64 MG/DL — HIGH (ref 0.5–1.3)
EOSINOPHIL # BLD AUTO: 0.12 K/UL — SIGNIFICANT CHANGE UP (ref 0–0.5)
EOSINOPHIL NFR BLD AUTO: 0.7 % — SIGNIFICANT CHANGE UP (ref 0–6)
GLUCOSE SERPL-MCNC: 105 MG/DL — HIGH (ref 70–99)
HCT VFR BLD CALC: 23.3 % — LOW (ref 34.5–45)
HGB BLD-MCNC: 7.7 G/DL — LOW (ref 11.5–15.5)
IMM GRANULOCYTES NFR BLD AUTO: 2.7 % — HIGH (ref 0–1.5)
LDH SERPL L TO P-CCNC: 473 U/L — HIGH (ref 120–225)
LYMPHOCYTES # BLD AUTO: 1.74 K/UL — SIGNIFICANT CHANGE UP (ref 1–3.3)
LYMPHOCYTES # BLD AUTO: 10.3 % — LOW (ref 13–44)
MAGNESIUM SERPL-MCNC: 1.4 MG/DL — LOW (ref 1.6–2.6)
MCHC RBC-ENTMCNC: 29.6 PG — SIGNIFICANT CHANGE UP (ref 27–34)
MCHC RBC-ENTMCNC: 33 GM/DL — SIGNIFICANT CHANGE UP (ref 32–36)
MCV RBC AUTO: 89.6 FL — SIGNIFICANT CHANGE UP (ref 80–100)
MONOCYTES # BLD AUTO: 1.24 K/UL — HIGH (ref 0–0.9)
MONOCYTES NFR BLD AUTO: 7.3 % — SIGNIFICANT CHANGE UP (ref 2–14)
NEUTROPHILS # BLD AUTO: 13.31 K/UL — HIGH (ref 1.8–7.4)
NEUTROPHILS NFR BLD AUTO: 78.6 % — HIGH (ref 43–77)
NRBC # BLD: 0 /100 WBCS — SIGNIFICANT CHANGE UP (ref 0–0)
PHOSPHATE SERPL-MCNC: 4.2 MG/DL — SIGNIFICANT CHANGE UP (ref 2.5–4.5)
PLATELET # BLD AUTO: 121 K/UL — LOW (ref 150–400)
POTASSIUM SERPL-MCNC: 3.9 MMOL/L — SIGNIFICANT CHANGE UP (ref 3.5–5.3)
POTASSIUM SERPL-SCNC: 3.9 MMOL/L — SIGNIFICANT CHANGE UP (ref 3.5–5.3)
PROT SERPL-MCNC: 7 G/DL — SIGNIFICANT CHANGE UP (ref 6–8.3)
RBC # BLD: 2.6 M/UL — LOW (ref 3.8–5.2)
RBC # FLD: 16.9 % — HIGH (ref 10.3–14.5)
SODIUM SERPL-SCNC: 138 MMOL/L — SIGNIFICANT CHANGE UP (ref 135–145)
URATE SERPL-MCNC: 4 MG/DL — SIGNIFICANT CHANGE UP (ref 2.5–7)
WBC # BLD: 16.92 K/UL — HIGH (ref 3.8–10.5)
WBC # FLD AUTO: 16.92 K/UL — HIGH (ref 3.8–10.5)

## 2021-10-23 RX ADMIN — Medication 25 MILLIGRAM(S): at 05:26

## 2021-10-23 RX ADMIN — SODIUM CHLORIDE 75 MILLILITER(S): 9 INJECTION INTRAMUSCULAR; INTRAVENOUS; SUBCUTANEOUS at 06:57

## 2021-10-23 RX ADMIN — Medication 650 MILLIGRAM(S): at 15:20

## 2021-10-23 RX ADMIN — Medication 650 MILLIGRAM(S): at 14:24

## 2021-10-23 RX ADMIN — Medication 1 MILLIGRAM(S): at 14:24

## 2021-10-23 RX ADMIN — SIMVASTATIN 40 MILLIGRAM(S): 20 TABLET, FILM COATED ORAL at 22:21

## 2021-10-23 RX ADMIN — Medication 30 MILLIGRAM(S): at 14:24

## 2021-10-23 NOTE — PROGRESS NOTE ADULT - PROBLEM SELECTOR PLAN 10
PT recommends JANETTE : pt and family chose [facility]   currently refusing to participate in PT  Care management following for discharge planning   Palliative follows  Monitor TLS labs daily

## 2021-10-23 NOTE — PROGRESS NOTE ADULT - SUBJECTIVE AND OBJECTIVE BOX
Patient seen this evening. Mentions today she had  a slight belly pain. NO nausea. Was not out of bed.  Events noted.      Vital Signs Last 24 Hrs  T(C): 36.7 (10-23-21 @ 14:15), Max: 37 (10-23-21 @ 05:27)  T(F): 98 (10-23-21 @ 14:15), Max: 98.6 (10-23-21 @ 05:27)  HR: 78 (10-23-21 @ 14:15) (68 - 78)  BP: 122/80 (10-23-21 @ 14:15) (122/80 - 152/74)  BP(mean): 90 (10-23-21 @ 14:15) (86 - 90)  RR: 18 (10-23-21 @ 14:15) (16 - 18)  SpO2: 95% (10-23-21 @ 14:15) (93% - 96%)    Gen: NAD   CVS: s1s2   Resp: ctabl   GI: soft, non tender   Ext: no edema   CNS: no focal deficit     CBC Full  -  ( 23 Oct 2021 07:29 )  WBC Count : 16.92 K/uL  RBC Count : 2.60 M/uL  Hemoglobin : 7.7 g/dL  Hematocrit : 23.3 %  Platelet Count - Automated : 121 K/uL  Mean Cell Volume : 89.6 fl  Mean Cell Hemoglobin : 29.6 pg  Mean Cell Hemoglobin Concentration : 33.0 gm/dL  Auto Neutrophil # : 13.31 K/uL  Auto Lymphocyte # : 1.74 K/uL  Auto Monocyte # : 1.24 K/uL  Auto Eosinophil # : 0.12 K/uL  Auto Basophil # : 0.06 K/uL  Auto Neutrophil % : 78.6 %  Auto Lymphocyte % : 10.3 %  Auto Monocyte % : 7.3 %  Auto Eosinophil % : 0.7 %  Auto Basophil % : 0.4 %    MEDICATIONS  (STANDING):  folic acid 1 milliGRAM(s) Oral daily  metoprolol tartrate 25 milliGRAM(s) Oral two times a day  PARoxetine 30 milliGRAM(s) Oral daily  simvastatin 40 milliGRAM(s) Oral at bedtime  sodium chloride 0.9%. 100 milliLiter(s) (100 mL/Hr) IV Continuous <Continuous>  sodium chloride 0.9%. 1000 milliLiter(s) (75 mL/Hr) IV Continuous <Continuous>

## 2021-10-23 NOTE — PROGRESS NOTE ADULT - ASSESSMENT
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 Next cycle due in 3 weeks. Encouraged OOB to chair with assistance  -upon discharge pt to f/u with Dr. Casper     # Anemia - multifactorial, including recent chemotherapy; transfuse to keep hg > 7.5 g/dl  stable   s/p PRBC transfusion 10/18    # Renal failure   -chemo day 2 & 3 cancelled  -continue IVF  -Abd US: no hydro, showed Hepatic metastases. Hepatomegaly. Cholelithiasis. Nonspecific gallbladder wall thickening.  -avoid nephrotoxic agents  -after review of labs c/w TLS, rasburicase 3 mg given 10/18  -appreciate nephrology input, monitoring urine output     # ? TLS  BRENDA, hyperuricemia, hyperphosphatemia  LDH 1050-->695-->523 improving  Hypocalcemia since last admission d/t aredia given for hypercalcemia  rasburicase 3mg given 10/18  repeat uric acid now wnl at 6.1-->1.0  dec phos  continue IVF  appreciate nephrology consult, no evidence of tubulo-toxicity from cis    #Leukocytosis  d/t neulasta    case d/w Medicine Team   we will follow

## 2021-10-23 NOTE — PROGRESS NOTE ADULT - PROBLEM SELECTOR PLAN 7
in the setting of Ca w/ chemotherapy initiation  impring  - Continue to monitor   - Bleeding precautions  - Oncology following

## 2021-10-23 NOTE — PROGRESS NOTE ADULT - PROBLEM SELECTOR PLAN 9
DVT ppx: Hold pharmacologic DVT ppx in setting of worsening anemia and thrombocytopenia; c/w SCDs   OOB

## 2021-10-23 NOTE — PROGRESS NOTE ADULT - ASSESSMENT
71 YOF with PMH HTN, HLD, recent dx of metastatic lung CA which has not started on active chemo yet.  Brought by  due to worsening of weakness which contributed to delaying  on initiate chemo tx .  Dr thomas is outpt heme/onc, [facility] is following and recommends to start 1st chemo as in- patient. Day 1 was 10/15. pt is noted tumor lysis syndrome after first chemo, received Rasburicase, phoslo. 2nd chemo on hold in setting BRENAD per Heme/Onc.  on IVF. Renal/abdominal sono  for BRENDA, transaminitis revealed kidneys with no hydronephrosis. Hepatomegaly, Cholelithiasis, Nonspecific gallbladder wall thickening ( already established from previous w/u in 9/21). Patient with stage IV Ca; Oncology following with plans for palliative chemotherapy  q 3 weeks with close monitoring of renal function. Anemia stable HBG remains above 7 and likely 2/2 to chemo.  transfuse if <7 Plan for discharge to [facility], pending acceptance given patient refusing to participate in PT. May likely need LTC placement instead, NCM to follow

## 2021-10-23 NOTE — PROGRESS NOTE ADULT - PROBLEM SELECTOR PLAN 4
in the setting of TLS   - Chemotherapy day 2 and 3 held   - Cr now 2.5  - US; kidneys with no hydronephrosis   - Continue NS @ 75cc/hr   Avoid nephrotoxic agents, NSAIDs  Renal following;   - Continue to monitor BMP

## 2021-10-23 NOTE — PROGRESS NOTE ADULT - SUBJECTIVE AND OBJECTIVE BOX
INTERVAL HPI/OVERNIGHT EVENTS: "I dont want to do physical therapy"       MEDICATIONS  (STANDING):  folic acid 1 milliGRAM(s) Oral daily  metoprolol tartrate 25 milliGRAM(s) Oral two times a day  PARoxetine 30 milliGRAM(s) Oral daily  simvastatin 40 milliGRAM(s) Oral at bedtime  sodium chloride 0.9%. 100 milliLiter(s) (100 mL/Hr) IV Continuous <Continuous>  sodium chloride 0.9%. 1000 milliLiter(s) (75 mL/Hr) IV Continuous <Continuous>    MEDICATIONS  (PRN):  acetaminophen   Tablet .. 650 milliGRAM(s) Oral every 6 hours PRN Temp greater or equal to 38C (100.4F), Mild Pain (1 - 3), Moderate Pain (4 - 6)  ALBUTerol    90 MICROgram(s) HFA Inhaler 2 Puff(s) Inhalation every 6 hours PRN Shortness of Breath and/or Wheezing  OLANZapine 2.5 milliGRAM(s) Oral at bedtime PRN Agitation        __________________________________________________  REVIEW OF SYSTEMS:    CONSTITUTIONAL: fatigue,   EYES: no acute visual disturbances  NECK: No pain or stiffness  RESPIRATORY: No cough; No shortness of breath  CARDIOVASCULAR: No chest pain, no palpitations  GASTROINTESTINAL: No pain. No nausea or vomiting; No diarrhea   NEUROLOGICAL: No headache or numbness, no tremors  MUSCULOSKELETAL: No joint pain, no muscle pain  GENITOURINARY: no dysuria, no frequency, no hesitancy  PSYCHIATRY: no depression , no anxiety  ALL OTHER  ROS negative      T(C): 36.6 (10-23-21 @ 20:24), Max: 36.7 (10-23-21 @ 14:15)  HR: 77 (10-23-21 @ 20:24) (77 - 84)  BP: 127/56 (10-23-21 @ 20:24) (121/57 - 127/56)  RR: 17 (10-23-21 @ 20:24) (17 - 18)  SpO2: 96% (10-23-21 @ 20:24) (95% - 96%)  ________________________________________________  PHYSICAL EXAM:  GENERAL: NAD  HEENT: Normocephalic; atraumatic  CHEST/LUNG: Breathing nonlabored breathing with ease on N/C -- diminished B/S   HEART: +S1 +S2  regular  ABDOMEN: Softly distended, nontender; Bowel sounds present  EXTREMITIES: +2 bilateral radial pulses. no edema.   SKIN: warm and dry; no rash  NERVOUS SYSTEM:  Awake and alert; Oriented  to place, person and time    _________________________________________________                        7.7    16.92 )-----------( 121      ( 23 Oct 2021 07:29 )             23.3           LIVER FUNCTIONS - ( 23 Oct 2021 07:29 )  Alb: 2.8 g/dL / Pro: 7.0 g/dL / ALK PHOS: 157 U/L / ALT: 23 U/L DA / AST: 20 U/L / GGT: x             138|105|65<105  3.9|25|2.64  7.6,1.4,4.2  10-23 @ 07:29    TPro  7.0  /  Alb  2.7<L>  /  TBili  0.5  /  DBili  0.1  /  AST  18  /  ALT  23  /  AlkPhos  155<H>  10-22        CAPILLARY BLOOD GLUCOSE            RADIOLOGY & ADDITIONAL TESTS:     < from: US Abdomen Complete (US Abdomen Complete .) (10.20.21 @ 11:03) >    IMPRESSION:  Hepatic metastases.  Hepatomegaly.  Cholelithiasis.  Nonspecific gallbladder wall thickening.    < end of copied text >    Imaging Personally Reviewed:  YES    Consultant(s) Notes Reviewed:   YES    Plan of care was discussed with patient and /or primary care giver; all questions and concerns were addressed and care was aligned with patient's wishes.

## 2021-10-23 NOTE — PROGRESS NOTE ADULT - SUBJECTIVE AND OBJECTIVE BOX
WILEY ELAINA  71y  Patient is a 71y old  Female who presents with a chief complaint of weakness (22 Oct 2021 17:53)    HPI:  Seen and examined. No new complaints. Passing clear urine. This is a woman with Metastatic Ca who developed BRENDA after chemotherapy.      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 100 milliLiter(s) (100 mL/Hr) IV Continuous <Continuous>  sodium chloride 0.9%. 1000 milliLiter(s) (75 mL/Hr) IV Continuous <Continuous>    MEDICATIONS  (PRN):  acetaminophen   Tablet .. 650 milliGRAM(s) Oral every 6 hours PRN Temp greater or equal to 38C (100.4F), Mild Pain (1 - 3), Moderate Pain (4 - 6)  ALBUTerol    90 MICROgram(s) HFA Inhaler 2 Puff(s) Inhalation every 6 hours PRN Shortness of Breath and/or Wheezing  OLANZapine 2.5 milliGRAM(s) Oral at bedtime PRN Agitation    Vital Signs Last 24 Hrs  T(C): 37 (23 Oct 2021 05:27), Max: 37 (23 Oct 2021 05:27)  T(F): 98.6 (23 Oct 2021 05:27), Max: 98.6 (23 Oct 2021 05:27)  HR: 72 (23 Oct 2021 05:27) (68 - 86)  BP: 152/74 (23 Oct 2021 05:27) (132/77 - 152/74)  BP(mean): 86 (22 Oct 2021 20:48) (86 - 86)  RR: 16 (23 Oct 2021 05:27) (16 - 18)  SpO2: 96% (23 Oct 2021 05:27) (93% - 96%)  Daily     Daily     PHYSICAL EXAM:  Constitutional:   appears comfortable and not distressed. Not diaphoretic.    Neck:  The thyroid is normal. Trachea is midline.     Respiratory: The lungs are clear to auscultation. No dullness and expansion is normal.    Cardiovascular: S1 and S2 are normal. No mummurs, rubs or gallops are present.    Gastrointestinal: The abdomen is soft. No tenderness is present. No masses are present. Bowel sounds are normal.    Genitourinary: The bladder is not distended. No CVA tenderness is present.    Extremities: No edema is noted. No deformities are present.    Neurological: Cognition is normal. Tone, power and sensation are normal. Gait is steady.    Skin: No lesions are seen  or palpated.    Lymph Nodes: No lymphadenopathy is present.                          7.7    16.92 )-----------( 121      ( 23 Oct 2021 07:29 )             23.3     10-23    138  |  105  |  65<H>  ----------------------------<  105<H>  3.9   |  25  |  2.64<H>    Ca    7.6<L>      23 Oct 2021 07:29  Phos  4.2     10-23  Mg     1.4     10-23    TPro  7.0  /  Alb  2.8<L>  /  TBili  0.5  /  DBili  0.1  /  AST  20  /  ALT  23  /  AlkPhos  157<H>  10-23

## 2021-10-23 NOTE — PROGRESS NOTE ADULT - ASSESSMENT
72 y/o F with PMHx of HTN, HLD, and metastatic Lung CA, was discharged on 10/6 from [facility] after hospitalization for sepsis from PNA and UTI, comes in complaining of weakness since discharge.   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 TLS vs ATN from anemia vs Cisplatin  TLS in view of  ( Hyperphosphatemia, Hypocalcemia, high Uric acid and BRENDA)  FENa >1. Her renal functions appears to be plateauing so will monitor further, no indication for Renal Replacement.  - Monitor BMP.  - Continue hydration.      HTN  Optimal  Monitor BP    Hyperphosphatemia  Likely due to TLS and has improved.  Low Po4 diet      Hypocalcemia  Likely due to low albumin  Corrected calcium 8.0  Monitor BRENDA

## 2021-10-23 NOTE — PROGRESS NOTE ADULT - PROBLEM SELECTOR PLAN 5
in the setting of Ca w/ likely liver mets;   Abdominal US as above;   Alk phos mildly elevated @ 155  AST/ ALT WNL  Continue to monitor LFTs  avoid hepatotoxins

## 2021-10-23 NOTE — PROGRESS NOTE ADULT - PROBLEM SELECTOR PLAN 6
Multifactorial etiology, including recent chemotherapy requiring 1 unit PRBC this admission   - Continue to trend H/H  - Monitor for signs of overt bleed  -Oncology following

## 2021-10-23 NOTE — PROGRESS NOTE ADULT - PROBLEM SELECTOR PLAN 2
-Hyperphosphatemia: downtrending   No concentrated phos in diet added.   -hyperuricemia   consider allopurinol renally dosed  monitor Serum Uric acid daily  Monitor CMP, Magnesium, phosphorus , LDH, Uric acid daily in am.   Renal following   HemeOnc following

## 2021-10-24 LAB
ALBUMIN SERPL ELPH-MCNC: 2.8 G/DL — LOW (ref 3.5–5)
ALP SERPL-CCNC: 158 U/L — HIGH (ref 40–120)
ALT FLD-CCNC: 21 U/L DA — SIGNIFICANT CHANGE UP (ref 10–60)
ANION GAP SERPL CALC-SCNC: 9 MMOL/L — SIGNIFICANT CHANGE UP (ref 5–17)
ANISOCYTOSIS BLD QL: SLIGHT — SIGNIFICANT CHANGE UP
APPEARANCE UR: ABNORMAL
AST SERPL-CCNC: 18 U/L — SIGNIFICANT CHANGE UP (ref 10–40)
BACTERIA # UR AUTO: ABNORMAL /HPF
BASOPHILS # BLD AUTO: 0 K/UL — SIGNIFICANT CHANGE UP (ref 0–0.2)
BASOPHILS NFR BLD AUTO: 0 % — SIGNIFICANT CHANGE UP (ref 0–2)
BILIRUB DIRECT SERPL-MCNC: 0.1 MG/DL — SIGNIFICANT CHANGE UP (ref 0–0.2)
BILIRUB INDIRECT FLD-MCNC: 0.3 MG/DL — SIGNIFICANT CHANGE UP (ref 0.2–1)
BILIRUB SERPL-MCNC: 0.4 MG/DL — SIGNIFICANT CHANGE UP (ref 0.2–1.2)
BILIRUB UR-MCNC: NEGATIVE — SIGNIFICANT CHANGE UP
BUN SERPL-MCNC: 65 MG/DL — HIGH (ref 7–18)
CALCIUM SERPL-MCNC: 7.8 MG/DL — LOW (ref 8.4–10.5)
CHLORIDE SERPL-SCNC: 107 MMOL/L — SIGNIFICANT CHANGE UP (ref 96–108)
CO2 SERPL-SCNC: 25 MMOL/L — SIGNIFICANT CHANGE UP (ref 22–31)
COLOR SPEC: YELLOW — SIGNIFICANT CHANGE UP
CREAT SERPL-MCNC: 2.77 MG/DL — HIGH (ref 0.5–1.3)
DIFF PNL FLD: ABNORMAL
EOSINOPHIL # BLD AUTO: 0.6 K/UL — HIGH (ref 0–0.5)
EOSINOPHIL NFR BLD AUTO: 4 % — SIGNIFICANT CHANGE UP (ref 0–6)
GLUCOSE SERPL-MCNC: 112 MG/DL — HIGH (ref 70–99)
GLUCOSE UR QL: NEGATIVE — SIGNIFICANT CHANGE UP
HCT VFR BLD CALC: 23.5 % — LOW (ref 34.5–45)
HGB BLD-MCNC: 7.6 G/DL — LOW (ref 11.5–15.5)
HYPOCHROMIA BLD QL: SLIGHT — SIGNIFICANT CHANGE UP
KETONES UR-MCNC: ABNORMAL
LDH SERPL L TO P-CCNC: 426 U/L — HIGH (ref 120–225)
LEUKOCYTE ESTERASE UR-ACNC: ABNORMAL
LYMPHOCYTES # BLD AUTO: 1.65 K/UL — SIGNIFICANT CHANGE UP (ref 1–3.3)
LYMPHOCYTES # BLD AUTO: 11 % — LOW (ref 13–44)
MAGNESIUM SERPL-MCNC: 1.5 MG/DL — LOW (ref 1.6–2.6)
MANUAL SMEAR VERIFICATION: SIGNIFICANT CHANGE UP
MCHC RBC-ENTMCNC: 29.2 PG — SIGNIFICANT CHANGE UP (ref 27–34)
MCHC RBC-ENTMCNC: 32.3 GM/DL — SIGNIFICANT CHANGE UP (ref 32–36)
MCV RBC AUTO: 90.4 FL — SIGNIFICANT CHANGE UP (ref 80–100)
MONOCYTES # BLD AUTO: 0.6 K/UL — SIGNIFICANT CHANGE UP (ref 0–0.9)
MONOCYTES NFR BLD AUTO: 4 % — SIGNIFICANT CHANGE UP (ref 2–14)
NEUTROPHILS # BLD AUTO: 12.14 K/UL — HIGH (ref 1.8–7.4)
NEUTROPHILS NFR BLD AUTO: 81 % — HIGH (ref 43–77)
NITRITE UR-MCNC: NEGATIVE — SIGNIFICANT CHANGE UP
NRBC # BLD: 0 /100 — SIGNIFICANT CHANGE UP (ref 0–0)
PH UR: 6.5 — SIGNIFICANT CHANGE UP (ref 5–8)
PHOSPHATE SERPL-MCNC: 4.5 MG/DL — SIGNIFICANT CHANGE UP (ref 2.5–4.5)
PLAT MORPH BLD: NORMAL — SIGNIFICANT CHANGE UP
PLATELET # BLD AUTO: 127 K/UL — LOW (ref 150–400)
POIKILOCYTOSIS BLD QL AUTO: SLIGHT — SIGNIFICANT CHANGE UP
POTASSIUM SERPL-MCNC: 3.8 MMOL/L — SIGNIFICANT CHANGE UP (ref 3.5–5.3)
POTASSIUM SERPL-SCNC: 3.8 MMOL/L — SIGNIFICANT CHANGE UP (ref 3.5–5.3)
PROT SERPL-MCNC: 7.2 G/DL — SIGNIFICANT CHANGE UP (ref 6–8.3)
PROT UR-MCNC: 100
RBC # BLD: 2.6 M/UL — LOW (ref 3.8–5.2)
RBC # FLD: 17.5 % — HIGH (ref 10.3–14.5)
RBC BLD AUTO: ABNORMAL
RBC CASTS # UR COMP ASSIST: SIGNIFICANT CHANGE UP /HPF (ref 0–2)
SODIUM SERPL-SCNC: 141 MMOL/L — SIGNIFICANT CHANGE UP (ref 135–145)
SP GR SPEC: 1.01 — SIGNIFICANT CHANGE UP (ref 1.01–1.02)
URATE SERPL-MCNC: 5.5 MG/DL — SIGNIFICANT CHANGE UP (ref 2.5–7)
UROBILINOGEN FLD QL: NEGATIVE — SIGNIFICANT CHANGE UP
WBC # BLD: 14.99 K/UL — HIGH (ref 3.8–10.5)
WBC # FLD AUTO: 14.99 K/UL — HIGH (ref 3.8–10.5)
WBC UR QL: >50 /HPF (ref 0–5)

## 2021-10-24 RX ORDER — MAGNESIUM SULFATE 500 MG/ML
2 VIAL (ML) INJECTION ONCE
Refills: 0 | Status: COMPLETED | OUTPATIENT
Start: 2021-10-24 | End: 2021-10-24

## 2021-10-24 RX ORDER — OXYCODONE AND ACETAMINOPHEN 5; 325 MG/1; MG/1
1 TABLET ORAL EVERY 6 HOURS
Refills: 0 | Status: DISCONTINUED | OUTPATIENT
Start: 2021-10-24 | End: 2021-10-28

## 2021-10-24 RX ADMIN — Medication 650 MILLIGRAM(S): at 19:30

## 2021-10-24 RX ADMIN — OXYCODONE AND ACETAMINOPHEN 1 TABLET(S): 5; 325 TABLET ORAL at 10:50

## 2021-10-24 RX ADMIN — Medication 650 MILLIGRAM(S): at 18:07

## 2021-10-24 RX ADMIN — Medication 50 GRAM(S): at 09:53

## 2021-10-24 RX ADMIN — Medication 25 MILLIGRAM(S): at 18:07

## 2021-10-24 RX ADMIN — Medication 25 MILLIGRAM(S): at 05:44

## 2021-10-24 RX ADMIN — Medication 1 MILLIGRAM(S): at 12:21

## 2021-10-24 RX ADMIN — SIMVASTATIN 40 MILLIGRAM(S): 20 TABLET, FILM COATED ORAL at 22:01

## 2021-10-24 RX ADMIN — Medication 30 MILLIGRAM(S): at 12:21

## 2021-10-24 RX ADMIN — OXYCODONE AND ACETAMINOPHEN 1 TABLET(S): 5; 325 TABLET ORAL at 10:20

## 2021-10-24 NOTE — PROGRESS NOTE ADULT - ASSESSMENT
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 Next cycle due in 3 weeks. Encouraged OOB to chair with assistance  -upon discharge pt to f/u with Dr. Casper     # Anemia - multifactorial, including recent chemotherapy; transfuse to keep hg > 7.5 g/dl  stable; s/p PRBC transfusion 10/18    # Renal failure   -chemo day 2 & 3 cancelled  -continue IVF  -Abd US: no hydro, showed Hepatic metastases. Hepatomegaly. Cholelithiasis. Nonspecific gallbladder wall thickening.  -avoid nephrotoxic agents  -after review of labs c/w TLS, rasburicase 3 mg given 10/18  -appreciate nephrology input, monitoring urine output abd creat     # ? TLS  BRENDA, hyperuricemia, hyperphosphatemia  LDH 1050-->695-->523 improving  Hypocalcemia since last admission d/t aredia given for hypercalcemia  rasburicase 3mg given 10/18  repeat uric acid now wnl   appreciate nephrology consult, no evidence of tubulo-toxicity from cis    #Leukocytosis: d/t neulasta    case d/w Medicine Team   We will follow. Please call with questions: [phone]

## 2021-10-24 NOTE — PROGRESS NOTE ADULT - ASSESSMENT
71 YOF with PMH HTN, HLD, recent dx of metastatic lung CA which has not started on active chemo yet.  Brought by  due to worsening of weakness which contributed to delaying  on initiate chemo tx .  Dr thomas is outpt heme/onc, [facility] is following and recommends to start 1st chemo as in- patient. Day 1 was 10/15. pt is noted tumor lysis syndrome after first chemo, received Rasburicase, phoslo. 2nd chemo on hold in setting BRENDA per Heme/Onc.  on IVF. Renal/abdominal sono  for BRENDA, transaminitis revealed kidneys with no hydronephrosis. Hepatomegaly, Cholelithiasis, Nonspecific gallbladder wall thickening ( already established from previous w/u in 9/21). Patient with stage IV Ca; Oncology following with plans for palliative chemotherapy  q 3 weeks with close monitoring of renal function. Anemia stable HBG remains above 7 and likely 2/2 to chemo.  transfuse if <7 Plan for discharge to [facility], pending acceptance given patient refusing to participate in PT. May likely need LTC placement instead, NCM to follow

## 2021-10-24 NOTE — PROGRESS NOTE ADULT - SUBJECTIVE AND OBJECTIVE BOX
S: Patient seen this morning. Belly pain slightly better, NO nausea. Has not been out of bed.  Events noted.      Vital Signs Last 24 Hrs  T(C): 36.7 (24 Oct 2021 05:15), Max: 36.7 (23 Oct 2021 14:15)  T(F): 98.1 (24 Oct 2021 05:15), Max: 98.1 (24 Oct 2021 05:15)  HR: 85 (24 Oct 2021 05:15) (77 - 85)  BP: 129/78 (24 Oct 2021 05:15) (121/57 - 129/78)  BP(mean): 90 (23 Oct 2021 14:15) (90 - 90)  RR: 16 (24 Oct 2021 05:15) (16 - 18)  SpO2: 95% (24 Oct 2021 05:15) (95% - 96%)    Gen: NAD   CVS: s1s2   Resp: ctabl   GI: soft, non tender   Ext: no edema   CNS: no focal deficit     CBC Full  -  ( 24 Oct 2021 07:06 )  WBC Count : 14.99 K/uL  RBC Count : 2.60 M/uL  Hemoglobin : 7.6 g/dL  Hematocrit : 23.5 %  Platelet Count - Automated : 127 K/uL  Mean Cell Volume : 90.4 fl  Mean Cell Hemoglobin : 29.2 pg  Mean Cell Hemoglobin Concentration : 32.3 gm/dL  Auto Neutrophil # : 12.14 K/uL  Auto Lymphocyte # : 1.65 K/uL  Auto Monocyte # : 0.60 K/uL  Auto Eosinophil # : 0.60 K/uL  Auto Basophil # : 0.00 K/uL  Auto Neutrophil % : 81.0 %  Auto Lymphocyte % : 11.0 %  Auto Monocyte % : 4.0 %  Auto Eosinophil % : 4.0 %  Auto Basophil % : 0.0 %      MEDICATIONS  (STANDING):  folic acid 1 milliGRAM(s) Oral daily  metoprolol tartrate 25 milliGRAM(s) Oral two times a day  PARoxetine 30 milliGRAM(s) Oral daily  simvastatin 40 milliGRAM(s) Oral at bedtime  sodium chloride 0.9%. 100 milliLiter(s) (100 mL/Hr) IV Continuous <Continuous>  sodium chloride 0.9%. 1000 milliLiter(s) (75 mL/Hr) IV Continuous <Continuous>

## 2021-10-24 NOTE — PROGRESS NOTE ADULT - ASSESSMENT
70 y/o F with PMHx of HTN, HLD, and metastatic Lung CA, was discharged on 10/6 from [facility] after hospitalization for sepsis from PNA and UTI, comes in complaining of weakness since discharge.   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 TLS vs ATN from anemia vs Cisplatin  TLS in view of  ( Hyperphosphatemia, Hypocalcemia, high Uric acid and BRENDA)  FENa >1. Her renal function ha worsened again, however here is no indication for Renal Replacement.  Will re-assess.  - Repeat UA.  - Send Urine lytes.  - Monitor BMP.  - Continue hydration.      HTN  Optimal  Monitor BP    Hyperphosphatemia  Likely due to TLS and has improved.  Low Po4 diet      Hypocalcemia  Likely due to low albumin  Corrected calcium 8.0  Monitor BRENDA

## 2021-10-24 NOTE — PROGRESS NOTE ADULT - SUBJECTIVE AND OBJECTIVE BOX
INTERVAL HPI/OVERNIGHT EVENTS: "I dont want to do physical therapy"     T(C): 36.8 (10-24-21 @ 20:04), Max: 36.8 (10-24-21 @ 20:04)  HR: 60 (10-24-21 @ 20:04) (60 - 70)  BP: 114/59 (10-24-21 @ 20:04) (114/59 - 149/81)  RR: 17 (10-24-21 @ 20:04) (17 - 17)  SpO2: 96% (10-24-21 @ 20:04) (96% - 97%)      MEDICATIONS  (STANDING):  folic acid 1 milliGRAM(s) Oral daily  metoprolol tartrate 25 milliGRAM(s) Oral two times a day  PARoxetine 30 milliGRAM(s) Oral daily  simvastatin 40 milliGRAM(s) Oral at bedtime  sodium chloride 0.9%. 100 milliLiter(s) (100 mL/Hr) IV Continuous <Continuous>  sodium chloride 0.9%. 1000 milliLiter(s) (75 mL/Hr) IV Continuous <Continuous>    MEDICATIONS  (PRN):  acetaminophen   Tablet .. 650 milliGRAM(s) Oral every 6 hours PRN Temp greater or equal to 38C (100.4F), Mild Pain (1 - 3), Moderate Pain (4 - 6)  ALBUTerol    90 MICROgram(s) HFA Inhaler 2 Puff(s) Inhalation every 6 hours PRN Shortness of Breath and/or Wheezing  OLANZapine 2.5 milliGRAM(s) Oral at bedtime PRN Agitation  oxycodone    5 mG/acetaminophen 325 mG 1 Tablet(s) Oral every 6 hours PRN Severe Pain (7 - 10)    __________________________________________________  REVIEW OF SYSTEMS:    CONSTITUTIONAL: fatigue,   EYES: no acute visual disturbances  NECK: No pain or stiffness  RESPIRATORY: No cough; No shortness of breath  CARDIOVASCULAR: No chest pain, no palpitations  GASTROINTESTINAL: No pain. No nausea or vomiting; No diarrhea   NEUROLOGICAL: No headache or numbness, no tremors  MUSCULOSKELETAL: No joint pain, no muscle pain  GENITOURINARY: no dysuria, no frequency, no hesitancy  PSYCHIATRY: no depression , no anxiety  ALL OTHER  ROS negative        PHYSICAL EXAM:  GENERAL: NAD  HEENT: Normocephalic; atraumatic  CHEST/LUNG: Breathing nonlabored breathing with ease on N/C -- diminished B/S   HEART: +S1 +S2  regular  ABDOMEN: Softly distended, nontender; Bowel sounds present  EXTREMITIES: +2 bilateral radial pulses. no edema.   SKIN: warm and dry; no rash  NERVOUS SYSTEM:  Awake and alert; Oriented  to place, person and time    _________________________________________________                               7.6    14.99 )-----------( 127      ( 24 Oct 2021 07:06 )             23.5           LIVER FUNCTIONS - ( 24 Oct 2021 07:06 )  Alb: 2.8 g/dL / Pro: 7.2 g/dL / ALK PHOS: 158 U/L / ALT: 21 U/L DA / AST: 18 U/L / GGT: x             141|107|65<112  3.8|25|2.77  7.8,1.5,4.5  10-24 @ 07:06

## 2021-10-24 NOTE — PROGRESS NOTE ADULT - SUBJECTIVE AND OBJECTIVE BOX
WILEY ELAINA  71y  Patient is a 71y old  Female who presents with a chief complaint of weakness (24 Oct 2021 10:56)    HPI:  Followed for BRENDA after Chemotherapy.  No new complaints at this time.      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 100 milliLiter(s) (100 mL/Hr) IV Continuous <Continuous>  sodium chloride 0.9%. 1000 milliLiter(s) (75 mL/Hr) IV Continuous <Continuous>    MEDICATIONS  (PRN):  acetaminophen   Tablet .. 650 milliGRAM(s) Oral every 6 hours PRN Temp greater or equal to 38C (100.4F), Mild Pain (1 - 3), Moderate Pain (4 - 6)  ALBUTerol    90 MICROgram(s) HFA Inhaler 2 Puff(s) Inhalation every 6 hours PRN Shortness of Breath and/or Wheezing  OLANZapine 2.5 milliGRAM(s) Oral at bedtime PRN Agitation  oxycodone    5 mG/acetaminophen 325 mG 1 Tablet(s) Oral every 6 hours PRN Severe Pain (7 - 10)    Vital Signs Last 24 Hrs  T(C): 36.6 (24 Oct 2021 13:00), Max: 36.7 (24 Oct 2021 05:15)  T(F): 97.8 (24 Oct 2021 13:00), Max: 98.1 (24 Oct 2021 05:15)  HR: 70 (24 Oct 2021 13:00) (70 - 85)  BP: 149/81 (24 Oct 2021 18:06) (126/77 - 149/81)  BP(mean): 88 (24 Oct 2021 13:00) (88 - 88)  RR: 17 (24 Oct 2021 13:00) (16 - 17)  SpO2: 97% (24 Oct 2021 13:00) (95% - 97%)  Daily     Daily     PHYSICAL EXAM:  Constitutional:  She appears comfortable and not distressed. Not diaphoretic.    Neck:  The thyroid is normal. Trachea is midline.     Respiratory: The lungs are clear to auscultation. No dullness and expansion is normal.    Cardiovascular: S1 and S2 are normal. No mummurs, rubs or gallops are present.    Gastrointestinal: The abdomen is soft. No tenderness is present. No masses are present. Bowel sounds are normal.    Genitourinary: The bladder is not distended. No CVA tenderness is present.    Extremities: No edema is noted. No deformities are present.    Neurological: Cognition is normal. Tone, power and sensation are normal.     Skin: No lesions are seen  or palpated.       Lymph Nodes: No lymphadenopathy is present.    Psychiatric: Mood is appropriate. No hallucinations or flight of ideas are noted.                              7.6    14.99 )-----------( 127      ( 24 Oct 2021 07:06 )             23.5     10-24    141  |  107  |  65<H>  ----------------------------<  112<H>  3.8   |  25  |  2.77<H>    Ca    7.8<L>      24 Oct 2021 07:06  Phos  4.5     10-24  Mg     1.5     10-24    TPro  7.2  /  Alb  2.8<L>  /  TBili  0.4  /  DBili  0.1  /  AST  18  /  ALT  21  /  AlkPhos  158<H>  10-24

## 2021-10-24 NOTE — CHART NOTE - NSCHARTNOTEFT_GEN_A_CORE
Pt. seen at bedside, son in attendance.  c/o right shoulder pain 7/10, Percocet 5/325 mg q6hrs ordered.  Pt. continues to decline PT today stating "my stomach is not right".  Pt. for possible discharge to [facility] tomorrow, CM following.

## 2021-10-24 NOTE — PROGRESS NOTE ADULT - PROBLEM SELECTOR PLAN 1
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   per Oncology goal of treatment is palliative;  q 3 weeks with close monitoring of renal function.  Per last [facility] discussion patient to remain full code; Palliative follows and patient's decision has not changed  Continue to monitor and supplemental O2; currently 94-98% on 2 L via NC   Heme/onc [facility] (Dr. Miranda's) group following.

## 2021-10-25 LAB
ALBUMIN SERPL ELPH-MCNC: 2.8 G/DL — LOW (ref 3.5–5)
ALP SERPL-CCNC: 158 U/L — HIGH (ref 40–120)
ALT FLD-CCNC: 21 U/L DA — SIGNIFICANT CHANGE UP (ref 10–60)
ANION GAP SERPL CALC-SCNC: 9 MMOL/L — SIGNIFICANT CHANGE UP (ref 5–17)
AST SERPL-CCNC: 13 U/L — SIGNIFICANT CHANGE UP (ref 10–40)
BASOPHILS # BLD AUTO: 0.1 K/UL — SIGNIFICANT CHANGE UP (ref 0–0.2)
BASOPHILS NFR BLD AUTO: 0.8 % — SIGNIFICANT CHANGE UP (ref 0–2)
BILIRUB DIRECT SERPL-MCNC: 0.1 MG/DL — SIGNIFICANT CHANGE UP (ref 0–0.2)
BILIRUB INDIRECT FLD-MCNC: 0.4 MG/DL — SIGNIFICANT CHANGE UP (ref 0.2–1)
BILIRUB SERPL-MCNC: 0.5 MG/DL — SIGNIFICANT CHANGE UP (ref 0.2–1.2)
BUN SERPL-MCNC: 58 MG/DL — HIGH (ref 7–18)
CALCIUM SERPL-MCNC: 7.8 MG/DL — LOW (ref 8.4–10.5)
CHLORIDE SERPL-SCNC: 104 MMOL/L — SIGNIFICANT CHANGE UP (ref 96–108)
CO2 SERPL-SCNC: 25 MMOL/L — SIGNIFICANT CHANGE UP (ref 22–31)
CREAT SERPL-MCNC: 2.95 MG/DL — HIGH (ref 0.5–1.3)
EOSINOPHIL # BLD AUTO: 0.05 K/UL — SIGNIFICANT CHANGE UP (ref 0–0.5)
EOSINOPHIL NFR BLD AUTO: 0.4 % — SIGNIFICANT CHANGE UP (ref 0–6)
GLUCOSE SERPL-MCNC: 126 MG/DL — HIGH (ref 70–99)
HCT VFR BLD CALC: 23.1 % — LOW (ref 34.5–45)
HGB BLD-MCNC: 7.6 G/DL — LOW (ref 11.5–15.5)
IMM GRANULOCYTES NFR BLD AUTO: 6.6 % — HIGH (ref 0–1.5)
LDH SERPL L TO P-CCNC: 360 U/L — HIGH (ref 120–225)
LYMPHOCYTES # BLD AUTO: 1.35 K/UL — SIGNIFICANT CHANGE UP (ref 1–3.3)
LYMPHOCYTES # BLD AUTO: 10.2 % — LOW (ref 13–44)
MAGNESIUM SERPL-MCNC: 2 MG/DL — SIGNIFICANT CHANGE UP (ref 1.6–2.6)
MCHC RBC-ENTMCNC: 29.8 PG — SIGNIFICANT CHANGE UP (ref 27–34)
MCHC RBC-ENTMCNC: 32.9 GM/DL — SIGNIFICANT CHANGE UP (ref 32–36)
MCV RBC AUTO: 90.6 FL — SIGNIFICANT CHANGE UP (ref 80–100)
MONOCYTES # BLD AUTO: 0.65 K/UL — SIGNIFICANT CHANGE UP (ref 0–0.9)
MONOCYTES NFR BLD AUTO: 4.9 % — SIGNIFICANT CHANGE UP (ref 2–14)
NEUTROPHILS # BLD AUTO: 10.24 K/UL — HIGH (ref 1.8–7.4)
NEUTROPHILS NFR BLD AUTO: 77.1 % — HIGH (ref 43–77)
NRBC # BLD: 0 /100 WBCS — SIGNIFICANT CHANGE UP (ref 0–0)
PHOSPHATE SERPL-MCNC: 4.6 MG/DL — HIGH (ref 2.5–4.5)
PLATELET # BLD AUTO: 133 K/UL — LOW (ref 150–400)
POTASSIUM SERPL-MCNC: 3.7 MMOL/L — SIGNIFICANT CHANGE UP (ref 3.5–5.3)
POTASSIUM SERPL-SCNC: 3.7 MMOL/L — SIGNIFICANT CHANGE UP (ref 3.5–5.3)
PROT SERPL-MCNC: 7.1 G/DL — SIGNIFICANT CHANGE UP (ref 6–8.3)
RBC # BLD: 2.55 M/UL — LOW (ref 3.8–5.2)
RBC # FLD: 18.1 % — HIGH (ref 10.3–14.5)
SARS-COV-2 RNA SPEC QL NAA+PROBE: SIGNIFICANT CHANGE UP
SODIUM SERPL-SCNC: 138 MMOL/L — SIGNIFICANT CHANGE UP (ref 135–145)
URATE SERPL-MCNC: 6.1 MG/DL — SIGNIFICANT CHANGE UP (ref 2.5–7)
WBC # BLD: 13.27 K/UL — HIGH (ref 3.8–10.5)
WBC # FLD AUTO: 13.27 K/UL — HIGH (ref 3.8–10.5)

## 2021-10-25 RX ORDER — SODIUM CHLORIDE 9 MG/ML
1000 INJECTION INTRAMUSCULAR; INTRAVENOUS; SUBCUTANEOUS
Refills: 0 | Status: DISCONTINUED | OUTPATIENT
Start: 2021-10-25 | End: 2021-10-25

## 2021-10-25 RX ORDER — SODIUM CHLORIDE 9 MG/ML
1000 INJECTION INTRAMUSCULAR; INTRAVENOUS; SUBCUTANEOUS
Refills: 0 | Status: DISCONTINUED | OUTPATIENT
Start: 2021-10-25 | End: 2021-10-28

## 2021-10-25 RX ORDER — SODIUM CHLORIDE 9 MG/ML
100 INJECTION INTRAMUSCULAR; INTRAVENOUS; SUBCUTANEOUS
Refills: 0 | Status: DISCONTINUED | OUTPATIENT
Start: 2021-10-25 | End: 2021-10-25

## 2021-10-25 RX ADMIN — SIMVASTATIN 40 MILLIGRAM(S): 20 TABLET, FILM COATED ORAL at 22:19

## 2021-10-25 RX ADMIN — SODIUM CHLORIDE 100 MILLILITER(S): 9 INJECTION INTRAMUSCULAR; INTRAVENOUS; SUBCUTANEOUS at 12:45

## 2021-10-25 RX ADMIN — Medication 25 MILLIGRAM(S): at 17:15

## 2021-10-25 RX ADMIN — Medication 650 MILLIGRAM(S): at 09:00

## 2021-10-25 RX ADMIN — Medication 650 MILLIGRAM(S): at 08:06

## 2021-10-25 RX ADMIN — OXYCODONE AND ACETAMINOPHEN 1 TABLET(S): 5; 325 TABLET ORAL at 18:10

## 2021-10-25 RX ADMIN — Medication 1 MILLIGRAM(S): at 12:45

## 2021-10-25 RX ADMIN — Medication 25 MILLIGRAM(S): at 05:46

## 2021-10-25 RX ADMIN — Medication 30 MILLIGRAM(S): at 12:45

## 2021-10-25 RX ADMIN — OXYCODONE AND ACETAMINOPHEN 1 TABLET(S): 5; 325 TABLET ORAL at 17:15

## 2021-10-25 NOTE — PROGRESS NOTE ADULT - ASSESSMENT
71 YOF with PMH HTN, HLD, recent dx of metastatic lung CA which has not started on active chemo yet.  Brought by  due to worsening of weakness which contributed to delaying  on initiate chemo tx .  Dr thomas is outpt heme/onc, [facility] group is following and recommends to start 1st chemo as in- patient. Day 1 was 10/15. pt is noted tumor lysis syndrome after first chemo, received Rasburicase, phoslo. 2nd chemo on hold in setting BRENDA per Heme/Onc.  on IVF. Renal/abdominal sono  for BRENDA, transaminitis revealed kidneys with no hydronephrosis. Hepatomegaly, Cholelithiasis, Nonspecific gallbladder wall thickening ( already established from previous w/u in 9/21). Patient with stage IV Ca; Oncology following with plans for palliative chemotherapy  q 3 weeks with close monitoring of renal function. Anemia stable HBG remains above 7 and likely 2/2 to chemo.  transfuse if <7 Plan for discharge to [facility], pending acceptance given patient refusing to participate in PT. May likely need LTC placement instead, NCM to follow.    10/25-Seen and examined at bed side, comfortable with no c/o discomfort, states she wants to go home.  Cr trending up today, IVF hydration in progress.  Leukocytosis improving, pt. afebrile.  Discharge to [facility] pending pt.'s participation with PT and improved Cr.

## 2021-10-25 NOTE — PROGRESS NOTE ADULT - SUBJECTIVE AND OBJECTIVE BOX
NP Note discussed with  Primary Attending    Patient is a 71y old  Female who presents with a chief complaint of weakness (24 Oct 2021 19:48)      INTERVAL HPI/OVERNIGHT EVENTS: no new complaints    MEDICATIONS  (STANDING):  folic acid 1 milliGRAM(s) Oral daily  metoprolol tartrate 25 milliGRAM(s) Oral two times a day  PARoxetine 30 milliGRAM(s) Oral daily  simvastatin 40 milliGRAM(s) Oral at bedtime  sodium chloride 0.9%. 1000 milliLiter(s) (100 mL/Hr) IV Continuous <Continuous>    MEDICATIONS  (PRN):  acetaminophen   Tablet .. 650 milliGRAM(s) Oral every 6 hours PRN Temp greater or equal to 38C (100.4F), Mild Pain (1 - 3), Moderate Pain (4 - 6)  ALBUTerol    90 MICROgram(s) HFA Inhaler 2 Puff(s) Inhalation every 6 hours PRN Shortness of Breath and/or Wheezing  OLANZapine 2.5 milliGRAM(s) Oral at bedtime PRN Agitation  oxycodone    5 mG/acetaminophen 325 mG 1 Tablet(s) Oral every 6 hours PRN Severe Pain (7 - 10)      __________________________________________________  REVIEW OF SYSTEMS:    CONSTITUTIONAL: No fever,   EYES: no acute visual disturbances  NECK: No pain or stiffness  RESPIRATORY: No cough; No shortness of breath  CARDIOVASCULAR: No chest pain, no palpitations  GASTROINTESTINAL: No pain. No nausea or vomiting; No diarrhea   NEUROLOGICAL: No headache or numbness, no tremors  MUSCULOSKELETAL: No joint pain, no muscle pain  GENITOURINARY: no dysuria, no frequency, no hesitancy  PSYCHIATRY: no depression , no anxiety  ALL OTHER  ROS negative        Vital Signs Last 24 Hrs  T(C): 36.8 (25 Oct 2021 05:19), Max: 36.8 (24 Oct 2021 20:04)  T(F): 98.3 (25 Oct 2021 05:19), Max: 98.3 (24 Oct 2021 20:04)  HR: 74 (25 Oct 2021 05:19) (60 - 74)  BP: 150/60 (25 Oct 2021 05:19) (114/59 - 150/60)  BP(mean): 88 (24 Oct 2021 13:00) (88 - 88)  RR: 17 (25 Oct 2021 05:19) (17 - 17)  SpO2: 94% (25 Oct 2021 05:19) (94% - 97%)    ________________________________________________  PHYSICAL EXAM:  well developed, well groomed  GENERAL: NAD  HEENT: Normocephalic;  conjunctivae and sclerae clear; moist mucous membranes;   NECK : supple  CHEST/LUNG: Clear to auscultation bilaterally with good air entry   HEART: S1 S2  regular; no murmurs, gallops or rubs  ABDOMEN: Soft, Nontender, Nondistended; Bowel sounds present  EXTREMITIES: no cyanosis; no edema; no calf tenderness  SKIN: warm and dry; no rash  NERVOUS SYSTEM:  Awake and alert; Oriented  to place, person and time ; no new deficits    _________________________________________________  LABS:                        7.6    13.27 )-----------( 133      ( 25 Oct 2021 07:35 )             23.1     10-    138  |  104  |  58<H>  ----------------------------<  126<H>  3.7   |  25  |  2.95<H>    Ca    7.8<L>      25 Oct 2021 07:35  Phos  4.6     10-  Mg     2.0     10-25    TPro  7.1  /  Alb  2.8<L>  /  TBili  0.5  /  DBili  0.1  /  AST  13  /  ALT  21  /  AlkPhos  158<H>  10-25      Urinalysis Basic - ( 24 Oct 2021 21:36 )    Color: Yellow / Appearance: Slightly Turbid / S.015 / pH: x  Gluc: x / Ketone: Trace  / Bili: Negative / Urobili: Negative   Blood: x / Protein: 100 / Nitrite: Negative   Leuk Esterase: Moderate / RBC: 0-2 /HPF / WBC >50 /HPF   Sq Epi: x / Non Sq Epi: x / Bacteria: Many /HPF      CAPILLARY BLOOD GLUCOSE    RADIOLOGY & ADDITIONAL TESTS:    US Abdomen Complete (US Abdomen Complete .) (10.20.21 @ 11:03) >  EXAM:  US ABDOMEN COMPLETE                            PROCEDURE DATE:  10/20/2021          INTERPRETATION:  CLINICAL INFORMATION: Acute kidney injury, elevated liver function tests    COMPARISON: MR dated 2021    TECHNIQUE: Sonography of the abdomen.    FINDINGS:    Liver: Multiple lesions. Enlarged (20 cm). Patent main portal vein with normal flow direction.  Bile ducts: Normal caliber. Common bile duct measures 7 mm.  Gallbladder: Cholelithiasis. Mild wall thickening (4 mm). No focal tenderness.  Pancreas: Visualized portions are within normal limits.  Spleen: 11.7 cm. Within normal limits.  Right kidney: 10.5 cm. No hydronephrosis.  Left kidney: 10.8 cm.  No hydronephrosis.  Ascites: None.  Aorta and IVC: Visualized portions are within normal limits.    IMPRESSION:  Hepatic metastases.  Hepatomegaly.  Cholelithiasis.  Nonspecific gallbladder wall thickening.    < end of copied text >      Imaging Personally Reviewed:  YES  Consultant(s) Notes Reviewed:   YES/ No    Care Discussed with Consultants : renal    Plan of care was discussed with patient and /or primary care giver; all questions and concerns were addressed and care was aligned with patient's wishes.     NP Note discussed with  Primary Attending    Patient is a 71y old  Female who presents with a chief complaint of weakness (24 Oct 2021 19:48)      INTERVAL HPI/OVERNIGHT EVENTS: no new complaints    MEDICATIONS  (STANDING):  folic acid 1 milliGRAM(s) Oral daily  metoprolol tartrate 25 milliGRAM(s) Oral two times a day  PARoxetine 30 milliGRAM(s) Oral daily  simvastatin 40 milliGRAM(s) Oral at bedtime  sodium chloride 0.9%. 1000 milliLiter(s) (100 mL/Hr) IV Continuous <Continuous>    MEDICATIONS  (PRN):  acetaminophen   Tablet .. 650 milliGRAM(s) Oral every 6 hours PRN Temp greater or equal to 38C (100.4F), Mild Pain (1 - 3), Moderate Pain (4 - 6)  ALBUTerol    90 MICROgram(s) HFA Inhaler 2 Puff(s) Inhalation every 6 hours PRN Shortness of Breath and/or Wheezing  OLANZapine 2.5 milliGRAM(s) Oral at bedtime PRN Agitation  oxycodone    5 mG/acetaminophen 325 mG 1 Tablet(s) Oral every 6 hours PRN Severe Pain (7 - 10)      __________________________________________________  REVIEW OF SYSTEMS:  "I want to go home"    CONSTITUTIONAL: No fever,   EYES: no acute visual disturbances  NECK: No pain or stiffness  RESPIRATORY: No cough; No shortness of breath  CARDIOVASCULAR: No chest pain, no palpitations  GASTROINTESTINAL: No pain. No nausea or vomiting; No diarrhea   NEUROLOGICAL: No headache or numbness, no tremors  MUSCULOSKELETAL: No joint pain, no muscle pain  GENITOURINARY: no dysuria, no frequency, no hesitancy  PSYCHIATRY: no depression , no anxiety  ALL OTHER  ROS negative        Vital Signs Last 24 Hrs  T(C): 36.8 (25 Oct 2021 05:19), Max: 36.8 (24 Oct 2021 20:04)  T(F): 98.3 (25 Oct 2021 05:19), Max: 98.3 (24 Oct 2021 20:04)  HR: 74 (25 Oct 2021 05:19) (60 - 74)  BP: 150/60 (25 Oct 2021 05:19) (114/59 - 150/60)  BP(mean): 88 (24 Oct 2021 13:00) (88 - 88)  RR: 17 (25 Oct 2021 05:19) (17 - 17)  SpO2: 94% (25 Oct 2021 05:19) (94% - 97%)    ________________________________________________  PHYSICAL EXAM:  well developed, well groomed  GENERAL: NAD  HEENT: Normocephalic;  conjunctivae and sclerae clear; moist mucous membranes;   NECK : supple  CHEST/LUNG: Clear to auscultation bilaterally with good air entry   HEART: S1 S2  regular; no murmurs, gallops or rubs  ABDOMEN: Soft, Nontender, Nondistended; Bowel sounds present  EXTREMITIES: no cyanosis; no edema; no calf tenderness  SKIN: warm and dry; no rash  NERVOUS SYSTEM:  Awake and alert; Oriented  to place, person and time ; no new deficits    _________________________________________________  LABS:                        7.6    13.27 )-----------( 133      ( 25 Oct 2021 07:35 )             23.1     10-    138  |  104  |  58<H>  ----------------------------<  126<H>  3.7   |  25  |  2.95<H>    Ca    7.8<L>      25 Oct 2021 07:35  Phos  4.6     10-  Mg     2.0     10-    TPro  7.1  /  Alb  2.8<L>  /  TBili  0.5  /  DBili  0.1  /  AST  13  /  ALT  21  /  AlkPhos  158<H>  10-25      Urinalysis Basic - ( 24 Oct 2021 21:36 )    Color: Yellow / Appearance: Slightly Turbid / S.015 / pH: x  Gluc: x / Ketone: Trace  / Bili: Negative / Urobili: Negative   Blood: x / Protein: 100 / Nitrite: Negative   Leuk Esterase: Moderate / RBC: 0-2 /HPF / WBC >50 /HPF   Sq Epi: x / Non Sq Epi: x / Bacteria: Many /HPF      CAPILLARY BLOOD GLUCOSE    RADIOLOGY & ADDITIONAL TESTS:    US Abdomen Complete (US Abdomen Complete .) (10.20.21 @ 11:03) >  EXAM:  US ABDOMEN COMPLETE                            PROCEDURE DATE:  10/20/2021          INTERPRETATION:  CLINICAL INFORMATION: Acute kidney injury, elevated liver function tests    COMPARISON: MR dated 2021    TECHNIQUE: Sonography of the abdomen.    FINDINGS:    Liver: Multiple lesions. Enlarged (20 cm). Patent main portal vein with normal flow direction.  Bile ducts: Normal caliber. Common bile duct measures 7 mm.  Gallbladder: Cholelithiasis. Mild wall thickening (4 mm). No focal tenderness.  Pancreas: Visualized portions are within normal limits.  Spleen: 11.7 cm. Within normal limits.  Right kidney: 10.5 cm. No hydronephrosis.  Left kidney: 10.8 cm.  No hydronephrosis.  Ascites: None.  Aorta and IVC: Visualized portions are within normal limits.    IMPRESSION:  Hepatic metastases.  Hepatomegaly.  Cholelithiasis.  Nonspecific gallbladder wall thickening.    < end of copied text >      Imaging Personally Reviewed:  YES  Consultant(s) Notes Reviewed:   YES/ No    Care Discussed with Consultants : renal    Plan of care was discussed with patient and /or primary care giver; all questions and concerns were addressed and care was aligned with patient's wishes.

## 2021-10-25 NOTE — PROGRESS NOTE ADULT - PROBLEM SELECTOR PLAN 3
in the setting of recent pegfilgrastrim  therapy  - Continue to monitor for downtrending  - Pan culture if fevers   - Oncology following - Continue to monitor for downtrending  - Pan culture if fevers   - Oncology following

## 2021-10-25 NOTE — PROGRESS NOTE ADULT - PROBLEM SELECTOR PLAN 4
in the setting of TLS   - Chemotherapy day 2 and 3 held   - Cr now 2.5  - US; kidneys with no hydronephrosis   - Continue NS @ 75cc/hr   Avoid nephrotoxic agents, NSAIDs  Renal following;   - Continue to monitor BMP in the setting of TLS   -Chemotherapy day 2 and 3 held   -Cr trending up, 2.9 today  -US; kidneys with no hydronephrosis   -Continue NS @ 100cc/hr   -Avoid nephrotoxic agents, NSAIDs  -Renal following  -f/u UA  -f/u urine lytes  - Continue to monitor BMP

## 2021-10-25 NOTE — PROGRESS NOTE ADULT - PROBLEM SELECTOR PLAN 6
Multifactorial etiology, including recent chemotherapy requiring 1 unit PRBC this admission   - Continue to trend H/H  - Monitor for signs of overt bleed  -Oncology following Multifactorial etiology, including recent chemotherapy requiring 1 unit PRBC this admission   -Continue to trend H/H  -Monitor for signs of overt bleed  -Oncology following

## 2021-10-25 NOTE — PROGRESS NOTE ADULT - PROBLEM SELECTOR PLAN 7
in the setting of Ca w/ chemotherapy initiation  impring  - Continue to monitor   - Bleeding precautions  - Oncology following in the setting of Ca w/ chemotherapy initiation-Improving  -Plt 133 today  - Continue to monitor   - Bleeding precautions  - Oncology following

## 2021-10-25 NOTE — PROGRESS NOTE ADULT - PROBLEM SELECTOR PLAN 1
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  -2nd and 3rd chemo on hold for now due to worsening renal function and anemia  -Next cycle due in 3-4 weeks  -Heme/onc [facility] (Dr. Miranda's) group following. 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  -2nd and 3rd chemo on hold for now due to worsening renal function and anemia  -Next cycle due in 3-4 weeks  -Heme/onc [facility] (Dr. Miranda's) group following.  -Upon discharge pt. to f/u with Dr. Casper

## 2021-10-25 NOTE — PROGRESS NOTE ADULT - SUBJECTIVE AND OBJECTIVE BOX
[facility] [place] [facility]   MD JAD GONZALEZ MD RUORU WONG, PA    TEL:  OFFICE: [phone]  DR MCNEILL CELL: [phone]  NNEKA ALEJO CELL: [phone]  DR. RUBIN CELL: [phone]      FROM 5 PM - 7 AM PLEASE CALL ANSWERING SERVICE: [phone]    RENAL FOLLOW UP NOTE--Date of Service 10-25-21 @ 13:00  --------------------------------------------------------------------------------  HPI:      Pt seen and examined at bedside.   Denies SOB, chest pain     PAST HISTORY  --------------------------------------------------------------------------------  No significant changes to PMH, PSH, FHx, SHx, unless otherwise noted    ALLERGIES & MEDICATIONS  --------------------------------------------------------------------------------  Allergies    No Known Allergies    Intolerances      Standing Inpatient Medications  folic acid 1 milliGRAM(s) Oral daily  metoprolol tartrate 25 milliGRAM(s) Oral two times a day  PARoxetine 30 milliGRAM(s) Oral daily  simvastatin 40 milliGRAM(s) Oral at bedtime  sodium chloride 0.9%. 1000 milliLiter(s) IV Continuous <Continuous>    PRN Inpatient Medications  acetaminophen   Tablet .. 650 milliGRAM(s) Oral every 6 hours PRN  ALBUTerol    90 MICROgram(s) HFA Inhaler 2 Puff(s) Inhalation every 6 hours PRN  OLANZapine 2.5 milliGRAM(s) Oral at bedtime PRN  oxycodone    5 mG/acetaminophen 325 mG 1 Tablet(s) Oral every 6 hours PRN      REVIEW OF SYSTEMS  --------------------------------------------------------------------------------  General: no fever  CVS: no chest pain  RESP: no sob, no cough  ABD: no abdominal pain  : no dysuria,  MSK: no edema     VITALS/PHYSICAL EXAM  --------------------------------------------------------------------------------  T(C): 36.8 (10-25-21 @ 05:19), Max: 36.8 (10-24-21 @ 20:04)  HR: 74 (10-25-21 @ 05:19) (60 - 74)  BP: 150/60 (10-25-21 @ 05:19) (114/59 - 150/60)  RR: 17 (10-25-21 @ 05:19) (17 - 17)  SpO2: 94% (10-25-21 @ 05:19) (94% - 96%)  Wt(kg): --        10-24-21 @ 07:01  -  10-25-21 @ 07:00  --------------------------------------------------------  IN: 0 mL / OUT: 600 mL / NET: -600 mL      Physical Exam:  	Gen: NAD  	HEENT: MMM  	Pulm: CTA B/L  	CV: S1S2  	Abd: Soft, +BS  	Ext: No LE edema B/L                      Neuro: Awake   	Skin: Warm and Dry   	Vascular access: NO HD catheter            no  arti  LABS/STUDIES  --------------------------------------------------------------------------------              7.6    13.27 >-----------<  133      [10-25-21 @ 07:35]              23.1     138  |  104  |  58  ----------------------------<  126      [10-25-21 @ 07:35]  3.7   |  25  |  2.95        Ca     7.8     [10-25-21 @ 07:35]      Mg     2.0     [10-25-21 @ 07:35]      Phos  4.6     [10-25-21 @ 07:35]    TPro  7.1  /  Alb  2.8  /  TBili  0.5  /  DBili  0.1  /  AST  13  /  ALT  21  /  AlkPhos  158  [10-25-21 @ 07:35]        Uric acid 6.1      [10-25-21 @ 07:35]        [10-25-21 @ 07:35]    Creatinine Trend:  SCr 2.95 [10-25 @ 07:35]  SCr 2.77 [10-24 @ 07:06]  SCr 2.64 [10-23 @ 07:29]  SCr 2.59 [10-22 @ 07:41]  SCr 2.60 [10-21 @ 06:47]    Urinalysis - [10-24-21 @ 21:36]      Color Yellow / Appearance Slightly Turbid / SG 1.015 / pH 6.5      Gluc Negative / Ketone Trace  / Bili Negative / Urobili Negative       Blood Moderate / Protein 100 / Leuk Est Moderate / Nitrite Negative      RBC 0-2 / WBC >50 / Hyaline  / Gran  / Sq Epi  / Non Sq Epi  / Bacteria Many    Urine Creatinine 30      [10-18-21 @ 16:11]  Urine Sodium 82      [10-18-21 @ 16:11]    Iron 56, TIBC 262, %sat 21      [09-22-21 @ 07:54]  HbA1c 6.2      [07-10-18 @ 13:53]  TSH 1.17      [09-28-21 @ 05:58]  Lipid: chol 172, , HDL 25, LDL --      [09-21-21 @ 07:37]      Immunofixation Serum:    One Weak  IgG Kappa Band and One Weak  IgG Lambda Band Identified    Reference Range: None Detected      [09-29-21 @ 01:43]  SPEP Interpretation: Two Weak Gamma-Migrating Paraproteins Identified      [09-29-21 @ 01:43]

## 2021-10-25 NOTE — PROGRESS NOTE ADULT - PROBLEM SELECTOR PLAN 2
-Hyperphosphatemia: downtrending   No concentrated phos in diet added.   -hyperuricemia   consider allopurinol renally dosed  monitor Serum Uric acid daily  -elevated LDH 1055---> 498  - IVF x 24 hrs then reevaluate   Monitor CMP, Magnesium, phosphorus , LDH, Uric acid daily in am.   Renal following   HemeOnc following Improving  -Hyperphosphatemia stable  -No concentrated phos in diet added.   -hyperuricemia   -Uric acid stable at 6.1  -LDH trending down 1055---> 498--->360  -Cont IVF  -Monitor CMP, Magnesium, phosphorus , LDH, Uric acid daily in am.   -Renal following   -HemeOnc following 1 or 2

## 2021-10-25 NOTE — PROGRESS NOTE ADULT - SUBJECTIVE AND OBJECTIVE BOX
Time of Visit:  Patient seen and examined.     MEDICATIONS  (STANDING):  folic acid 1 milliGRAM(s) Oral daily  metoprolol tartrate 25 milliGRAM(s) Oral two times a day  PARoxetine 30 milliGRAM(s) Oral daily  simvastatin 40 milliGRAM(s) Oral at bedtime  sodium chloride 0.9%. 1000 milliLiter(s) (100 mL/Hr) IV Continuous <Continuous>      MEDICATIONS  (PRN):  acetaminophen   Tablet .. 650 milliGRAM(s) Oral every 6 hours PRN Temp greater or equal to 38C (100.4F), Mild Pain (1 - 3), Moderate Pain (4 - 6)  ALBUTerol    90 MICROgram(s) HFA Inhaler 2 Puff(s) Inhalation every 6 hours PRN Shortness of Breath and/or Wheezing  OLANZapine 2.5 milliGRAM(s) Oral at bedtime PRN Agitation  oxycodone    5 mG/acetaminophen 325 mG 1 Tablet(s) Oral every 6 hours PRN Severe Pain (7 - 10)       Medications up to date at time of exam.    ROS; No fever, chills, cough, congestion on exam.   PHYSICAL EXAMINATION:    Vital Signs Last 24 Hrs  T(C): 36.6 (25 Oct 2021 13:00), Max: 36.8 (24 Oct 2021 20:04)  T(F): 97.9 (25 Oct 2021 13:00), Max: 98.3 (24 Oct 2021 20:04)  HR: 73 (25 Oct 2021 13:00) (60 - 74)  BP: 101/53 (25 Oct 2021 13:00) (101/53 - 150/60)  BP(mean): 61 (25 Oct 2021 13:00) (61 - 61)  RR: 17 (25 Oct 2021 13:00) (17 - 17)  SpO2: 97% (25 Oct 2021 13:00) (94% - 97%)   (if applicable)    General: Alert and oriented x2. Poor historian . Able to answer question at rest with no SOB. No acute distress.      HEENT: Head is normocephalic and atraumatic. No nasal tenderness. Extraocular muscles are intact. Moist mucosa .     NECK: Supple, no palpable adenopathy.    LUNGS: Clear to auscultation bilaterally with no rales, rhonchi, wheezing. Non labored. No use of accessory muscle.     HEART: S1 S2 Regular rate and no click/ rub.     ABDOMEN: Soft, nontender, and nondistended.  Active bowel sounds.     : No bladder distention and tenderness.     NEUROLOGIC: Awake, alert, oriented, forgetful .     SKIN: Warm and moist . Non diaphoretic.     LABS:                        7.6    13.27 )-----------( 133      ( 25 Oct 2021 07:35 )             23.1     10-25    138  |  104  |  58<H>  ----------------------------<  126<H>  3.7   |  25  |  2.95<H>    Ca    7.8<L>      25 Oct 2021 07:35  Phos  4.6     10-25  Mg     2.0     10-25    TPro  7.1  /  Alb  2.8<L>  /  TBili  0.5  /  DBili  0.1  /  AST  13  /  ALT  21  /  AlkPhos  158<H>  10-25      Urinalysis Basic - ( 24 Oct 2021 21:36 )    Color: Yellow / Appearance: Slightly Turbid / S.015 / pH: x  Gluc: x / Ketone: Trace  / Bili: Negative / Urobili: Negative   Blood: x / Protein: 100 / Nitrite: Negative   Leuk Esterase: Moderate / RBC: 0-2 /HPF / WBC >50 /HPF   Sq Epi: x / Non Sq Epi: x / Bacteria: Many /HPF                      MICROBIOLOGY: (if applicable)    RADIOLOGY & ADDITIONAL STUDIES:  EKG:   CXR:  ECHO:    IMPRESSION: 71y Female PAST MEDICAL & SURGICAL HISTORY:  Lumbar stenosis    Bleeding ulcer  stomach ulcer     Anxiety    Spinal stenosis in cervical region    Acute hepatitis C virus infection without hepatic coma  treated - Resolved    Balance disorder    Coronary artery disease involving native coronary artery of native heart without angina pectoris    HTN (hypertension)    HLD (hyperlipidemia)    Coronary artery disease involving native coronary artery of native heart without angina pectoris    H/O colonoscopy    Bleeding ulcer  stomach surgery for bleeding ulcer    Fracture  ORIF Left wrist  13    History of lumbar surgery      Chronic UTI  Pt reports presently on 2nd dose of antibiotics 18 10 days, Dr Virgil smith, pt going for m/eval 18.    H/O cardiac catheterization  , no intervention needed, treated medically     Impression: 70 Y/O Female presented with weakness since discharge. Patient was discharged on 10-06-21 from [facility] after hospitalization for Sepsis from UTI and Pneumonia. Has Metastatic Lung Ca who had s/p EBUS / Bronchoscopy on 10-04-21. Right endobronchial  Mass. Has Stage IV High-Grade Lung , Neuroendocrine CA with likely mets to liver/peritoneum/bone being followed by Oncology Team for inpatient Chemo .  10-25-21, 10-19-21, 10-12-21 Negative for Covid 19 PCR.    Suggestion:  O2 saturation 98%  room air. So far saturating good room air .   Pulmonary Oral hygiene care.  Transfuse as needed per Heme  Oncology follow up for Chemo therapy.  Continue PRN Albuterol 90 mcg 2 puffs Q 6 Hours.

## 2021-10-25 NOTE — PROGRESS NOTE ADULT - ASSESSMENT
72 y/o F with PMHx of HTN, HLD, and metastatic Lung CA, was discharged on 10/6 from [facility] after hospitalization for sepsis from PNA and UTI, comes in complaining of weakness since discharge.   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 TLS vs ATN from anemia vs Cisplatin  TLS in view of  ( Hyperphosphatemia, Hypocalcemia, high Uric acid and BRENDA)  FENa >1. Her renal function ha worsened again, however here is no indication for Renal Replacement.  - Repeat UA.  - Send Urine lytes.  - Monitor BMP.  - Continue hydration.      HTN  Optimal  Monitor BP    Hyperphosphatemia  Likely due to TLS and has improved.  Low Po4 diet      Hypocalcemia  Likely due to low albumin  Corrected calcium 8.0  Monitor BRENDA

## 2021-10-26 LAB
ALBUMIN SERPL ELPH-MCNC: 2.5 G/DL — LOW (ref 3.5–5)
ALP SERPL-CCNC: 167 U/L — HIGH (ref 40–120)
ALT FLD-CCNC: 20 U/L DA — SIGNIFICANT CHANGE UP (ref 10–60)
ANION GAP SERPL CALC-SCNC: 11 MMOL/L — SIGNIFICANT CHANGE UP (ref 5–17)
ANISOCYTOSIS BLD QL: SLIGHT — SIGNIFICANT CHANGE UP
APPEARANCE UR: CLEAR — SIGNIFICANT CHANGE UP
AST SERPL-CCNC: 13 U/L — SIGNIFICANT CHANGE UP (ref 10–40)
BACTERIA # UR AUTO: ABNORMAL /HPF
BASOPHILS # BLD AUTO: 0.15 K/UL — SIGNIFICANT CHANGE UP (ref 0–0.2)
BASOPHILS NFR BLD AUTO: 1 % — SIGNIFICANT CHANGE UP (ref 0–2)
BILIRUB DIRECT SERPL-MCNC: 0.1 MG/DL — SIGNIFICANT CHANGE UP (ref 0–0.2)
BILIRUB INDIRECT FLD-MCNC: 0.3 MG/DL — SIGNIFICANT CHANGE UP (ref 0.2–1)
BILIRUB SERPL-MCNC: 0.4 MG/DL — SIGNIFICANT CHANGE UP (ref 0.2–1.2)
BILIRUB UR-MCNC: NEGATIVE — SIGNIFICANT CHANGE UP
BUN SERPL-MCNC: 62 MG/DL — HIGH (ref 7–18)
CALCIUM SERPL-MCNC: 8 MG/DL — LOW (ref 8.4–10.5)
CHLORIDE SERPL-SCNC: 104 MMOL/L — SIGNIFICANT CHANGE UP (ref 96–108)
CHLORIDE UR-SCNC: 60 MMOL/L — SIGNIFICANT CHANGE UP
CO2 SERPL-SCNC: 22 MMOL/L — SIGNIFICANT CHANGE UP (ref 22–31)
COLOR SPEC: YELLOW — SIGNIFICANT CHANGE UP
CREAT ?TM UR-MCNC: 86 MG/DL — SIGNIFICANT CHANGE UP
CREAT SERPL-MCNC: 3.13 MG/DL — HIGH (ref 0.5–1.3)
DIFF PNL FLD: ABNORMAL
EOSINOPHIL # BLD AUTO: 0 K/UL — SIGNIFICANT CHANGE UP (ref 0–0.5)
EOSINOPHIL NFR BLD AUTO: 0 % — SIGNIFICANT CHANGE UP (ref 0–6)
EPI CELLS # UR: SIGNIFICANT CHANGE UP /HPF
GLUCOSE SERPL-MCNC: 101 MG/DL — HIGH (ref 70–99)
GLUCOSE UR QL: NEGATIVE — SIGNIFICANT CHANGE UP
HCT VFR BLD CALC: 21.6 % — LOW (ref 34.5–45)
HGB BLD-MCNC: 7.2 G/DL — LOW (ref 11.5–15.5)
HYALINE CASTS # UR AUTO: ABNORMAL /LPF
HYPOCHROMIA BLD QL: SLIGHT — SIGNIFICANT CHANGE UP
KETONES UR-MCNC: NEGATIVE — SIGNIFICANT CHANGE UP
LDH SERPL L TO P-CCNC: 360 U/L — HIGH (ref 120–225)
LEUKOCYTE ESTERASE UR-ACNC: ABNORMAL
LYMPHOCYTES # BLD AUTO: 0.91 K/UL — LOW (ref 1–3.3)
LYMPHOCYTES # BLD AUTO: 6 % — LOW (ref 13–44)
MANUAL SMEAR VERIFICATION: SIGNIFICANT CHANGE UP
MCHC RBC-ENTMCNC: 30 PG — SIGNIFICANT CHANGE UP (ref 27–34)
MCHC RBC-ENTMCNC: 33.3 GM/DL — SIGNIFICANT CHANGE UP (ref 32–36)
MCV RBC AUTO: 90 FL — SIGNIFICANT CHANGE UP (ref 80–100)
MONOCYTES # BLD AUTO: 0.46 K/UL — SIGNIFICANT CHANGE UP (ref 0–0.9)
MONOCYTES NFR BLD AUTO: 3 % — SIGNIFICANT CHANGE UP (ref 2–14)
MYELOCYTES NFR BLD: 1 % — HIGH (ref 0–0)
NEUTROPHILS # BLD AUTO: 13.53 K/UL — HIGH (ref 1.8–7.4)
NEUTROPHILS NFR BLD AUTO: 88 % — HIGH (ref 43–77)
NEUTS BAND # BLD: 1 % — SIGNIFICANT CHANGE UP (ref 0–8)
NITRITE UR-MCNC: NEGATIVE — SIGNIFICANT CHANGE UP
NRBC # BLD: 0 /100 — SIGNIFICANT CHANGE UP (ref 0–0)
OSMOLALITY UR: 527 MOS/KG — SIGNIFICANT CHANGE UP (ref 50–1200)
PH UR: 6 — SIGNIFICANT CHANGE UP (ref 5–8)
PLAT MORPH BLD: NORMAL — SIGNIFICANT CHANGE UP
PLATELET # BLD AUTO: 151 K/UL — SIGNIFICANT CHANGE UP (ref 150–400)
PLATELET COUNT - ESTIMATE: NORMAL — SIGNIFICANT CHANGE UP
POIKILOCYTOSIS BLD QL AUTO: SLIGHT — SIGNIFICANT CHANGE UP
POLYCHROMASIA BLD QL SMEAR: SLIGHT — SIGNIFICANT CHANGE UP
POTASSIUM SERPL-MCNC: 3.6 MMOL/L — SIGNIFICANT CHANGE UP (ref 3.5–5.3)
POTASSIUM SERPL-SCNC: 3.6 MMOL/L — SIGNIFICANT CHANGE UP (ref 3.5–5.3)
POTASSIUM UR-SCNC: 33 MMOL/L — SIGNIFICANT CHANGE UP
PROT ?TM UR-MCNC: 120 MG/DL — HIGH (ref 0–12)
PROT SERPL-MCNC: 7.1 G/DL — SIGNIFICANT CHANGE UP (ref 6–8.3)
PROT UR-MCNC: 100
RBC # BLD: 2.4 M/UL — LOW (ref 3.8–5.2)
RBC # FLD: 17.9 % — HIGH (ref 10.3–14.5)
RBC BLD AUTO: ABNORMAL
RBC CASTS # UR COMP ASSIST: ABNORMAL /HPF (ref 0–2)
SODIUM SERPL-SCNC: 137 MMOL/L — SIGNIFICANT CHANGE UP (ref 135–145)
SODIUM UR-SCNC: 62 MMOL/L — SIGNIFICANT CHANGE UP
SP GR SPEC: 1.01 — SIGNIFICANT CHANGE UP (ref 1.01–1.02)
URATE SERPL-MCNC: 7.2 MG/DL — HIGH (ref 2.5–7)
UROBILINOGEN FLD QL: NEGATIVE — SIGNIFICANT CHANGE UP
UUN UR-MCNC: 728 MG/DL — SIGNIFICANT CHANGE UP
WBC # BLD: 15.2 K/UL — HIGH (ref 3.8–10.5)
WBC # FLD AUTO: 15.2 K/UL — HIGH (ref 3.8–10.5)
WBC UR QL: ABNORMAL /HPF (ref 0–5)

## 2021-10-26 RX ORDER — CEFTRIAXONE 500 MG/1
1000 INJECTION, POWDER, FOR SOLUTION INTRAMUSCULAR; INTRAVENOUS EVERY 24 HOURS
Refills: 0 | Status: DISCONTINUED | OUTPATIENT
Start: 2021-10-26 | End: 2021-10-28

## 2021-10-26 RX ADMIN — Medication 1 MILLIGRAM(S): at 12:19

## 2021-10-26 RX ADMIN — Medication 30 MILLIGRAM(S): at 12:19

## 2021-10-26 RX ADMIN — OXYCODONE AND ACETAMINOPHEN 1 TABLET(S): 5; 325 TABLET ORAL at 09:13

## 2021-10-26 RX ADMIN — OXYCODONE AND ACETAMINOPHEN 1 TABLET(S): 5; 325 TABLET ORAL at 08:43

## 2021-10-26 RX ADMIN — SODIUM CHLORIDE 100 MILLILITER(S): 9 INJECTION INTRAMUSCULAR; INTRAVENOUS; SUBCUTANEOUS at 12:24

## 2021-10-26 RX ADMIN — Medication 25 MILLIGRAM(S): at 17:30

## 2021-10-26 RX ADMIN — Medication 25 MILLIGRAM(S): at 05:09

## 2021-10-26 RX ADMIN — CEFTRIAXONE 100 MILLIGRAM(S): 500 INJECTION, POWDER, FOR SOLUTION INTRAMUSCULAR; INTRAVENOUS at 10:44

## 2021-10-26 NOTE — CONSULT NOTE ADULT - SUBJECTIVE AND OBJECTIVE BOX
HPI:  72 y/o F with PMHx of HTN, HLD, and metastatic Lung CA, was discharged on 10/6 from [facility] after hospitalization for sepsis from PNA and UTI, comes in complaining of weakness since discharge. Patient states that she felt well on the day of discharge but then started feeling weak again the next day. Patient denies fever, chills, headache, dizziness, chest pain, palpitations, cough, SOB, n/v/d/c,  complaints, rashes or bleeding. Patient admitted for placement to [facility] which was refused on previous admission per chart review.   Per  Marcos, he did not understand the extent of her weakness and was not able to take care of her at home. She currently has a file open at [facility] about cancer treatment options and wants to pursue every and all treatment/life saving measures for his wife including intubation and resuscitation if indicated.    In ED:   Temp 97.5F, HR 74, /78, RR 17, SpO2: 97% on 2L NC   EKG: NSR   s/p 1L bolus (12 Oct 2021 17:49)      PAST MEDICAL & SURGICAL HISTORY:  Lumbar stenosis    Bleeding ulcer  stomach ulcer     Anxiety    Spinal stenosis in cervical region    Acute hepatitis C virus infection without hepatic coma  treated - Resolved    Balance disorder    Coronary artery disease involving native coronary artery of native heart without angina pectoris    HTN (hypertension)    HLD (hyperlipidemia)    Coronary artery disease involving native coronary artery of native heart without angina pectoris    H/O colonoscopy    Bleeding ulcer  stomach surgery for bleeding ulcer    Fracture  ORIF Left wrist  13    History of lumbar surgery  2013    Chronic UTI  Pt reports presently on 2nd dose of antibiotics 18 10 days, Dr Virgil smith, pt going for m/eval 18.    H/O cardiac catheterization  , no intervention needed, treated medically        No Known Allergies      Meds:  acetaminophen   Tablet .. 650 milliGRAM(s) Oral every 6 hours PRN  ALBUTerol    90 MICROgram(s) HFA Inhaler 2 Puff(s) Inhalation every 6 hours PRN  cefTRIAXone   IVPB 1000 milliGRAM(s) IV Intermittent every 24 hours  folic acid 1 milliGRAM(s) Oral daily  metoprolol tartrate 25 milliGRAM(s) Oral two times a day  OLANZapine 2.5 milliGRAM(s) Oral at bedtime PRN  oxycodone    5 mG/acetaminophen 325 mG 1 Tablet(s) Oral every 6 hours PRN  PARoxetine 30 milliGRAM(s) Oral daily  simvastatin 40 milliGRAM(s) Oral at bedtime  sodium chloride 0.9%. 1000 milliLiter(s) IV Continuous <Continuous>      SOCIAL HISTORY:  Smoker:  YES / NO        PACK YEARS:                         WHEN QUIT?  ETOH use:  YES / NO               FREQUENCY / QUANTITY:  Ilicit Drug use:  YES / NO  Occupation:  Assisted device use (Cane / Walker):  Live with:    FAMILY HISTORY:  No pertinent family history in first degree relatives        VITALS:  Vital Signs Last 24 Hrs  T(C): 36.8 (26 Oct 2021 13:23), Max: 37 (25 Oct 2021 20:25)  T(F): 98.3 (26 Oct 2021 13:23), Max: 98.6 (25 Oct 2021 20:25)  HR: 80 (26 Oct 2021 17:31) (72 - 80)  BP: 170/77 (26 Oct 2021 17:31) (113/50 - 170/77)  BP(mean): 66 (25 Oct 2021 20:25) (66 - 66)  RR: 17 (26 Oct 2021 13:23) (17 - 18)  SpO2: 97% (26 Oct 2021 13:23) (96% - 97%)    LABS/DIAGNOSTIC TESTS:                          7.2    15.20 )-----------( 151      ( 26 Oct 2021 08:54 )             21.6     WBC Count: 15.20 K/uL (10-26 @ 08:54)  WBC Count: 13.27 K/uL (10-25 @ 07:35)  WBC Count: 14.99 K/uL (10-24 @ 07:06)      10-26    137  |  104  |  62<H>  ----------------------------<  101<H>  3.6   |  22  |  3.13<H>    Ca    8.0<L>      26 Oct 2021 08:54  Phos  4.6     10-25  Mg     2.0     10-25    TPro  7.1  /  Alb  2.5<L>  /  TBili  0.4  /  DBili  0.1  /  AST  13  /  ALT  20  /  AlkPhos  167<H>  10-26      Urinalysis Basic - ( 26 Oct 2021 06:22 )    Color: Yellow / Appearance: Clear / S.015 / pH: x  Gluc: x / Ketone: Negative  / Bili: Negative / Urobili: Negative   Blood: x / Protein: 100 / Nitrite: Negative   Leuk Esterase: Moderate / RBC: 2-5 /HPF / WBC 11-25 /HPF   Sq Epi: x / Non Sq Epi: Few /HPF / Bacteria: Moderate /HPF        LIVER FUNCTIONS - ( 26 Oct 2021 08:54 )  Alb: 2.5 g/dL / Pro: 7.1 g/dL / ALK PHOS: 167 U/L / ALT: 20 U/L DA / AST: 13 U/L / GGT: x                 LACTATE:    ABG -     CULTURES:   BAL Bronchoalveolar Washings  10-05 @ 13:17   No growth at 1 week.  --    Moderate polymorphonuclear leukocytes per low power field  Rare Squamous epithelial cells per low power field  No organisms seen per oil power field      Clean Catch Clean Catch (Midstream)   @ 20:59   >100,000 CFU/ml Enterococcus faecium (vancomycin resistant)  --  Enterococcus faecium (vancomycin resistant)              RADIOLOGY:< from: US Abdomen Complete (US Abdomen Complete .) (10.20.21 @ 11:03) >  EXAM:  US ABDOMEN COMPLETE                            PROCEDURE DATE:  10/20/2021          INTERPRETATION:  CLINICAL INFORMATION: Acute kidney injury, elevated liver function tests    COMPARISON: MR dated 2021    TECHNIQUE: Sonography of the abdomen.    FINDINGS:    Liver: Multiple lesions. Enlarged (20 cm). Patent main portal vein with normal flow direction.  Bile ducts: Normal caliber. Common bile duct measures 7 mm.  Gallbladder: Cholelithiasis. Mild wall thickening (4 mm). No focal tenderness.  Pancreas: Visualized portions are within normal limits.  Spleen: 11.7 cm. Within normal limits.  Right kidney: 10.5 cm. No hydronephrosis.  Left kidney: 10.8 cm.  No hydronephrosis.  Ascites: None.  Aorta and IVC: Visualized portions are within normal limits.    IMPRESSION:  Hepatic metastases.  Hepatomegaly.  Cholelithiasis.  Nonspecific gallbladder wall thickening.        --- End of Report ---            LILIANA BURDICK MD; Attending Radiologist  This document has been electronically signed. Oct 20 2021  1:48PM    < end of copied text >        ROS  [  ] UNABLE TO ELICIT               HPI:  72 y/o F with PMHx of HTN, HLD, and metastatic Lung CA, was discharged on 10/6 from [facility] after hospitalization for sepsis from PNA and UTI, comes in complaining of weakness since discharge. Patient states that she felt well on the day of discharge but then started feeling weak again the next day. Patient denies fever, chills, headache, dizziness, chest pain, palpitations, cough, SOB, n/v/d/c,  complaints, rashes or bleeding. Patient admitted for placement to [facility] which was refused on previous admission per chart review.   Per  Marcos, he did not understand the extent of her weakness and was not able to take care of her at home. She currently has a file open at [facility] about cancer treatment options and wants to pursue every and all treatment/life saving measures for his wife including intubation and resuscitation if indicated.    In ED:   Temp 97.5F, HR 74, /78, RR 17, SpO2: 97% on 2L NC   EKG: NSR   s/p 1L bolus (12 Oct 2021 17:49)      History as above, pt who admitted with weakness and found to have a positive u/a and so is being treated for a UTI as she had recently been admitted to this hospital for both pneumonia and a UTI. She denies having any dysuria or frequency or hematuria, she is not febrile or having chills, she denies any abdominal pain. She has an elevated WBC count.          PAST MEDICAL & SURGICAL HISTORY:  Lumbar stenosis    Bleeding ulcer  stomach ulcer     Anxiety    Spinal stenosis in cervical region    Acute hepatitis C virus infection without hepatic coma  treated - Resolved    Balance disorder    Coronary artery disease involving native coronary artery of native heart without angina pectoris    HTN (hypertension)    HLD (hyperlipidemia)    Coronary artery disease involving native coronary artery of native heart without angina pectoris    H/O colonoscopy    Bleeding ulcer  stomach surgery for bleeding ulcer    Fracture  ORIF Left wrist  13    History of lumbar surgery  2013    Chronic UTI  Pt reports presently on 2nd dose of antibiotics 18 10 days, Dr Virgil smith, pt going for m/eval 18.    H/O cardiac catheterization  , no intervention needed, treated medically        No Known Allergies      Meds:  acetaminophen   Tablet .. 650 milliGRAM(s) Oral every 6 hours PRN  ALBUTerol    90 MICROgram(s) HFA Inhaler 2 Puff(s) Inhalation every 6 hours PRN  cefTRIAXone   IVPB 1000 milliGRAM(s) IV Intermittent every 24 hours  folic acid 1 milliGRAM(s) Oral daily  metoprolol tartrate 25 milliGRAM(s) Oral two times a day  OLANZapine 2.5 milliGRAM(s) Oral at bedtime PRN  oxycodone    5 mG/acetaminophen 325 mG 1 Tablet(s) Oral every 6 hours PRN  PARoxetine 30 milliGRAM(s) Oral daily  simvastatin 40 milliGRAM(s) Oral at bedtime  sodium chloride 0.9%. 1000 milliLiter(s) IV Continuous <Continuous>      SOCIAL HISTORY:  Smoker:  ex smoker  ETOH use:  denies      FAMILY HISTORY:  No pertinent family history in first degree relatives        VITALS:  Vital Signs Last 24 Hrs  T(C): 36.8 (26 Oct 2021 13:23), Max: 37 (25 Oct 2021 20:25)  T(F): 98.3 (26 Oct 2021 13:23), Max: 98.6 (25 Oct 2021 20:25)  HR: 80 (26 Oct 2021 17:31) (72 - 80)  BP: 170/77 (26 Oct 2021 17:31) (113/50 - 170/77)  BP(mean): 66 (25 Oct 2021 20:25) (66 - 66)  RR: 17 (26 Oct 2021 13:23) (17 - 18)  SpO2: 97% (26 Oct 2021 13:23) (96% - 97%)    LABS/DIAGNOSTIC TESTS:                          7.2    15.20 )-----------( 151      ( 26 Oct 2021 08:54 )             21.6     WBC Count: 15.20 K/uL (10-26 @ 08:54)  WBC Count: 13.27 K/uL (10-25 @ 07:35)  WBC Count: 14.99 K/uL (10-24 @ 07:06)      10-26    137  |  104  |  62<H>  ----------------------------<  101<H>  3.6   |  22  |  3.13<H>    Ca    8.0<L>      26 Oct 2021 08:54  Phos  4.6     10-25  Mg     2.0     10-25    TPro  7.1  /  Alb  2.5<L>  /  TBili  0.4  /  DBili  0.1  /  AST  13  /  ALT  20  /  AlkPhos  167<H>  10-      Urinalysis Basic - ( 26 Oct 2021 06:22 )    Color: Yellow / Appearance: Clear / S.015 / pH: x  Gluc: x / Ketone: Negative  / Bili: Negative / Urobili: Negative   Blood: x / Protein: 100 / Nitrite: Negative   Leuk Esterase: Moderate / RBC: 2-5 /HPF / WBC 11-25 /HPF   Sq Epi: x / Non Sq Epi: Few /HPF / Bacteria: Moderate /HPF        LIVER FUNCTIONS - ( 26 Oct 2021 08:54 )  Alb: 2.5 g/dL / Pro: 7.1 g/dL / ALK PHOS: 167 U/L / ALT: 20 U/L DA / AST: 13 U/L / GGT: x                 LACTATE:    ABG -     CULTURES:               RADIOLOGY:< from: US Abdomen Complete (US Abdomen Complete .) (10.20.21 @ 11:03) >  EXAM:  US ABDOMEN COMPLETE                            PROCEDURE DATE:  10/20/2021          INTERPRETATION:  CLINICAL INFORMATION: Acute kidney injury, elevated liver function tests    COMPARISON: MR dated 2021    TECHNIQUE: Sonography of the abdomen.    FINDINGS:    Liver: Multiple lesions. Enlarged (20 cm). Patent main portal vein with normal flow direction.  Bile ducts: Normal caliber. Common bile duct measures 7 mm.  Gallbladder: Cholelithiasis. Mild wall thickening (4 mm). No focal tenderness.  Pancreas: Visualized portions are within normal limits.  Spleen: 11.7 cm. Within normal limits.  Right kidney: 10.5 cm. No hydronephrosis.  Left kidney: 10.8 cm.  No hydronephrosis.  Ascites: None.  Aorta and IVC: Visualized portions are within normal limits.    IMPRESSION:  Hepatic metastases.  Hepatomegaly.  Cholelithiasis.  Nonspecific gallbladder wall thickening.        --- End of Report ---            LILIANA BURDICK MD; Attending Radiologist  This document has been electronically signed. Oct 20 2021  1:48PM    < end of copied text >        ROS  [  ] UNABLE TO ELICIT

## 2021-10-26 NOTE — PROGRESS NOTE ADULT - PROBLEM SELECTOR PLAN 3
- Continue to trend up likely due to UTI  -UA positive, urine culture testing  -Started Ceftriaxone for now  -f/u urine culture  - Pan culture if fevers   - Oncology following

## 2021-10-26 NOTE — PROGRESS NOTE ADULT - PROBLEM SELECTOR PLAN 6
Multifactorial etiology, including recent chemotherapy requiring 1 unit PRBC this admission   -Continue to trend H/H  -Monitor for signs of overt bleed  -Oncology following

## 2021-10-26 NOTE — PROGRESS NOTE ADULT - PROBLEM SELECTOR PLAN 2
Improving  -Hyperphosphatemia stable  -No concentrated phos in diet added.   -hyperuricemia   -Uric acid stable at 6.1  -LDH trending down 1055---> 498--->360  -Cont IVF  -Monitor CMP, Magnesium, phosphorus , LDH, Uric acid daily in am.   -Renal following   -HemeOnc following

## 2021-10-26 NOTE — CONSULT NOTE ADULT - NEUROLOGICAL DETAILS
81F with tnilateral radiculopathy, ring enhancing lesion in cauda equina behind L4 vertebral body. Now s/p laminectomy for mass resection 5/4.    -Pain control with tylenol 650 q6h prn as first choice, oxycodone 5mg prn, patient extremely sensitive to pain medications.  -wound stable  -no HOB restrictions, OOB as much as possible. Pt to be OOB atleast 6 hours per day.  -PT/OT/OOB  -Continue coumadin 2mg daily. Restarted 5/7.   -home COPD inhalers and duo nebs q4 per medicine recs  -regular diet  -SSI  -Bowel regimen  -Delirium precautions  -Pending SNF   alert and oriented x 3

## 2021-10-26 NOTE — PROGRESS NOTE ADULT - PROBLEM SELECTOR PLAN 7
in the setting of Ca w/ chemotherapy initiation-Improving  -Plt 133 today  - Continue to monitor   - Bleeding precautions  - Oncology following

## 2021-10-26 NOTE — PROGRESS NOTE ADULT - ASSESSMENT
72 y/o F with PMHx of HTN, HLD, and metastatic Lung CA, was discharged on 10/6 from [facility] after hospitalization for sepsis from PNA and UTI, comes in complaining of weakness since discharge.   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 (diagnosed 10/04/21, Ki-67 98%)  -ECOG PS 2/3 and d/t aggressive nature of pt's cancer and progressive weakness that she needs to start the 1st cycle of inpatient chemotherapy with Cisplatin/etoposide starting 10/15 and tolerated day 1 well with supportive      BRENDA  likely TLS   unlikely sec to  Cisplatin  TLS in view of  ( Hyperphosphatemia, Hypocalcemia, high Uric acid and BRENDA)  FENa >1.   - Repeat UA.  - Send Urine lytes.  - Monitor BMP.--pending BMP today   - Continue hydration.      HTN  Optimal  Monitor BP    Hyperphosphatemia  Likely due to TLS and has improved.  Low Po4 diet      Hypocalcemia  Likely due to low albumin  Corrected calcium 8.0  Monitor BRENDA

## 2021-10-26 NOTE — PROGRESS NOTE ADULT - SUBJECTIVE AND OBJECTIVE BOX
[facility]   MD JAD GONZALEZ MD RUORU WONG, PA    TEL:  OFFICE: [phone]  DR MCNEILL CELL: [phone]  NNEKA ALEJO CELL: [phone]  DR. RUBIN CELL: [phone]      FROM 5 PM - 7 AM PLEASE CALL ANSWERING SERVICE: [phone]    RENAL FOLLOW UP NOTE--Date of Service 10-26-21 @ 11:26  --------------------------------------------------------------------------------  HPI:      Pt seen and examined at bedside.   Denies SOB, chest pain     PAST HISTORY  --------------------------------------------------------------------------------  No significant changes to PMH, PSH, FHx, SHx, unless otherwise noted    ALLERGIES & MEDICATIONS  --------------------------------------------------------------------------------  Allergies    No Known Allergies    Intolerances      Standing Inpatient Medications  cefTRIAXone   IVPB 1000 milliGRAM(s) IV Intermittent every 24 hours  folic acid 1 milliGRAM(s) Oral daily  metoprolol tartrate 25 milliGRAM(s) Oral two times a day  PARoxetine 30 milliGRAM(s) Oral daily  simvastatin 40 milliGRAM(s) Oral at bedtime  sodium chloride 0.9%. 1000 milliLiter(s) IV Continuous <Continuous>    PRN Inpatient Medications  acetaminophen   Tablet .. 650 milliGRAM(s) Oral every 6 hours PRN  ALBUTerol    90 MICROgram(s) HFA Inhaler 2 Puff(s) Inhalation every 6 hours PRN  OLANZapine 2.5 milliGRAM(s) Oral at bedtime PRN  oxycodone    5 mG/acetaminophen 325 mG 1 Tablet(s) Oral every 6 hours PRN      REVIEW OF SYSTEMS  --------------------------------------------------------------------------------  General: no fever  CVS: no chest pain  RESP: no sob, no cough  ABD: no abdominal pain  : no dysuria,  MSK: no edema     VITALS/PHYSICAL EXAM  --------------------------------------------------------------------------------  T(C): 36.5 (10-26-21 @ 05:05), Max: 37 (10-25-21 @ 20:25)  HR: 79 (10-26-21 @ 05:05) (72 - 81)  BP: 147/64 (10-26-21 @ 05:05) (101/53 - 150/71)  RR: 17 (10-26-21 @ 05:05) (17 - 18)  SpO2: 96% (10-26-21 @ 05:05) (96% - 97%)  Wt(kg): --        10-25-21 @ 07:01  -  10-26-21 @ 07:00  --------------------------------------------------------  IN: 0 mL / OUT: 600 mL / NET: -600 mL      Physical Exam:  	Gen: NAD  	HEENT: MMM  	Pulm: CTA B/L  	CV: S1S2  	Abd: Soft, +BS  	Ext: No LE edema B/L                      Neuro: Awake   	Skin: Warm and Dry   	Vascular access: NO HD catheter            no arti  LABS/STUDIES  --------------------------------------------------------------------------------              7.2    15.20 >-----------<  151      [10-26-21 @ 08:54]              21.6     138  |  104  |  58  ----------------------------<  126      [10-25-21 @ 07:35]  3.7   |  25  |  2.95        Ca     7.8     [10-25-21 @ 07:35]      Mg     2.0     [10-25-21 @ 07:35]      Phos  4.6     [10-25-21 @ 07:35]    TPro  7.1  /  Alb  2.5  /  TBili  0.4  /  DBili  0.1  /  AST  13  /  ALT  20  /  AlkPhos  167  [10-26-21 @ 08:54]        Uric acid 7.2      [10-26-21 @ 08:54]        [10-26-21 @ 08:54]    Creatinine Trend:  SCr 2.95 [10-25 @ 07:35]  SCr 2.77 [10-24 @ 07:06]  SCr 2.64 [10-23 @ 07:29]  SCr 2.59 [10-22 @ 07:41]  SCr 2.60 [10-21 @ 06:47]    Urinalysis - [10-26-21 @ 06:22]      Color Yellow / Appearance Clear / SG 1.015 / pH 6.0      Gluc Negative / Ketone Negative  / Bili Negative / Urobili Negative       Blood Small / Protein 100 / Leuk Est Moderate / Nitrite Negative      RBC 2-5 / WBC 11-25 / Hyaline 0-2 / Gran  / Sq Epi  / Non Sq Epi Few / Bacteria Moderate    Urine Creatinine 86      [10-26-21 @ 06:22]  Urine Protein 120      [10-26-21 @ 06:22]  Urine Sodium 62      [10-26-21 @ 06:22]  Urine Urea Nitrogen 728      [10-26-21 @ 09:37]  Urine Potassium 33      [10-26-21 @ 06:22]  Urine Chloride 60      [10-26-21 @ 06:22]  Urine Osmolality 527      [10-26-21 @ 06:22]    Iron 56, TIBC 262, %sat 21      [09-22-21 @ 07:54]  HbA1c 6.2      [07-10-18 @ 13:53]  TSH 1.17      [09-28-21 @ 05:58]  Lipid: chol 172, , HDL 25, LDL --      [09-21-21 @ 07:37]      Immunofixation Serum:    One Weak  IgG Kappa Band and One Weak  IgG Lambda Band Identified    Reference Range: None Detected      [09-29-21 @ 01:43]  SPEP Interpretation: Two Weak Gamma-Migrating Paraproteins Identified      [09-29-21 @ 01:43]

## 2021-10-26 NOTE — PROGRESS NOTE ADULT - PROBLEM SELECTOR PLAN 1
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  -2nd and 3rd chemo on hold for now due to worsening renal function and anemia  -Next cycle due in 3-4 weeks  -Heme/onc [facility] (Dr. Miranda's) group following.  -Upon discharge pt. to f/u with Dr. Casper

## 2021-10-26 NOTE — PROGRESS NOTE ADULT - SUBJECTIVE AND OBJECTIVE BOX
NP Note discussed with  Primary Attending    Patient is a 71y old  Female who presents with a chief complaint of weakness (26 Oct 2021 11:26)      INTERVAL HPI/OVERNIGHT EVENTS: no new complaints    MEDICATIONS  (STANDING):  cefTRIAXone   IVPB 1000 milliGRAM(s) IV Intermittent every 24 hours  folic acid 1 milliGRAM(s) Oral daily  metoprolol tartrate 25 milliGRAM(s) Oral two times a day  PARoxetine 30 milliGRAM(s) Oral daily  simvastatin 40 milliGRAM(s) Oral at bedtime  sodium chloride 0.9%. 1000 milliLiter(s) (100 mL/Hr) IV Continuous <Continuous>    MEDICATIONS  (PRN):  acetaminophen   Tablet .. 650 milliGRAM(s) Oral every 6 hours PRN Temp greater or equal to 38C (100.4F), Mild Pain (1 - 3), Moderate Pain (4 - 6)  ALBUTerol    90 MICROgram(s) HFA Inhaler 2 Puff(s) Inhalation every 6 hours PRN Shortness of Breath and/or Wheezing  OLANZapine 2.5 milliGRAM(s) Oral at bedtime PRN Agitation  oxycodone    5 mG/acetaminophen 325 mG 1 Tablet(s) Oral every 6 hours PRN Severe Pain (7 - 10)      __________________________________________________  REVIEW OF SYSTEMS:    CONSTITUTIONAL: No fever,   EYES: no acute visual disturbances  NECK: No pain or stiffness  RESPIRATORY: No cough; No shortness of breath  CARDIOVASCULAR: No chest pain, no palpitations  GASTROINTESTINAL: No pain. No nausea or vomiting; No diarrhea   NEUROLOGICAL: No headache or numbness, no tremors  MUSCULOSKELETAL: No joint pain, no muscle pain  GENITOURINARY: no dysuria, no frequency, no hesitancy  PSYCHIATRY: no depression , no anxiety  ALL OTHER  ROS negative        Vital Signs Last 24 Hrs  T(C): 36.8 (26 Oct 2021 13:23), Max: 37 (25 Oct 2021 20:25)  T(F): 98.3 (26 Oct 2021 13:23), Max: 98.6 (25 Oct 2021 20:25)  HR: 79 (26 Oct 2021 05:05) (72 - 81)  BP: 144/57 (26 Oct 2021 13:23) (113/50 - 150/71)  BP(mean): 66 (25 Oct 2021 20:25) (66 - 66)  RR: 17 (26 Oct 2021 13:23) (17 - 18)  SpO2: 97% (26 Oct 2021 13:23) (96% - 97%)    ________________________________________________  PHYSICAL EXAM:  GENERAL: NAD  HEENT: Normocephalic;  conjunctivae and sclerae clear; moist mucous membranes;   NECK : supple  CHEST/LUNG: Clear to auscultation bilaterally with good air entry   HEART: S1 S2  regular; no murmurs, gallops or rubs  ABDOMEN: Soft, Nontender, Nondistended; Bowel sounds present  EXTREMITIES: no cyanosis; no edema; no calf tenderness  SKIN: warm and dry; no rash  NERVOUS SYSTEM:  Awake and alert; Oriented  to place, person and time ; no new deficits    _________________________________________________  LABS:                        7.2    15.20 )-----------( 151      ( 26 Oct 2021 08:54 )             21.6     10-    137  |  104  |  62<H>  ----------------------------<  101<H>  3.6   |  22  |  3.13<H>    Ca    8.0<L>      26 Oct 2021 08:54  Phos  4.6     10-25  Mg     2.0     10-25    TPro  7.1  /  Alb  2.5<L>  /  TBili  0.4  /  DBili  0.1  /  AST  13  /  ALT  20  /  AlkPhos  167<H>  10-      Urinalysis Basic - ( 26 Oct 2021 06:22 )    Color: Yellow / Appearance: Clear / S.015 / pH: x  Gluc: x / Ketone: Negative  / Bili: Negative / Urobili: Negative   Blood: x / Protein: 100 / Nitrite: Negative   Leuk Esterase: Moderate / RBC: 2-5 /HPF / WBC 11-25 /HPF   Sq Epi: x / Non Sq Epi: Few /HPF / Bacteria: Moderate /HPF      CAPILLARY BLOOD GLUCOSE            RADIOLOGY & ADDITIONAL TESTS:    Imaging Personally Reviewed:  YES/NO    Consultant(s) Notes Reviewed:   YES/ No    Care Discussed with Consultants :     Plan of care was discussed with patient and /or primary care giver; all questions and concerns were addressed and care was aligned with patient's wishes.     NP Note discussed with  Primary Attending    Patient is a 71y old  Female who presents with a chief complaint of weakness (26 Oct 2021 11:26)      INTERVAL HPI/OVERNIGHT EVENTS: no new complaints    MEDICATIONS  (STANDING):  cefTRIAXone   IVPB 1000 milliGRAM(s) IV Intermittent every 24 hours  folic acid 1 milliGRAM(s) Oral daily  metoprolol tartrate 25 milliGRAM(s) Oral two times a day  PARoxetine 30 milliGRAM(s) Oral daily  simvastatin 40 milliGRAM(s) Oral at bedtime  sodium chloride 0.9%. 1000 milliLiter(s) (100 mL/Hr) IV Continuous <Continuous>    MEDICATIONS  (PRN):  acetaminophen   Tablet .. 650 milliGRAM(s) Oral every 6 hours PRN Temp greater or equal to 38C (100.4F), Mild Pain (1 - 3), Moderate Pain (4 - 6)  ALBUTerol    90 MICROgram(s) HFA Inhaler 2 Puff(s) Inhalation every 6 hours PRN Shortness of Breath and/or Wheezing  OLANZapine 2.5 milliGRAM(s) Oral at bedtime PRN Agitation  oxycodone    5 mG/acetaminophen 325 mG 1 Tablet(s) Oral every 6 hours PRN Severe Pain (7 - 10)      __________________________________________________  REVIEW OF SYSTEMS:    CONSTITUTIONAL: No fever,   EYES: no acute visual disturbances  NECK: No pain or stiffness  RESPIRATORY: No cough; No shortness of breath  CARDIOVASCULAR: No chest pain, no palpitations  GASTROINTESTINAL: No pain. No nausea or vomiting; No diarrhea   NEUROLOGICAL: No headache or numbness, no tremors  MUSCULOSKELETAL: No joint pain, no muscle pain  GENITOURINARY: no dysuria, no frequency, no hesitancy  PSYCHIATRY: no depression , no anxiety  ALL OTHER  ROS negative        Vital Signs Last 24 Hrs  T(C): 36.8 (26 Oct 2021 13:23), Max: 37 (25 Oct 2021 20:25)  T(F): 98.3 (26 Oct 2021 13:23), Max: 98.6 (25 Oct 2021 20:25)  HR: 79 (26 Oct 2021 05:05) (72 - 81)  BP: 144/57 (26 Oct 2021 13:23) (113/50 - 150/71)  BP(mean): 66 (25 Oct 2021 20:25) (66 - 66)  RR: 17 (26 Oct 2021 13:23) (17 - 18)  SpO2: 97% (26 Oct 2021 13:23) (96% - 97%)    ________________________________________________  PHYSICAL EXAM:  well developed, well groomed, comfortable  GENERAL: NAD  HEENT: Normocephalic;  conjunctivae and sclerae clear; moist mucous membranes;   NECK : supple  CHEST/LUNG: Clear to auscultation bilaterally with good air entry   HEART: S1 S2  regular; no murmurs, gallops or rubs  ABDOMEN: Soft, Nontender, Nondistended; Bowel sounds present  EXTREMITIES: no cyanosis; no edema; no calf tenderness  SKIN: warm and dry; no rash  NERVOUS SYSTEM:  Awake and alert; Oriented  to place, person and time ; no new deficits    _________________________________________________  LABS:                        7.2    15.20 )-----------( 151      ( 26 Oct 2021 08:54 )             21.6     10-26    137  |  104  |  62<H>  ----------------------------<  101<H>  3.6   |  22  |  3.13<H>    Ca    8.0<L>      26 Oct 2021 08:54  Phos  4.6     10-25  Mg     2.0     10-25    TPro  7.1  /  Alb  2.5<L>  /  TBili  0.4  /  DBili  0.1  /  AST  13  /  ALT  20  /  AlkPhos  167<H>  10-26      Urinalysis Basic - ( 26 Oct 2021 06:22 )    Color: Yellow / Appearance: Clear / S.015 / pH: x  Gluc: x / Ketone: Negative  / Bili: Negative / Urobili: Negative   Blood: x / Protein: 100 / Nitrite: Negative   Leuk Esterase: Moderate / RBC: 2-5 /HPF / WBC 11-25 /HPF   Sq Epi: x / Non Sq Epi: Few /HPF / Bacteria: Moderate /HPF      CAPILLARY BLOOD GLUCOSE    RADIOLOGY & ADDITIONAL TESTS:      Imaging Personally Reviewed:  YES/NO    Consultant(s) Notes Reviewed:   YES/ No    Care Discussed with Consultants :     Plan of care was discussed with patient and /or primary care giver; all questions and concerns were addressed and care was aligned with patient's wishes.

## 2021-10-26 NOTE — PROGRESS NOTE ADULT - ASSESSMENT
71 YOF with PMH HTN, HLD, recent dx of metastatic lung CA which has not started on active chemo yet.  Brought by  due to worsening of weakness which contributed to delaying  on initiate chemo tx .  Dr thomas is outpt heme/onc, [facility] is following and recommends to start 1st chemo as in- patient. Day 1 was 10/15. pt is noted tumor lysis syndrome after first chemo, received Rasburicase, phoslo. 2nd chemo on hold in setting BRENDA per Heme/Onc.  on IVF. Renal/abdominal sono  for BRENDA, transaminitis revealed kidneys with no hydronephrosis. Hepatomegaly, Cholelithiasis, Nonspecific gallbladder wall thickening ( already established from previous w/u in 9/21). Patient with stage IV Ca; Oncology following with plans for palliative chemotherapy  q 3 weeks with close monitoring of renal function. Anemia stable HBG remains above 7 and likely 2/2 to chemo.  transfuse if <7 Plan for discharge to [facility], pending acceptance given patient refusing to participate in PT. May likely need LTC placement instead, NCM to follow.    10/25-Seen and examined at bed side, comfortable with no c/o discomfort, states she wants to go home.  Cr trending up today, IVF hydration in progress.  Leukocytosis improving, pt. afebrile.  Discharge to [facility] pending pt.'s participation with PT and improved Cr.  10/26-Remains comfortable and cheerful today, states she will participate with PT so she can "get out of here".    Cr continues to trend up, nephro following.  UA+, urine culture testing, leukocytosis trending up, afebrile, started Ceftriaxone for now.  Discharge to [facility] pending improved renal function and urine culture results.

## 2021-10-26 NOTE — PROGRESS NOTE ADULT - PROBLEM SELECTOR PLAN 4
in the setting of TLS   -Chemotherapy day 2 and 3 held   -Cr trending up, 3.1 today  -US; kidneys with no hydronephrosis   -Continue NS @ 100cc/hr   -Avoid nephrotoxic agents, NSAIDs  -Renal following  -UA+  -f/u urine lytes  - Continue to monitor BMP

## 2021-10-26 NOTE — CHART NOTE - NSCHARTNOTEFT_GEN_A_CORE
Reassessment:     Patient is a 71y old  Female who presents with a chief complaint of weakness (26 Oct 2021 16:28)      Factors impacting intake: [ ] none [ ] nausea  [ ] vomiting [ ] diarrhea [ ] constipation  [ ]chewing problems [ ] swallowing issues  [X ] other: lung cancer to mets, BRENDA    Diet Prescription: Diet, DASH/ TLC:   Sodium & Cholesterol Restricted  No Concentrated Phosphorus  Supplement Feeding Modality:  Oral  Nepro Cans or Servings Per Day:  1       Frequency:  Daily (10-20-21 @ 18:42)    Intake: Patient visited, alert & awake, reports not eating well today, consumed ~30% of lunch meal, drinking Nepro when "on & off"? voiced disliked tasted, offer variety of flavors available, willing to try "[place]", d/w Diet Tech also follow up with pt. daily menu selections, encourage po intake w/meal set up, per flow sheet intake 85% on 10/25 noted, rec. c/w diet as ordered & Nepro - once daily as medically feasible. RD available.      Daily Weight in k.8 (20 Oct 2021 05:05)    % Weight Change: 1% wt. loss possible due to fluid shift     Pertinent Medications: MEDICATIONS  (STANDING):  cefTRIAXone   IVPB 1000 milliGRAM(s) IV Intermittent every 24 hours  folic acid 1 milliGRAM(s) Oral daily  metoprolol tartrate 25 milliGRAM(s) Oral two times a day  PARoxetine 30 milliGRAM(s) Oral daily  simvastatin 40 milliGRAM(s) Oral at bedtime  sodium chloride 0.9%. 1000 milliLiter(s) (100 mL/Hr) IV Continuous <Continuous>    MEDICATIONS  (PRN):  acetaminophen   Tablet .. 650 milliGRAM(s) Oral every 6 hours PRN Temp greater or equal to 38C (100.4F), Mild Pain (1 - 3), Moderate Pain (4 - 6)  ALBUTerol    90 MICROgram(s) HFA Inhaler 2 Puff(s) Inhalation every 6 hours PRN Shortness of Breath and/or Wheezing  OLANZapine 2.5 milliGRAM(s) Oral at bedtime PRN Agitation  oxycodone    5 mG/acetaminophen 325 mG 1 Tablet(s) Oral every 6 hours PRN Severe Pain (7 - 10)    Pertinent Labs: 10- Na137 mmol/L Glu 101 mg/dL<H> K+ 3.6 mmol/L Cr  3.13 mg/dL<H> BUN 62 mg/dL<H> 10-25 Phos 4.6 mg/dL<H> 10-26 Alb 2.5 g/dL<L>     CAPILLARY BLOOD GLUCOSE    Skin: intact    Estimated Needs:   [X ] no change since previous assessment  [ ] recalculated:     Previous Nutrition Diagnosis:   [ ] Inadequate Energy Intake [ X] Inadequate Oral Intake [ ] Excessive Energy Intake   [ ] Underweight [ ] Increased Nutrient Needs [ ] Overweight/Obesity   [ ] Altered GI Function [ ] Unintended Weight Loss [ ] Food & Nutrition Related Knowledge Deficit [ ] Malnutrition     Nutrition Diagnosis is [X] ongoing  [ ] resolved [ ] not applicable     Interventions: To meet nutrition needs   Recommend  [ ] Change Diet To:  [ ] Nutrition Supplement  [ ] Nutrition Support  [X ] Other: Nursing to continue feeding assistance and encouragement    Monitoring and Evaluation:   [X PO intake [ x ] Tolerance to diet prescription [ x ] weights [ x ] labs[ x ] follow up per protocol  [ ] other:

## 2021-10-27 LAB
ALBUMIN SERPL ELPH-MCNC: 2.5 G/DL — LOW (ref 3.5–5)
ALP SERPL-CCNC: 160 U/L — HIGH (ref 40–120)
ALT FLD-CCNC: 17 U/L DA — SIGNIFICANT CHANGE UP (ref 10–60)
ANION GAP SERPL CALC-SCNC: 11 MMOL/L — SIGNIFICANT CHANGE UP (ref 5–17)
AST SERPL-CCNC: 11 U/L — SIGNIFICANT CHANGE UP (ref 10–40)
BASOPHILS # BLD AUTO: 0 K/UL — SIGNIFICANT CHANGE UP (ref 0–0.2)
BASOPHILS NFR BLD AUTO: 0 % — SIGNIFICANT CHANGE UP (ref 0–2)
BILIRUB DIRECT SERPL-MCNC: 0.1 MG/DL — SIGNIFICANT CHANGE UP (ref 0–0.2)
BILIRUB INDIRECT FLD-MCNC: 0.3 MG/DL — SIGNIFICANT CHANGE UP (ref 0.2–1)
BILIRUB SERPL-MCNC: 0.4 MG/DL — SIGNIFICANT CHANGE UP (ref 0.2–1.2)
BUN SERPL-MCNC: 50 MG/DL — HIGH (ref 7–18)
CALCIUM SERPL-MCNC: 8.6 MG/DL — SIGNIFICANT CHANGE UP (ref 8.4–10.5)
CHLORIDE SERPL-SCNC: 104 MMOL/L — SIGNIFICANT CHANGE UP (ref 96–108)
CO2 SERPL-SCNC: 21 MMOL/L — LOW (ref 22–31)
CREAT SERPL-MCNC: 2.73 MG/DL — HIGH (ref 0.5–1.3)
EOSINOPHIL # BLD AUTO: 0 K/UL — SIGNIFICANT CHANGE UP (ref 0–0.5)
EOSINOPHIL NFR BLD AUTO: 0 % — SIGNIFICANT CHANGE UP (ref 0–6)
GLUCOSE SERPL-MCNC: 115 MG/DL — HIGH (ref 70–99)
HCT VFR BLD CALC: 23.9 % — LOW (ref 34.5–45)
HGB BLD-MCNC: 7.9 G/DL — LOW (ref 11.5–15.5)
HYPOSEGMENTATION: PRESENT — SIGNIFICANT CHANGE UP
LDH SERPL L TO P-CCNC: 327 U/L — HIGH (ref 120–225)
LYMPHOCYTES # BLD AUTO: 1.73 K/UL — SIGNIFICANT CHANGE UP (ref 1–3.3)
LYMPHOCYTES # BLD AUTO: 9 % — LOW (ref 13–44)
MAGNESIUM SERPL-MCNC: 2 MG/DL — SIGNIFICANT CHANGE UP (ref 1.6–2.6)
MANUAL SMEAR VERIFICATION: SIGNIFICANT CHANGE UP
MCHC RBC-ENTMCNC: 29.9 PG — SIGNIFICANT CHANGE UP (ref 27–34)
MCHC RBC-ENTMCNC: 33.1 GM/DL — SIGNIFICANT CHANGE UP (ref 32–36)
MCV RBC AUTO: 90.5 FL — SIGNIFICANT CHANGE UP (ref 80–100)
MONOCYTES # BLD AUTO: 0.58 K/UL — SIGNIFICANT CHANGE UP (ref 0–0.9)
MONOCYTES NFR BLD AUTO: 3 % — SIGNIFICANT CHANGE UP (ref 2–14)
NEUTROPHILS # BLD AUTO: 16.9 K/UL — HIGH (ref 1.8–7.4)
NEUTROPHILS NFR BLD AUTO: 86 % — HIGH (ref 43–77)
NEUTS BAND # BLD: 2 % — SIGNIFICANT CHANGE UP (ref 0–8)
NRBC # BLD: 0 /100 — SIGNIFICANT CHANGE UP (ref 0–0)
PHOSPHATE SERPL-MCNC: 4.4 MG/DL — SIGNIFICANT CHANGE UP (ref 2.5–4.5)
PLAT MORPH BLD: NORMAL — SIGNIFICANT CHANGE UP
PLATELET # BLD AUTO: 197 K/UL — SIGNIFICANT CHANGE UP (ref 150–400)
PLATELET COUNT - ESTIMATE: NORMAL — SIGNIFICANT CHANGE UP
POTASSIUM SERPL-MCNC: 3.5 MMOL/L — SIGNIFICANT CHANGE UP (ref 3.5–5.3)
POTASSIUM SERPL-SCNC: 3.5 MMOL/L — SIGNIFICANT CHANGE UP (ref 3.5–5.3)
PROT SERPL-MCNC: 7.3 G/DL — SIGNIFICANT CHANGE UP (ref 6–8.3)
RBC # BLD: 2.64 M/UL — LOW (ref 3.8–5.2)
RBC # FLD: 17.6 % — HIGH (ref 10.3–14.5)
RBC BLD AUTO: NORMAL — SIGNIFICANT CHANGE UP
SODIUM SERPL-SCNC: 136 MMOL/L — SIGNIFICANT CHANGE UP (ref 135–145)
URATE SERPL-MCNC: 7.5 MG/DL — HIGH (ref 2.5–7)
WBC # BLD: 19.2 K/UL — HIGH (ref 3.8–10.5)
WBC # FLD AUTO: 19.2 K/UL — HIGH (ref 3.8–10.5)

## 2021-10-27 RX ORDER — ONDANSETRON 8 MG/1
4 TABLET, FILM COATED ORAL ONCE
Refills: 0 | Status: COMPLETED | OUTPATIENT
Start: 2021-10-27 | End: 2021-10-27

## 2021-10-27 RX ADMIN — Medication 650 MILLIGRAM(S): at 09:05

## 2021-10-27 RX ADMIN — Medication 25 MILLIGRAM(S): at 05:35

## 2021-10-27 RX ADMIN — Medication 25 MILLIGRAM(S): at 17:08

## 2021-10-27 RX ADMIN — Medication 1 MILLIGRAM(S): at 11:03

## 2021-10-27 RX ADMIN — OXYCODONE AND ACETAMINOPHEN 1 TABLET(S): 5; 325 TABLET ORAL at 12:43

## 2021-10-27 RX ADMIN — Medication 30 MILLIGRAM(S): at 11:03

## 2021-10-27 RX ADMIN — SIMVASTATIN 40 MILLIGRAM(S): 20 TABLET, FILM COATED ORAL at 21:07

## 2021-10-27 RX ADMIN — OXYCODONE AND ACETAMINOPHEN 1 TABLET(S): 5; 325 TABLET ORAL at 13:13

## 2021-10-27 RX ADMIN — CEFTRIAXONE 100 MILLIGRAM(S): 500 INJECTION, POWDER, FOR SOLUTION INTRAMUSCULAR; INTRAVENOUS at 09:57

## 2021-10-27 RX ADMIN — Medication 650 MILLIGRAM(S): at 08:35

## 2021-10-27 NOTE — PROGRESS NOTE ADULT - ASSESSMENT
71 YOF with PMH HTN, HLD, recent dx of metastatic lung CA which has not started on active chemo yet.  Brought by  due to worsening of weakness which contributed to delaying  on initiate chemo tx .  Dr thomas is outpt heme/onc, [facility] is following and recommends to start 1st chemo as in- patient. Day 1 was 10/15. pt is noted tumor lysis syndrome after first chemo, received Rasburicase, phoslo. 2nd chemo on hold in setting BRENDA per Heme/Onc.  on IVF. Renal/abdominal sono  for BRENDA, transaminitis revealed kidneys with no hydronephrosis. Hepatomegaly, Cholelithiasis, Nonspecific gallbladder wall thickening ( already established from previous w/u in 9/21). Patient with stage IV Ca; Oncology following with plans for palliative chemotherapy  q 3 weeks with close monitoring of renal function. Anemia stable HBG remains above 7 and likely 2/2 to chemo.  transfuse if <7 Plan for discharge to [facility], pending acceptance given patient refusing to participate in PT. May likely need LTC placement instead, NCM to follow.    10/25-Seen and examined at bed side, comfortable with no c/o discomfort, states she wants to go home.  Cr trending up today, IVF hydration in progress.  Leukocytosis improving, pt. afebrile.  Discharge to [facility] pending pt.'s participation with PT and improved Cr.  10/26-Remains comfortable and cheerful today, states she will participate with PT so she can "get out of here".      10/27  Patient with new complaints of rib pain.  Cr continues to trend up, nephro following.  UA+, urine culture testing, leukocytosis trending up, afebrile, started Ceftriaxone for now.  Discharge to [facility] pending improved renal function and urine culture results.

## 2021-10-27 NOTE — PROGRESS NOTE ADULT - SUBJECTIVE AND OBJECTIVE BOX
ELAINA JAMA  71y  Patient is a 71y old  Female who presents with a chief complaint of weakness (27 Oct 2021 16:17)    HPI:  Seen and examined. Followed for BRENDA.  She remains Non-Oliguric.    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 1000 milliLiter(s) (100 mL/Hr) IV Continuous <Continuous>    MEDICATIONS  (PRN):  acetaminophen   Tablet .. 650 milliGRAM(s) Oral every 6 hours PRN Temp greater or equal to 38C (100.4F), Mild Pain (1 - 3), Moderate Pain (4 - 6)  ALBUTerol    90 MICROgram(s) HFA Inhaler 2 Puff(s) Inhalation every 6 hours PRN Shortness of Breath and/or Wheezing  OLANZapine 2.5 milliGRAM(s) Oral at bedtime PRN Agitation  oxycodone    5 mG/acetaminophen 325 mG 1 Tablet(s) Oral every 6 hours PRN Severe Pain (7 - 10)    Vital Signs Last 24 Hrs  T(C): 36.8 (27 Oct 2021 13:55), Max: 37.3 (26 Oct 2021 19:59)  T(F): 98.2 (27 Oct 2021 13:55), Max: 99.1 (26 Oct 2021 19:59)  HR: 74 (27 Oct 2021 13:55) (73 - 80)  BP: 136/69 (27 Oct 2021 13:55) (134/51 - 170/77)  BP(mean): 85 (27 Oct 2021 13:55) (85 - 85)  RR: 17 (27 Oct 2021 13:55) (17 - 18)  SpO2: 98% (27 Oct 2021 13:55) (95% - 98%)  Daily     Daily Weight in k (27 Oct 2021 05:17)    PHYSICAL EXAM:  Constitutional:  She appears comfortable and not distressed. Not diaphoretic.    Neck:  The thyroid is normal. Trachea is midline.     Respiratory: The lungs are clear to auscultation. No dullness and expansion is normal.    Cardiovascular: S1 and S2 are normal. No mummurs, rubs or gallops are present.    Gastrointestinal: The abdomen is soft. No tenderness is present. No masses are present. Bowel sounds are normal.    Genitourinary: The bladder is not distended. No CVA tenderness is present.    Extremities: No edema is noted. No deformities are present.    Neurological: Cognition is normal. Tone, power and sensation are normal.     Skin: No lesions are seen  or palpated.    Lymph Nodes: No lymphadenopathy is present.    Psychiatric: Mood is appropriate. No hallucinations or flight of ideas are noted.                              7.9    19.20 )-----------( 197      ( 27 Oct 2021 08:04 )             23.9     10-27    136  |  104  |  50<H>  ----------------------------<  115<H>  3.5   |  21<L>  |  2.73<H>    Ca    8.6      27 Oct 2021 08:04  Phos  4.4     10-27  Mg     2.0     10-27    TPro  7.3  /  Alb  2.5<L>  /  TBili  0.4  /  DBili  0.1  /  AST  11  /  ALT  17  /  AlkPhos  160<H>  10-27    Urinalysis Basic - ( 26 Oct 2021 06:22 )    Color: Yellow / Appearance: Clear / S.015 / pH: x  Gluc: x / Ketone: Negative  / Bili: Negative / Urobili: Negative   Blood: x / Protein: 100 / Nitrite: Negative   Leuk Esterase: Moderate / RBC: 2-5 /HPF / WBC 11-25 /HPF   Sq Epi: x / Non Sq Epi: Few /HPF / Bacteria: Moderate /HPF    Sodium, Random Urine: 62  Osmolality, Random Urine: 527 mos/kg (10.26.21 @ 06:22)   Total Protein, Random Urine: 120 mg/dL (10.26.21 @ 06:22)

## 2021-10-27 NOTE — PROGRESS NOTE ADULT - ASSESSMENT
_________________________________________________________________________________________  ========>>  M E D I C A L   A T T E N D I N G    F O L L O W  U P  N O T E  <<=========  -----------------------------------------------------------------------------------------------------    - Patient seen and examined by me earlier today.  (covering today)   - In summary,  ELAINA JAMA is a 71y year old woman admitted with   - Patient today overall doing ok, comfortable, eating OK.     ==================>> REVIEW OF SYSTEM <<=================    GEN: no fever, no chills, + some occasional pain in Rt flank are   RESP: no SOB, no cough, no sputum  CVS: no chest pain, no palpitations, no edema  GI: no abdominal pain, no nausea  : no dysuria, no frequency  Neuro: no headache, no dizziness  Derm : no itching, no rash    ==================>> PHYSICAL EXAM <<=================    GEN: A&O X 2 , NAD , comfortable, pleasant, calm , cachectic, in bed   HEENT: NCAT, PERRL, MMM, hearing intact  Neck: supple , no JVD appreciated  CVS: S1S2 , regular , No M/R/G appreciated  PULM: CTA B/L,  no W/R/R appreciated  ABD.: soft. non tender, non distended,  bowel sounds present  Extrem: intact pulses , no edema   PSYCH : normal mood,  not anxious                            ( Note Written / Date of service :  10-27-21 )    ==================>> MEDICATIONS <<====================    MEDICATIONS  (STANDING):  cefTRIAXone   IVPB 1000 milliGRAM(s) IV Intermittent every 24 hours  folic acid 1 milliGRAM(s) Oral daily  metoprolol tartrate 25 milliGRAM(s) Oral two times a day  PARoxetine 30 milliGRAM(s) Oral daily  simvastatin 40 milliGRAM(s) Oral at bedtime  sodium chloride 0.9%. 1000 milliLiter(s) (100 mL/Hr) IV Continuous <Continuous>    MEDICATIONS  (PRN):  acetaminophen   Tablet .. 650 milliGRAM(s) Oral every 6 hours PRN Temp greater or equal to 38C (100.4F), Mild Pain (1 - 3), Moderate Pain (4 - 6)  ALBUTerol    90 MICROgram(s) HFA Inhaler 2 Puff(s) Inhalation every 6 hours PRN Shortness of Breath and/or Wheezing  OLANZapine 2.5 milliGRAM(s) Oral at bedtime PRN Agitation  oxycodone    5 mG/acetaminophen 325 mG 1 Tablet(s) Oral every 6 hours PRN Severe Pain (7 - 10)    ___________  Active diet:  Diet, DASH/TLC:   Sodium & Cholesterol Restricted  No Concentrated Phosphorus  Supplement Feeding Modality:  Oral  Nepro Cans or Servings Per Day:  1       Frequency:  Daily  ___________________    ==================>> VITAL SIGNS <<==================    T(C): 36.8 (10-27-21 @ 13:55), Max: 37.3 (10-26-21 @ 19:59)  HR: 74 (10-27-21 @ 13:55) (73 - 80)  BP: 136/69 (10-27-21 @ 13:55) (134/51 - 170/77)  BP(mean): 85 (10-27-21 @ 13:55)  RR: 17 (10-27-21 @ 13:55) (17 - 18)  SpO2: 98% (10-27-21 @ 13:55) (95% - 98%)     I&O's Summary    26 Oct 2021 07:  -  27 Oct 2021 07:00  --------------------------------------------------------  IN: 0 mL / OUT: 650 mL / NET: -650 mL    27 Oct 2021 07:  -  27 Oct 2021 14:24  --------------------------------------------------------  IN: 0 mL / OUT: 1 mL / NET: -1 mL       ==================>> LAB AND IMAGING <<==================                        7.9    19.20 )-----------( 197      ( 27 Oct 2021 08:04 )             23.9     WBC count:   19.20 <<== ,  15.20 <<== ,  13.27 <<== ,  14.99 <<== ,  16.92 <<==   Hemoglobin:   7.9 <<==,  7.2 <<==,  7.6 <<==,  7.6 <<==,  7.7 <<==       10-27    136  |  104  |  50<H>  ----------------------------<  115<H>  3.5   |  21<L>  |  2.73<H>    Ca    8.6      27 Oct 2021 08:04  Phos  4.4     10-27  Mg     2.0     10-27    TPro  7.3  /  Alb  2.5<L>  /  TBili  0.4  /  DBili  0.1  /  AST  11  /  ALT  17  /  AlkPhos  160<H>  10-27               Urinalysis Basic - ( 26 Oct 2021 06:22 )  Color: Yellow / Appearance: Clear / S.015 / pH: x  Gluc: x / Ketone: Negative  / Bili: Negative / Urobili: Negative   Blood: x / Protein: 100 / Nitrite: Negative   Leuk Esterase: Moderate / RBC: 2-5 /HPF / WBC 11-25 /HPF   Sq Epi: x / Non Sq Epi: Few /HPF / Bacteria: Moderate /HPF    TSH:      1.17   (21)           HgA1C:   (21)          (21)      6.5    _______________________  C U L T U R E S :    COVID-19 PCR: NotDetec (10-25-21 @ 06:48)  COVID-19 PCR: NotDetec (10-19-21 @ 06:21)  COVID-19 PCR: NotDetec (10-12-21 @ 13:12)    ___________________________________________________________________________________  ===============>>  A S S E S S M E N T   A N D   P L A N <<===============  ------------------------------------------------------------------------------------------    · Assessment	  71 YOF with PMH HTN, HLD, recent dx of metastatic lung CA which has not started on active chemo yet.  Brought by  due to worsening of weakness which contributed to delaying  on initiate chemo tx .      Problem/Plan - 1:  ·  Problem: Metastatic cancer to lung.   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  -2nd and 3rd chemo on hold for now due to worsening renal function and anemia  -Next cycle due in 3-4 weeks  -Heme/onc [facility] (Dr. Miranda's) group following.  -Upon discharge pt. to f/u with Dr. Casper.    Problem/Plan - 2:  ·  Problem: Tumor lysis syndrome following antineoplastic drug therapy.   ·  Plan: Improving  -Hyperphosphatemia stable  -No concentrated phos in diet added.   -hyperuricemia   -Uric acid stable at 6.1  -LDH trending down 1055---> 498--->360  -Cont IVF  -Monitor CMP, Magnesium, phosphorus , LDH, Uric acid daily in am.   -Renal following   -HemeOnc following.    Problem/Plan - 3:  ·  Problem: Leucocytosis.   ·  Plan: - Continue to trend up likely due to UTI  -UA positive, urine culture testing  -Started Ceftriaxone for now  -f/u urine culture  - Pan culture if fevers   - Oncology following.  - ID consult and follow up / mgmt     Problem/Plan - 4:  ·  Problem: BRENDA (acute kidney injury).   ·  Plan: in the setting of TLS   -Chemotherapy day 2 and 3 held   -Cr trending /monitor :        Creatinine:  2.73  <<==, 3.13  <<==, 2.95  <<==, 2.77  <<==, 2.64  <<==, 2.59  <<==  -US; kidneys with no hydronephrosis   -Continue NS @ 100cc/hr   -Avoid nephrotoxic agents, NSAIDs  -Renal following  -UA+  -f/u urine lytes  - Continue to monitor BMP.    Problem/Plan - 5:  ·  Problem: Transaminitis.   ·  Plan: in the setting of Ca w/ likely liver mets;   Abdominal US as above;   Alk phos mildly elevated @ 155  AST/ ALT WNL  Continue to monitor LFTs  avoid hepatotoxins.    Problem/Plan - 6:  ·  Problem: Anemia due to chemotherapy.   ·  Plan: Multifactorial etiology, including recent chemotherapy requiring 1 unit PRBC this admission   -Continue to trend H/H  -Monitor for signs of overt bleed  -Oncology following.    Problem/Plan - 7:  ·  Problem: Thrombocytopenia.   ·  Plan: in the setting of Ca w/ chemotherapy initiation-Improving  -Plt 133 today  - Continue to monitor   - Bleeding precautions  - Oncology following.    Problem/Plan - 8:  ·  Problem: HTN (hypertension).   ·  Plan: Controlled  's   -Continue Metoprolol 25mg BID with parameters.    Problem/Plan - 9:  ·  Problem: Need for prophylactic measure.   ·  Plan: DVT ppx: Hold pharmacologic DVT ppx in setting of worsening anemia and thrombocytopenia; c/w SCDs   OOB.    Problem/Plan - 10:  ·  Problem: Discharge planning issues.   ·  Plan; PT recommends JANETTE : pt and family [facility]   currently refusing to participate in PT  Care management following for discharge planning   Palliative follows  Monitor TLS labs daily.    --------------------------------------------  Case discussed with pt  Education given on findings and plan of care  ___________________________  H. JAMARI Hi.  (covering today)   Pager: [phone]

## 2021-10-27 NOTE — PROGRESS NOTE ADULT - SUBJECTIVE AND OBJECTIVE BOX
Time of Visit:  Patient seen and examined.     MEDICATIONS  (STANDING):  cefTRIAXone   IVPB 1000 milliGRAM(s) IV Intermittent every 24 hours  folic acid 1 milliGRAM(s) Oral daily  metoprolol tartrate 25 milliGRAM(s) Oral two times a day  PARoxetine 30 milliGRAM(s) Oral daily  simvastatin 40 milliGRAM(s) Oral at bedtime  sodium chloride 0.9%. 1000 milliLiter(s) (100 mL/Hr) IV Continuous <Continuous>      MEDICATIONS  (PRN):  acetaminophen   Tablet .. 650 milliGRAM(s) Oral every 6 hours PRN Temp greater or equal to 38C (100.4F), Mild Pain (1 - 3), Moderate Pain (4 - 6)  ALBUTerol    90 MICROgram(s) HFA Inhaler 2 Puff(s) Inhalation every 6 hours PRN Shortness of Breath and/or Wheezing  OLANZapine 2.5 milliGRAM(s) Oral at bedtime PRN Agitation  oxycodone    5 mG/acetaminophen 325 mG 1 Tablet(s) Oral every 6 hours PRN Severe Pain (7 - 10)       Medications up to date at time of exam.      PHYSICAL EXAMINATION:    Vital Signs Last 24 Hrs  T(C): 36.8 (27 Oct 2021 13:55), Max: 37.3 (26 Oct 2021 19:59)  T(F): 98.2 (27 Oct 2021 13:55), Max: 99.1 (26 Oct 2021 19:59)  HR: 74 (27 Oct 2021 13:55) (73 - 80)  BP: 136/69 (27 Oct 2021 13:55) (134/51 - 170/77)  BP(mean): 85 (27 Oct 2021 13:55) (85 - 85)  RR: 17 (27 Oct 2021 13:55) (17 - 18)  SpO2: 98% (27 Oct 2021 13:55) (95% - 98%)   (if applicable)    General: Alert and oriented x2. Poor historian . Able to answer question at rest with no SOB. No acute distress.      HEENT: Head is normocephalic and atraumatic. No nasal tenderness. Extraocular muscles are intact. Moist mucosa .     NECK: Supple, no palpable adenopathy.    LUNGS: Clear to auscultation bilaterally with no rales, rhonchi, wheezing. Non labored. No use of accessory muscle.     HEART: S1 S2 Regular rate and no click/ rub.     ABDOMEN: Soft, nontender, and nondistended.  Active bowel sounds.     : No bladder distention and tenderness.     NEUROLOGIC: Awake, alert, oriented, forgetful .     SKIN: Warm and moist . Non diaphoretic.       LABS:                        7.9    19.20 )-----------( 197      ( 27 Oct 2021 08:04 )             23.9     10-27    136  |  104  |  50<H>  ----------------------------<  115<H>  3.5   |  21<L>  |  2.73<H>    Ca    8.6      27 Oct 2021 08:04  Phos  4.4     10-27  Mg     2.0     10-27    TPro  7.3  /  Alb  2.5<L>  /  TBili  0.4  /  DBili  0.1  /  AST  11  /  ALT  17  /  AlkPhos  160<H>  10-27      Urinalysis Basic - ( 26 Oct 2021 06:22 )    Color: Yellow / Appearance: Clear / S.015 / pH: x  Gluc: x / Ketone: Negative  / Bili: Negative / Urobili: Negative   Blood: x / Protein: 100 / Nitrite: Negative   Leuk Esterase: Moderate / RBC: 2-5 /HPF / WBC 11-25 /HPF   Sq Epi: x / Non Sq Epi: Few /HPF / Bacteria: Moderate /HPF                      MICROBIOLOGY: (if applicable)    RADIOLOGY & ADDITIONAL STUDIES:  EKG:   CXR:  ECHO:    IMPRESSION: 71y Female PAST MEDICAL & SURGICAL HISTORY:  Lumbar stenosis    Bleeding ulcer  stomach ulcer     Anxiety    Spinal stenosis in cervical region    Acute hepatitis C virus infection without hepatic coma  treated - Resolved    Balance disorder    Coronary artery disease involving native coronary artery of native heart without angina pectoris    HTN (hypertension)    HLD (hyperlipidemia)    Coronary artery disease involving native coronary artery of native heart without angina pectoris    H/O colonoscopy    Bleeding ulcer  stomach surgery for bleeding ulcer    Fracture  ORIF Left wrist  13    History of lumbar surgery      Chronic UTI  Pt reports presently on 2nd dose of antibiotics 18 10 days, Dr Virgil smith, pt going for m/eval 18.    H/O cardiac catheterization  , no intervention needed, treated medically     Impression: 70 Y/O Female presented with weakness since discharge. Patient was discharged on 10-06-21 from [facility] after hospitalization for Sepsis from UTI and Pneumonia. Has Metastatic Lung Ca who had s/p EBUS / Bronchoscopy on 10-04-21. Right endobronchial  Mass. Has Stage IV High-Grade Lung , Neuroendocrine CA with likely mets to liver/peritoneum/bone being followed by Oncology Team for inpatient Chemo .  10-25-21, 10-19-21, 10-12-21 Negative for Covid 19 PCR.    Suggestion:  O2 saturation 96%  room air. So far saturating good room air .   Pulmonary Oral hygiene care.  Transfuse as needed per Heme  Oncology follow up for Chemo therapy.  Continue PRN Albuterol 90 mcg 2 puffs Q 6 Hours.      Time of Visit:  Patient seen and examined.     MEDICATIONS  (STANDING):  cefTRIAXone   IVPB 1000 milliGRAM(s) IV Intermittent every 24 hours  folic acid 1 milliGRAM(s) Oral daily  metoprolol tartrate 25 milliGRAM(s) Oral two times a day  PARoxetine 30 milliGRAM(s) Oral daily  simvastatin 40 milliGRAM(s) Oral at bedtime  sodium chloride 0.9%. 1000 milliLiter(s) (100 mL/Hr) IV Continuous <Continuous>      MEDICATIONS  (PRN):  acetaminophen   Tablet .. 650 milliGRAM(s) Oral every 6 hours PRN Temp greater or equal to 38C (100.4F), Mild Pain (1 - 3), Moderate Pain (4 - 6)  ALBUTerol    90 MICROgram(s) HFA Inhaler 2 Puff(s) Inhalation every 6 hours PRN Shortness of Breath and/or Wheezing  OLANZapine 2.5 milliGRAM(s) Oral at bedtime PRN Agitation  oxycodone    5 mG/acetaminophen 325 mG 1 Tablet(s) Oral every 6 hours PRN Severe Pain (7 - 10)       Medications up to date at time of exam.      PHYSICAL EXAMINATION:    Vital Signs Last 24 Hrs  T(C): 36.8 (27 Oct 2021 13:55), Max: 37.3 (26 Oct 2021 19:59)  T(F): 98.2 (27 Oct 2021 13:55), Max: 99.1 (26 Oct 2021 19:59)  HR: 74 (27 Oct 2021 13:55) (73 - 80)  BP: 136/69 (27 Oct 2021 13:55) (134/51 - 170/77)  BP(mean): 85 (27 Oct 2021 13:55) (85 - 85)  RR: 17 (27 Oct 2021 13:55) (17 - 18)  SpO2: 98% (27 Oct 2021 13:55) (95% - 98%)   (if applicable)    General: Alert and oriented x2. Poor historian . Able to answer question at rest with no SOB. No acute distress.      HEENT: Head is normocephalic and atraumatic. No nasal tenderness. Extraocular muscles are intact. Moist mucosa .     NECK: Supple, no palpable adenopathy.    LUNGS: Clear to auscultation bilaterally with no rales, rhonchi, wheezing. Non labored. No use of accessory muscle.     HEART: S1 S2 Regular rate and no click/ rub.     ABDOMEN: Soft, nontender, and nondistended.  Active bowel sounds.     : No bladder distention and tenderness.     NEUROLOGIC: Awake, alert, oriented, forgetful .     SKIN: Warm and moist . Non diaphoretic.       LABS:                        7.9    19.20 )-----------( 197      ( 27 Oct 2021 08:04 )             23.9     10-27    136  |  104  |  50<H>  ----------------------------<  115<H>  3.5   |  21<L>  |  2.73<H>    Ca    8.6      27 Oct 2021 08:04  Phos  4.4     10-27  Mg     2.0     10-27    TPro  7.3  /  Alb  2.5<L>  /  TBili  0.4  /  DBili  0.1  /  AST  11  /  ALT  17  /  AlkPhos  160<H>  10-27      Urinalysis Basic - ( 26 Oct 2021 06:22 )    Color: Yellow / Appearance: Clear / S.015 / pH: x  Gluc: x / Ketone: Negative  / Bili: Negative / Urobili: Negative   Blood: x / Protein: 100 / Nitrite: Negative   Leuk Esterase: Moderate / RBC: 2-5 /HPF / WBC 11-25 /HPF   Sq Epi: x / Non Sq Epi: Few /HPF / Bacteria: Moderate /HPF                      MICROBIOLOGY: (if applicable)    RADIOLOGY & ADDITIONAL STUDIES:  EKG:   CXR:  ECHO:    IMPRESSION: 71y Female PAST MEDICAL & SURGICAL HISTORY:  Lumbar stenosis    Bleeding ulcer  stomach ulcer     Anxiety    Spinal stenosis in cervical region    Acute hepatitis C virus infection without hepatic coma  treated - Resolved    Balance disorder    Coronary artery disease involving native coronary artery of native heart without angina pectoris    HTN (hypertension)    HLD (hyperlipidemia)    Coronary artery disease involving native coronary artery of native heart without angina pectoris    H/O colonoscopy    Bleeding ulcer  stomach surgery for bleeding ulcer    Fracture  ORIF Left wrist  13    History of lumbar surgery      Chronic UTI  Pt reports presently on 2nd dose of antibiotics 18 10 days, Dr Virgil smith, pt going for m/eval 18.    H/O cardiac catheterization  , no intervention needed, treated medically     Impression: 70 Y/O Female presented with weakness since discharge. Patient was discharged on 10-06-21 from [facility] after hospitalization for Sepsis from UTI and Pneumonia. Has Metastatic Lung Ca who had s/p EBUS / Bronchoscopy on 10-04-21. Right endobronchial  Mass. Has Stage IV High-Grade Lung , Neuroendocrine CA with likely mets to liver/peritoneum/bone being followed by Oncology Team for inpatient Chemo .  10-25-21, 10-19-21, 10-12-21 Negative for Covid 19 PCR.    Suggestion:  O2 saturation 96%  room air. So far saturating good room air .   Pulmonary Oral hygiene care.  Transfuse as needed per Heme  Oncology follow up for Chemo therapy.  Continue PRN Albuterol 90 mcg 2 puffs Q 6 Hours.     Agree with above assessment and plan as transcribed.

## 2021-10-27 NOTE — PROGRESS NOTE ADULT - PROBLEM SELECTOR PLAN 7
-  Platelets 197  today  -  Continue to monitor CBC daily  -  Bleeding precautions  -  Oncology following

## 2021-10-27 NOTE — PROGRESS NOTE ADULT - PROBLEM SELECTOR PLAN 3
- Continue to trend up likely due to UTI  - UA positive, urine culture testing  - Started Ceftriaxone on 10/26  - f/u urine culture  - Pan culture if fevers   - Oncology following

## 2021-10-27 NOTE — PROGRESS NOTE ADULT - PROBLEM SELECTOR PLAN 10
-  PT recommends JANETTE : pt and family chose [facility]   -  Care management following for discharge planning   -  Plan for discharge to [facility],  pending urine cultures.

## 2021-10-27 NOTE — PROGRESS NOTE ADULT - PROBLEM SELECTOR PLAN 1
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  - 2nd and 3rd chemo on hold for now due to worsening renal function and anemia  - Next cycle due in 3-4 weeks  - Heme/onc [facility] (Dr. Miranda's) group following.  - Upon discharge pt. to f/u with Dr. Casper

## 2021-10-27 NOTE — PROGRESS NOTE ADULT - PROBLEM SELECTOR PLAN 6
-  Multifactorial etiology, including recent chemotherapy requiring 1 unit PRBC this admission   -  Continue to trend H/H.  Hgb today 7.9  -  Monitor for signs of overt bleed  -  Oncology following

## 2021-10-27 NOTE — PROGRESS NOTE ADULT - ASSESSMENT
70 y/o F with PMHx of HTN, HLD, and metastatic Lung CA, was discharged on 10/6 from [facility] after hospitalization for sepsis from PNA and UTI, comes in complaining of weakness since discharge.   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   - Repeat UA.  - Send Urine lytes.  - Monitor BMP.  - Continue hydration.      HTN  Optimal  Monitor BP    Hyperphosphatemia  Likely due to TLS and has improved.  Low Po4 diet      Hypocalcemia  Likely due to low albumin  Corrected calcium 8.0  Monitor BRENDA

## 2021-10-27 NOTE — PROGRESS NOTE ADULT - SUBJECTIVE AND OBJECTIVE BOX
Patient is a 71y old  Female who presents with a chief complaint of weakness (26 Oct 2021 11:26)      INTERVAL HPI/OVERNIGHT EVENTS: Patient with new complaints of right rib pain    MEDICATIONS  (STANDING):  cefTRIAXone   IVPB 1000 milliGRAM(s) IV Intermittent every 24 hours  folic acid 1 milliGRAM(s) Oral daily  metoprolol tartrate 25 milliGRAM(s) Oral two times a day  ondansetron Injectable 4 milliGRAM(s) IV Push once  PARoxetine 30 milliGRAM(s) Oral daily  simvastatin 40 milliGRAM(s) Oral at bedtime  sodium chloride 0.9%. 1000 milliLiter(s) (100 mL/Hr) IV Continuous <Continuous>    MEDICATIONS  (PRN):  acetaminophen   Tablet .. 650 milliGRAM(s) Oral every 6 hours PRN Temp greater or equal to 38C (100.4F), Mild Pain (1 - 3), Moderate Pain (4 - 6)  ALBUTerol    90 MICROgram(s) HFA Inhaler 2 Puff(s) Inhalation every 6 hours PRN Shortness of Breath and/or Wheezing  OLANZapine 2.5 milliGRAM(s) Oral at bedtime PRN Agitation  oxycodone    5 mG/acetaminophen 325 mG 1 Tablet(s) Oral every 6 hours PRN Severe Pain (7 - 10)    __________________________________________________  REVIEW OF SYSTEMS:    CONSTITUTIONAL: No fever,   EYES: no acute visual disturbances  NECK: No pain or stiffness  RESPIRATORY: No cough; No shortness of breath  CARDIOVASCULAR: No chest pain, no palpitations  GASTROINTESTINAL: No pain. No nausea or vomiting; No diarrhea   NEUROLOGICAL: No headache or numbness, no tremors  MUSCULOSKELETAL: No joint pain, right rib pain  GENITOURINARY: no dysuria, no frequency, no hesitancy  PSYCHIATRY: no depression , no anxiety  ALL OTHER  ROS negative        Vital Signs Last 24 Hrs  T(C): 36.8 (27 Oct 2021 13:55), Max: 37.3 (26 Oct 2021 19:59)  T(F): 98.2 (27 Oct 2021 13:55), Max: 99.1 (26 Oct 2021 19:59)  HR: 74 (27 Oct 2021 13:55) (73 - 80)  BP: 136/69 (27 Oct 2021 13:55) (134/51 - 170/77)  BP(mean): 85 (27 Oct 2021 13:55) (85 - 85)  RR: 17 (27 Oct 2021 13:55) (17 - 18)  SpO2: 98% (27 Oct 2021 13:55) (95% - 98%)      ________________________________________________  PHYSICAL EXAM:    GENERAL: No acute distress  HEENT: Normocephalic;  conjunctivae and sclerae clear; moist mucous membranes;   NECK : supple.  No JVD noted  CHEST/LUNG: Clear to auscultation bilaterally with good air entry.  No rales, wheezes or rhonchi   HEART: S1 S2  regular; no murmurs  ABDOMEN: Soft, Nontender, Nondistended; Bowel sounds present  EXTREMITIES: no cyanosis; no edema; no calf tenderness  SKIN: warm and dry; no rash  NERVOUS SYSTEM:  Awake and alert; Oriented  to place, person and time ; no new deficits    _________________________________________________  LABS:                                   7.9    19.20 )-----------( 197      ( 27 Oct 2021 08:04 )             23.9       10-27    136  |  104  |  50<H>  ----------------------------<  115<H>  3.5   |  21<L>  |  2.73<H>    Ca    8.6      27 Oct 2021 08:04  Phos  4.4     10-27  Mg     2.0     10-27    TPro  7.3  /  Alb  2.5<L>  /  TBili  0.4  /  DBili  0.1  /  AST  11  /  ALT  17  /  AlkPhos  160<H>  10-27             Urinalysis Basic - ( 26 Oct 2021 06:22 )    Color: Yellow / Appearance: Clear / S.015 / pH: x  Gluc: x / Ketone: Negative  / Bili: Negative / Urobili: Negative   Blood: x / Protein: 100 / Nitrite: Negative   Leuk Esterase: Moderate / RBC: 2-5 /HPF / WBC 11-25 /HPF   Sq Epi: x / Non Sq Epi: Few /HPF / Bacteria: Moderate /HPF      CAPILLARY BLOOD GLUCOSE    RADIOLOGY & ADDITIONAL TESTS:      Imaging Personally Reviewed:  YES/NO    Consultant(s) Notes Reviewed:   YES/ No    Care Discussed with Consultants :

## 2021-10-27 NOTE — PROGRESS NOTE ADULT - ASSESSMENT
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 Next cycle due in 2-3 weeks. Encouraged OOB to chair with assistance, but pt refusing  -recommend Palliative care consult  -upon discharge pt to f/u with Dr. Casper   d/c delayed d/t leukocytosis-->UTI, Cx pending    # Anemia - multifactorial, including recent chemotherapy; transfuse to keep hg > 7.0 g/dl  stable; s/p PRBC transfusion 10/18    # Renal failure   -chemo day 2 & 3 cancelled  -continue IVF  -Abd US: no hydro, showed Hepatic metastases. Hepatomegaly. Cholelithiasis. Nonspecific gallbladder wall thickening.  -avoid nephrotoxic agents  -after review of labs c/w TLS, rasburicase 3 mg given 10/18  -appreciate nephrology input, monitoring urine output abd creat     # ? TLS  BRENDA, hyperuricemia, hyperphosphatemia  LDH 1050-->695-->523 improving  Hypocalcemia since last admission d/t aredia given for hypercalcemia  rasburicase 3mg given 10/18  repeat uric acid wnl   appreciate nephrology consult, TLS vs tubulo-toxicity from cis    #Leukocytosis: d/t neulasta    case d/w Medicine Team   Thank you for the referral. Will continue to monitor the patient.  Please call with any questions [phone]  Above reviewed with Attending Dr. 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 Next cycle due in 2-3 weeks. Encouraged OOB to chair with assistance, but pt refusing  -right rib pain likely referred pain, VSS, denies SOB/dyspnea  -recommend Palliative care consult as pt poor ECOG and unlikely to be a candidate for outpt care unless pt improves, spoke with NP Domitila  -upon discharge pt to f/u with Dr. Casper   d/c delayed d/t leukocytosis-->UTI, Cx pending    # Anemia - multifactorial, including recent chemotherapy; transfuse to keep hg > 7.0 g/dl  stable; s/p PRBC transfusion 10/18    # Renal failure   -chemo day 2 & 3 cancelled  -Cr continues to increase, non-oliguric  -Abd US: no hydro, showed Hepatic metastases. Hepatomegaly. Cholelithiasis. Nonspecific gallbladder wall thickening.  -avoid nephrotoxic agents  -after review of labs c/w TLS, rasburicase 3 mg given 10/18  -appreciate nephrology input, monitoring urine output and creat   -repeat Cr prior to d/c to rehab and f/u with nephrology as outpt    # ? TLS  BRENDA, hyperuricemia, hyperphosphatemia  LDH 1050-->695-->523 improving  Hypocalcemia since last admission d/t aredia given for hypercalcemia  rasburicase 3mg given 10/18  repeat uric acid wnl   appreciate nephrology consult, TLS vs tubulo-toxicity from cis    #Leukocytosis: d/t neulasta    case d/w Medicine Team   Thank you for the referral. Will continue to monitor the patient.  Please call with any questions [phone]  Above reviewed with Attending Dr. Treviño  [facility]  [address] [facility], [address], [place], [place]  [phone]

## 2021-10-27 NOTE — PROGRESS NOTE ADULT - ATTENDING COMMENTS
CC: Stage IV high grade neuroendocrine ca    Pt feels weak    Vital Signs Last 24 Hrs  T(C): 36.9 (21 Oct 2021 14:38), Max: 36.9 (21 Oct 2021 14:38)  T(F): 98.5 (21 Oct 2021 14:38), Max: 98.5 (21 Oct 2021 14:38)  HR: 77 (21 Oct 2021 17:09) (74 - 79)  BP: 146/72 (21 Oct 2021 17:09) (119/51 - 152/69)  BP(mean): 91 (21 Oct 2021 14:38) (91 - 91)  RR: 18 (21 Oct 2021 17:09) (16 - 18)  SpO2: 95% (21 Oct 2021 17:09) (95% - 99%)    NAD; exam as per above    10-21    138  |  105  |  66<H>  ----------------------------<  118<H>  3.8   |  23  |  2.60<H>    Ca    7.2<L>      21 Oct 2021 06:47  Phos  4.6     10-21  Mg     1.8     10-20    TPro  6.6  /  Alb  2.4<L>  /  TBili  0.6  /  DBili  0.2  /  AST  18  /  ALT  25  /  AlkPhos  133<H>  10-21    A/P    # ONC - s/p CDDP/etoposide (10/15) w/ renal failure  -GOC is palliative  -ECOG PS is 3    # Renal failure - multifactorial; s/p rasburicase  -appreciate renal input    I have examined the patient at bedside and reviewed patient's data and participated in the management of the patient along with Ngoc COSME as well as hemotology/med oncology faculty consisting of Dr. Omar Meredith, Dr. CHILO Casper, Dr. CHILO Treviño, Dr. Aftab Plunkett, Dr. Lennie Mathews, Dr. Jasiel Trinh as well as myself during the daily heme/onc case review. I reviewed pertinent clinical information, PE,  labs as well as A/P as outline above.
CC: Stage IV high grade neuroendocrine cancer    HA resolved    Vital Signs Last 24 Hrs  T(C): 36.6 (20 Oct 2021 14:00), Max: 36.9 (20 Oct 2021 05:05)  T(F): 97.9 (20 Oct 2021 14:00), Max: 98.4 (20 Oct 2021 05:05)  HR: 76 (20 Oct 2021 14:00) (76 - 84)  BP: 138/58 (20 Oct 2021 14:00) (121/62 - 145/70)  BP(mean): 77 (20 Oct 2021 14:00) (77 - 77)  RR: 17 (20 Oct 2021 14:00) (16 - 18)  SpO2: 97% (20 Oct 2021 14:00) (94% - 98%)    NAD; exam as per above    Basic Metabolic Panel (10.19.21 @ 07:50)    Sodium, Serum: 139 mmol/L    Potassium, Serum: 4.3 mmol/L    Chloride, Serum: 105 mmol/L    Carbon Dioxide, Serum: 24 mmol/L    Anion Gap, Serum: 10 mmol/L    Blood Urea Nitrogen, Serum: 60 mg/dL    Creatinine, Serum: 2.08 mg/dL    Glucose, Serum: 126 mg/dL    Calcium, Total Serum: 7.7 mg/dL    eGFR if Non : 24: Interpretative comment  The units for eGFR are mL/min/1.73M2 (normalized body surface area). The  eGFR is calculated from a serum creatinine using the CKD-EPI equation.  Other variables required for calculation are race, age and sex. Among  patients with chronic kidney disease (CKD), the eGFR is useful in  determining the stage of disease according to KDOQI CKD classification.  All eGFR results are reported numerically with the following  interpretation.          GFR                    With                 Without     (ml/min/1.73 m2)    Kidney Damage       Kidney Damage        >= 90                    Stage 1                     Normal        60-89                    Stage 2                     Decreased GFR        30-59     Stage 3                     Stage 3        15-29                    Stage 4                     Stage 4        < 15                      Stage 5                     Stage 5  Each stage of CKD assumes that the associated GFR level has been in  effect for at least 3 months. Determination of stages one and two (with  eGFR > 59 ml/min/m2) requires estimation of kidney damage for at least 3  months as defined by structural or functional abnormalities.  Limitations: All estimates of GFR will be less accurate for patients at  extremes of muscle mass (including but not limited to frail elderly,  critically ill, or cancer patients), those with unusual diets, and those  with conditions associated with reduced secretion or extrarenal  elimination of creatinine. The eGFR equation is not recommended for use  in patients with unstable creatinine levels. mL/min/1.73M2    eGFR if African American: 27 mL/min/1.73M2    A/P    # Neuroendocrin ca - s/p CDDP/VP16 day 1 w/ renal insufficiency  -GOC is pallaitive  -PT/OT eval  -plan to resume palliative tumor directed therapy as outpt in 3 weeks    # Renal failure - likley related to CDDP; TLS w/ hyperuricemia sp rasburicase; appreciate renal input    I have examined the patient at bedside and reviewed patient's data and participated in the management of the patient along with Ngoc COSME as well as hemotology/med oncology faculty consisting of Dr. Omar Meredith, Dr. CHILO Casper, Dr. CHILO Treviño, Dr. Aftab Plunkett, Dr. Lennie Mathews, Dr. Jasiel Trinh as well as myself during the daily heme/onc case review. I reviewed pertinent clinical information, PE,  labs as well as A/P as outline above.
I have examined the patient at bedside and reviewed patient's data and participated in the management of the patient along with Ngoc COSME as well as hemotology/med oncology faculty consisting of Dr. Omar Meredith, Dr. REGLA Etienne, Dr. CHILO Treviño, Dr. Aftab Plunkett, Dr. Lennie Mathews, Dr. Jasiel Trinh as well as myself during the daily heme/onc case review. I reviewed pertinent clinical information, PE,  labs as well as A/P as outline above.      was explained side effects to cis/etoposide and agreed. May pursue [facility] management after discharge or remain with [facility] Immune therapy to be added on subsequent cycles.
CC: Stage IV high grade neuroendocrine ca    Vital Signs Last 24 Hrs  T(C): 36.7 (19 Oct 2021 14:51), Max: 36.8 (18 Oct 2021 19:25)  T(F): 98 (19 Oct 2021 14:51), Max: 98.3 (19 Oct 2021 05:09)  HR: 81 (19 Oct 2021 14:51) (81 - 90)  BP: 124/54 (19 Oct 2021 14:51) (123/62 - 171/75)  BP(mean): 72 (19 Oct 2021 14:51) (72 - 72)  RR: 18 (19 Oct 2021 14:51) (17 - 18)  SpO2: 98% (19 Oct 2021 14:51) (97% - 98%)    NAD; exam as per above    A/P  Problem #1 Stage IV High-Grade Lung Neuroendocrine CA s/p Cisplatin/etoposide 10/15/21 now with renal failure   -GOC is palliative    Problem #2 Anemia - multifactorial, including recent chemotherapy; transfuse to keep hg > 7.5 g/dl    Problem #3 Renal failure - worsening despite IVF and po fluid and resburicase  -appreciate renal input  -avoid nephrotoxic agents    I have examined the patient at bedside and reviewed patient's data and participated in the management of the patient along with Ngoc COSME as well as hemotology/med oncology faculty consisting of Dr. Omar Meredith, Dr. CHILO Casper, Dr. CHILO Treviño, Dr. Afatb Plunkett, Dr. Lennie Mathews, Dr. Jasiel Trinh as well as myself during the daily heme/onc case review. I reviewed pertinent clinical information, PE,  labs as well as A/P as outline above.
# Extensive Stage High-Grade Lung Neuroendocrine CA with likely mets to liver/peritoneum/bone (diagnosed 10/04/21, Ki-67 98%)   s/p 1st cycles of abbreviated chemo.   # BRENDA / TLS: creat continues to trend up   Poor performance status   Palliative care evaluation   Not a chemo candidate currently   Nephrology on board   Encourage PT participation   Supportive care   Very guarded prognosis
Patient seen and examined.   Patient to start Chemo today
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
Patient seen and examined.     T(C): 37.3 (10-26-21 @ 19:59), Max: 37.3 (10-26-21 @ 19:59)  HR: 73 (10-26-21 @ 19:59) (73 - 80)  BP: 134/51 (10-26-21 @ 19:59) (134/51 - 170/77)  RR: 18 (10-26-21 @ 19:59) (17 - 18)  SpO2: 95% (10-26-21 @ 19:59) (95% - 97%)    Metastatic Lung ca s/p chemo   TLS   BRENDA   Positive UA       Renal following   Continue with iv hydration   UA positive started on Ceftriaxone, patient asymptomatic.  Follow up urine cultures    ID consult called, follow up recs.
Patient seen and examined   No complaints     T(C): 36.9 (10-21-21 @ 20:19), Max: 36.9 (10-21-21 @ 14:38)  HR: 68 (10-21-21 @ 20:19) (68 - 77)  BP: 138/62 (10-21-21 @ 20:19) (138/62 - 152/69)  RR: 18 (10-21-21 @ 20:19) (18 - 18)  SpO2: 98% (10-21-21 @ 20:19) (95% - 99%)    TLS resolving   Continue with iv hydration   Hem Onc/ renal following  Monitor CMP, Magnesium, phosphorus , LDH, Uric acid daily in am.
Patient seen and examined   No complaints     T(C): 36.6 (10-25-21 @ 13:00), Max: 36.6 (10-25-21 @ 13:00)  HR: 81 (10-25-21 @ 17:11) (73 - 81)  BP: 150/71 (10-25-21 @ 17:11) (101/53 - 150/71)  RR: 17 (10-25-21 @ 13:00) (17 - 17)  SpO2: 97% (10-25-21 @ 13:00) (97% - 97%)    TLS resolving   BRENDA worsening renal function   Reviewed renal sono.   Renal follow up   Continue with iv hydration   Continue with iv hydration   Hem Onc/ renal following  Monitor CMP, Magnesium, phosphorus , LDH, Uric acid daily in am.
Patient seen and examined.     T(C): 36.7 (10-22-21 @ 20:48), Max: 36.8 (10-22-21 @ 12:40)  HR: 68 (10-22-21 @ 20:48) (68 - 87)  BP: 149/63 (10-22-21 @ 20:48) (129/87 - 149/63)  RR: 18 (10-22-21 @ 20:48) (17 - 18)  SpO2: 93% (10-22-21 @ 20:48) (93% - 96%)    Reviewed labs and imaging     TLS iv hydration   -Hyper phosphotemia  -Hyperkalemia     Monitor Electrolytes. Start Allopurinol.   D/C planning to [facility] when stable.   Plan of care discussed with patients

## 2021-10-27 NOTE — PROGRESS NOTE ADULT - PROBLEM SELECTOR PLAN 5
-  In the setting of Ca w/ likely liver mets;   -  Abdominal US as above;   -  Alk phos mildly elevated @ 155  -  AST/ ALT WNL  -  Continue to monitor LFTs  -  avoid hepatotoxins

## 2021-10-27 NOTE — PROGRESS NOTE ADULT - PROBLEM SELECTOR PLAN 2
- Hyperphosphatemia stable  - No concentrated phos in diet added.   - hyperuricemia   - Uric acid stable at 6.1  - LDH trending down 1055---> 498--->360  - Cont IVF  - Monitor CMP, Magnesium, phosphorus , LDH, Uric acid daily in am.   - Renal following   - Heme Onc following

## 2021-10-27 NOTE — PROGRESS NOTE ADULT - PROBLEM SELECTOR PLAN 4
- Chemotherapy day 2 and 3 held   - Creatinine  trending down,  2.73 today  - US; kidneys with no hydronephrosis   - Continue NS @ 100cc/hr   - Avoid nephrotoxic agents, NSAIDs  - Renal following  - UA positive,  cultures pending  - Continue to monitor BMP daily

## 2021-10-27 NOTE — PROGRESS NOTE ADULT - SUBJECTIVE AND OBJECTIVE BOX
Patient is a 71y old  Female who presents with a chief complaint of weakness (26 Oct 2021 17:34)      SUBJECTIVE / OVERNIGHT EVENTS:    ADDITIONAL REVIEW OF SYSTEMS:    MEDICATIONS  (STANDING):  cefTRIAXone   IVPB 1000 milliGRAM(s) IV Intermittent every 24 hours  folic acid 1 milliGRAM(s) Oral daily  metoprolol tartrate 25 milliGRAM(s) Oral two times a day  PARoxetine 30 milliGRAM(s) Oral daily  simvastatin 40 milliGRAM(s) Oral at bedtime  sodium chloride 0.9%. 1000 milliLiter(s) (100 mL/Hr) IV Continuous <Continuous>    MEDICATIONS  (PRN):  acetaminophen   Tablet .. 650 milliGRAM(s) Oral every 6 hours PRN Temp greater or equal to 38C (100.4F), Mild Pain (1 - 3), Moderate Pain (4 - 6)  ALBUTerol    90 MICROgram(s) HFA Inhaler 2 Puff(s) Inhalation every 6 hours PRN Shortness of Breath and/or Wheezing  OLANZapine 2.5 milliGRAM(s) Oral at bedtime PRN Agitation  oxycodone    5 mG/acetaminophen 325 mG 1 Tablet(s) Oral every 6 hours PRN Severe Pain (7 - 10)    CAPILLARY BLOOD GLUCOSE        I&O's Summary    26 Oct 2021 07:01  -  27 Oct 2021 07:00  --------------------------------------------------------  IN: 0 mL / OUT: 650 mL / NET: -650 mL        PHYSICAL EXAM:  Vital Signs Last 24 Hrs  T(C): 36.8 (27 Oct 2021 05:17), Max: 37.3 (26 Oct 2021 19:59)  T(F): 98.3 (27 Oct 2021 05:17), Max: 99.1 (26 Oct 2021 19:59)  HR: 74 (27 Oct 2021 05:17) (73 - 80)  BP: 146/67 (27 Oct 2021 05:17) (134/51 - 170/77)  BP(mean): --  RR: 17 (27 Oct 2021 05:17) (17 - 18)  SpO2: 97% (27 Oct 2021 05:17) (95% - 97%)      LABS:                        7.9    19.20 )-----------( 197      ( 27 Oct 2021 08:04 )             23.9     10-27    136  |  104  |  50<H>  ----------------------------<  115<H>  3.5   |  21<L>  |  2.73<H>    Ca    8.6      27 Oct 2021 08:04  Phos  4.4     10-27  Mg     2.0     10-27    TPro  7.3  /  Alb  2.5<L>  /  TBili  0.4  /  DBili  0.1  /  AST  11  /  ALT  17  /  AlkPhos  160<H>  10-27          Urinalysis Basic - ( 26 Oct 2021 06:22 )    Color: Yellow / Appearance: Clear / S.015 / pH: x  Gluc: x / Ketone: Negative  / Bili: Negative / Urobili: Negative   Blood: x / Protein: 100 / Nitrite: Negative   Leuk Esterase: Moderate / RBC: 2-5 /HPF / WBC 11-25 /HPF   Sq Epi: x / Non Sq Epi: Few /HPF / Bacteria: Moderate /HPF        COVID-19 PCR: NotDetec (25 Oct 2021 06:48)  COVID-19 PCR: NotDetec (19 Oct 2021 06:21)  COVID-19 PCR: NotDetec (12 Oct 2021 13:12)  COVID-19 PCR: NotDetec (03 Oct 2021 06:52)  COVID-19 PCR: NotDetec (23 Sep 2021 20:35)  COVID-19 PCR: NotDetec (20 Sep 2021 11:38)      RADIOLOGY & ADDITIONAL TESTS:  Imaging from Last 24 Hours:    Electrocardiogram/QTc Interval:    COORDINATION OF CARE:  Care Discussed with Consultants/Other Providers:   Patient is a 71y old  Female who presents with a chief complaint of weakness (26 Oct 2021 17:34)    SUBJECTIVE / OVERNIGHT EVENTS: events noted. Pt anxious to leave, on abx, Urine Cx pending, pt refusing PT    MEDICATIONS  (STANDING):  cefTRIAXone   IVPB 1000 milliGRAM(s) IV Intermittent every 24 hours  folic acid 1 milliGRAM(s) Oral daily  metoprolol tartrate 25 milliGRAM(s) Oral two times a day  PARoxetine 30 milliGRAM(s) Oral daily  simvastatin 40 milliGRAM(s) Oral at bedtime  sodium chloride 0.9%. 1000 milliLiter(s) (100 mL/Hr) IV Continuous <Continuous>    MEDICATIONS  (PRN):  acetaminophen   Tablet .. 650 milliGRAM(s) Oral every 6 hours PRN Temp greater or equal to 38C (100.4F), Mild Pain (1 - 3), Moderate Pain (4 - 6)  ALBUTerol    90 MICROgram(s) HFA Inhaler 2 Puff(s) Inhalation every 6 hours PRN Shortness of Breath and/or Wheezing  OLANZapine 2.5 milliGRAM(s) Oral at bedtime PRN Agitation  oxycodone    5 mG/acetaminophen 325 mG 1 Tablet(s) Oral every 6 hours PRN Severe Pain (7 - 10)    Vital Signs Last 24 Hrs  T(C): 36.8 (27 Oct 2021 05:17), Max: 37.3 (26 Oct 2021 19:59)  T(F): 98.3 (27 Oct 2021 05:17), Max: 99.1 (26 Oct 2021 19:59)  HR: 74 (27 Oct 2021 05:17) (73 - 80)  BP: 146/67 (27 Oct 2021 05:17) (134/51 - 170/77)  BP(mean): --  RR: 17 (27 Oct 2021 05:17) (17 - 18)  SpO2: 97% (27 Oct 2021 05:17) (95% - 97%)      LABS:                        7.9    19.20 )-----------( 197      ( 27 Oct 2021 08:04 )             23.9     10-    136  |  104  |  50<H>  ----------------------------<  115<H>  3.5   |  21<L>  |  2.73<H>    Ca    8.6      27 Oct 2021 08:04  Phos  4.4     10-27  Mg     2.0     10-27    TPro  7.3  /  Alb  2.5<L>  /  TBili  0.4  /  DBili  0.1  /  AST  11  /  ALT  17  /  AlkPhos  160<H>  10-27          Urinalysis Basic - ( 26 Oct 2021 06:22 )    Color: Yellow / Appearance: Clear / S.015 / pH: x  Gluc: x / Ketone: Negative  / Bili: Negative / Urobili: Negative   Blood: x / Protein: 100 / Nitrite: Negative   Leuk Esterase: Moderate / RBC: 2-5 /HPF / WBC 11-25 /HPF   Sq Epi: x / Non Sq Epi: Few /HPF / Bacteria: Moderate /HPF        COVID-19 PCR: NotDetec (25 Oct 2021 06:48)  COVID-19 PCR: NotDetec (19 Oct 2021 06:21)  COVID-19 PCR: NotDetec (12 Oct 2021 13:12)  COVID-19 PCR: NotDetec (03 Oct 2021 06:52)  COVID-19 PCR: NotDetec (23 Sep 2021 20:35)  COVID-19 PCR: NotDetec (20 Sep 2021 11:38)      RADIOLOGY & ADDITIONAL TESTS:  Imaging from Last 24 Hours:    Electrocardiogram/QTc Interval:    COORDINATION OF CARE:  Care Discussed with Consultants/Other Providers:   Patient is a 71y old  Female who presents with a chief complaint of weakness (26 Oct 2021 17:34)    SUBJECTIVE / OVERNIGHT EVENTS: events noted. Pt anxious to leave, on abx, Urine Cx pending, pt refusing PT. Now c/o nausea after percocet    MEDICATIONS  (STANDING):  cefTRIAXone   IVPB 1000 milliGRAM(s) IV Intermittent every 24 hours  folic acid 1 milliGRAM(s) Oral daily  metoprolol tartrate 25 milliGRAM(s) Oral two times a day  PARoxetine 30 milliGRAM(s) Oral daily  simvastatin 40 milliGRAM(s) Oral at bedtime  sodium chloride 0.9%. 1000 milliLiter(s) (100 mL/Hr) IV Continuous <Continuous>    MEDICATIONS  (PRN):  acetaminophen   Tablet .. 650 milliGRAM(s) Oral every 6 hours PRN Temp greater or equal to 38C (100.4F), Mild Pain (1 - 3), Moderate Pain (4 - 6)  ALBUTerol    90 MICROgram(s) HFA Inhaler 2 Puff(s) Inhalation every 6 hours PRN Shortness of Breath and/or Wheezing  OLANZapine 2.5 milliGRAM(s) Oral at bedtime PRN Agitation  oxycodone    5 mG/acetaminophen 325 mG 1 Tablet(s) Oral every 6 hours PRN Severe Pain (7 - 10)    Vital Signs Last 24 Hrs  T(C): 36.8 (27 Oct 2021 05:17), Max: 37.3 (26 Oct 2021 19:59)  T(F): 98.3 (27 Oct 2021 05:17), Max: 99.1 (26 Oct 2021 19:59)  HR: 74 (27 Oct 2021 05:17) (73 - 80)  BP: 146/67 (27 Oct 2021 05:17) (134/51 - 170/77)  BP(mean): --  RR: 17 (27 Oct 2021 05:17) (17 - 18)  SpO2: 97% (27 Oct 2021 05:17) (95% - 97%)    GEN: NAD; A and O x 3, pt has garbled speech at baseline  LUNGS: CTA B/L  HEART: S1 S2  ABDOMEN: soft, non-tender, non-distended, + BS  EXTREMITIES: no edema  NERVOUS SYSTEM:  Awake and alert; no focal neuro deficits    LABS:                        7.9    19.20 )-----------( 197      ( 27 Oct 2021 08:04 )             23.9     10-27    136  |  104  |  50<H>  ----------------------------<  115<H>  3.5   |  21<L>  |  2.73<H>    Ca    8.6      27 Oct 2021 08:04  Phos  4.4     10-27  Mg     2.0     10-27    TPro  7.3  /  Alb  2.5<L>  /  TBili  0.4  /  DBili  0.1  /  AST  11  /  ALT  17  /  AlkPhos  160<H>  10-27        Urinalysis Basic - ( 26 Oct 2021 06:22 )    Color: Yellow / Appearance: Clear / S.015 / pH: x  Gluc: x / Ketone: Negative  / Bili: Negative / Urobili: Negative   Blood: x / Protein: 100 / Nitrite: Negative   Leuk Esterase: Moderate / RBC: 2-5 /HPF / WBC 11-25 /HPF   Sq Epi: x / Non Sq Epi: Few /HPF / Bacteria: Moderate /HPF        COVID-19 PCR: NotDetec (25 Oct 2021 06:48)  COVID-19 PCR: NotDetec (19 Oct 2021 06:21)  COVID-19 PCR: NotDetec (12 Oct 2021 13:12)  COVID-19 PCR: NotDetec (03 Oct 2021 06:52)  COVID-19 PCR: NotDetec (23 Sep 2021 20:35)  COVID-19 PCR: NotDetec (20 Sep 2021 11:38)

## 2021-10-28 ENCOUNTER — TRANSCRIPTION ENCOUNTER (OUTPATIENT)
Age: 71
End: 2021-10-28

## 2021-10-28 VITALS
SYSTOLIC BLOOD PRESSURE: 148 MMHG | RESPIRATION RATE: 17 BRPM | TEMPERATURE: 98 F | OXYGEN SATURATION: 98 % | HEART RATE: 69 BPM | DIASTOLIC BLOOD PRESSURE: 69 MMHG

## 2021-10-28 DIAGNOSIS — Z51.5 ENCOUNTER FOR PALLIATIVE CARE: ICD-10-CM

## 2021-10-28 DIAGNOSIS — R53.81 OTHER MALAISE: ICD-10-CM

## 2021-10-28 DIAGNOSIS — N17.9 ACUTE KIDNEY FAILURE, UNSPECIFIED: ICD-10-CM

## 2021-10-28 DIAGNOSIS — E44.0 MODERATE PROTEIN-CALORIE MALNUTRITION: ICD-10-CM

## 2021-10-28 LAB
ALBUMIN SERPL ELPH-MCNC: 2.4 G/DL — LOW (ref 3.5–5)
ALP SERPL-CCNC: 154 U/L — HIGH (ref 40–120)
ALT FLD-CCNC: 15 U/L DA — SIGNIFICANT CHANGE UP (ref 10–60)
AST SERPL-CCNC: 9 U/L — LOW (ref 10–40)
BASOPHILS # BLD AUTO: 0.11 K/UL — SIGNIFICANT CHANGE UP (ref 0–0.2)
BASOPHILS NFR BLD AUTO: 0.6 % — SIGNIFICANT CHANGE UP (ref 0–2)
BILIRUB DIRECT SERPL-MCNC: <0.1 MG/DL — SIGNIFICANT CHANGE UP (ref 0–0.2)
BILIRUB INDIRECT FLD-MCNC: >0.3 MG/DL — SIGNIFICANT CHANGE UP (ref 0.2–1)
BILIRUB SERPL-MCNC: 0.4 MG/DL — SIGNIFICANT CHANGE UP (ref 0.2–1.2)
EOSINOPHIL # BLD AUTO: 0.09 K/UL — SIGNIFICANT CHANGE UP (ref 0–0.5)
EOSINOPHIL NFR BLD AUTO: 0.5 % — SIGNIFICANT CHANGE UP (ref 0–6)
HCT VFR BLD CALC: 22.7 % — LOW (ref 34.5–45)
HGB BLD-MCNC: 7.5 G/DL — LOW (ref 11.5–15.5)
IMM GRANULOCYTES NFR BLD AUTO: 4.8 % — HIGH (ref 0–1.5)
LDH SERPL L TO P-CCNC: 280 U/L — HIGH (ref 120–225)
LYMPHOCYTES # BLD AUTO: 1.42 K/UL — SIGNIFICANT CHANGE UP (ref 1–3.3)
LYMPHOCYTES # BLD AUTO: 7.7 % — LOW (ref 13–44)
MCHC RBC-ENTMCNC: 29.6 PG — SIGNIFICANT CHANGE UP (ref 27–34)
MCHC RBC-ENTMCNC: 33 GM/DL — SIGNIFICANT CHANGE UP (ref 32–36)
MCV RBC AUTO: 89.7 FL — SIGNIFICANT CHANGE UP (ref 80–100)
MONOCYTES # BLD AUTO: 0.59 K/UL — SIGNIFICANT CHANGE UP (ref 0–0.9)
MONOCYTES NFR BLD AUTO: 3.2 % — SIGNIFICANT CHANGE UP (ref 2–14)
NEUTROPHILS # BLD AUTO: 15.46 K/UL — HIGH (ref 1.8–7.4)
NEUTROPHILS NFR BLD AUTO: 83.2 % — HIGH (ref 43–77)
NRBC # BLD: 0 /100 WBCS — SIGNIFICANT CHANGE UP (ref 0–0)
PLATELET # BLD AUTO: 226 K/UL — SIGNIFICANT CHANGE UP (ref 150–400)
PROT SERPL-MCNC: 7.2 G/DL — SIGNIFICANT CHANGE UP (ref 6–8.3)
RBC # BLD: 2.53 M/UL — LOW (ref 3.8–5.2)
RBC # FLD: 17.4 % — HIGH (ref 10.3–14.5)
SARS-COV-2 RNA SPEC QL NAA+PROBE: SIGNIFICANT CHANGE UP
URATE SERPL-MCNC: 7.7 MG/DL — HIGH (ref 2.5–7)
WBC # BLD: 18.56 K/UL — HIGH (ref 3.8–10.5)
WBC # FLD AUTO: 18.56 K/UL — HIGH (ref 3.8–10.5)

## 2021-10-28 PROCEDURE — 84133 ASSAY OF URINE POTASSIUM: CPT

## 2021-10-28 PROCEDURE — 84484 ASSAY OF TROPONIN QUANT: CPT

## 2021-10-28 PROCEDURE — 86923 COMPATIBILITY TEST ELECTRIC: CPT

## 2021-10-28 PROCEDURE — P9040: CPT

## 2021-10-28 PROCEDURE — 99223 1ST HOSP IP/OBS HIGH 75: CPT

## 2021-10-28 PROCEDURE — 86850 RBC ANTIBODY SCREEN: CPT

## 2021-10-28 PROCEDURE — 99285 EMERGENCY DEPT VISIT HI MDM: CPT | Mod: 25

## 2021-10-28 PROCEDURE — 87086 URINE CULTURE/COLONY COUNT: CPT

## 2021-10-28 PROCEDURE — 76700 US EXAM ABDOM COMPLETE: CPT

## 2021-10-28 PROCEDURE — 36430 TRANSFUSION BLD/BLD COMPNT: CPT

## 2021-10-28 PROCEDURE — 83735 ASSAY OF MAGNESIUM: CPT

## 2021-10-28 PROCEDURE — 82248 BILIRUBIN DIRECT: CPT

## 2021-10-28 PROCEDURE — 85027 COMPLETE CBC AUTOMATED: CPT

## 2021-10-28 PROCEDURE — 80053 COMPREHEN METABOLIC PANEL: CPT

## 2021-10-28 PROCEDURE — 84300 ASSAY OF URINE SODIUM: CPT

## 2021-10-28 PROCEDURE — 87186 SC STD MICRODIL/AGAR DIL: CPT

## 2021-10-28 PROCEDURE — 86901 BLOOD TYPING SEROLOGIC RH(D): CPT

## 2021-10-28 PROCEDURE — 87077 CULTURE AEROBIC IDENTIFY: CPT

## 2021-10-28 PROCEDURE — 36415 COLL VENOUS BLD VENIPUNCTURE: CPT

## 2021-10-28 PROCEDURE — 83935 ASSAY OF URINE OSMOLALITY: CPT

## 2021-10-28 PROCEDURE — 81001 URINALYSIS AUTO W/SCOPE: CPT

## 2021-10-28 PROCEDURE — 85025 COMPLETE CBC W/AUTO DIFF WBC: CPT

## 2021-10-28 PROCEDURE — 84550 ASSAY OF BLOOD/URIC ACID: CPT

## 2021-10-28 PROCEDURE — 99497 ADVNCD CARE PLAN 30 MIN: CPT | Mod: 25

## 2021-10-28 PROCEDURE — 84156 ASSAY OF PROTEIN URINE: CPT

## 2021-10-28 PROCEDURE — 86900 BLOOD TYPING SEROLOGIC ABO: CPT

## 2021-10-28 PROCEDURE — 82570 ASSAY OF URINE CREATININE: CPT

## 2021-10-28 PROCEDURE — 80048 BASIC METABOLIC PNL TOTAL CA: CPT

## 2021-10-28 PROCEDURE — 83615 LACTATE (LD) (LDH) ENZYME: CPT

## 2021-10-28 PROCEDURE — 93005 ELECTROCARDIOGRAM TRACING: CPT

## 2021-10-28 PROCEDURE — 82436 ASSAY OF URINE CHLORIDE: CPT

## 2021-10-28 PROCEDURE — 80076 HEPATIC FUNCTION PANEL: CPT

## 2021-10-28 PROCEDURE — 87635 SARS-COV-2 COVID-19 AMP PRB: CPT

## 2021-10-28 PROCEDURE — 86769 SARS-COV-2 COVID-19 ANTIBODY: CPT

## 2021-10-28 PROCEDURE — 84540 ASSAY OF URINE/UREA-N: CPT

## 2021-10-28 PROCEDURE — 84100 ASSAY OF PHOSPHORUS: CPT

## 2021-10-28 RX ORDER — OLANZAPINE 15 MG/1
1 TABLET, FILM COATED ORAL
Qty: 0 | Refills: 0 | DISCHARGE
Start: 2021-10-28

## 2021-10-28 RX ORDER — CEFUROXIME AXETIL 250 MG
1 TABLET ORAL
Qty: 8 | Refills: 0
Start: 2021-10-28 | End: 2021-10-31

## 2021-10-28 RX ADMIN — Medication 650 MILLIGRAM(S): at 15:37

## 2021-10-28 RX ADMIN — Medication 25 MILLIGRAM(S): at 17:37

## 2021-10-28 RX ADMIN — Medication 1 MILLIGRAM(S): at 11:56

## 2021-10-28 RX ADMIN — Medication 650 MILLIGRAM(S): at 16:15

## 2021-10-28 RX ADMIN — CEFTRIAXONE 100 MILLIGRAM(S): 500 INJECTION, POWDER, FOR SOLUTION INTRAMUSCULAR; INTRAVENOUS at 11:55

## 2021-10-28 RX ADMIN — Medication 650 MILLIGRAM(S): at 08:29

## 2021-10-28 RX ADMIN — Medication 25 MILLIGRAM(S): at 05:35

## 2021-10-28 RX ADMIN — Medication 650 MILLIGRAM(S): at 09:00

## 2021-10-28 RX ADMIN — Medication 30 MILLIGRAM(S): at 11:56

## 2021-10-28 NOTE — CONSULT NOTE ADULT - ATTENDING COMMENTS
71 y F hx metastatic lung CA, debility, ECOG 4, bx showed SCC, started on chemo on this admission, course c/b BRENDA 2/2 TLS vs cisplatin. Known to Palliative team from previous admission. For JANETTE once medically stable. Patient and  both wish to continue with DMT as tolerated and want all measures to prolong her life. They understand she will need to improve her functional status in order to continue w DMT. Support provided. 71 y F hx metastatic lung CA, debility, ECOG 4, bx showed small cell CA,  started on chemo on this admission, course c/b BRENDA 2/2 TLS vs cisplatin. Known to Palliative team from previous admission. For JANETTE once medically stable. Patient and  both wish to continue with DMT as tolerated and want all measures to prolong her life. They understand she will need to improve her functional status in order to continue w DMT. Support provided.

## 2021-10-28 NOTE — CONSULT NOTE ADULT - CONVERSATION DETAILS
Palliative care team met with the pt and her spouse Marcos at the bedside, discussed her oncology history, current clinical condition and goals of care.  Pt expressed feeling much better, still weak but getting better.  Marcos shared how difficult it was for him to provide care for his wife at home; he had to carry her from place to place because she was unable to walk.  They are hopeful with rehab the pt will get stronger and continue with chemotherapy.   Regarding goals of care, discussed the risks vs benefits of cardiopulmonary resuscitation, intubation and artificial nutrition in context of advanced illness.  MOLST drafted to reflect pt's wishes.  CPR/trial of intubation/ no feeding tube.    All questions answered.  Support provided.    Plan discussed with oncology team.

## 2021-10-28 NOTE — DISCHARGE NOTE NURSING/CASE MANAGEMENT/SOCIAL WORK - NSDCPEFALRISK_GEN_ALL_CORE
For information on Fall & injury Prevention, visit [email or web address]

## 2021-10-28 NOTE — CONSULT NOTE ADULT - PROBLEM SELECTOR RECOMMENDATION 3
Clinical evidence indicates that the patient has Moderate protein calorie malnutrition/ 2nd degree. Albumin 2.4.  Pt reports appetite is much better.      In context of Chronic Illness (>1 month)    Energy/Food intake <75% of estimated energy requirement >7 day  Body Fat loss:  muscle atrophy  Weight loss: Moderate -   Muscle mass loss: Mild   Strength: weakened Mild    Diet as tolerated  Nutrition consult  to optimize protein intake    No feeding tube

## 2021-10-28 NOTE — PROGRESS NOTE ADULT - REASON FOR ADMISSION
[facility] Hospitalist Progress Note     [facility]      SUBJECTIVE:  Abdominal pain improved  No nausea  Small flatus this AM, none since.  No BM    OBJECTIVE:  Temp:  [98.1 °F (36.7 °C)-99.4 °F (37.4 °C)] 98.1 °F (36.7 °C)  Pulse:  [66-73] 70  Resp:  [16] stomach. Mildly distended air-filled loops of small bowel in mid abdomen are noted. There cell or air-fluid levels. CALCIFICATIONS:  None significant.       CONCLUSION:  Mildly distended air-filled loops of small bowel in the mid abdomen with air-fluid le weakness diverticula. ABDOMINAL WALL:  Unremarkable. PELVIC ORGANS:  Decompressed bladder. LYMPH NODES:  Unremarkable. BONES:  Degenerative changes in the spine. OTHER:  None.       CONCLUSION:  Imaging findings are consistent with a small bowel obstruction with a t male with PMH sig for HTN, anxiety who presented with SBO.     Abdominal pain 2/2 SBO  - CT imaging independently reviewed and notable for SBO  - apprec gen surgery consultation  - NG tube placed  - IVF, NPO  - IV morphine PRN  - IV tylenol also ordered  -

## 2021-10-28 NOTE — PROGRESS NOTE ADULT - ASSESSMENT
70 y/o F with PMHx of HTN, HLD, and metastatic Lung CA, was discharged on 10/6 from [facility] after hospitalization for sepsis from PNA and UTI, comes in complaining of weakness since discharge.   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   - Repeat UA.  - Send Urine lytes.  - Monitor BMP.  - Continue hydration.      HTN  Optimal  Monitor BP    Hyperphosphatemia  Likely due to TLS and has improved.  Low Po4 diet      Hypocalcemia  Likely due to low albumin  Corrected calcium 8.0  Monitor BRENDA

## 2021-10-28 NOTE — DISCHARGE NOTE NURSING/CASE MANAGEMENT/SOCIAL WORK - PATIENT PORTAL LINK FT
You can access the FollowMyHealth Patient Portal offered by [facility] by registering at the following website: [email or web address]. By joining [employer]’s FollowMyHealth portal, you will also be able to view your health information using other applications (apps) compatible with our system.

## 2021-10-28 NOTE — PROGRESS NOTE ADULT - PROBLEM SELECTOR PROBLEM 8
Discharge planning issues
Discharge planning issues
HTN (hypertension)
Anxiety
Anxiety
HTN (hypertension)

## 2021-10-28 NOTE — CONSULT NOTE ADULT - SUBJECTIVE AND OBJECTIVE BOX
[facility] Geriatric and Palliative Consult Service:  Dr. Suni Lindquist: cell ([phone])  Dr. Berenice Garcia: cell ([phone])   Liudmila Polk NP: cell ([phone])  Domitila Enciso NP: cell ([phone])   Rich Adams LSW: cell ([phone])     HPI:  70 y/o F with PMHx of HTN, HLD, and metastatic Lung CA, was discharged on 10/6 from [facility] after hospitalization for sepsis from PNA and UTI, comes in complaining of weakness since discharge. Patient states that she felt well on the day of discharge but then started feeling weak again the next day. Patient denies fever, chills, headache, dizziness, chest pain, palpitations, cough, SOB, n/v/d/c,  complaints, rashes or bleeding. Patient admitted for placement to [facility] which was refused on previous admission per chart review.   Per  Marcos, he did not understand the extent of her weakness and was not able to take care of her at home. She currently has a file open at [facility] about cancer treatment options and wants to pursue every and all treatment/life saving measures for his wife including intubation and resuscitation if indicated.    In ED:   Temp 97.5F, HR 74, /78, RR 17, SpO2: 97% on 2L NC   EKG: NSR   s/p 1L bolus (12 Oct 2021 17:49)      PAST MEDICAL & SURGICAL HISTORY:  Lumbar stenosis    Bleeding ulcer  stomach ulcer 2011    Anxiety    Spinal stenosis in cervical region    Acute hepatitis C virus infection without hepatic coma  treated 2015- Resolved    Balance disorder    Coronary artery disease involving native coronary artery of native heart without angina pectoris    HTN (hypertension)    HLD (hyperlipidemia)    Coronary artery disease involving native coronary artery of native heart without angina pectoris    H/O colonoscopy    Bleeding ulcer  stomach surgery for bleeding ulcer    Fracture  ORIF Left wrist  1/17/13    History of lumbar surgery  2013    Chronic UTI  Pt reports presently on 2nd dose of antibiotics 7/5/18 10 days, Dr Herman aware, pt going for m/eval 7/13/18.    H/O cardiac catheterization  2013, no intervention needed, treated medically        SOCIAL HISTORY:    Admitted from:  home  (with HHA)           assisted living          [facility]   Substance abuse history:              Tobacco hx:                  Alcohol hx:              Home Opioid hx:  [employer]ian:                                    Preferred Language:    FAMILY HISTORY:  No pertinent family history in first degree relatives     unable to obtain from patient due to poor mentation, family unable to give information  Baseline ADLs (prior to admission):    Allergies    No Known Allergies    Intolerances      Present Symptoms:   Pain:  Fatigue:  Nausea:  Lack of Appetite:   SOB:  Depression:  Anxiety:  Review of Systems: [All others negative or Unable to obtain due to poor mentation]    MEDICATIONS  (STANDING):  cefTRIAXone   IVPB 1000 milliGRAM(s) IV Intermittent every 24 hours  folic acid 1 milliGRAM(s) Oral daily  metoprolol tartrate 25 milliGRAM(s) Oral two times a day  PARoxetine 30 milliGRAM(s) Oral daily  simvastatin 40 milliGRAM(s) Oral at bedtime  sodium chloride 0.9%. 1000 milliLiter(s) (100 mL/Hr) IV Continuous <Continuous>    MEDICATIONS  (PRN):  acetaminophen   Tablet .. 650 milliGRAM(s) Oral every 6 hours PRN Temp greater or equal to 38C (100.4F), Mild Pain (1 - 3), Moderate Pain (4 - 6)  ALBUTerol    90 MICROgram(s) HFA Inhaler 2 Puff(s) Inhalation every 6 hours PRN Shortness of Breath and/or Wheezing  OLANZapine 2.5 milliGRAM(s) Oral at bedtime PRN Agitation  oxycodone    5 mG/acetaminophen 325 mG 1 Tablet(s) Oral every 6 hours PRN Severe Pain (7 - 10)      PHYSICAL EXAM:  Vital Signs Last 24 Hrs  T(C): 36.8 (28 Oct 2021 13:25), Max: 37.1 (27 Oct 2021 20:33)  T(F): 98.3 (28 Oct 2021 13:25), Max: 98.7 (27 Oct 2021 20:33)  HR: 75 (28 Oct 2021 13:25) (67 - 77)  BP: 139/60 (28 Oct 2021 13:25) (139/60 - 190/78)  BP(mean): 78 (28 Oct 2021 13:25) (78 - 84)  RR: 17 (28 Oct 2021 13:25) (17 - 18)  SpO2: 98% (28 Oct 2021 13:25) (97% - 98%)    General: alert  oriented x ____    lethargic distressed cachexia  nonverbal  unarousable verbal    Palliative Performance Scale/Karnofsky Score:  ECOG Performance:    HEENT: no abnormal lesion, dry mouth  ET tube/trach oral lesions:  Lungs: tachypnea/labored breathing  excessive secretions  CV: RRR, S1S2, tachycardia  GI: soft non distended non tender  incontinent               PEG/NG/OG tube  constipation  last BM:   : incontinent  oliguria/anuria  chi  Musculoskeletal: weakness  edema   ambulatory with assistance    bedbound/wheelchair bound  Skin: no abnormal skin lesions, poor skin turgor, pressure ulcer stage:   Neuro: no deficits, cognitive impairment dsyphagia/dysarthria paresis  Oral intake ability: unable/only mouth care, minimal moderate full capability    LABS:                        7.5    18.56 )-----------( 226      ( 28 Oct 2021 07:03 )             22.7     10-27    136  |  104  |  50<H>  ----------------------------<  115<H>  3.5   |  21<L>  |  2.73<H>    Ca    8.6      27 Oct 2021 08:04  Phos  4.4     10-27  Mg     2.0     10-27    TPro  7.2  /  Alb  2.4<L>  /  TBili  0.4  /  DBili  <0.1  /  AST  9<L>  /  ALT  15  /  AlkPhos  154<H>  10-28        RADIOLOGY & ADDITIONAL STUDIES:         [facility] Geriatric and Palliative Consult Service:  Dr. Suni Lindquist: cell ([phone])  Dr. Berenice Garcia: cell ([phone])   Liudmila Polk NP: cell ([phone])  Domitila Enciso NP: cell ([phone])   Rich Adams LSW: cell ([phone])     HPI:  72 y/o F with PMHx of HTN, HLD, and metastatic Lung CA, was discharged on 10/6 from [facility] after hospitalization for sepsis from PNA and UTI, comes in complaining of weakness since discharge. Patient states that she felt well on the day of discharge but then started feeling weak again the next day. Patient denies fever, chills, headache, dizziness, chest pain, palpitations, cough, SOB, n/v/d/c,  complaints, rashes or bleeding. Patient admitted for placement to [facility] which was refused on previous admission per chart review.   Per  Marcos, he did not understand the extent of her weakness and was not able to take care of her at home. She currently has a file open at [facility] about cancer treatment options and wants to pursue every and all treatment/life saving measures for his wife including intubation and resuscitation if indicated.    Interval hx: pt seen and examined at the bedside Aox3.  NAD.  S/p chemotherapy 10/15.  Spouse Marcos also present.  Pt is known to palliative care from previous admission.        PAST MEDICAL & SURGICAL HISTORY:  Lumbar stenosis    Bleeding ulcer  stomach ulcer     Anxiety    Spinal stenosis in cervical region    Acute hepatitis C virus infection without hepatic coma  treated - Resolved    Balance disorder    Coronary artery disease involving native coronary artery of native heart without angina pectoris    HTN (hypertension)    HLD (hyperlipidemia)    Coronary artery disease involving native coronary artery of native heart without angina pectoris    H/O colonoscopy    Bleeding ulcer  stomach surgery for bleeding ulcer    Fracture  ORIF Left wrist  13    History of lumbar surgery      Chronic UTI  Pt reports presently on 2nd dose of antibiotics 18 10 days, Dr Virgil smith, pt going for m/eval 18.    H/O cardiac catheterization  , no intervention needed, treated medically      SOCIAL HISTORY:    Admitted from:  home    Pt is  lives with her  Marcos  Substance abuse history: none             Tobacco hx:  25 PPD history                Alcohol hx: stopped 22 year ago             Home Opioid hx: none  Mandaeism:    Advent                                Preferred Language: English    Marcos Gray  Phone# [phone]    FAMILY HISTORY:  Pt mother  had cancer       Baseline ADLs (prior to admission): required assistance with ADLs    Allergies    No Known Allergies    Intolerances      Present Symptoms:   Pain: denies  Fatigue: denies  Nausea: denies  Lack of Appetite:  denies  SOB: denies  Depression: denies  Anxiety: denies  Review of Systems: [All others negative     MEDICATIONS  (STANDING):  cefTRIAXone   IVPB 1000 milliGRAM(s) IV Intermittent every 24 hours  folic acid 1 milliGRAM(s) Oral daily  metoprolol tartrate 25 milliGRAM(s) Oral two times a day  PARoxetine 30 milliGRAM(s) Oral daily  simvastatin 40 milliGRAM(s) Oral at bedtime  sodium chloride 0.9%. 1000 milliLiter(s) (100 mL/Hr) IV Continuous <Continuous>    MEDICATIONS  (PRN):  acetaminophen   Tablet .. 650 milliGRAM(s) Oral every 6 hours PRN Temp greater or equal to 38C (100.4F), Mild Pain (1 - 3), Moderate Pain (4 - 6)  ALBUTerol    90 MICROgram(s) HFA Inhaler 2 Puff(s) Inhalation every 6 hours PRN Shortness of Breath and/or Wheezing  OLANZapine 2.5 milliGRAM(s) Oral at bedtime PRN Agitation  oxycodone    5 mG/acetaminophen 325 mG 1 Tablet(s) Oral every 6 hours PRN Severe Pain (7 - 10)      PHYSICAL EXAM:  Vital Signs Last 24 Hrs  T(C): 36.8 (28 Oct 2021 13:25), Max: 37.1 (27 Oct 2021 20:33)  T(F): 98.3 (28 Oct 2021 13:25), Max: 98.7 (27 Oct 2021 20:33)  HR: 75 (28 Oct 2021 13:25) (67 - 77)  BP: 139/60 (28 Oct 2021 13:25) (139/60 - 190/78)  BP(mean): 78 (28 Oct 2021 13:25) (78 - 84)  RR: 17 (28 Oct 2021 13:25) (17 - 18)  SpO2: 98% (28 Oct 2021 13:25) (97% - 98%)    General: Obese elderly woman, AOx3.  NAD    Palliative Performance Scale/Karnofsky Score: 40  ECOG Performance: 3    HEENT: atraumatic, moist mucous membrane  Lungs: decreased b/l unlabored on RA  CV: RRR, S1S2  GI: soft non distended, non tender on palpation   : Primafit  Musculoskeletal: weakness, no edema  Skin: no abnormal skin lesions, poor skin turgor  Neuro: able to follow commands  Oral intake ability: good  po intake    LABS:                        7.5    18.56 )-----------( 226      ( 28 Oct 2021 07:03 )             22.7     10-27    136  |  104  |  50<H>  ----------------------------<  115<H>  3.5   |  21<L>  |  2.73<H>    Ca    8.6      27 Oct 2021 08:04  Phos  4.4     10-27  Mg     2.0     10-27    TPro  7.2  /  Alb  2.4<L>  /  TBili  0.4  /  DBili  <0.1  /  AST  9<L>  /  ALT  15  /  AlkPhos  154<H>  10-28        RADIOLOGY & ADDITIONAL STUDIES: reviewed  ADVANCED DIRECTIVES:  MOLST; CPR/trial of intubation/no feeding tube.

## 2021-10-28 NOTE — PROGRESS NOTE ADULT - PROBLEM SELECTOR PROBLEM 5
Anxiety
Transaminitis
HLD (hyperlipidemia)
Transaminitis
HLD (hyperlipidemia)
Transaminitis

## 2021-10-28 NOTE — PROGRESS NOTE ADULT - PROBLEM SELECTOR PROBLEM 1
Metastatic cancer to lung

## 2021-10-28 NOTE — PROGRESS NOTE ADULT - SUBJECTIVE AND OBJECTIVE BOX
S: Patient seen this  afternoon. No acute events. Plan for d/c to rehab. discussed with primary team     Vital Signs Last 24 Hrs  T(C): 36.8 (28 Oct 2021 13:25), Max: 37.1 (27 Oct 2021 20:33)  T(F): 98.3 (28 Oct 2021 13:25), Max: 98.7 (27 Oct 2021 20:33)  HR: 79 (28 Oct 2021 17:35) (67 - 79)  BP: 149/63 (28 Oct 2021 17:35) (139/60 - 190/78)  BP(mean): 78 (28 Oct 2021 13:25) (78 - 84)  RR: 17 (28 Oct 2021 13:25) (17 - 18)  SpO2: 98% (28 Oct 2021 13:25) (97% - 98%)     Gen: A&Ox3, nad, obese   CVS: s1s2   Resp: dec breath sounds b.l bases   GI: soft, distended, no tenderness   CNS: no focal deficits   Ext: no ecchymosis     MEDICATIONS  (STANDING):  cefTRIAXone   IVPB 1000 milliGRAM(s) IV Intermittent every 24 hours  folic acid 1 milliGRAM(s) Oral daily  metoprolol tartrate 25 milliGRAM(s) Oral two times a day  PARoxetine 30 milliGRAM(s) Oral daily  simvastatin 40 milliGRAM(s) Oral at bedtime  sodium chloride 0.9%. 1000 milliLiter(s) (100 mL/Hr) IV Continuous <Continuous>    CBC Full  -  ( 28 Oct 2021 07:03 )  WBC Count : 18.56 K/uL  RBC Count : 2.53 M/uL  Hemoglobin : 7.5 g/dL  Hematocrit : 22.7 %  Platelet Count - Automated : 226 K/uL  Mean Cell Volume : 89.7 fl  Mean Cell Hemoglobin : 29.6 pg  Mean Cell Hemoglobin Concentration : 33.0 gm/dL  Auto Neutrophil # : 15.46 K/uL  Auto Lymphocyte # : 1.42 K/uL  Auto Monocyte # : 0.59 K/uL  Auto Eosinophil # : 0.09 K/uL  Auto Basophil # : 0.11 K/uL  Auto Neutrophil % : 83.2 %  Auto Lymphocyte % : 7.7 %  Auto Monocyte % : 3.2 %  Auto Eosinophil % : 0.5 %  Auto Basophil % : 0.6 %    10-27    136  |  104  |  50<H>  ----------------------------<  115<H>  3.5   |  21<L>  |  2.73<H>    Ca    8.6      27 Oct 2021 08:04  Phos  4.4     10-27  Mg     2.0     10-27    TPro  7.2  /  Alb  2.4<L>  /  TBili  0.4  /  DBili  <0.1  /  AST  9<L>  /  ALT  15  /  AlkPhos  154<H>  10-28

## 2021-10-28 NOTE — PROGRESS NOTE ADULT - PROBLEM SELECTOR PROBLEM 2
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
Anemia
Anemia
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
Anemia
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

## 2021-10-28 NOTE — PROGRESS NOTE ADULT - PROVIDER SPECIALTY LIST ADULT
Heme/Onc
Internal Medicine
Internal Medicine
Nephrology
Pulmonology
Pulmonology
Heme/Onc
Internal Medicine
Nephrology
Nephrology
Pulmonology
Heme/Onc
Internal Medicine
Internal Medicine
Nephrology
Pulmonology
Heme/Onc
Nephrology
Internal Medicine

## 2021-10-28 NOTE — CONSULT NOTE ADULT - PROBLEM SELECTOR RECOMMENDATION 2
Likely 2/2 Tumor Lysis Syndrome s/p antineoplastic drug therapy 10/15.  receiving IVF Scr improving.  Nephrology following    Avoid nephrotoxic medications/NSAIDs

## 2021-10-28 NOTE — PROGRESS NOTE ADULT - PROBLEM SELECTOR PROBLEM 7
Discharge planning issues
Thrombocytopenia
Need for prophylactic measure
Coronary artery disease involving native coronary artery of native heart without angina pectoris
Need for prophylactic measure
Thrombocytopenia
Thrombocytopenia
Coronary artery disease involving native coronary artery of native heart without angina pectoris
Thrombocytopenia

## 2021-10-28 NOTE — PROGRESS NOTE ADULT - PROBLEM SELECTOR PLAN 10
-  PT recommends JANETTE : pt and family chose [facility]   -  Care management following for discharge planning   -  Plan for discharge to [facility],  pending urine cultures.

## 2021-10-28 NOTE — PROGRESS NOTE ADULT - ASSESSMENT
# Extensive Stage High-Grade Lung Neuroendocrine CA with likely mets to liver/peritoneum/bone (diagnosed 10/04/21, Ki-67 98%)   s/p 1st cycles of abbreviated chemo.   # BRENDA / TLS: creat slight decrease today.    Poor performance status   Palliative care evaluation   Not a chemo candidate currently   Nephrology on board   Encourage PT participation   Supportive care   Plan for d/c to rehab.   On discharge out-pt f/u with Dr. Lennie Mathews  Very guarded prognosis   DVT ppx. Case d/w primary team

## 2021-10-28 NOTE — GOALS OF CARE CONVERSATION - ADVANCED CARE PLANNING - CONVERSATION DETAILS
Met with patient and spouse regarding current condition, debilitated condition, goals of care. Patient is known to Palliative team from previous admission. Patient is A&Ox2, somewhat confused, defers to  for decisionmaking. Denies pain, SOB, N/V, constipation. Good appetite.  pt readmitted for weakness,  had declined JANETTE on last admission, but now acknowledges patient became more debilitated from the hospitalization and unable to care for her at home. He is sending paperwork to [facility] for anticipated evaluation for treatment. provided anticipatory guidance regarding inability to receive treatment while at [facility] and would need to improve functional status to be able to tolerate DMT. He verbalized understanding and stated [facility] had told him the same thing. For now, his goal remains for his wife to get stronger and pursue treatment for her cancer. he wishes to continue all measures to prolong her life at this time. She remains FULL CODE. Support provided.
Palliative care team met with pt and her spouse Marcos at bedside to discuss her current condition and goals of care. Pt reported that she was adjusting appropriately to her hospitalization, and is feeling much better. Pt and her  reflected on the difficulty of having the pt at home, due to her difficulty with ADLs. Pt and her  expressed feelings of hope around her discharge to [facility] and future treatment. Palliative care team discussed the risks and benefits of CPR, intubation, and feeding tubes. Pt shared that she was not ready to make decisions on her code status at this time, sharing that she found it depressing. Pt stated that she would not want a feeding tube. MOLST completed, pt is CPR/trial of intubation/no feeding tubes. Pt and her  agreed to reach out as needed.    Patrick Adams  [phone]

## 2021-10-28 NOTE — PROGRESS NOTE ADULT - PROBLEM SELECTOR PROBLEM 6
Anxiety
Anemia due to chemotherapy
Need for prophylactic measure
Anemia due to chemotherapy
HTN (hypertension)
Anxiety
Anemia due to chemotherapy
Anemia due to chemotherapy
HTN (hypertension)

## 2021-10-28 NOTE — PROGRESS NOTE ADULT - NUTRITIONAL ASSESSMENT
Diet, DASH/TLC:   Sodium & Cholesterol Restricted  No Concentrated Phosphorus  Supplement Feeding Modality:  Oral  Nepro Cans or Servings Per Day:  1       Frequency:  Daily (10-20-21 @ 18:42) [Active]
Diet, DASH/TLC:   Sodium & Cholesterol Restricted  No Concentrated Phosphorus  Supplement Feeding Modality:  Oral  Nepro Cans or Servings Per Day:  1       Frequency:  Daily (10-20-21 @ 18:42) [Active]

## 2021-10-28 NOTE — CONSULT NOTE ADULT - PROBLEM SELECTOR RECOMMENDATION 9
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 S/p 1st cycle of inpatient chemotherapy with Cisplatin/etoposide given 10/15 ; day 2 & 3 chemo cancelled due to acutely worse hg and renal function w/ TLS, rasburicase 3 mg given 10/18. Next cycle due in 3-4 weeks.   Pt to f/u with Dr. Casper at [facility].    Pt wishes to continue to pursue disease modifying treatment.  She stated she has a lot to look forward to.        Pt remains a FULL CODE.

## 2021-10-28 NOTE — PROGRESS NOTE ADULT - PROBLEM SELECTOR PROBLEM 4
BRENDA (acute kidney injury)
BRENDA (acute kidney injury)
HLD (hyperlipidemia)
BRENDA (acute kidney injury)
HTN (hypertension)
BRENDA (acute kidney injury)
BRENDA (acute kidney injury)
HTN (hypertension)
BRENDA (acute kidney injury)

## 2021-10-28 NOTE — PROGRESS NOTE ADULT - PROBLEM SELECTOR PROBLEM 3
Leucocytosis
Hypokalemia
Pancytopenia
Leucocytosis
Pancytopenia
HTN (hypertension)
Leucocytosis
Hypokalemia

## 2021-10-28 NOTE — CONSULT NOTE ADULT - CONSULT REASON
Discuss complex medical decision making related to goals of care due to malignancy
High-Grade Neuroendocrine CA
sonam
UTI/ Leukocytosis

## 2021-10-28 NOTE — PROGRESS NOTE ADULT - SUBJECTIVE AND OBJECTIVE BOX
Time of Visit:  Patient seen and examined.     MEDICATIONS  (STANDING):  cefTRIAXone   IVPB 1000 milliGRAM(s) IV Intermittent every 24 hours  folic acid 1 milliGRAM(s) Oral daily  metoprolol tartrate 25 milliGRAM(s) Oral two times a day  PARoxetine 30 milliGRAM(s) Oral daily  simvastatin 40 milliGRAM(s) Oral at bedtime  sodium chloride 0.9%. 1000 milliLiter(s) (100 mL/Hr) IV Continuous <Continuous>      MEDICATIONS  (PRN):  acetaminophen   Tablet .. 650 milliGRAM(s) Oral every 6 hours PRN Temp greater or equal to 38C (100.4F), Mild Pain (1 - 3), Moderate Pain (4 - 6)  ALBUTerol    90 MICROgram(s) HFA Inhaler 2 Puff(s) Inhalation every 6 hours PRN Shortness of Breath and/or Wheezing  OLANZapine 2.5 milliGRAM(s) Oral at bedtime PRN Agitation  oxycodone    5 mG/acetaminophen 325 mG 1 Tablet(s) Oral every 6 hours PRN Severe Pain (7 - 10)       Medications up to date at time of exam.      PHYSICAL EXAMINATION:  Patient has no new complaints.  GENERAL: The patient is a well-developed, well-nourished, in no apparent distress.     Vital Signs Last 24 Hrs  T(C): 36.8 (28 Oct 2021 13:25), Max: 37.1 (27 Oct 2021 20:33)  T(F): 98.3 (28 Oct 2021 13:25), Max: 98.7 (27 Oct 2021 20:33)  HR: 75 (28 Oct 2021 13:25) (67 - 77)  BP: 139/60 (28 Oct 2021 13:25) (139/60 - 190/78)  BP(mean): 78 (28 Oct 2021 13:25) (78 - 84)  RR: 17 (28 Oct 2021 13:25) (17 - 18)  SpO2: 98% (28 Oct 2021 13:25) (97% - 98%)   (if applicable)    Chest Tube (if applicable)    HEENT: Head is normocephalic and atraumatic. Extraocular muscles are intact. Mucous membranes are moist.     NECK: Supple, no palpable adenopathy.    LUNGS: Clear to auscultation, no wheezing, rales, or rhonchi.    HEART: Regular rate and rhythm without murmur.    ABDOMEN: Soft, nontender, and nondistended.  No hepatosplenomegaly is noted.    : No painful voiding, no pelvic pain    EXTREMITIES: Without any cyanosis, clubbing, rash, lesions or edema.    NEUROLOGIC: Awake, alert, oriented, grossly intact    SKIN: Warm, dry, good turgor.      LABS:                        7.5    18.56 )-----------( 226      ( 28 Oct 2021 07:03 )             22.7     10-27    136  |  104  |  50<H>  ----------------------------<  115<H>  3.5   |  21<L>  |  2.73<H>    Ca    8.6      27 Oct 2021 08:04  Phos  4.4     10-27  Mg     2.0     10-27    TPro  7.2  /  Alb  2.4<L>  /  TBili  0.4  /  DBili  <0.1  /  AST  9<L>  /  ALT  15  /  AlkPhos  154<H>  10-28                        MICROBIOLOGY: (if applicable)    RADIOLOGY & ADDITIONAL STUDIES:  EKG:   CXR:  ECHO:    IMPRESSION: 71y Female PAST MEDICAL & SURGICAL HISTORY:  Lumbar stenosis    Bleeding ulcer  stomach ulcer 2011    Anxiety    Spinal stenosis in cervical region    Acute hepatitis C virus infection without hepatic coma  treated 2015- Resolved    Balance disorder    Coronary artery disease involving native coronary artery of native heart without angina pectoris    HTN (hypertension)    HLD (hyperlipidemia)    Coronary artery disease involving native coronary artery of native heart without angina pectoris    H/O colonoscopy    Bleeding ulcer  stomach surgery for bleeding ulcer    Fracture  ORIF Left wrist  1/17/13    History of lumbar surgery  2013    Chronic UTI  Pt reports presently on 2nd dose of antibiotics 7/5/18 10 days, Dr Herman aware, pt going for m/eval 7/13/18.    H/O cardiac catheterization  2013, no intervention needed, treated medically     p/w            Impression: 72 Y/O Female presented with weakness since discharge. Patient was discharged on 10-06-21 from [facility] after hospitalization for Sepsis from UTI and Pneumonia. Has Metastatic Lung Ca who had s/p EBUS / Bronchoscopy on 10-04-21. Right endobronchial  Mass. Has Stage IV High-Grade Lung , Neuroendocrine CA with likely mets to liver/peritoneum/bone being followed by Oncology Team for inpatient Chemo .  10-25-21, 10-19-21, 10-12-21 Negative for Covid 19 PCR.    Suggestion:  O2 saturation 96%  room air. So far saturating good room air .   Pulmonary Oral hygiene care.  Transfuse as needed per Heme  Oncology follow up for Chemo therapy.  Continue PRN Albuterol 90 mcg 2 puffs Q 6 Hours.

## 2021-10-28 NOTE — PROGRESS NOTE ADULT - ASSESSMENT
71 YOF with PMH HTN, HLD, recent dx of metastatic lung CA which has not started on active chemo yet.  Brought by  due to worsening of weakness which contributed to delaying  on initiate chemo tx .  Dr thomas is outpt heme/onc, [facility] is following and recommends to start 1st chemo as in- patient. Day 1 was 10/15. pt is noted tumor lysis syndrome after first chemo, received Rasburicase, phoslo. 2nd chemo on hold in setting BRENDA per Heme/Onc.  on IVF. Renal/abdominal sono  for BRENDA, transaminitis revealed kidneys with no hydronephrosis. Hepatomegaly, Cholelithiasis, Nonspecific gallbladder wall thickening ( already established from previous w/u in 9/21). Patient with stage IV Ca; Oncology following with plans for palliative chemotherapy  q 3 weeks with close monitoring of renal function. Anemia stable HBG remains above 7 and likely 2/2 to chemo.  transfuse if <7 Plan for discharge to [facility], pending acceptance given patient refusing to participate in PT. May likely need LTC placement instead, NCM to follow.    10/25-Seen and examined at bed side, comfortable with no c/o discomfort, states she wants to go home.  Cr trending up today, IVF hydration in progress.  Leukocytosis improving, pt. afebrile.  Discharge to [facility] pending pt.'s participation with PT and improved Cr.  10/26-Remains comfortable and cheerful today, states she will participate with PT so she can "get out of here".      10/27  Patient with new complaints of rib pain.  Cr continues to trend up, nephro following.  UA+, urine culture testing, leukocytosis trending up, afebrile, started Ceftriaxone      Discharge to [facility]

## 2021-10-28 NOTE — GOALS OF CARE CONVERSATION - ADVANCED CARE PLANNING - WHAT MATTERS MOST
for patient to get stronger so that she can receive her cancer treatment.
Returning to baseline and continuing to receive treatment.

## 2021-10-28 NOTE — PROGRESS NOTE ADULT - SUBJECTIVE AND OBJECTIVE BOX
Patient is a 71y old  Female who presents with a chief complaint of weakness (26 Oct 2021 11:26)      INTERVAL HPI/OVERNIGHT EVENTS: D/C planning     MEDICATIONS  (STANDING):  cefTRIAXone   IVPB 1000 milliGRAM(s) IV Intermittent every 24 hours  folic acid 1 milliGRAM(s) Oral daily  metoprolol tartrate 25 milliGRAM(s) Oral two times a day  ondansetron Injectable 4 milliGRAM(s) IV Push once  PARoxetine 30 milliGRAM(s) Oral daily  simvastatin 40 milliGRAM(s) Oral at bedtime  sodium chloride 0.9%. 1000 milliLiter(s) (100 mL/Hr) IV Continuous <Continuous>    MEDICATIONS  (PRN):  acetaminophen   Tablet .. 650 milliGRAM(s) Oral every 6 hours PRN Temp greater or equal to 38C (100.4F), Mild Pain (1 - 3), Moderate Pain (4 - 6)  ALBUTerol    90 MICROgram(s) HFA Inhaler 2 Puff(s) Inhalation every 6 hours PRN Shortness of Breath and/or Wheezing  OLANZapine 2.5 milliGRAM(s) Oral at bedtime PRN Agitation  oxycodone    5 mG/acetaminophen 325 mG 1 Tablet(s) Oral every 6 hours PRN Severe Pain (7 - 10)    __________________________________________________  REVIEW OF SYSTEMS:    CONSTITUTIONAL: No fever,   EYES: no acute visual disturbances  NECK: No pain or stiffness  RESPIRATORY: No cough; No shortness of breath  CARDIOVASCULAR: No chest pain, no palpitations  GASTROINTESTINAL: No pain. No nausea or vomiting; No diarrhea   NEUROLOGICAL: No headache or numbness, no tremors  MUSCULOSKELETAL: No joint pain, right rib pain  GENITOURINARY: no dysuria, no frequency, no hesitancy  PSYCHIATRY: no depression , no anxiety  ALL OTHER  ROS negative        Vital Signs Last 24 Hrs  T(C): 36.8 (27 Oct 2021 13:55), Max: 37.3 (26 Oct 2021 19:59)  T(F): 98.2 (27 Oct 2021 13:55), Max: 99.1 (26 Oct 2021 19:59)  HR: 74 (27 Oct 2021 13:55) (73 - 80)  BP: 136/69 (27 Oct 2021 13:55) (134/51 - 170/77)  BP(mean): 85 (27 Oct 2021 13:55) (85 - 85)  RR: 17 (27 Oct 2021 13:55) (17 - 18)  SpO2: 98% (27 Oct 2021 13:55) (95% - 98%)      ________________________________________________  PHYSICAL EXAM:    GENERAL: No acute distress  HEENT: Normocephalic;  conjunctivae and sclerae clear; moist mucous membranes;   NECK : supple.  No JVD noted  CHEST/LUNG: Clear to auscultation bilaterally with good air entry.  No rales, wheezes or rhonchi   HEART: S1 S2  regular; no murmurs  ABDOMEN: Soft, Nontender, Nondistended; Bowel sounds present  EXTREMITIES: no cyanosis; no edema; no calf tenderness  SKIN: warm and dry; no rash  NERVOUS SYSTEM:  Awake and alert; Oriented  to place, person and time ; no new deficits    _________________________________________________  LABS:                                   7.9    19.20 )-----------( 197      ( 27 Oct 2021 08:04 )             23.9       10-27    136  |  104  |  50<H>  ----------------------------<  115<H>  3.5   |  21<L>  |  2.73<H>    Ca    8.6      27 Oct 2021 08:04  Phos  4.4     10-27  Mg     2.0     10-27    TPro  7.3  /  Alb  2.5<L>  /  TBili  0.4  /  DBili  0.1  /  AST  11  /  ALT  17  /  AlkPhos  160<H>  10-27             Urinalysis Basic - ( 26 Oct 2021 06:22 )    Color: Yellow / Appearance: Clear / S.015 / pH: x  Gluc: x / Ketone: Negative  / Bili: Negative / Urobili: Negative   Blood: x / Protein: 100 / Nitrite: Negative   Leuk Esterase: Moderate / RBC: 2-5 /HPF / WBC 11-25 /HPF   Sq Epi: x / Non Sq Epi: Few /HPF / Bacteria: Moderate /HPF      CAPILLARY BLOOD GLUCOSE    RADIOLOGY & ADDITIONAL TESTS:      Imaging Personally Reviewed:  YES/NO    Consultant(s) Notes Reviewed:   YES/ No    Care Discussed with Consultants :

## 2021-10-29 RX ORDER — CEFTRIAXONE 500 MG/1
1 INJECTION, POWDER, FOR SOLUTION INTRAMUSCULAR; INTRAVENOUS
Qty: 4 | Refills: 0
Start: 2021-10-29 | End: 2021-11-01

## 2021-11-03 LAB
CULTURE RESULTS: SIGNIFICANT CHANGE UP
SPECIMEN SOURCE: SIGNIFICANT CHANGE UP

## 2021-11-24 LAB
CULTURE RESULTS: SIGNIFICANT CHANGE UP
SPECIMEN SOURCE: SIGNIFICANT CHANGE UP

## 2021-12-20 NOTE — ED ADULT NURSE NOTE - CADM POA URETHRAL CATHETER
 COLON SURGERY      DILATION AND CURETTAGE OF UTERUS N/A 2/16/2017    DILATATION AND CURETTAGE HYSTEROSCOPY performed by Wanda Willams MD at [facility]  02/17/2017    JOINT REPLACEMENT      TONSILLECTOMY       Family History   Problem Relation Age of Onset    Heart Disease Other         Family History    Cancer Other         Family History    Hypertension Other         Family History    High Blood Pressure Mother     Colon Cancer Mother     Other Father         UNKNOWN     Social History     Tobacco Use    Smoking status: Never Smoker    Smokeless tobacco: Never Used   Substance Use Topics    Alcohol use: No     Alcohol/week: 0.0 standard drinks      Current Outpatient Medications   Medication Sig Dispense Refill    warfarin (COUMADIN) 5 MG tablet TAKE 1 TABLET BY MOUTH ONCE DAILY OR AS DIRECTED 30 tablet 5    MULTAQ 400 MG TABS TAKE 1 TABLET BY MOUTH TWICE DAILY WITH MEALS 180 tablet 1    metoprolol succinate (TOPROL XL) 25 MG extended release tablet Take 1 tablet by mouth twice daily 180 tablet 1    anastrozole (ARIMIDEX) 1 MG tablet Take 1 tablet by mouth once daily      Acetaminophen (TYLENOL) 325 MG CAPS Take by mouth daily      Carboxymethylcellulose Sodium (REFRESH TEARS OP) Apply 1 drop to eye as needed      vitamin D (CHOLECALCIFEROL) 1000 UNIT TABS tablet Take 1,000 Units by mouth daily      allopurinol (ZYLOPRIM) 100 MG tablet Take 100 mg by mouth 2 times daily       calcium carbonate 600 MG TABS tablet Take 1 tablet by mouth 2 times daily Indications: SUPPLEMENT      spironolactone (ALDACTONE) 25 MG tablet Take 25 mg by mouth nightly Indications: FLUID RETENTION       levothyroxine (SYNTHROID) 50 MCG tablet Take 50 mcg by mouth Daily Indications: HYPOTHROIDISM       polyethylene glycol (MIRALAX) powder Take 17 g by mouth daily Indications: REGULARITY       losartan (COZAAR) 100 MG tablet Take 50 mg by mouth Daily Indications: HYPERTENSION  bumetanide (BUMEX) 1 MG tablet Take 0.5 mg by mouth daily       simvastatin (ZOCOR) 40 MG tablet Take 40 mg by mouth nightly Indications: CHOLESTEROL       ferrous sulfate 325 (65 FE) MG tablet Take 325 mg by mouth every other day Indications: ANEMIA  (Patient not taking: Reported on 8/18/2021)       No current facility-administered medications for this visit. Allergies: Bactrim [sulfamethoxazole-trimethoprim] and Codeine    Review of Systems  Constitutional  no appetite change, or unexpected weight change. No fever, chills or diaphoresis. No significant change in activity level or new onset of fatigue. HEENT  no significant rhinorrhea or epistaxis. No tinnitus or significant hearing loss. Eyes  no sudden vision change or amaurosis. No corneal arcus, xantholasma, subconjunctival hemorrhage or discharge. Respiratory  no significant wheezing, stridor, apnea or cough. No dyspnea on exertion or shortness of air. Cardiovascular  no exertional chest pain to suggest myocardial ischemia. No orthopnea or PND. No sensation of sustained arrythmia. No occurrence of slow heart rate. No palpitations. No claudication. Gastrointestinal  no abdominal swelling or pain. No blood in stool. No severe constipation, diarrhea, nausea, or vomiting. Genitourinary  no dysuria, frequency, or urgency. No flank pain or hematuria. Musculoskeletal  no back pain or myalgia. No problems with gait. Extremities - no clubbing, cyanosis or extremity edema. Skin  no color change or rash. No pallor. No new surgical incision. Neurologic  no speech difficulty, facial asymmetry or lateralizing weakness. No seizures, presyncope or syncope. No significant dizziness. Hematologic  no easy bruising or excessive bleeding. Psychiatric  no severe anxiety or insomnia. No confusion. All other review of systems are negative.     Objective  Vital Signs - /80   Pulse 59   Ht 5' 5.5\" (1.664 m)   Wt 222 lb (100.7 kg)   SpO2 98%   BMI 36.38 kg/m²   General - [place] is alert, cooperative, and pleasant. Well groomed. No acute distress. Body habitus - Body mass index is 36.38 kg/m². HEENT  Head is normocephalic. No circumoral cyanosis. Dentition is normal.  EYES -   Lids normal without ptosis. No discharge, edema or subconjunctival hemorrhage. Neck - Symmetrical without apparent mass or lymphadenopathy. Respiratory - Normal respiratory effort without use of accessory muscles. Ausculatation reveals vesicular breath sounds without crackles, wheezes, rub or rhonchi. Cardiovascular  No jugular venous distention. Auscultation reveals regular rate and rhythm. No audible clicks, gallop or rub. No murmur. No lower extremity varicosities. No carotid bruits. Abdominal -  No visible distention, mass or pulsations. Extremities - No clubbing or cyanosis. No statis dermatitis or ulcers. No edema. Musculoskeletal -   No Osler's nodes. No kyphosis or scoliosis. Gait is even and regular without limp or shuffle. Ambulates without assistance. Skin -  Warm and dry; no rash or pallor. No new surgical wound. Neurological - No focal neurological deficits. Thought processes coherent. No apparent tremor. Oriented to person, place and time. Psychiatric -  Appropriate affect and mood. Data reviewed:  9/20/21 Lexiscan  Impression:   There is no obvious infarct or ischemia, with a calculated ejection   fraction of 72 %. Suggest: Clinical correlation with consideration for medical   management. Signed by Dr Lane Wagner     9/20/21 echo   LV is normal in size with preserved LV systolic function. LV ejection  fraction estimated at 50 to 55%. Probable grade 1 diastolic dysfunction. RV is normal in size with normal systolic function. Mild left atrial enlargement. Normal right atrial size. Aortic valve is trileaflet with moderate leaflet thickening and mildly  reduced leaflet mobility.  No significant stenosis with aortic sclerosis. No regurgitation noted. Mitral valve is structurally normal with normal leaflet mobility. Trace to  mild mitral regurgitation. No stenosis. Mild tricuspid regurgitation. Trace pulmonic regurgitation. No significant pericardial effusion. Signature    Electronically signed by Socrates Schafer MD(Interpreting physician)  on 09/20/2021 08:22 PM    Lab Results   Component Value Date    WBC 10.5 08/15/2018    HGB 12.9 08/15/2018    HCT 40.2 08/15/2018    MCV 95.9 08/15/2018     08/15/2018     Lab Results   Component Value Date     01/30/2020    K 4.2 01/30/2020     01/30/2020    CO2 26 01/30/2020    BUN 16 01/30/2020    CREATININE 1.9 (H) 01/30/2020    GLUCOSE 94 01/30/2020    CALCIUM 10.0 01/30/2020    PROT 7.1 08/15/2018    LABALBU 4.3 08/15/2018    BILITOT 0.4 08/15/2018    ALKPHOS 78 08/15/2018    AST 20 08/15/2018    ALT 16 08/15/2018    LABGLOM 25 (A) 01/30/2020       BP Readings from Last 3 Encounters:   12/20/21 132/80   08/18/21 114/72   12/03/20 126/74    Pulse Readings from Last 3 Encounters:   12/20/21 59   08/18/21 60   12/03/20 81        Wt Readings from Last 3 Encounters:   12/20/21 222 lb (100.7 kg)   08/18/21 223 lb (101.2 kg)   12/03/20 216 lb (98 kg)       Recent Results (from the past 1008 hour(s))   POCT INR    Collection Time: 12/03/21 12:00 AM   Result Value Ref Range    INR 2.7     Protime     POCT INR    Collection Time: 12/20/21 10:39 AM   Result Value Ref Range    INR 2.3     Protime 27.2      Assessment/Plan:   Diagnosis Orders   1. PAF (paroxysmal atrial fibrillation) (HCC)  POCT INR   2. Moderate mitral regurgitation by prior echocardiogram     3. Hypertension, unspecified type     4. Mixed hyperlipidemia     5. On Coumadin for atrial fibrillation (HCC)       [id number] Score: 4  Disclaimer: Risk Score calculation is dependent on accuracy of patient problem list and past encounter diagnosis.     Stable CV status without overt heart failure, sensed arrhythmia or angina. PAF - no recurrent sustained AF. Continue Multaq. Chronic anticoag - no bleeding issues on coumadin. HTN - normotensive on current regimen. BP goal less than 130/80. Continue same. Hyperlipidemia - labs followed by PCP. Continue Zocor 40 mg daily. Patient is compliant with medication regimen. Previous cardiac history and records reviewed. Continue current medical management for cardiac related condition. Continue other current medications as directed. Continue to follow up with primary care provider for non cardiac medical problems. If your primary care provider is outside of the [facility], please request that your labs be faxed to this office at [phone]. BP goal 130/80 or less. Call the office with any problems, questions or concerns at [phone]. Cardiology follow up as scheduled in [address] appointments. Educational included in patient instructions. Heart health.       KAYLA Pendleton No

## 2022-02-16 NOTE — PRE-OP CHECKLIST - SIDE RAILS UP
---------------------  From: Lali Bassett CMA   To: Gabrile Teixeira MD;     Sent: 2020 11:15:03 AM CDT  Subject: alrpazolam refill     PCP:   ARACELIS    Medication:   alprazolam  Last Filled:  20    Quantity:  30  Refills:  0      Date of last office visit and reason:   20      Note:  [facility]    Pharmacy: _    Resource:   Jackie  Phone:   [phone]  ** Submitted: **  Order:ALPRAZolam (ALPRAZolam 0.5 mg oral tablet)  1 tab(s)  Oral  q6 hrs  Qty:  30 tab(s)        Refills:  0          Substitutions Allowed     PRN  for anxiety      Route To Pharmacy - [facility]    Signed by Gabriel Teixeira MD  2020 6:20:00 PM   done

## 2022-06-29 NOTE — PROGRESS NOTE ADULT - PROBLEM SELECTOR PLAN 1
06/29/2022  Jose Reyes is a 52 y.o., male.      Pre-op Assessment    I have reviewed the Patient Summary Reports.     I have reviewed the Nursing Notes. I have reviewed the NPO Status.   I have reviewed the Medications.     Review of Systems  Anesthesia Hx:  No problems with previous Anesthesia  Family Hx of Anesthesia complications:  Personal Hx of Anesthesia complications   Social:  Non-Smoker    Hematology/Oncology:  Hematology Normal   Oncology Normal     EENT/Dental:EENT/Dental Normal   Cardiovascular:  Cardiovascular Normal     Pulmonary:  Pulmonary Normal    Renal/:  Renal/ Normal     Hepatic/GI:  Hepatic/GI Normal    Musculoskeletal:  Musculoskeletal Normal    Neurological:  Neurology Normal    Endocrine:  Endocrine Normal    Dermatological:  Skin Normal    Psych:  Psychiatric Normal           Physical Exam  General: Well nourished, Cooperative, Alert and Oriented    Airway:  Mallampati: II   Mouth Opening: Normal  TM Distance: Normal  Tongue: Normal  Neck ROM: Normal ROM    Chest/Lungs:  Clear to auscultation, Normal Respiratory Rate    Heart:  Rate: Normal  Rhythm: Regular Rhythm    Abdomen:  Normal, Soft        Anesthesia Plan  Type of Anesthesia, risks & benefits discussed:    Anesthesia Type: Gen Natural Airway  Intra-op Monitoring Plan: Standard ASA Monitors  Post Op Pain Control Plan: multimodal analgesia  Induction:  IV  Informed Consent: Informed consent signed with the Patient and all parties understand the risks and agree with anesthesia plan.  All questions answered. Patient consented to blood products? Yes  ASA Score: 2    Ready For Surgery From Anesthesia Perspective.     .       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   per Oncology goal of treatment is palliative;  q 3 weeks with close monitoring of renal function.  Per last [facility] discussion patient to remain full code; Palliative follows and patient's decision has not changed  Continue to monitor and supplemental O2; currently 94-98% on 2 L via NC   Heme/onc [facility] (Dr. Miranda's) group following.

## 2023-01-11 NOTE — PHYSICAL THERAPY INITIAL EVALUATION ADULT - BALANCE DISTURBANCE, IDENTIFIED IMPAIRMENT CONTRIBUTE, REHAB EVAL
pain/decreased strength Drysol Counseling:  I discussed with the patient the risks of drysol/aluminum chloride including but not limited to skin rash, itching, irritation, burning.

## 2023-01-15 NOTE — PROGRESS NOTE ADULT - PROBLEM SELECTOR PLAN 7
Statement Selected -  Platelets 197  today  -  Continue to monitor CBC daily  -  Bleeding precautions  -  Oncology following

## 2023-01-19 NOTE — CONSULT NOTE ADULT - SUBJECTIVE AND OBJECTIVE BOX
Reason for consult: Lung Mass on CT scan   This is a 71 years old female with PMH of HTN, HLD and gastric ulcer. Admitted due to hypoxia, respiratory failure, and hypotension. Work-up reviled PNA – Azithromycin and Ceftriaxone, also found with BRENDA and sepsis. Surgery consulted due to the presence of right hilar lung mass and peritoneal lesions concerning for neoplasia.   Patient reports cough over the last 7 days, associated with clear secretions, with occasional strikes of blood after prolonged coughing, noted fatigue, and anorexia. Denies fever, weight loss, night sweats, SOB, chest pain. Patient does report history of tobacco use – stopped 22 years ago, smoked for 15 years (1 pack a day of cigarette).  is a current smoker – 1 to 2 cigarettes a day (in close spaces like the house). Denies exposure to chemicals, other smokes, bird drops, tuberculosis, and vaping. Worked as  at [employer].   Patient is currently admitted to medicine floor, pulmonology consulted for possible EBUS, CT of the chest and abdomen performed. HD stable, afebrile. Hepatitis C positive.   Surgeries: L4/5 disk replacement surgery. Distal gastrectomy for perforated gastric ulcer. Spinal stenosis with cervical access.   Medications: Amlodipine, simvastatin , paroxetin, metoprolol.   Family Hx is negative for neoplasia, exposure to chemicals, smokes, bird drops, tuberculosis, and vaping. Denies Hx of lung pathologies.     amLODIPine   Tablet 5 milliGRAM(s) Oral daily  azithromycin  IVPB 500 milliGRAM(s) IV Intermittent every 24 hours  cefTRIAXone   IVPB 1000 milliGRAM(s) IV Intermittent every 24 hours  heparin   Injectable 5000 Unit(s) SubCutaneous every 8 hours  metoprolol tartrate 100 milliGRAM(s) Oral two times a day    Vital Signs Last 24 Hrs  T(C): 36.9 (22 Sep 2021 14:36), Max: 37.1 (22 Sep 2021 11:14)  T(F): 98.5 (22 Sep 2021 14:36), Max: 98.7 (22 Sep 2021 11:14)  HR: 76 (22 Sep 2021 14:36) (74 - 92)  BP: 107/82 (22 Sep 2021 14:36) (107/82 - 188/82)  BP(mean): --  RR: 18 (22 Sep 2021 14:36) (18 - 19)  SpO2: 92% (22 Sep 2021 14:36) (92% - 97%)  I&O's Detail      PHYSICAL EXAMINATION   General: NAD  NEURO:  follows commands but with some confusion, unable to respond correctly to all questions.  called for verification of the information  PULM: nonlabored breathing, no respiratory distress, Right lobe diffuse crackles.   ABD: soft, nondistended, non tender, no rebound, no guarding, no tympany.   EXTREM: WWP, no edema, no calf tenderness  SKIN: no skin changes, warm and perfused. No cervical, supraclavicular, axillary or inguinal lymphadenopathy   VASC: No cyanosis,  no pallor.     LABS:                        10.2   6.61  )-----------( 296      ( 22 Sep 2021 07:54 )             30.5         138  |  96  |  15  ----------------------------<  112<H>  3.5   |  29  |  0.93    Ca    10.9<H>      22 Sep 2021 07:54  Phos  3.7       Mg     2.1         TPro  7.6  /  Alb  2.9<L>  /  TBili  0.5  /  DBili  x   /  AST  61<H>  /  ALT  22  /  AlkPhos  90        Urinalysis Basic - ( 21 Sep 2021 09:15 )    Color: Yellow / Appearance: very cloudy / S.020 / pH: x  Gluc: x / Ketone: Small  / Bili: Negative / Urobili: Negative   Blood: x / Protein: 30 mg/dL / Nitrite: Positive   Leuk Esterase: Moderate / RBC: 2-5 /HPF / WBC >50 /HPF   Sq Epi: x / Non Sq Epi: Many /HPF / Bacteria: Many /HPF     CT of the Abdomen and Pelvis with Oral and I contrast 21  FINDINGS:  LOWER CHEST: Partially visualized known right hilar/infrahilar mass with atelectatic lung at the right lung base and small right pleural effusion more fully described on chest CT of 2021. Coronary artery calcification is also noted.  LIVER: Within normal limits. Suspected lesion in the right hepatic lobe on the noncontrast study is not confirmed on this portal venous phase study.  BILE DUCTS: Normal caliber.  GALLBLADDER: Personally calcified gallstone versus gallbladder wall calcification. This may be correlated with ultrasound on a nonemergent basis.  SPLEEN: Within normal limits.  PANCREAS: Within normal limits.  ADRENALS: Within normal limits.  KIDNEYS/URETERS: Within normal limits.  BLADDER: Within normal limits.  REPRODUCTIVE ORGANS: Uterus and adnexa within normal limits. Incidental note is made of left adnexal calcification  BOWEL: No bowel obstruction. Appendix is normal. Changes of gastric bypass  PERITONEUM: No ascites. Right mid abdominal peritoneal nodules are noted measuring up to 1.0 cm suspicious for metastatic peritoneal implants.  VESSELS: Atherosclerotic changes.  RETROPERITONEUM/LYMPH NODES: Upper abdominal lymph nodes at the level of the celiac axis and superior mesenteric artery are seen measuring up to 8 mm in short axis.  ABDOMINAL WALL: Within normal limits.  BONES: Degenerative changes. Pedicle screws are seen on the left at the L4 and L5 levels with artificial disc at L4-L5 and mild grade 1 anterolisthesis of L4 on L5. Mild superior T12 compression fracture  IMPRESSION:  Liver lesions suspected on the noncontrast study not confirmed on this portal venous phase study. Consider arterial phase imaging if needed for patient's diagnosis.  Group of peritoneal nodules in the right mid abdomen may represent metastatic peritoneal implants.  Calcified gallstone versus gallbladder wall calcification which may be further evaluated with ultrasound on a nonemergent basis  Evidence of gastric bypass    CT Scan of the Chest 21  LUNGS AND AIRWAYS: Central mass encasing the right hilum, measuring at least 5.4 x 5.2 cm. Postobstructive atelectasis in the right middle and lower lobes. Patchy consolidation in the right upper lobe, possibly pneumonia.  PLEURA: Small right pleural effusion.    MEDIASTINUM AND DESIREE:   Mediastinal and right hilar adenopathy. For example:  * Lower right paratracheal node, measuring 1.9 x 1.8 cm  * Subcarinal node (series 3, image 65), measuring 2.0 x 1.5 cm  VESSELS: Atherosclerotic calcifications.  HEART: Heart size is normal. No pericardial effusion.  CHEST WALL AND LOWER NECK: Within normal limits.    IMPRESSION:  Central mass encasing the right hilum with postobstructive atelectasis in the right middle and lower lobes.  Mediastinal and right hilar adenopathy.  Probable liver metastases.  Patchy consolidation in the right upper lobe, possibly pneumonia.   Awake/Alert/Cooperative

## 2023-03-23 NOTE — PROGRESS NOTE ADULT - PROBLEM SELECTOR PLAN 3
Outpatient Care Management  Plan of Care Follow Up Visit    Patient: Christy Webber  MRN: [id number]  Date of Service: 03/23/2023  Completed by: Zoila Machado RN  Referral Date: 12/21/2022    Reason for Visit   Patient presents with    OPCM Chart Review    OPCM RN Second Follow-Up Attempt    Nursing Assessment    Update Plan Of Care     03/27/23       Brief Summary: 03/27/23  visit completed. Care plan reviewed and updated.   Next Steps: plan to follow up in approximately 2 weeks, consider discharge.  VASYL Machado RN    3/23/2023 1st attempt to complete Follow-Up  for Outpatient Care Management, left message.  VASYL Machado RN     US noted above  IR advised lesions not large enough for biopsy  Pt to follow up as outpatient for PET scan per heme/onc recommendations  Dr. Mathews, Heme/Onc, following  Dr. Arnett, Surg-Onc, following  Palliative care following

## 2025-01-06 NOTE — PROGRESS NOTE ADULT - PROBLEM/PLAN-10
Please get either magnesium glycinate 100 or 200 mg or magnesium chloride 64 mg and take 1 tab 3 times a week (with a meal, do not take with other medications).  
DISPLAY PLAN FREE TEXT

## 2025-01-22 NOTE — PROGRESS NOTE ADULT - SUBJECTIVE AND OBJECTIVE BOX
INTERVAL HPI: Patient seen and examined at bedside; Events noted; Patient completed chemotherapy on 10/15 w/o complication       MEDICATIONS  (STANDING):  aprepitant 80 milliGRAM(s) Oral once  folic acid 1 milliGRAM(s) Oral daily  magnesium sulfate  IVPB 2 Gram(s) IV Intermittent once  metoprolol tartrate 25 milliGRAM(s) Oral two times a day  PARoxetine 30 milliGRAM(s) Oral daily  simvastatin 40 milliGRAM(s) Oral at bedtime  sodium chloride 0.65% Nasal 1 Spray(s) Both Nostrils three times a day    MEDICATIONS  (PRN):  acetaminophen   Tablet .. 650 milliGRAM(s) Oral every 6 hours PRN Temp greater or equal to 38C (100.4F), Mild Pain (1 - 3), Moderate Pain (4 - 6)  ALBUTerol    90 MICROgram(s) HFA Inhaler 2 Puff(s) Inhalation every 6 hours PRN Shortness of Breath and/or Wheezing  OLANZapine 2.5 milliGRAM(s) Oral at bedtime PRN Agitation      Vital Signs Last 24 Hrs  T(C): 36.3 (16 Oct 2021 05:10), Max: 37.3 (15 Oct 2021 12:01)  T(F): 97.4 (16 Oct 2021 05:10), Max: 99.2 (15 Oct 2021 12:01)  HR: 90 (16 Oct 2021 05:10) (81 - 101)  BP: 156/62 (16 Oct 2021 05:10) (118/70 - 165/87)  BP(mean): 87 (16 Oct 2021 05:10) (87 - 87)  RR: 20 (16 Oct 2021 05:10) (10 - 20)  SpO2: 95% (16 Oct 2021 05:10) (95% - 99%)  _________________  PHYSICAL EXAM:  ---------------------------  GEN: NAD; NC/AT; A and O x   LUNGS: no wheezing; decreased bilateral air entry; no use of accessory muscles for breathing  HEART: Nl S1 S2; no M   ABDOMEN: Soft, Nontender, non distended  EXTREMITIES: no cyanosis; no edema; warm and dry  NERVOUS SYSTEM:  Awake and alert; no focal neuro  deficits    _________________________________________________  LABS:                        7.8    6.39  )-----------( 140      ( 16 Oct 2021 06:51 )             25.0     10-16    143  |  113<H>  |  20<H>  ----------------------------<  151<H>  4.0   |  25  |  0.85    Ca    7.6<L>      16 Oct 2021 06:51  Phos  4.7     10-16  Mg     1.4     10-16    TPro  6.9  /  Alb  2.5<L>  /  TBili  0.3  /  DBili  x   /  AST  158<H>  /  ALT  90<H>  /  AlkPhos  176<H>  10-16      CAPILLARY BLOOD GLUCOSE                     Quality 226: Preventive Care And Screening: Tobacco Use: Screening And Cessation Intervention: Patient screened for tobacco use and is an ex/non-smoker Quality 47: Advance Care Plan: Advance Care Planning discussed and documented; advance care plan or surrogate decision maker documented in the medical record. Detail Level: Detailed INTERVAL HPI: Patient seen and examined at bedside; Events noted; Patient completed chemotherapy on 10/15 w/o complication; pt denies SOB, emesis, diarrhea or constipation or cough/SOB      MEDICATIONS  (STANDING):  aprepitant 80 milliGRAM(s) Oral once  folic acid 1 milliGRAM(s) Oral daily  magnesium sulfate  IVPB 2 Gram(s) IV Intermittent once  metoprolol tartrate 25 milliGRAM(s) Oral two times a day  PARoxetine 30 milliGRAM(s) Oral daily  simvastatin 40 milliGRAM(s) Oral at bedtime  sodium chloride 0.65% Nasal 1 Spray(s) Both Nostrils three times a day    MEDICATIONS  (PRN):  acetaminophen   Tablet .. 650 milliGRAM(s) Oral every 6 hours PRN Temp greater or equal to 38C (100.4F), Mild Pain (1 - 3), Moderate Pain (4 - 6)  ALBUTerol    90 MICROgram(s) HFA Inhaler 2 Puff(s) Inhalation every 6 hours PRN Shortness of Breath and/or Wheezing  OLANZapine 2.5 milliGRAM(s) Oral at bedtime PRN Agitation      Vital Signs Last 24 Hrs  T(C): 36.3 (16 Oct 2021 05:10), Max: 37.3 (15 Oct 2021 12:01)  T(F): 97.4 (16 Oct 2021 05:10), Max: 99.2 (15 Oct 2021 12:01)  HR: 90 (16 Oct 2021 05:10) (81 - 101)  BP: 156/62 (16 Oct 2021 05:10) (118/70 - 165/87)  BP(mean): 87 (16 Oct 2021 05:10) (87 - 87)  RR: 20 (16 Oct 2021 05:10) (10 - 20)  SpO2: 95% (16 Oct 2021 05:10) (95% - 99%)  _________________  PHYSICAL EXAM:  ---------------------------  GEN: NAD; NC/AT; A and O x 3  LUNGS: no wheezing; + bilateral air entry; no use of accessory muscles for breathing  HEART: Nl S1 S2; no M   ABDOMEN: Soft, Nontender, non distended  EXTREMITIES: no cyanosis; no edema; warm and dry  NERVOUS SYSTEM:  Awake and alert; no focal neuro  deficits    _________________________________________________  LABS:                        7.8    6.39  )-----------( 140      ( 16 Oct 2021 06:51 )             25.0     10-16    143  |  113<H>  |  20<H>  ----------------------------<  151<H>  4.0   |  25  |  0.85    Ca    7.6<L>      16 Oct 2021 06:51  Phos  4.7     10-16  Mg     1.4     10-16    TPro  6.9  /  Alb  2.5<L>  /  TBili  0.3  /  DBili  x   /  AST  158<H>  /  ALT  90<H>  /  AlkPhos  176<H>  10-16      CAPILLARY BLOOD GLUCOSE

## 2025-02-19 NOTE — PROGRESS NOTE ADULT - SUBJECTIVE AND OBJECTIVE BOX
Procedure Note: Left Greater and Lesser Occipital Nerve Block    Indications: Occipital Neuralgia    Informed consent was obtained (explaining the procedure and risks and benefits of procedure) from patient:  the signed consent form was placed in the medical record.    A time out was completed, verifying correct patient, procedure,site, positioning, and implants or special equipment.    Patient's left occipital area was palpated to identify location of greater and lesser occipital nerve. Alcohol was applied topically to the skin. Using a 25 gauge needle (aspirating during insertion), 3 mL of a mixture of 40 mg Kenalog and 5 mL bupivacaine 0.25% was injected on the left side (directing needle to center, left and right of painful focus). The needle was then redirected to the lesser occipital nerve and after negative aspiration, 3 mL of injectate was utilized. A total of 6 mL of injectate was used. Pressure with a gauze pad was held briefly upon the site of puncture to minimize bleeding and to further spread anaesthetic subcutaneously.    There were no complications. Patient was comfortable and left without complaint.    NP Note discussed with  primary attending    Patient is a 71y old  Female who presents with a chief complaint of Pneumonia (05 Oct 2021 15:36)    INTERVAL HPI/OVERNIGHT EVENTS: Patient offers no complaints, plan for discharge delayed by  who is requesting patient be transported home tomorrow     MEDICATIONS  (STANDING):  amLODIPine   Tablet 5 milliGRAM(s) Oral daily  folic acid 1 milliGRAM(s) Oral daily  influenza   Vaccine 0.5 milliLiter(s) IntraMuscular once  lisinopril 10 milliGRAM(s) Oral every 12 hours  LORazepam     Tablet 1 milliGRAM(s) Oral at bedtime  melatonin 1 milliGRAM(s) Oral at bedtime  metoprolol tartrate 100 milliGRAM(s) Oral two times a day  nitrofurantoin monohydrate/macrocrystals (MACROBID) 100 milliGRAM(s) Oral two times a day  nystatin Powder 1 Application(s) Topical every 8 hours  pantoprazole    Tablet 40 milliGRAM(s) Oral before breakfast  PARoxetine 40 milliGRAM(s) Oral daily  simvastatin 40 milliGRAM(s) Oral at bedtime  sodium chloride 0.9%. 1000 milliLiter(s) (75 mL/Hr) IV Continuous <Continuous>    MEDICATIONS  (PRN):  acetaminophen   Tablet .. 650 milliGRAM(s) Oral every 6 hours PRN Temp greater or equal to 38C (100.4F), Mild Pain (1 - 3)  ALBUTerol    90 MICROgram(s) HFA Inhaler 2 Puff(s) Inhalation every 6 hours PRN Shortness of Breath and/or Wheezing      __________________________________________________  REVIEW OF SYSTEMS:    CONSTITUTIONAL: No fever,   EYES: no acute visual disturbances  NECK: No pain or stiffness  RESPIRATORY: No cough; No shortness of breath  CARDIOVASCULAR: No chest pain, no palpitations  GASTROINTESTINAL: No pain. No nausea or vomiting; No diarrhea   NEUROLOGICAL: No headache or numbness, no tremors  MUSCULOSKELETAL: No joint pain, no muscle pain  GENITOURINARY: no dysuria, no frequency, no hesitancy  PSYCHIATRY: no depression , no anxiety  ALL OTHER  ROS negative        Vital Signs Last 24 Hrs  T(C): 36.4 (05 Oct 2021 13:12), Max: 36.8 (04 Oct 2021 17:31)  T(F): 97.6 (05 Oct 2021 13:12), Max: 98.3 (04 Oct 2021 17:31)  HR: 74 (05 Oct 2021 13:12) (74 - 102)  BP: 110/58 (05 Oct 2021 13:12) (103/70 - 128/66)  BP(mean): --  RR: 17 (05 Oct 2021 13:12) (17 - 18)  SpO2: 97% (05 Oct 2021 13:12) (93% - 97%)    ________________________________________________  PHYSICAL EXAM:  GENERAL: NAD  HEENT: Normocephalic;  conjunctivae and sclerae clear;  NECK : supple  CHEST/LUNG: Clear to auscultation bilaterally with good air entry breathing with ease on 2L N/C   HEART: S1 S2  regular; no murmurs, gallops or rubs  ABDOMEN: Soft, Nontender, Nondistended; Bowel sounds present  EXTREMITIES: no cyanosis; no edema; no calf tenderness  SKIN: warm and dry; no rash  NERVOUS SYSTEM:  Awake and alert; Oriented  to place, person and time    _________________________________________________  LABS:                        8.1    9.25  )-----------( 154      ( 05 Oct 2021 07:56 )             24.7     10-05    141  |  107  |  22<H>  ----------------------------<  121<H>  3.5   |  24  |  1.12    Ca    7.9<L>      05 Oct 2021 07:56  Mg     2.2     10-04    TPro  7.0  /  Alb  2.6<L>  /  TBili  0.4  /  DBili  x   /  AST  74<H>  /  ALT  21  /  AlkPhos  92  10-05    PT/INR - ( 04 Oct 2021 08:25 )   PT: 13.5 sec;   INR: 1.14 ratio             CAPILLARY BLOOD GLUCOSE            RADIOLOGY & ADDITIONAL TESTS:   < from: MR Abdomen No Cont (09.29.21 @ 17:53) >    EXAM:  MR ABDOMEN                            PROCEDURE DATE:  09/29/2021          INTERPRETATION:  CLINICAL INFORMATION: Lung mass and liver lesions    COMPARISON: CT abdomen pelvis 9/22/2021, chest CT 9/20/2021    CONTRAST/COMPLICATIONS:  IV Contrast: NOne  Oral Contrast: None  Complications: None    PROCEDURE:  MRI of the abdomen was performed.      FINDINGS:  LOWER CHEST: Right lung mass and small right pleural effusion.    LIVER: Multiple bilobar hepatic metastatic disease. For reference:  -Left medial segment lesion measures 1.9 x 1.9 cm (4-10)  -Right posterior segment lesion measures 1.5 x 1.4 cm (4-16)  BILE DUCTS: Mild dilation. No choledocholithiasis.  GALLBLADDER: Contracted with stones.  SPLEEN: Normal.  PANCREAS: Normal.  ADRENALS: Normal.  KIDNEYS/URETERS: No hydronephrosis.    VISUALIZED PORTIONS:  BOWEL: No bowel-related abnormality.  PERITONEUM: No ascites. Right upper quadrant peritoneal implants were better seen on CT.  VESSELS: Aortic atherosclerosis without aneurysm.  RETROPERITONEUM/LYMPH NODES: No adenopathy.  ABDOMINAL WALL: Normal.  BONES: Diffusely abnormal marrow signal and restricted diffusion indicative of metastatic disease.    IMPRESSION:  *  Bilobar hepatic metastases are confirmed.  *  Right upper quadrant peritoneal implants better seen on CT.  *  Diffuse metastatic disease throughout the bone marrow.      --- End of Report ---          < end of copied text >      < from: Xray Chest 1 View- PORTABLE-Urgent (Xray Chest 1 View- PORTABLE-Urgent .) (09.28.21 @ 14:17) >    EXAM:  XR CHEST PORTABLE URGENT 1V                            PROCEDURE DATE:  09/28/2021          INTERPRETATION:  Portable chest radiograph    CLINICAL INFORMATION: Fever.    TECHNIQUE:  Portable  AP view of the chest.    COMPARISON: 9/20/2021 chest available for review.    FINDINGS:    The lungs show residual RIGHT basilar airspace consolidation and/or effusion. LEFT lung parenchyma remains clear.. No pneumothorax.    Cardiac chambers normal size.    Visualized osseous structures are intact.    IMPRESSION:   Residual RIGHT basilar airspace consolidation and/or effusion..      Please review CT scan 9/20/2021 which stated "central mass encasing the RIGHT hilum with postobstructive atelectasis in the RIGHT middle lobe and lower lobe"    ---End of Report ---      < end of copied text >    < from: CT Head No Cont (09.25.21 @ 07:24) >    EXAM:  CT BRAIN                            PROCEDURE DATE:  09/25/2021          INTERPRETATION:  CLINICAL STATEMENT: Lung mass    TECHNIQUE: CT of the head was performed without IV contrast.  RAPID artificial intelligence was utilized for the preliminary evaluation of intracranial hemorrhage.    COMPARISON: MRI brain 9/22/2021, 5/27/2018    FINDINGS:  There is moderate diffuse parenchymal volume loss. There are areas of low attenuation in the periventricular white matter likely related to mild chronic microvascular ischemic changes.    There is no acute intracranial hemorrhage, mass effect or midline shift. There is no acute territorial infarct. There is no hydrocephalus.    The cranium is intact. Multiple lytic lesions noted.    Mucosal thickening sphenoid sinus    IMPRESSION:  Multiple lytic lesions suspicious for metastases or multiple myeloma. Correlate clinically    If intraparenchymal metastasis is a clinical concern, MRI exam with contrast recommended    No acute intracranial hemorrhage.    --- End of Report ---    < end of copied text >  < from: US Abdomen Upper Quadrant Right (09.24.21 @ 12:48) >    IMPRESSION: Multiple circumscribed rounded lesions are identified within the hepatic parenchyma measuring up to 1.9 x 1.7 x 1.1 cm, indeterminate etiology and clinical significance. Further evaluation with contrast-enhanced abdominal MRI is recommended.    --- End of Report ---    < end of copied text >  Imaging Personally Reviewed:  YES    Consultant(s) Notes Reviewed:   YES    Plan of care was discussed with patient and /or primary care giver; all questions and concerns were addressed and care was aligned with patient's wishes.

## 2025-07-08 NOTE — H&P ADULT - NSHPSOCIALHISTORY_GEN_ALL_CORE
----- Message from Ira Lacy MD sent at 7/8/2025  7:54 AM CDT -----  Regarding: RE: Pelvic floor PT  Thanks for your message.    RN team - can you call pt and assess for symptoms of vaginal pain or discomfort, especially with sexual intercourse? She may benefit from vaginal estrace if so.  ----- Message -----  From: Nita Roberts PT  Sent: 7/7/2025   8:37 AM CDT  To: Sanchez Magdaleno PA-C; Ira Lacy MD  Subject: RE: Pelvic floor PT                              Chencho Bradford,    Thank you for reaching out to Dr. Lacy.     The tissue did not appear abnormal, she did present with dyssynergia which we did address in the session. She has been compliant with her HEP and lifestyle changes and has had no reports of bleeding with bowel movements and all reported around a bristol stool type 4. The only time she reported pain was when she was trying the correct evacuation technique but this has since been resolved at the last session. She said she is going to re-schedule her colonoscopy, her partner was sick and that's why she had to cancel.     Thanks!  Nita  ----- Message -----  From: Sanchez Magdaleno PA-C  Sent: 7/6/2025   8:00 PM CDT  To: Ira Lacy MD; Nita Roberts PT  Subject: RE: Pelvic floor PT                              Chencho Moya,    Thank you so much for reaching out. I will include her primary care provider, Dr. Lacy, in this thread since the estrogen creams are not our area of expertise. Dr. Lacy, thoughts on this?    Additionally, to confirm, since I only had a virtual visit and was unable to perform a BEATRICE, did this pale tissue appear abnormal? I guess what I am wondering is if what you noted appeared as a lesion or not, given her colonoscopy was cancelled.     Thank you,  Sanchez Magdaleno PA-C  ----- Message -----  From: Nita Roberts PT  Sent: 7/2/2025   3:18 PM CDT  To: Sanchez Magdaleno PA-C  Subject: Pelvic floor PT                                  Hi  Sanchez,    My name is Nita Roberts, I am a pelvic floor physical therapist at [facility]. I just saw Alondra  today for her first follow up appointment and did both an internal vaginal pelvic floor muscle assessment and digital rectal exam. She did have some reports of itching at the vulvar tissue/perineal body/external anal sphincter and did have pale tissue around these areas. I educated her on discontinuing scented soaps and wipes but was thinking she might benefit from a vaginal estrogen cream. I was wondering your thoughts on this? I appreciate your time!    Thanks!  Nita   Smokes 1ppd for many years, stopped 22 years ago